# Patient Record
Sex: MALE | Race: WHITE | NOT HISPANIC OR LATINO | Employment: OTHER | URBAN - NONMETROPOLITAN AREA
[De-identification: names, ages, dates, MRNs, and addresses within clinical notes are randomized per-mention and may not be internally consistent; named-entity substitution may affect disease eponyms.]

---

## 2018-01-29 RX ORDER — ALPRAZOLAM 0.5 MG/1
1 TABLET ORAL EVERY 12 HOURS PRN
Refills: 0 | COMMUNITY
Start: 2018-01-23 | End: 2018-01-31 | Stop reason: SDUPTHER

## 2018-01-29 RX ORDER — DOXEPIN HYDROCHLORIDE 50 MG/1
1 CAPSULE ORAL
COMMUNITY
End: 2018-01-31 | Stop reason: SDUPTHER

## 2018-01-29 RX ORDER — PANTOPRAZOLE SODIUM 40 MG/1
1 TABLET, DELAYED RELEASE ORAL DAILY
COMMUNITY
End: 2018-01-31 | Stop reason: SDUPTHER

## 2018-01-29 RX ORDER — RANITIDINE 150 MG/1
1 TABLET ORAL
COMMUNITY
End: 2018-01-31 | Stop reason: SDUPTHER

## 2018-01-31 ENCOUNTER — OFFICE VISIT (OUTPATIENT)
Dept: FAMILY MEDICINE CLINIC | Facility: CLINIC | Age: 63
End: 2018-01-31
Payer: COMMERCIAL

## 2018-01-31 VITALS
BODY MASS INDEX: 33.68 KG/M2 | OXYGEN SATURATION: 97 % | HEART RATE: 68 BPM | SYSTOLIC BLOOD PRESSURE: 124 MMHG | TEMPERATURE: 98.6 F | RESPIRATION RATE: 18 BRPM | WEIGHT: 227.4 LBS | DIASTOLIC BLOOD PRESSURE: 70 MMHG | HEIGHT: 69 IN
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
## 2018-01-31 DIAGNOSIS — Z72.0 TOBACCO ABUSE: ICD-10-CM

## 2018-01-31 DIAGNOSIS — I10 HYPERTENSION, UNSPECIFIED TYPE: Primary | ICD-10-CM

## 2018-01-31 DIAGNOSIS — K21.9 GASTROESOPHAGEAL REFLUX DISEASE WITHOUT ESOPHAGITIS: ICD-10-CM

## 2018-01-31 DIAGNOSIS — K63.5 POLYP OF COLON, UNSPECIFIED PART OF COLON, UNSPECIFIED TYPE: ICD-10-CM

## 2018-01-31 DIAGNOSIS — M25.50 ARTHRALGIA, UNSPECIFIED JOINT: ICD-10-CM

## 2018-01-31 DIAGNOSIS — K25.9 GASTRIC ULCER, UNSPECIFIED CHRONICITY, UNSPECIFIED WHETHER GASTRIC ULCER HEMORRHAGE OR PERFORATION PRESENT: ICD-10-CM

## 2018-01-31 DIAGNOSIS — E53.8 VITAMIN B12 DEFICIENCY: ICD-10-CM

## 2018-01-31 DIAGNOSIS — G47.00 INSOMNIA, UNSPECIFIED TYPE: ICD-10-CM

## 2018-01-31 DIAGNOSIS — Z13.220 SCREENING FOR HYPERLIPIDEMIA: ICD-10-CM

## 2018-01-31 DIAGNOSIS — E55.9 VITAMIN D DEFICIENCY: ICD-10-CM

## 2018-01-31 DIAGNOSIS — F41.9 ANXIETY: ICD-10-CM

## 2018-01-31 DIAGNOSIS — Z13.1 SCREENING FOR DIABETES MELLITUS: ICD-10-CM

## 2018-01-31 PROCEDURE — 3078F DIAST BP <80 MM HG: CPT | Performed by: NURSE PRACTITIONER

## 2018-01-31 PROCEDURE — 99204 OFFICE O/P NEW MOD 45 MIN: CPT | Performed by: NURSE PRACTITIONER

## 2018-01-31 PROCEDURE — 3074F SYST BP LT 130 MM HG: CPT | Performed by: NURSE PRACTITIONER

## 2018-01-31 RX ORDER — IBUPROFEN 800 MG/1
800 TABLET ORAL EVERY 8 HOURS PRN
Qty: 270 TABLET | Refills: 1 | Status: SHIPPED | OUTPATIENT
Start: 2018-01-31 | End: 2019-10-24 | Stop reason: SDUPTHER

## 2018-01-31 RX ORDER — PANTOPRAZOLE SODIUM 40 MG/1
40 TABLET, DELAYED RELEASE ORAL DAILY
Qty: 90 TABLET | Refills: 1 | Status: SHIPPED | OUTPATIENT
Start: 2018-01-31 | End: 2019-05-23 | Stop reason: SDUPTHER

## 2018-01-31 RX ORDER — ERGOCALCIFEROL 1.25 MG/1
1 CAPSULE ORAL
COMMUNITY
Start: 2018-01-06 | End: 2018-01-31 | Stop reason: SDUPTHER

## 2018-01-31 RX ORDER — LISINOPRIL 20 MG/1
1 TABLET ORAL DAILY
COMMUNITY
Start: 2018-01-24 | End: 2018-01-31 | Stop reason: SDUPTHER

## 2018-01-31 RX ORDER — CITALOPRAM 20 MG/1
1 TABLET ORAL DAILY
COMMUNITY
Start: 2017-12-13 | End: 2018-01-31 | Stop reason: SDUPTHER

## 2018-01-31 RX ORDER — RANITIDINE 150 MG/1
150 TABLET ORAL
Qty: 180 TABLET | Refills: 1 | Status: SHIPPED | OUTPATIENT
Start: 2018-01-31 | End: 2019-06-24 | Stop reason: SDUPTHER

## 2018-01-31 RX ORDER — IBUPROFEN 800 MG/1
1 TABLET ORAL EVERY 8 HOURS PRN
COMMUNITY
Start: 2017-12-13 | End: 2018-01-31 | Stop reason: SDUPTHER

## 2018-01-31 RX ORDER — ALPRAZOLAM 0.5 MG/1
0.5 TABLET ORAL EVERY 12 HOURS PRN
Qty: 60 TABLET | Refills: 2 | Status: SHIPPED | OUTPATIENT
Start: 2018-01-31 | End: 2018-08-22 | Stop reason: SDUPTHER

## 2018-01-31 RX ORDER — LISINOPRIL 20 MG/1
20 TABLET ORAL DAILY
Qty: 90 TABLET | Refills: 1 | Status: SHIPPED | OUTPATIENT
Start: 2018-01-31 | End: 2018-08-22 | Stop reason: SDUPTHER

## 2018-01-31 RX ORDER — DOXEPIN HYDROCHLORIDE 50 MG/1
50 CAPSULE ORAL
Qty: 90 CAPSULE | Refills: 1 | Status: SHIPPED | OUTPATIENT
Start: 2018-01-31 | End: 2019-11-27 | Stop reason: ALTCHOICE

## 2018-01-31 RX ORDER — ERGOCALCIFEROL 1.25 MG/1
50000 CAPSULE ORAL
Qty: 3 CAPSULE | Refills: 1 | Status: SHIPPED | OUTPATIENT
Start: 2018-01-31 | End: 2018-11-27 | Stop reason: SDUPTHER

## 2018-01-31 RX ORDER — CITALOPRAM 20 MG/1
20 TABLET ORAL DAILY
Qty: 90 TABLET | Refills: 1 | Status: SHIPPED | OUTPATIENT
Start: 2018-01-31 | End: 2018-11-27 | Stop reason: SDUPTHER

## 2018-01-31 NOTE — PROGRESS NOTES
Assessment/Plan:      Diagnoses and all orders for this visit:    Hypertension, unspecified type  -     CBC and differential; Future  -     Comprehensive metabolic panel; Future  -     TSH, 3rd generation with T4 reflex; Future  -     lisinopril (ZESTRIL) 20 mg tablet; Take 1 tablet (20 mg total) by mouth daily    Anxiety  -     citalopram (CeleXA) 20 mg tablet; Take 1 tablet (20 mg total) by mouth daily  -     ALPRAZolam (XANAX) 0 5 mg tablet; Take 1 tablet (0 5 mg total) by mouth every 12 (twelve) hours as needed for anxiety For anxiety    Insomnia, unspecified type  -     doxepin (SINEquan) 50 mg capsule; Take 1 capsule (50 mg total) by mouth daily at bedtime    Gastroesophageal reflux disease without esophagitis  -     CBC and differential; Future  -     Comprehensive metabolic panel; Future  -     TSH, 3rd generation with T4 reflex; Future  -     pantoprazole (PROTONIX) 40 mg tablet; Take 1 tablet (40 mg total) by mouth daily  -     ranitidine (ZANTAC) 150 mg tablet; Take 1 tablet (150 mg total) by mouth 2 (two) times a day before breakfast and lunch    Tobacco abuse  -     TSH, 3rd generation with T4 reflex; Future    Gastric ulcer, unspecified chronicity, unspecified whether gastric ulcer hemorrhage or perforation present  -     CBC and differential; Future  -     Comprehensive metabolic panel; Future  -     TSH, 3rd generation with T4 reflex; Future  -     pantoprazole (PROTONIX) 40 mg tablet; Take 1 tablet (40 mg total) by mouth daily  -     ranitidine (ZANTAC) 150 mg tablet; Take 1 tablet (150 mg total) by mouth 2 (two) times a day before breakfast and lunch    Polyp of colon, unspecified part of colon, unspecified type  -     Ambulatory referral to Gastroenterology; Future    Screening for hyperlipidemia  -     Lipid panel; Future    Screening for diabetes mellitus  -     Hemoglobin A1c; Future  -     Insulin, fasting; Future    Vitamin D deficiency  -     Vitamin D Panel;  Future  -     ergocalciferol (VITAMIN D2) 50,000 units; Take 1 capsule (50,000 Units total) by mouth every 30 (thirty) days    Vitamin B12 deficiency  -     Vitamin B12; Future    Arthralgia, unspecified joint  -     ibuprofen (MOTRIN) 800 mg tablet; Take 1 tablet (800 mg total) by mouth every 8 (eight) hours as needed for moderate pain    Other orders  -     Discontinue: ALPRAZolam (XANAX) 0 5 mg tablet; Take 1 tablet by mouth every 12 (twelve) hours as needed For anxiety  -     Discontinue: pantoprazole (PROTONIX) 40 mg tablet; Take 1 tablet by mouth daily  -     Discontinue: ranitidine (ZANTAC) 150 mg tablet; Take 1 tablet by mouth 2 (two) times a day before breakfast and lunch  -     Discontinue: doxepin (SINEquan) 50 mg capsule; Take 1 capsule by mouth daily at bedtime  -     Discontinue: citalopram (CeleXA) 20 mg tablet; Take 1 tablet by mouth daily  -     Discontinue: ergocalciferol (VITAMIN D2) 50,000 units; Take 1 capsule by mouth every 30 (thirty) days  -     Discontinue: ibuprofen (MOTRIN) 800 mg tablet; Take 1 tablet by mouth every 8 (eight) hours as needed  -     Discontinue: lisinopril (ZESTRIL) 20 mg tablet; Take 1 tablet by mouth daily          Subjective:     Patient ID: Kena Nassar is a 57 y o  male  Patient presents to office for Initial Physical Exam and to establish care at [address] [place] [address]  Patient denies any problems or concerns at present time  All medications reviewed with patient and states he is tolerating all medications well without any problems  Patient completed Doxycycline regimen treatment for recent tick bite and denies any fever, chills, sweats, generalized muscle aches or joint pain at present time  Review of Systems    GENERAL:  Feels well, denies any significant changes in weight without trying  SKIN:  Denies rashes, lesions, opened areas, wounds, change in moles or any other skin changes  HEENT:  Denies any head injury or headaches      Negative blurred vision, floaters, spots before eyes, infections, or other vision problems  Negative significant changes in vision or hearing  Negative tinnitus, vertigo, or infections  Negative hay fever, sinus trouble, nasal discharge, bloody noses, or problems with smell  Negative sore throat, bleeding gums, ulcers, or sores  Glasses/Contacts: Glasses  Hearing Aids: NO  Dentures/Partials/Implants: NO  NECK:  Denies lumps, goiter, pain, swollen glands, or lymphadenopathy  BREASTS:  Denies lumps, pain, nipple discharge, swelling, redness, or any other changes  RESPIRATORY:  Denies cough, wheezing, shortness of breath, dyspnea, or orthopnea  CARDIOVASCULAR:  Denies chest pain or palpitations  GASTROINTESTINAL:  Appetite good, denies nausea, vomiting, Hx of indigestion and gastric ulcer controlled with medications  Bowel movements normal occurring about once daily or every other day  Denies black tarry or bloody Bms  URINARY:  Denies frequency, incontinence, dysuria, hematuria, nocturia, or recent flank pain  GENITAL:  Denies penile discharge, ulcerations, lesions, or other problems  PERIPHERAL VASCULAR:  Denies varicosities, swelling, skin changes, or pain  MUSCULOSKELETAL:  Denies back, joint, or muscle pain  Negative problems with mobility or movement  PSYCHIATRIC:  Denies problems with depression, Feels anxiety is controlled with medications, No anger or other psychiatric symptoms  NEUROLOGIC:  Denies fainting, dizziness, memory problems, seizures, tingling, motor or sensory loss  HEMATOLOGIC:  Denies easy bruising, bleeding, or anemia  ENDOCRINE:  Denies thyroid problems, temperature intolerance, excessive sweating, or other endocrine symptoms  Objective:     Physical Exam    GENERAL:  Appears well nourished, well groomed, in no acute distress  SKIN:  Palms warm, dry, color good  Nails without clubbing or cyanosis  No lesions, ulcerations, or wounds  HEAD:  Hair is average texture    Scalp without lesions, normocephalic, and atraumatic  EYES:  Visual fields full by confrontation  Conjunctiva pink, sclera white, PERRLA  Extraocular movements intact  Disc margins sharp, without hemorrhages or exudate  No arteriolar narrowing or A-V nicking  EARS:  B/L ear canals clear  B/L TMs clear with + light reflex  Acuity good to whispered voice  [place] midline  AC>BC  NOSE: Mucosa pink, moist, septum midline  Negative sinus tenderness  B/L turbinates pink, moist, non-edematous without exudate  MOUTH:  Oral mucosa pink  Pharynx pink, moist, without swelling, redness, or exudate  Dentition ok  Tonsils without enlargement or exudate  Tongue midline  NECK:  Supple, trachea midline, Negative thyromegaly, lymphadenopathy, or swollen glands  LYMPH NODES:  Negative enlargement of neck, axillary, epitrochlear, or inguinal nodes  THORAX/LUNGS  Thorax symmetric with good excursion  Lungs resonant  Breath sounds vesicular with no added sounds  Diaphragm descends within normal limits  CARDIOVASCULAR:  Carotid upstrokes brisk and without bruits  Apical impulse discrete and tapping, barely palpable in the 5th ICS/MCL  Normal S1 and Normal S2, Negative S3 or S4  Negative murmurs, thrills, lifts, or heaves  ABDOMEN:  Protuberant, bowel sounds normal active x 4 quadrants  Negative tenderness  Negative masses  Negative hepatomegaly  Negative splenomegaly  Negative costovertebral tenderness  EXTREMITIES:  Warm, calves supple, non-tender, negative for edema  Negative stasis pigmentation or ulcers  +2 pulses throughout  MUSCULOSKELETAL:  Negative joint deformities  Good range of motion in hands, wrists, elbows, shoulders, spine, hips, knees, and ankles  Negative spinal curvature  NEUROLOGICAL:  Mental status:  Awake, alert, and oriented to person, place, time, and event  Normal thought processes  Cranial Nerves:  II-XII intact     Motor:  Good muscle bulk and tone   Strength: 5/5 throughout  Cerebellar:  Rapid alternating movements, point-to-point movements intact  Gait stable and fluid  Sensory:  Pinprick, light touch, position sense, vibration, and stereogenesis intact  Romberg: Negative  Reflexes: +2 throughout

## 2018-01-31 NOTE — PATIENT INSTRUCTIONS
Low-Sodium Diet   AMBULATORY CARE:   A low-sodium diet  limits foods that are high in sodium (salt)  You will need to follow a low-sodium diet if you have high blood pressure, kidney disease, or heart failure  You may also need to follow this diet if you have a condition that is causing your body to retain (hold) extra fluid  You may need to limit the amount of sodium you eat to 1,500 mg  Ask your healthcare provider how much sodium you can have each day  How to use food labels to choose foods that are low in sodium:  Read food labels to find the amount of sodium they contain  The amount of sodium is listed in milligrams (mg)  The % Daily Value (DV) column tells you how much of your daily needs are met by 1 serving of the food for each nutrient listed  Choose foods that have less than 5% of the DV of sodium  These foods are considered low in sodium  Foods that have 20% or more of the DV of sodium are considered high in sodium  Some food labels may also list any of the following terms that tell you about the sodium content in the food:  · Sodium-free:  Less than 5 mg in each serving    · Very low sodium:  35 mg of sodium or less in each serving    · Low sodium:  140 mg of sodium or less in each serving    · Reduced sodium: At least 25% less sodium in each serving than the regular type    · Light in sodium:  50% less sodium in each serving    · Unsalted or no added salt:  No extra salt is added during processing (the food may still contain sodium)  Foods to avoid:  Salty foods are high in sodium   You should avoid the following:  · Processed foods:      ¨ Mixes for cornbread, biscuits, cake, and pudding     ¨ Instant foods, such as potatoes, cereals, noodles, and rice     ¨ Packaged foods, such as bread stuffing, rice and pasta mixes, snack dip mixes, and macaroni and cheese     ¨ Canned foods, such as canned vegetables, soups, broths, sauces, and vegetable or tomato juice    ¨ Snack foods, such as salted chips, popcorn, pretzels, pork rinds, salted crackers, and salted nuts    ¨ Frozen foods, such as dinners, entrees, vegetables with sauces, and breaded meats    ¨ Sauerkraut, pickled vegetables, and other foods prepared in brine    · Meats and cheeses:      ¨ Smoked or cured meat, such as corned beef, michaels, ham, hot dogs, and sausage    ¨ Canned meats or spreads, such as potted meats, sardines, anchovies, and imitation seafood    ¨ Deli or lunch meats, such as bologna, ham, turkey, and roast beef    ¨ Processed cheese, such as American cheese and cheese spreads    · Condiments, sauces, and seasonings:      ¨ Salt (¼ teaspoon of salt contains 575 mg of sodium)    ¨ Seasonings made with salt, such as garlic salt, celery salt, onion salt, and seasoned salt    ¨ Regular soy sauce, barbecue sauce, teriyaki sauce, steak sauce, Worcestershire sauce, and most flavored vinegars    ¨ Canned gravy and mixes     ¨ Regular condiments, such as mustard, ketchup, and salad dressings    ¨ Pickles and olives    ¨ Meat tenderizers and monosodium glutamate (MSG)  Foods to include:  Read the food label to find the exact amount of sodium in each serving  · Bread and cereal:  Try to choose breads with less than 80 mg of sodium per serving  ¨ Bread, roll, maranda, tortilla, or unsalted crackers  ¨ Ready-to-eat cereals with less than 5% DV of sodium (examples include shredded wheat and puffed rice)    ¨ Pasta    · Vegetables and fruits:      ¨ Unsalted fresh, frozen, or canned vegetables    ¨ Fresh, frozen, or canned fruits    ¨ Fruit juice    · Dairy:  One serving has about 150 mg of sodium  ¨ Milk, all types    ¨ Yogurt    ¨ Hard cheese, such as cheddar, Swiss, [employer], or mozzarella    · Meat and other protein foods:  Some raw meats may have added sodium       ¨ Plain meats, fish, and poultry     ¨ Eggs    · Other foods:      ¨ Homemade pudding    ¨ Unsalted nuts, popcorn, or pretzels    ¨ Unsalted butter or margarine  Ways to decrease sodium:   · Add spices and herbs to foods instead of salt during cooking  Use salt-free seasonings to add flavor to foods  Examples include onion powder, garlic powder, basil, grubbs powder, paprika, and parsley  Try lemon or lime juice or vinegar to give foods a tart flavor  Use hot peppers, pepper, or cayenne pepper to add a spicy flavor to foods  · Do not keep a salt shaker at your kitchen table  This may help keep you from adding salt to food at the table  It may take time to get used to enjoying the natural flavor of food instead of adding salt  Talk to your healthcare provider before you use salt substitutes  Some salt substitutes have a high amount of potassium and need to be avoided if you have kidney disease  · Choose low-sodium foods at restaurants  Meals from restaurants are often high in sodium  Some restaurants have nutrition information on the menu that tells you the amount of sodium in their foods  If possible, ask for your food to be prepared with less, or no salt  · Shop for unsalted or low-sodium foods and snacks at the grocery store  Examples include unsalted or low-sodium broths, soups, and canned vegetables  Choose fresh or frozen vegetables instead  Choose unsalted nuts or seeds or fresh fruits or vegetables as snacks  Read food labels and choose salt-free, very low-sodium, or low-sodium foods  © 2017 [address] [employer] Information is for End User's use only and may not be sold, redistributed or otherwise used for commercial purposes  All illustrations and images included in CareNotes® are the copyrighted property of [employer]  or Lalo Murray  The above information is an  only  It is not intended as medical advice for individual conditions or treatments  Talk to your doctor, nurse or pharmacist before following any medical regimen to see if it is safe and effective for you    Wellness Visit for Adults   AMBULATORY CARE:   A wellness visit  is when you see your healthcare provider to get screened for health problems  You can also get advice on how to stay healthy  Write down your questions so you remember to ask them  Ask your healthcare provider how often you should have a wellness visit  What happens at a wellness visit:  Your healthcare provider will ask about your health, and your family history of health problems  This includes high blood pressure, heart disease, and cancer  He or she will ask if you have symptoms that concern you, if you smoke, and about your mood  You may also be asked about your intake of medicines, supplements, food, and alcohol  Any of the following may be done:  · Your weight  will be checked  Your height may also be checked so your body mass index (BMI) can be calculated  Your BMI shows if you are at a healthy weight  · Your blood pressure  and heart rate will be checked  Your temperature may also be checked  · Blood and urine tests  may be done  Blood tests may be done to check your cholesterol levels  Abnormal cholesterol levels increase your risk for heart disease and stroke  You may also need a blood or urine test to check for diabetes if you are at increased risk  Urine tests may be done to look for signs of an infection or kidney disease  · A physical exam  includes checking your heartbeat and lungs with a stethoscope  Your healthcare provider may also check your skin to look for sun damage  · Screening tests  may be recommended  A screening test is done to check for diseases that may not cause symptoms  The screening tests you may need depend on your age, gender, family history, and lifestyle habits  For example, colorectal screening may be recommended if you are 48years old or older  Screening tests you need if you are a woman:   · A Pap smear  is used to screen for cervical cancer  Pap smears are usually done every 3 to 5 years depending on your age   You may need them more often if you have had abnormal Pap smear test results in the past  Ask your healthcare provider how often you should have a Pap smear  · A mammogram  is an x-ray of your breasts to screen for breast cancer  Experts recommend mammograms every 2 years starting at age 48 years  You may need a mammogram at age 52 years or younger if you have an increased risk for breast cancer  Talk to your healthcare provider about when you should start having mammograms and how often you need them  Vaccines you may need:   · Get an influenza vaccine  every year  The influenza vaccine protects you from the flu  Several types of viruses cause the flu  The viruses change over time, so new vaccines are made each year  · Get a tetanus-diphtheria (Td) booster vaccine  every 10 years  This vaccine protects you against tetanus and diphtheria  Tetanus is a severe infection that may cause painful muscle spasms and lockjaw  Diphtheria is a severe bacterial infection that causes a thick covering in the back of your mouth and throat  · Get a human papillomavirus (HPV) vaccine  if you are female and aged 23 to 32 or male 23 to 24 and never received it  This vaccine protects you from HPV infection  HPV is the most common infection spread by sexual contact  HPV may also cause vaginal, penile, and anal cancers  · Get a pneumococcal vaccine  if you are aged 72 years or older  The pneumococcal vaccine is an injection given to protect you from pneumococcal disease  Pneumococcal disease is an infection caused by pneumococcal bacteria  The infection may cause pneumonia, meningitis, or an ear infection  · Get a shingles vaccine  if you are aged 61 or older, even if you have had shingles before  The shingles vaccine is an injection to protect you from the varicella-zoster virus  This is the same virus that causes chickenpox  Shingles is a painful rash that develops in people who had chickenpox or have been exposed to the virus    How to eat healthy:  My Plate is a model for planning healthy meals  It shows the types and amounts of foods that should go on your plate  Fruits and vegetables make up about half of your plate, and grains and protein make up the other half  A serving of dairy is included on the side of your plate  The amount of calories and serving sizes you need depends on your age, gender, weight, and height  Examples of healthy foods are listed below:  · Eat a variety of vegetables  such as dark green, red, and orange vegetables  You can also include canned vegetables low in sodium (salt) and frozen vegetables without added butter or sauces  · Eat a variety of fresh fruits , canned fruit in 100% juice, frozen fruit, and dried fruit  · Include whole grains  At least half of the grains you eat should be whole grains  Examples include whole-wheat bread, wheat pasta, brown rice, and whole-grain cereals such as oatmeal     · Eat a variety of protein foods such as seafood (fish and shellfish), lean meat, and poultry without skin (turkey and chicken)  Examples of lean meats include pork leg, shoulder, or tenderloin, and beef round, sirloin, tenderloin, and extra lean ground beef  Other protein foods include eggs and egg substitutes, beans, peas, soy products, nuts, and seeds  · Choose low-fat dairy products such as skim or 1% milk or low-fat yogurt, cheese, and cottage cheese  · Limit unhealthy fats  such as butter, hard margarine, and shortening  Exercise:  Exercise at least 30 minutes per day on most days of the week  Some examples of exercise include walking, biking, dancing, and swimming  You can also fit in more physical activity by taking the stairs instead of the elevator or parking farther away from stores  Include muscle strengthening activities 2 days each week  Regular exercise provides many health benefits   It helps you manage your weight, and decreases your risk for type 2 diabetes, heart disease, stroke, and high blood pressure  Exercise can also help improve your mood  Ask your healthcare provider about the best exercise plan for you  General health and safety guidelines:   · Do not smoke  Nicotine and other chemicals in cigarettes and cigars can cause lung damage  Ask your healthcare provider for information if you currently smoke and need help to quit  E-cigarettes or smokeless tobacco still contain nicotine  Talk to your healthcare provider before you use these products  · Limit alcohol  A drink of alcohol is 12 ounces of beer, 5 ounces of wine, or 1½ ounces of liquor  · Lose weight, if needed  Being overweight increases your risk of certain health conditions  These include heart disease, high blood pressure, type 2 diabetes, and certain types of cancer  · Protect your skin  Do not sunbathe or use tanning beds  Use sunscreen with a SPF 15 or higher  Apply sunscreen at least 15 minutes before you go outside  Reapply sunscreen every 2 hours  Wear protective clothing, hats, and sunglasses when you are outside  · Drive safely  Always wear your seatbelt  Make sure everyone in your car wears a seatbelt  A seatbelt can save your life if you are in an accident  Do not use your cell phone when you are driving  This could distract you and cause an accident  Pull over if you need to make a call or send a text message  · Practice safe sex  Use latex condoms if are sexually active and have more than one partner  Your healthcare provider may recommend screening tests for sexually transmitted infections (STIs)  · Wear helmets, lifejackets, and protective gear  Always wear a helmet when you ride a bike or motorcycle, go skiing, or play sports that could cause a head injury  Wear protective equipment when you play sports  Wear a lifejacket when you are on a boat or doing water sports    © 2017 [employer] Information is for End User's use only and may not be sold, redistributed or otherwise used for commercial purposes  All illustrations and images included in CareNotes® are the copyrighted property of [employer]  or Lalo Murray  The above information is an  only  It is not intended as medical advice for individual conditions or treatments  Talk to your doctor, nurse or pharmacist before following any medical regimen to see if it is safe and effective for you  Cigarette Smoking and Your Health   AMBULATORY CARE:   Risks to your health if you smoke:  Nicotine and other chemicals found in tobacco damage every cell in your body  Even if you are a light smoker, you have an increased risk for cancer, heart disease, and lung disease  If you are pregnant or have diabetes, smoking increases your risk for complications  Benefits to your health if you stop smoking:   · You decrease respiratory symptoms such as coughing, wheezing, and shortness of breath  · You reduce your risk for cancers of the lung, mouth, throat, kidney, bladder, pancreas, stomach, and cervix  If you already have cancer, you increase the benefits of chemotherapy  You also reduce your risk for cancer returning or a second cancer from developing  · You reduce your risk for heart disease, blood clots, heart attack, and stroke  · You reduce your risk for lung infections, and diseases such as pneumonia, asthma, chronic bronchitis, and emphysema  · Your circulation improves  More oxygen can be delivered to your body  If you have diabetes, you lower your risk for complications, such as kidney, artery, and eye diseases  You also lower your risk for nerve damage  Nerve damage can lead to amputations, poor vision, and blindness  · You improve your body's ability to heal and to fight infections  Benefits to the health of others if you stop smoking:  Tobacco is harmful to nonsmokers who breathe in your secondhand smoke   The following are ways the health of others around you may improve when you stop smoking:  · You lower the risks for lung cancer and heart disease in nonsmoking adults  · If you are pregnant, you lower the risk for miscarriage, early delivery, low birth weight, and stillbirth  You also lower your baby's risk for SIDS, obesity, developmental delay, and neurobehavioral problems, such as ADHD  · If you have children, you lower their risk for ear infections, colds, pneumonia, bronchitis, and asthma  For more information and support to stop smoking:   · [employer]  Phone: 5- 565 - [phone]  Web Address: www [employer]  Follow up with your healthcare provider as directed:  Write down your questions so you remember to ask them during your visits  © 2017 [facility] Information is for End User's use only and may not be sold, redistributed or otherwise used for commercial purposes  All illustrations and images included in CareNotes® are the copyrighted property of [employer]  or Lalo Murray  The above information is an  only  It is not intended as medical advice for individual conditions or treatments  Talk to your doctor, nurse or pharmacist before following any medical regimen to see if it is safe and effective for you

## 2018-02-23 LAB — HBA1C MFR BLD HPLC: 5.1 %

## 2018-07-18 ENCOUNTER — OFFICE VISIT (OUTPATIENT)
Dept: FAMILY MEDICINE CLINIC | Facility: CLINIC | Age: 63
End: 2018-07-18
Payer: COMMERCIAL

## 2018-07-18 VITALS
HEART RATE: 75 BPM | DIASTOLIC BLOOD PRESSURE: 68 MMHG | WEIGHT: 225.8 LBS | BODY MASS INDEX: 33.44 KG/M2 | HEIGHT: 69 IN | RESPIRATION RATE: 18 BRPM | OXYGEN SATURATION: 97 % | SYSTOLIC BLOOD PRESSURE: 122 MMHG | TEMPERATURE: 98.8 F

## 2018-07-18 DIAGNOSIS — K21.00 GASTROESOPHAGEAL REFLUX DISEASE WITH ESOPHAGITIS: Primary | ICD-10-CM

## 2018-07-18 DIAGNOSIS — F32.89 OTHER DEPRESSION: ICD-10-CM

## 2018-07-18 DIAGNOSIS — Z11.59 NEED FOR HEPATITIS B SCREENING TEST: ICD-10-CM

## 2018-07-18 DIAGNOSIS — G47.09 OTHER INSOMNIA: ICD-10-CM

## 2018-07-18 DIAGNOSIS — Z13.29 SCREENING FOR HYPOTHYROIDISM: ICD-10-CM

## 2018-07-18 DIAGNOSIS — E53.8 VITAMIN B12 DEFICIENCY: ICD-10-CM

## 2018-07-18 DIAGNOSIS — Z72.0 TOBACCO USE: ICD-10-CM

## 2018-07-18 DIAGNOSIS — Z23 NEED FOR TDAP VACCINATION: ICD-10-CM

## 2018-07-18 DIAGNOSIS — Z12.2 ENCOUNTER FOR SCREENING FOR LUNG CANCER: ICD-10-CM

## 2018-07-18 DIAGNOSIS — E55.9 VITAMIN D DEFICIENCY: ICD-10-CM

## 2018-07-18 DIAGNOSIS — Z11.4 SCREENING FOR HIV (HUMAN IMMUNODEFICIENCY VIRUS): ICD-10-CM

## 2018-07-18 DIAGNOSIS — F41.9 ANXIETY: ICD-10-CM

## 2018-07-18 DIAGNOSIS — Z13.220 SCREENING FOR HYPERLIPIDEMIA: ICD-10-CM

## 2018-07-18 DIAGNOSIS — R73.9 HYPERGLYCEMIA: ICD-10-CM

## 2018-07-18 DIAGNOSIS — Z13.0 SCREENING FOR DEFICIENCY ANEMIA: ICD-10-CM

## 2018-07-18 DIAGNOSIS — K29.50 CHRONIC GASTRITIS WITHOUT BLEEDING, UNSPECIFIED GASTRITIS TYPE: ICD-10-CM

## 2018-07-18 PROBLEM — F32.A DEPRESSION: Status: ACTIVE | Noted: 2018-07-18

## 2018-07-18 PROCEDURE — 90715 TDAP VACCINE 7 YRS/> IM: CPT | Performed by: NURSE PRACTITIONER

## 2018-07-18 PROCEDURE — 3008F BODY MASS INDEX DOCD: CPT | Performed by: NURSE PRACTITIONER

## 2018-07-18 PROCEDURE — 99214 OFFICE O/P EST MOD 30 MIN: CPT | Performed by: NURSE PRACTITIONER

## 2018-07-18 PROCEDURE — 90471 IMMUNIZATION ADMIN: CPT | Performed by: NURSE PRACTITIONER

## 2018-07-18 NOTE — PATIENT INSTRUCTIONS
Wellness Visit for Adults   WHAT YOU NEED TO KNOW:   What is a wellness visit? A wellness visit is when you see your healthcare provider to get screened for health problems  You can also get advice on how to stay healthy  Write down your questions so you remember to ask them  Ask your healthcare provider how often you should have a wellness visit  What happens at a wellness visit? Your healthcare provider will ask about your health, and your family history of health problems  This includes high blood pressure, heart disease, and cancer  He or she will ask if you have symptoms that concern you, if you smoke, and about your mood  You may also be asked about your intake of medicines, supplements, food, and alcohol  Any of the following may be done:  · Your weight  will be checked  Your height may also be checked so your body mass index (BMI) can be calculated  Your BMI shows if you are at a healthy weight  · Your blood pressure  and heart rate will be checked  Your temperature may also be checked  · Blood and urine tests  may be done  Blood tests may be done to check your cholesterol levels  Abnormal cholesterol levels increase your risk for heart disease and stroke  You may also need a blood or urine test to check for diabetes if you are at increased risk  Urine tests may be done to look for signs of an infection or kidney disease  · A physical exam  includes checking your heartbeat and lungs with a stethoscope  Your healthcare provider may also check your skin to look for sun damage  · Screening tests  may be recommended  A screening test is done to check for diseases that may not cause symptoms  The screening tests you may need depend on your age, gender, family history, and lifestyle habits  For example, colorectal screening may be recommended if you are 48years old or older  What screening tests do I need if I am a woman? · A Pap smear  is used to screen for cervical cancer   Pap smears are usually done every 3 to 5 years depending on your age  You may need them more often if you have had abnormal Pap smear test results in the past  Ask your healthcare provider how often you should have a Pap smear  · A mammogram  is an x-ray of your breasts to screen for breast cancer  Experts recommend mammograms every 2 years starting at age 48 years  You may need a mammogram at age 52 years or younger if you have an increased risk for breast cancer  Talk to your healthcare provider about when you should start having mammograms and how often you need them  What vaccines might I need? · Get an influenza vaccine  every year  The influenza vaccine protects you from the flu  Several types of viruses cause the flu  The viruses change over time, so new vaccines are made each year  · Get a tetanus-diphtheria (Td) booster vaccine  every 10 years  This vaccine protects you against tetanus and diphtheria  Tetanus is a severe infection that may cause painful muscle spasms and lockjaw  Diphtheria is a severe bacterial infection that causes a thick covering in the back of your mouth and throat  · Get a human papillomavirus (HPV) vaccine  if you are female and aged 23 to 32 or male 23 to 24 and never received it  This vaccine protects you from HPV infection  HPV is the most common infection spread by sexual contact  HPV may also cause vaginal, penile, and anal cancers  · Get a pneumococcal vaccine  if you are aged 72 years or older  The pneumococcal vaccine is an injection given to protect you from pneumococcal disease  Pneumococcal disease is an infection caused by pneumococcal bacteria  The infection may cause pneumonia, meningitis, or an ear infection  · Get a shingles vaccine  if you are aged 61 or older, even if you have had shingles before  The shingles vaccine is an injection to protect you from the varicella-zoster virus  This is the same virus that causes chickenpox   Shingles is a painful rash that develops in people who had chickenpox or have been exposed to the virus  How can I eat healthy? My Plate is a model for planning healthy meals  It shows the types and amounts of foods that should go on your plate  Fruits and vegetables make up about half of your plate, and grains and protein make up the other half  A serving of dairy is included on the side of your plate  The amount of calories and serving sizes you need depends on your age, gender, weight, and height  Examples of healthy foods are listed below:  · Eat a variety of vegetables  such as dark green, red, and orange vegetables  You can also include canned vegetables low in sodium (salt) and frozen vegetables without added butter or sauces  · Eat a variety of fresh fruits , canned fruit in 100% juice, frozen fruit, and dried fruit  · Include whole grains  At least half of the grains you eat should be whole grains  Examples include whole-wheat bread, wheat pasta, brown rice, and whole-grain cereals such as oatmeal     · Eat a variety of protein foods such as seafood (fish and shellfish), lean meat, and poultry without skin (turkey and chicken)  Examples of lean meats include pork leg, shoulder, or tenderloin, and beef round, sirloin, tenderloin, and extra lean ground beef  Other protein foods include eggs and egg substitutes, beans, peas, soy products, nuts, and seeds  · Choose low-fat dairy products such as skim or 1% milk or low-fat yogurt, cheese, and cottage cheese  · Limit unhealthy fats  such as butter, hard margarine, and shortening  How much exercise do I need? Exercise at least 30 minutes per day on most days of the week  Some examples of exercise include walking, biking, dancing, and swimming  You can also fit in more physical activity by taking the stairs instead of the elevator or parking farther away from stores  Include muscle strengthening activities 2 days each week  Regular exercise provides many health benefits  It helps you manage your weight, and decreases your risk for type 2 diabetes, heart disease, stroke, and high blood pressure  Exercise can also help improve your mood  Ask your healthcare provider about the best exercise plan for you  What are some general health and safety guidelines I should follow? · Do not smoke  Nicotine and other chemicals in cigarettes and cigars can cause lung damage  Ask your healthcare provider for information if you currently smoke and need help to quit  E-cigarettes or smokeless tobacco still contain nicotine  Talk to your healthcare provider before you use these products  · Limit alcohol  A drink of alcohol is 12 ounces of beer, 5 ounces of wine, or 1½ ounces of liquor  · Lose weight, if needed  Being overweight increases your risk of certain health conditions  These include heart disease, high blood pressure, type 2 diabetes, and certain types of cancer  · Protect your skin  Do not sunbathe or use tanning beds  Use sunscreen with a SPF 15 or higher  Apply sunscreen at least 15 minutes before you go outside  Reapply sunscreen every 2 hours  Wear protective clothing, hats, and sunglasses when you are outside  · Drive safely  Always wear your seatbelt  Make sure everyone in your car wears a seatbelt  A seatbelt can save your life if you are in an accident  Do not use your cell phone when you are driving  This could distract you and cause an accident  Pull over if you need to make a call or send a text message  · Practice safe sex  Use latex condoms if are sexually active and have more than one partner  Your healthcare provider may recommend screening tests for sexually transmitted infections (STIs)  · Wear helmets, lifejackets, and protective gear  Always wear a helmet when you ride a bike or motorcycle, go skiing, or play sports that could cause a head injury  Wear protective equipment when you play sports   Wear a lifejacket when you are on a boat or doing water sports  CARE AGREEMENT:   You have the right to help plan your care  Learn about your health condition and how it may be treated  Discuss treatment options with your caregivers to decide what care you want to receive  You always have the right to refuse treatment  The above information is an  only  It is not intended as medical advice for individual conditions or treatments  Talk to your doctor, nurse or pharmacist before following any medical regimen to see if it is safe and effective for you  © 2017 [employer]  Information is for End User's use only and may not be sold, redistributed or otherwise used for commercial purposes  All illustrations and images included in CareNotes® are the copyrighted property of A D A M , Inc  or [employer]  PDMP Checked and WNL  Anxiety   WHAT YOU NEED TO KNOW:   What do I need to know about anxiety? Anxiety is a condition that causes you to feel extremely worried or nervous  The feelings are so strong that they can cause problems with your daily activities or sleep  Anxiety may be triggered by something you fear, or it may happen without a cause  Family or work stress, smoking, caffeine, and alcohol can increase your risk for anxiety  Certain medicines or health conditions can also increase your risk  Anxiety can become a long-term condition if it is not managed or treated  What other common signs and symptoms may occur with anxiety? · Fatigue or muscle tightness     · Shaking, restlessness, or irritability     · Problems focusing     · Trouble sleeping     · Feeling jumpy, easily startled, or dizzy     · Rapid heartbeat or shortness of breath  What do I need to tell my healthcare provider about my anxiety? Tell your healthcare provider when your symptoms began and what triggers them  Tell your provider if anxiety affects your daily activities   Your provider will also ask about your medical history and if you have family members with a similar condition  Tell your provider about your past and present alcohol, nicotine, or drug use  What can I do to manage anxiety? You may get medicines to help you feel calm and relaxed, and to decrease your symptoms  Medicines are usually given together with therapy or other treatments  The following can help you manage anxiety:  · Talk to someone about your anxiety  Your healthcare provider may suggest counseling  Cognitive behavioral therapy can help you understand and change how you react to events that trigger your symptoms  You might feel more comfortable talking with a friend or family member about your anxiety  Choose someone you know will be supportive and encouraging  · Find ways to relax  Activities such as exercise, meditation, or listening to music can help you relax  Spend time with friends, or do things you enjoy  · Practice deep breathing  Deep breathing can help you relax when you feel anxious  Focus on taking slow, deep breaths several times a day, or during an anxiety attack  Breathe in through your nose and out through your mouth  · Create a regular sleep routine  Regular sleep can help you feel calmer during the day  Go to sleep and wake up at the same times every day  Do not watch television or use the computer right before bed  Your room should be comfortable, dark, and quiet  · Eat a variety of healthy foods  Healthy foods include fruits, vegetables, low-fat dairy products, lean meats, fish, whole-grain breads, and cooked beans  Healthy foods can help you feel less anxious and have more energy  · Exercise regularly  Exercise can increase your energy level  Exercise may also lift your mood and help you sleep better  Your healthcare provider can help you create an exercise plan  · Do not smoke  Nicotine and other chemicals in cigarettes and cigars can increase anxiety   Ask your healthcare provider for information if you currently smoke and need help to quit  E-cigarettes or smokeless tobacco still contain nicotine  Talk to your healthcare provider before you use these products  · Do not have caffeine  Caffeine can make your symptoms worse  Do not have foods or drinks that are meant to increase your energy level  · Limit or do not drink alcohol  Ask your healthcare provider if alcohol is safe for you  You may not be able to drink alcohol if you take certain anxiety or depression medicines  Limit alcohol to 1 drink per day if you are a woman  Limit alcohol to 2 drinks per day if you are a man  A drink of alcohol is 12 ounces of beer, 5 ounces of wine, or 1½ ounces of liquor  · Do not use drugs  Drugs can make your anxiety worse  It can also make anxiety hard to manage  Talk to your healthcare provider if you use drugs and want help to quit  Call 911 if:   · You have chest pain, tightness, or heaviness that may spread to your shoulders, arms, jaw, neck, or back  · You feel like hurting yourself or someone else  When should I contact my healthcare provider? · Your symptoms get worse or do not get better with treatment  · Your anxiety keeps you from doing your regular daily activities  · You have new symptoms since your last visit  · You have questions or concerns about your condition or care  CARE AGREEMENT:   You have the right to help plan your care  Learn about your health condition and how it may be treated  Discuss treatment options with your caregivers to decide what care you want to receive  You always have the right to refuse treatment  The above information is an  only  It is not intended as medical advice for individual conditions or treatments  Talk to your doctor, nurse or pharmacist before following any medical regimen to see if it is safe and effective for you    © 2017 [employer] Information is for End User's use only and may not be sold, redistributed or otherwise used for commercial purposes  All illustrations and images included in CareNotes® are the copyrighted property of A D A M , Inc  or Lalo Murray  Diphtheria/Acellular Pertussis/Tetanus Booster Vaccine (Tdap) (By injection)   Pertussis Vaccine, Acellular (per-TUS-iss VAX-een, b-JASI-lwl-lar), Reduced Diphtheria Toxoid (ree-DOOST dif-THEER-ee-a TOX-oyd), Tetanus Toxoid (TET-a-nus TOX-oyd)  Protects against infections caused by tetanus (lockjaw), diphtheria, or pertussis (whooping cough)  This is a booster vaccine  Brand Name(s): Adacel, Boostrix   There may be other brand names for this medicine  When This Medicine Should Not Be Used: You should not receive this vaccine if you have had an allergic reaction to the separate or combined tetanus, diphtheria, or pertussis vaccine  You should not receive this vaccine if you have had seizures, mental changes, or any other serious reaction within 7 days after you received a pertussis vaccine  How to Use This Medicine:   Injectable  · A nurse or other health provider will give you this medicine  · Your doctor will prescribe your exact dose and tell you how often it should be given  This medicine is given as a shot into one of your muscles  · You may receive other vaccines at the same time as this one, but in a different body area  You should receive patient instructions for all of the vaccines  Talk to your doctor or nurse if you have questions  · Read and follow the patient instructions that come with this medicine  Talk to your doctor or pharmacist if you have any questions  Drugs and Foods to Avoid:   Ask your doctor or pharmacist before using any other medicine, including over-the-counter medicines, vitamins, and herbal products  · Make sure the doctor knows if you are receiving a treatment or medicine that weakens your immune system   This includes radiation treatment, steroid medicines (such as dexamethasone, hydrocortisone, methylprednisolone, prednisolone, prednisone, Medrol®), or cancer medicines  Warnings While Using This Medicine:   · Make sure your doctor knows if you are pregnant or breastfeeding or have epilepsy, a weak immune system, or a history of a stroke  Tell your doctor if you are sick or have a fever  · Tell your doctor about any reaction you had after you received a vaccine  This includes fainting, seizures, a fever over 105 degrees F, or severe redness or swelling where the shot was given  Tell your doctor if you have a history of Guillain-Barré syndrome after you received a vaccine with tetanus  · Call your doctor right away if you faint or have vision changes, numbness or tingling in your arms, hands, or feet, or a seizure after you receive this vaccine  · Tell your doctor if you have an allergy to latex  The syringes may contain dry natural latex rubber  · This vaccine will not treat an active infection  If you have a diphtheria, tetanus, or pertussis infection, you will need medicine to treat the infection  Possible Side Effects While Using This Medicine:   Call your doctor right away if you notice any of these side effects:  · Allergic reaction: Itching or hives, swelling in your face or hands, swelling or tingling in your mouth or throat, chest tightness, trouble breathing  · Changes in vision  · Fever over 105 degrees F  · Lightheadedness or fainting  · Numbness, tingling, or burning pain in your hands, arms, legs, or feet  · Seizures  · Sudden numbness or weakness in your arms or legs  · Severe pain, redness, or swelling where the shot was given  If you notice these less serious side effects, talk with your doctor:   · Headache  · Mild pain, redness, or swelling where the shot was given  · Nausea, vomiting, diarrhea, or stomach pain  · Tiredness  If you notice other side effects that you think are caused by this medicine, tell your doctor  Call your doctor for medical advice about side effects   You may report side effects to FDA at 1-800-FDA-1088  © 2017 [address] [employer] Information is for End User's use only and may not be sold, redistributed or otherwise used for commercial purposes  The above information is an  only  It is not intended as medical advice for individual conditions or treatments  Talk to your doctor, nurse or pharmacist before following any medical regimen to see if it is safe and effective for you

## 2018-07-18 NOTE — PROGRESS NOTES
Assessment/Plan:      Diagnoses and all orders for this visit:    Gastroesophageal reflux disease with esophagitis    Screening for deficiency anemia  -     CBC and differential; Future    Screening for hyperlipidemia  -     Lipid panel; Future    Screening for hypothyroidism  -     TSH baseline; Future    Hyperglycemia  -     Comprehensive metabolic panel; Future  -     Hemoglobin A1C; Future  -     Insulin, fasting; Future    Vitamin B12 deficiency  -     Vitamin B12; Future    Vitamin D deficiency  -     Vitamin D 25 hydroxy; Future    Anxiety    Other depression    Other insomnia    Chronic gastritis without bleeding, unspecified gastritis type    Need for Tdap vaccination  -     TDAP VACCINE GREATER THAN OR EQUAL TO 6YO IM    Encounter for screening for lung cancer  -     XR chest pa & lateral; Future    Tobacco use  -     XR chest pa & lateral; Future    Screening for HIV (human immunodeficiency virus)  -     HIV 1/2 AG-AB combo; Future    Need for hepatitis B screening test  -     Chronic Hepatitis Panel; Future          Subjective:     Patient ID: Kayley Davis is a 57 y o  male  Patient presents to office for follow up and recheck  Complete medical history and medications reviewed with patient and tolerating all medications well without any problems or concerns at the present time  Patient still continues with B/L Shoulder Pain due to Hx DJD and B/L hand/wrist pain with numbness due to Hx B/L Carpal Tunnel Syndrome but has improved with taking the Tumeric OTC Supplement  Patient refuses to have Colonoscopy done at present time and continues to be contacted by GI Specialist Dr Darshana Willett to scheduled his follow up Colonoscopy  Review of Systems    GENERAL:  Feels well, denies any significant changes in weight without trying  SKIN:  Denies rashes, lesions, opened areas, wounds, change in moles or any other skin changes  HEENT:  Denies any head injury or headaches      Negative blurred vision, floaters, spots before eyes, infections, or other vision problems  Negative significant changes in vision or hearing  Negative tinnitus, vertigo, or infections  Negative hay fever, sinus trouble, nasal discharge, bloody noses, or problems with smell  Negative sore throat, bleeding gums, ulcers, or sores  Glasses/Contacts: Glasses  Hearing Aids: NO  Dentures/Partials/Implants: NO  NECK:  Denies lumps, goiter, pain, swollen glands, or lymphadenopathy  BREASTS:  Denies lumps, pain, nipple discharge, swelling, redness, or any other changes  RESPIRATORY:  Denies cough, wheezing, shortness of breath, dyspnea, or orthopnea  CARDIOVASCULAR:  Denies chest pain or palpitations  GASTROINTESTINAL:  Appetite good, denies nausea, vomiting, Hx of indigestion and gastric ulcer controlled with medications  Bowel movements normal occurring about once daily or every other day  Denies black tarry or bloody Bms  URINARY:  Denies frequency, incontinence, dysuria, hematuria, nocturia, or recent flank pain  GENITAL:  Denies penile discharge, ulcerations, lesions, or other problems  PERIPHERAL VASCULAR:  Denies varicosities, swelling, skin changes, or pain  MUSCULOSKELETAL:  Denies back, joint, or muscle pain  Negative problems with mobility or movement  PSYCHIATRIC:  Denies problems with depression, Feels anxiety is controlled with medications, No anger or other psychiatric symptoms  NEUROLOGIC:  Denies fainting, dizziness, memory problems, seizures, tingling, motor or sensory loss  HEMATOLOGIC:  Denies easy bruising, bleeding, or anemia  ENDOCRINE:  Denies thyroid problems, temperature intolerance, excessive sweating, or other endocrine symptoms       Objective:     Physical Exam   Nursing note and vitals reviewed  GENERAL:  Appears well nourished, well groomed, in no acute distress  SKIN:  Palms warm, dry, color good  Nails without clubbing or cyanosis      No lesions, ulcerations, or wounds  HEAD:  Hair is average texture  Scalp without lesions, normocephalic, and atraumatic  EYES:  Visual fields full by confrontation  Conjunctiva pink, sclera white, PERRLA  Extraocular movements intact  Disc margins sharp, without hemorrhages or exudate  No arteriolar narrowing or A-V nicking  EARS:  B/L ear canals clear  B/L TMs clear with + light reflex  Acuity good to whispered voice  [place] midline  AC>BC  NOSE: Mucosa pink, moist, septum midline  Negative sinus tenderness  B/L turbinates pink, moist, non-edematous without exudate  MOUTH:  Oral mucosa pink  Pharynx pink, moist, without swelling, redness, or exudate  Dentition ok  Tonsils without enlargement or exudate  Tongue midline  NECK:  Supple, trachea midline, Negative thyromegaly, lymphadenopathy, or swollen glands  LYMPH NODES:  Negative enlargement of neck, axillary, epitrochlear, or inguinal nodes  THORAX/LUNGS  Thorax symmetric with good excursion  Lungs resonant  Breath sounds vesicular with no added sounds  Diaphragm descends within normal limits  CARDIOVASCULAR:  Carotid upstrokes brisk and without bruits  Apical impulse discrete and tapping, barely palpable in the 5th ICS/MCL  Normal S1 and Normal S2, Negative S3 or S4  Negative murmurs, thrills, lifts, or heaves  ABDOMEN:  Protuberant, bowel sounds normal active x 4 quadrants  Negative tenderness  Negative masses  Negative hepatomegaly  Negative splenomegaly  Negative costovertebral tenderness  EXTREMITIES:  Warm, calves supple, non-tender, negative for edema  Negative stasis pigmentation or ulcers  +2 pulses throughout  MUSCULOSKELETAL:  Negative joint deformities  Good range of motion in hands, wrists, elbows, shoulders, spine, hips, knees, and ankles  Negative spinal curvature  NEUROLOGICAL:  Mental status:  Awake, alert, and oriented to person, place, time, and event  Normal thought processes  Cranial Nerves:  II-XII intact  Motor:  Good muscle bulk and tone  Strength: 5/5 throughout  Cerebellar:  Rapid alternating movements, point-to-point movements intact  Gait stable and fluid  Sensory:  Pinprick, light touch, position sense, vibration, and stereogenesis intact      Romberg: Negative  Reflexes: +2 throughout

## 2018-08-22 ENCOUNTER — TELEPHONE (OUTPATIENT)
Dept: FAMILY MEDICINE CLINIC | Facility: CLINIC | Age: 63
End: 2018-08-22

## 2018-08-22 DIAGNOSIS — I10 HYPERTENSION, UNSPECIFIED TYPE: ICD-10-CM

## 2018-08-22 DIAGNOSIS — F41.9 ANXIETY: ICD-10-CM

## 2018-08-22 RX ORDER — ALPRAZOLAM 0.5 MG/1
0.5 TABLET ORAL EVERY 12 HOURS PRN
Qty: 60 TABLET | Refills: 2 | Status: SHIPPED | OUTPATIENT
Start: 2018-08-22 | End: 2019-04-01 | Stop reason: SDUPTHER

## 2018-08-22 RX ORDER — LISINOPRIL 20 MG/1
20 TABLET ORAL DAILY
Qty: 90 TABLET | Refills: 1 | Status: SHIPPED | OUTPATIENT
Start: 2018-08-22 | End: 2019-02-07 | Stop reason: SDUPTHER

## 2018-10-15 ENCOUNTER — TELEPHONE (OUTPATIENT)
Dept: FAMILY MEDICINE CLINIC | Facility: CLINIC | Age: 63
End: 2018-10-15

## 2018-11-27 DIAGNOSIS — E55.9 VITAMIN D DEFICIENCY: ICD-10-CM

## 2018-11-27 DIAGNOSIS — F41.9 ANXIETY: ICD-10-CM

## 2018-11-27 RX ORDER — ERGOCALCIFEROL 1.25 MG/1
50000 CAPSULE ORAL
Qty: 3 CAPSULE | Refills: 1 | Status: SHIPPED | OUTPATIENT
Start: 2018-11-27 | End: 2019-05-19 | Stop reason: SDUPTHER

## 2018-11-27 RX ORDER — CITALOPRAM 20 MG/1
20 TABLET ORAL DAILY
Qty: 90 TABLET | Refills: 1 | Status: SHIPPED | OUTPATIENT
Start: 2018-11-27 | End: 2019-05-19 | Stop reason: SDUPTHER

## 2018-12-13 DIAGNOSIS — E03.9 HYPOTHYROIDISM, UNSPECIFIED TYPE: Primary | ICD-10-CM

## 2018-12-13 DIAGNOSIS — Z11.59 NEED FOR HEPATITIS C SCREENING TEST: Primary | ICD-10-CM

## 2018-12-13 DIAGNOSIS — Z11.59 NEED FOR HEPATITIS B SCREENING TEST: ICD-10-CM

## 2018-12-13 DIAGNOSIS — E78.5 HYPERLIPIDEMIA, UNSPECIFIED HYPERLIPIDEMIA TYPE: ICD-10-CM

## 2018-12-13 LAB
HBA1C MFR BLD HPLC: 5.5 %
HEPATITIS C ANTIBODY (HISTORICAL): <0.1

## 2018-12-20 ENCOUNTER — OFFICE VISIT (OUTPATIENT)
Dept: FAMILY MEDICINE CLINIC | Facility: CLINIC | Age: 63
End: 2018-12-20
Payer: MEDICARE

## 2018-12-20 VITALS
HEART RATE: 87 BPM | SYSTOLIC BLOOD PRESSURE: 108 MMHG | TEMPERATURE: 99.5 F | OXYGEN SATURATION: 97 % | WEIGHT: 214.2 LBS | DIASTOLIC BLOOD PRESSURE: 70 MMHG | BODY MASS INDEX: 31.73 KG/M2 | RESPIRATION RATE: 18 BRPM | HEIGHT: 69 IN

## 2018-12-20 DIAGNOSIS — Z13.29 SCREENING FOR HYPOTHYROIDISM: ICD-10-CM

## 2018-12-20 DIAGNOSIS — Z72.0 TOBACCO ABUSE: ICD-10-CM

## 2018-12-20 DIAGNOSIS — I10 HYPERTENSION, UNSPECIFIED TYPE: Primary | ICD-10-CM

## 2018-12-20 DIAGNOSIS — F41.9 ANXIETY: ICD-10-CM

## 2018-12-20 DIAGNOSIS — G47.09 OTHER INSOMNIA: ICD-10-CM

## 2018-12-20 DIAGNOSIS — E53.8 VITAMIN B12 DEFICIENCY: ICD-10-CM

## 2018-12-20 DIAGNOSIS — Z12.5 PROSTATE CANCER SCREENING: ICD-10-CM

## 2018-12-20 DIAGNOSIS — R73.9 HYPERGLYCEMIA: ICD-10-CM

## 2018-12-20 DIAGNOSIS — Z13.0 SCREENING FOR DEFICIENCY ANEMIA: ICD-10-CM

## 2018-12-20 DIAGNOSIS — K21.9 GASTROESOPHAGEAL REFLUX DISEASE WITHOUT ESOPHAGITIS: ICD-10-CM

## 2018-12-20 DIAGNOSIS — F32.89 OTHER DEPRESSION: ICD-10-CM

## 2018-12-20 DIAGNOSIS — Z12.11 COLON CANCER SCREENING: ICD-10-CM

## 2018-12-20 DIAGNOSIS — Z13.220 SCREENING FOR HYPERLIPIDEMIA: ICD-10-CM

## 2018-12-20 DIAGNOSIS — E55.9 VITAMIN D DEFICIENCY: ICD-10-CM

## 2018-12-20 DIAGNOSIS — Z86.010 HISTORY OF COLON POLYPS: ICD-10-CM

## 2018-12-20 PROCEDURE — 99214 OFFICE O/P EST MOD 30 MIN: CPT | Performed by: NURSE PRACTITIONER

## 2018-12-20 NOTE — PATIENT INSTRUCTIONS
Insomnia   WHAT YOU NEED TO KNOW:   What is insomnia? Insomnia is a condition that makes it hard to fall or stay asleep  Lack of sleep can lead to attention or memory problems during the day  You may also be garcia, depressed, clumsy, or have headaches  What increases my risk for insomnia? · Older age    · Stress or worry    · A medical condition, such as sleep apnea, GERD, COPD, or asthma    · A mental health condition, such as depression or anxiety    · Blood pressure medicines or antidepressants    · Odd work schedules or frequent travel  How is insomnia diagnosed? Your healthcare provider will ask when your symptoms began and how often you cannot sleep  He will ask if you take any medicines that can cause insomnia, such as blood pressure medicine  He will ask if you have a medical condition, such as GERD, or a mental health condition, such as depression  He may also have you take a survey about your sleep  How is insomnia treated? · Cognitive behavioral therapy (CBT)  helps you find ways to relax, decrease stress, and improve sleep  · Medicines  may help you sleep more regularly or help you feel less anxious  Take them as directed  What can I do to improve my sleep? · Create a sleep schedule  This will help you form a sleep routine  Keep a record of your sleep patterns, and any sleeping problems you have  Bring the record to follow-up visits with healthcare providers  · Do not take naps  Naps could make it hard for you to fall asleep at bedtime  · Keep your bedroom cool, quiet, and dark  Turn on white noise, such as a fan, to help you relax  Do not use your bed for any activity that will keep you awake  Do not read, exercise, eat, or watch TV in your bedroom  · Get up if you do not fall asleep within 20 minutes  Move to another room and do something relaxing until you become sleepy  · Limit caffeine, alcohol, and food to earlier in the day  Only drink caffeine in the morning   Do not drink alcohol within 6 hours of bedtime  Do not eat a heavy meal right before you go to bed  · Exercise regularly  Daily exercise may help you sleep better  Do not exercise within 4 hours of bedtime  When should I contact my healthcare provider? · Your symptoms do not get better, or they get worse  · You begin to use drugs or alcohol to fall asleep  · You have questions or concerns about your condition or care  CARE AGREEMENT:   You have the right to help plan your care  Learn about your health condition and how it may be treated  Discuss treatment options with your caregivers to decide what care you want to receive  You always have the right to refuse treatment  The above information is an  only  It is not intended as medical advice for individual conditions or treatments  Talk to your doctor, nurse or pharmacist before following any medical regimen to see if it is safe and effective for you  © 2017 [address] Information is for End User's use only and may not be sold, redistributed or otherwise used for commercial purposes  All illustrations and images included in CareNotes® are the copyrighted property of [employer]  or Lalo Trevor  Anxiety   WHAT YOU NEED TO KNOW:   What do I need to know about anxiety? Anxiety is a condition that causes you to feel extremely worried or nervous  The feelings are so strong that they can cause problems with your daily activities or sleep  Anxiety may be triggered by something you fear, or it may happen without a cause  Family or work stress, smoking, caffeine, and alcohol can increase your risk for anxiety  Certain medicines or health conditions can also increase your risk  Anxiety can become a long-term condition if it is not managed or treated  What other common signs and symptoms may occur with anxiety?    · Fatigue or muscle tightness     · Shaking, restlessness, or irritability     · Problems focusing · Trouble sleeping     · Feeling jumpy, easily startled, or dizzy     · Rapid heartbeat or shortness of breath  What do I need to tell my healthcare provider about my anxiety? Tell your healthcare provider when your symptoms began and what triggers them  Tell your provider if anxiety affects your daily activities  Your provider will also ask about your medical history and if you have family members with a similar condition  Tell your provider about your past and present alcohol, nicotine, or drug use  What can I do to manage anxiety? You may get medicines to help you feel calm and relaxed, and to decrease your symptoms  Medicines are usually given together with therapy or other treatments  The following can help you manage anxiety:  · Talk to someone about your anxiety  Your healthcare provider may suggest counseling  Cognitive behavioral therapy can help you understand and change how you react to events that trigger your symptoms  You might feel more comfortable talking with a friend or family member about your anxiety  Choose someone you know will be supportive and encouraging  · Find ways to relax  Activities such as exercise, meditation, or listening to music can help you relax  Spend time with friends, or do things you enjoy  · Practice deep breathing  Deep breathing can help you relax when you feel anxious  Focus on taking slow, deep breaths several times a day, or during an anxiety attack  Breathe in through your nose and out through your mouth  · Create a regular sleep routine  Regular sleep can help you feel calmer during the day  Go to sleep and wake up at the same times every day  Do not watch television or use the computer right before bed  Your room should be comfortable, dark, and quiet  · Eat a variety of healthy foods  Healthy foods include fruits, vegetables, low-fat dairy products, lean meats, fish, whole-grain breads, and cooked beans   Healthy foods can help you feel less anxious and have more energy  · Exercise regularly  Exercise can increase your energy level  Exercise may also lift your mood and help you sleep better  Your healthcare provider can help you create an exercise plan  · Do not smoke  Nicotine and other chemicals in cigarettes and cigars can increase anxiety  Ask your healthcare provider for information if you currently smoke and need help to quit  E-cigarettes or smokeless tobacco still contain nicotine  Talk to your healthcare provider before you use these products  · Do not have caffeine  Caffeine can make your symptoms worse  Do not have foods or drinks that are meant to increase your energy level  · Limit or do not drink alcohol  Ask your healthcare provider if alcohol is safe for you  You may not be able to drink alcohol if you take certain anxiety or depression medicines  Limit alcohol to 1 drink per day if you are a woman  Limit alcohol to 2 drinks per day if you are a man  A drink of alcohol is 12 ounces of beer, 5 ounces of wine, or 1½ ounces of liquor  · Do not use drugs  Drugs can make your anxiety worse  It can also make anxiety hard to manage  Talk to your healthcare provider if you use drugs and want help to quit  Call 911 if:   · You have chest pain, tightness, or heaviness that may spread to your shoulders, arms, jaw, neck, or back  · You feel like hurting yourself or someone else  When should I contact my healthcare provider? · Your symptoms get worse or do not get better with treatment  · Your anxiety keeps you from doing your regular daily activities  · You have new symptoms since your last visit  · You have questions or concerns about your condition or care  CARE AGREEMENT:   You have the right to help plan your care  Learn about your health condition and how it may be treated  Discuss treatment options with your caregivers to decide what care you want to receive   You always have the right to refuse treatment  The above information is an  only  It is not intended as medical advice for individual conditions or treatments  Talk to your doctor, nurse or pharmacist before following any medical regimen to see if it is safe and effective for you  © 2017 [address] Information is for End User's use only and may not be sold, redistributed or otherwise used for commercial purposes  All illustrations and images included in CareNotes® are the copyrighted property of [employer]  or [employer]  Low-Sodium Diet   WHAT YOU NEED TO KNOW:   What is a low-sodium diet? A low-sodium diet limits foods that are high in sodium (salt)  You will need to follow a low-sodium diet if you have high blood pressure, kidney disease, or heart failure  You may also need to follow this diet if you have a condition that is causing your body to retain (hold) extra fluid  You may need to limit the amount of sodium you eat to 1,500 mg  Ask your healthcare provider how much sodium you can have each day  How can I use food labels to choose foods that are low in sodium? Read food labels to find the amount of sodium they contain  The amount of sodium is listed in milligrams (mg)  The % Daily Value (DV) column tells you how much of your daily needs are met by 1 serving of the food for each nutrient listed  Choose foods that have less than 5% of the DV of sodium  These foods are considered low in sodium  Foods that have 20% or more of the DV of sodium are considered high in sodium  Some food labels may also list any of the following terms that tell you about the sodium content in the food:  · Sodium-free:  Less than 5 mg in each serving    · Very low sodium:  35 mg of sodium or less in each serving    · Low sodium:  140 mg of sodium or less in each serving    · Reduced sodium:   At least 25% less sodium in each serving than the regular type    · Light in sodium:  50% less sodium in each serving    · Unsalted or no added salt:  No extra salt is added during processing (the food may still contain sodium)  Which foods should I avoid? Salty foods are high in sodium  You should avoid the following:  · Processed foods:      ¨ Mixes for cornbread, biscuits, cake, and pudding     ¨ Instant foods, such as potatoes, cereals, noodles, and rice     ¨ Packaged foods, such as bread stuffing, rice and pasta mixes, snack dip mixes, and macaroni and cheese     ¨ Canned foods, such as canned vegetables, soups, broths, sauces, and vegetable or tomato juice    ¨ Snack foods, such as salted chips, popcorn, pretzels, pork rinds, salted crackers, and salted nuts    ¨ Frozen foods, such as dinners, entrees, vegetables with sauces, and breaded meats    ¨ Sauerkraut, pickled vegetables, and other foods prepared in brine    · Meats and cheeses:      ¨ Smoked or cured meat, such as corned beef, michaels, ham, hot dogs, and sausage    ¨ Canned meats or spreads, such as potted meats, sardines, anchovies, and imitation seafood    ¨ Deli or lunch meats, such as bologna, ham, turkey, and roast beef    ¨ Processed cheese, such as American cheese and cheese spreads    · Condiments, sauces, and seasonings:      ¨ Salt (¼ teaspoon of salt contains 575 mg of sodium)    ¨ Seasonings made with salt, such as garlic salt, celery salt, onion salt, and seasoned salt    ¨ Regular soy sauce, barbecue sauce, teriyaki sauce, steak sauce, Worcestershire sauce, and most flavored vinegars    ¨ Canned gravy and mixes     ¨ Regular condiments, such as mustard, ketchup, and salad dressings    ¨ Pickles and olives    ¨ Meat tenderizers and monosodium glutamate (MSG)  Which foods can I include? Read the food label to find the amount of sodium in each serving  · Bread and cereal:  Try to choose breads with less than 80 mg of sodium per serving  ¨ Bread, roll, maranda, tortilla, or unsalted crackers      ¨ Ready-to-eat cereals with less than 5% DV of sodium (examples include shredded wheat and puffed rice)    ¨ Pasta    · Vegetables and fruits:      ¨ Unsalted fresh, frozen, or canned vegetables    ¨ Fresh, frozen, or canned fruits    ¨ Fruit juice    · Dairy:  One serving has about 150 mg of sodium  ¨ Milk, all types    ¨ Yogurt    ¨ Hard cheese, such as cheddar, Swiss, [employer], or mozzarella    · Meat and other protein foods:  Some raw meats may have added sodium  ¨ Plain meats, fish, and poultry     ¨ Egg    · Other foods:      ¨ Homemade pudding    ¨ Unsalted nuts, popcorn, or pretzels    ¨ Unsalted butter or margarine  What are some ways that I can decrease sodium? · Add spices and herbs to foods instead of salt during cooking  Use salt-free seasonings to add flavor to foods  Examples include onion powder, garlic powder, basil, grubbs powder, paprika, and parsley  Try lemon or lime juice or vinegar to give foods a tart flavor  Use hot peppers, pepper, or cayenne pepper to add a spicy flavor to foods  · Do not keep a salt shaker at your kitchen table  This may help keep you from adding salt to food at the table  It may take time to get used to enjoying the natural flavor of food instead of adding salt  Talk to your healthcare provider before you use salt substitutes  Some salt substitutes have a high amount of potassium and need to be avoided if you have kidney disease  · Choose low-sodium foods at restaurants  Meals from restaurants are often high in sodium  Some restaurants have nutrition information on the menu that tells you the amount of sodium in their foods  If possible, ask for your food to be prepared with less, or no salt  · Shop for unsalted or low-sodium foods and snacks at the grocery store  Examples include unsalted or low-sodium broths, soups, and canned vegetables  Choose fresh or frozen vegetables instead  Choose unsalted nuts or seeds or fresh fruits or vegetables as snacks   Read food labels and choose salt-free, very low-sodium, or low-sodium foods  CARE AGREEMENT:   You have the right to help plan your care  Discuss treatment options with your caregivers to decide what care you want to receive  You always have the right to refuse treatment  The above information is an  only  It is not intended as medical advice for individual conditions or treatments  Talk to your doctor, nurse or pharmacist before following any medical regimen to see if it is safe and effective for you  © 2017 [address]  Information is for End User's use only and may not be sold, redistributed or otherwise used for commercial purposes  All illustrations and images included in CareNotes® are the copyrighted property of [employer]  or [employer]  Wellness Visit for Adults   WHAT YOU NEED TO KNOW:   What is a wellness visit? A wellness visit is when you see your healthcare provider to get screened for health problems  You can also get advice on how to stay healthy  Write down your questions so you remember to ask them  Ask your healthcare provider how often you should have a wellness visit  What happens at a wellness visit? Your healthcare provider will ask about your health, and your family history of health problems  This includes high blood pressure, heart disease, and cancer  He or she will ask if you have symptoms that concern you, if you smoke, and about your mood  You may also be asked about your intake of medicines, supplements, food, and alcohol  Any of the following may be done:  · Your weight  will be checked  Your height may also be checked so your body mass index (BMI) can be calculated  Your BMI shows if you are at a healthy weight  · Your blood pressure  and heart rate will be checked  Your temperature may also be checked  · Blood and urine tests  may be done  Blood tests may be done to check your cholesterol levels   Abnormal cholesterol levels increase your risk for heart disease and stroke  You may also need a blood or urine test to check for diabetes if you are at increased risk  Urine tests may be done to look for signs of an infection or kidney disease  · A physical exam  includes checking your heartbeat and lungs with a stethoscope  Your healthcare provider may also check your skin to look for sun damage  · Screening tests  may be recommended  A screening test is done to check for diseases that may not cause symptoms  The screening tests you may need depend on your age, gender, family history, and lifestyle habits  For example, colorectal screening may be recommended if you are 48years old or older  What screening tests do I need if I am a woman? · A Pap smear  is used to screen for cervical cancer  Pap smears are usually done every 3 to 5 years depending on your age  You may need them more often if you have had abnormal Pap smear test results in the past  Ask your healthcare provider how often you should have a Pap smear  · A mammogram  is an x-ray of your breasts to screen for breast cancer  Experts recommend mammograms every 2 years starting at age 48 years  You may need a mammogram at age 52 years or younger if you have an increased risk for breast cancer  Talk to your healthcare provider about when you should start having mammograms and how often you need them  What vaccines might I need? · Get an influenza vaccine  every year  The influenza vaccine protects you from the flu  Several types of viruses cause the flu  The viruses change over time, so new vaccines are made each year  · Get a tetanus-diphtheria (Td) booster vaccine  every 10 years  This vaccine protects you against tetanus and diphtheria  Tetanus is a severe infection that may cause painful muscle spasms and lockjaw  Diphtheria is a severe bacterial infection that causes a thick covering in the back of your mouth and throat       · Get a human papillomavirus (HPV) vaccine  if you are female and aged 23 to 32 or male 23 to 24 and never received it  This vaccine protects you from HPV infection  HPV is the most common infection spread by sexual contact  HPV may also cause vaginal, penile, and anal cancers  · Get a pneumococcal vaccine  if you are aged 72 years or older  The pneumococcal vaccine is an injection given to protect you from pneumococcal disease  Pneumococcal disease is an infection caused by pneumococcal bacteria  The infection may cause pneumonia, meningitis, or an ear infection  · Get a shingles vaccine  if you are aged 61 or older, even if you have had shingles before  The shingles vaccine is an injection to protect you from the varicella-zoster virus  This is the same virus that causes chickenpox  Shingles is a painful rash that develops in people who had chickenpox or have been exposed to the virus  How can I eat healthy? My Plate is a model for planning healthy meals  It shows the types and amounts of foods that should go on your plate  Fruits and vegetables make up about half of your plate, and grains and protein make up the other half  A serving of dairy is included on the side of your plate  The amount of calories and serving sizes you need depends on your age, gender, weight, and height  Examples of healthy foods are listed below:  · Eat a variety of vegetables  such as dark green, red, and orange vegetables  You can also include canned vegetables low in sodium (salt) and frozen vegetables without added butter or sauces  · Eat a variety of fresh fruits , canned fruit in 100% juice, frozen fruit, and dried fruit  · Include whole grains  At least half of the grains you eat should be whole grains  Examples include whole-wheat bread, wheat pasta, brown rice, and whole-grain cereals such as oatmeal     · Eat a variety of protein foods such as seafood (fish and shellfish), lean meat, and poultry without skin (turkey and chicken)   Examples of lean meats include pork leg, shoulder, or tenderloin, and beef round, sirloin, tenderloin, and extra lean ground beef  Other protein foods include eggs and egg substitutes, beans, peas, soy products, nuts, and seeds  · Choose low-fat dairy products such as skim or 1% milk or low-fat yogurt, cheese, and cottage cheese  · Limit unhealthy fats  such as butter, hard margarine, and shortening  How much exercise do I need? Exercise at least 30 minutes per day on most days of the week  Some examples of exercise include walking, biking, dancing, and swimming  You can also fit in more physical activity by taking the stairs instead of the elevator or parking farther away from stores  Include muscle strengthening activities 2 days each week  Regular exercise provides many health benefits  It helps you manage your weight, and decreases your risk for type 2 diabetes, heart disease, stroke, and high blood pressure  Exercise can also help improve your mood  Ask your healthcare provider about the best exercise plan for you  What are some general health and safety guidelines I should follow? · Do not smoke  Nicotine and other chemicals in cigarettes and cigars can cause lung damage  Ask your healthcare provider for information if you currently smoke and need help to quit  E-cigarettes or smokeless tobacco still contain nicotine  Talk to your healthcare provider before you use these products  · Limit alcohol  A drink of alcohol is 12 ounces of beer, 5 ounces of wine, or 1½ ounces of liquor  · Lose weight, if needed  Being overweight increases your risk of certain health conditions  These include heart disease, high blood pressure, type 2 diabetes, and certain types of cancer  · Protect your skin  Do not sunbathe or use tanning beds  Use sunscreen with a SPF 15 or higher  Apply sunscreen at least 15 minutes before you go outside  Reapply sunscreen every 2 hours   Wear protective clothing, hats, and sunglasses when you are outside  · Drive safely  Always wear your seatbelt  Make sure everyone in your car wears a seatbelt  A seatbelt can save your life if you are in an accident  Do not use your cell phone when you are driving  This could distract you and cause an accident  Pull over if you need to make a call or send a text message  · Practice safe sex  Use latex condoms if are sexually active and have more than one partner  Your healthcare provider may recommend screening tests for sexually transmitted infections (STIs)  · Wear helmets, lifejackets, and protective gear  Always wear a helmet when you ride a bike or motorcycle, go skiing, or play sports that could cause a head injury  Wear protective equipment when you play sports  Wear a lifejacket when you are on a boat or doing water sports  CARE AGREEMENT:   You have the right to help plan your care  Learn about your health condition and how it may be treated  Discuss treatment options with your caregivers to decide what care you want to receive  You always have the right to refuse treatment  The above information is an  only  It is not intended as medical advice for individual conditions or treatments  Talk to your doctor, nurse or pharmacist before following any medical regimen to see if it is safe and effective for you  © 2017 [address] Information is for End User's use only and may not be sold, redistributed or otherwise used for commercial purposes  All illustrations and images included in CareNotes® are the copyrighted property of [employer]  or Lalo SALCEDO [employer]

## 2018-12-20 NOTE — PROGRESS NOTES
Assessment/Plan:      Diagnoses and all orders for this visit:    Hypertension, unspecified type  -     PSA, Total Screen; Future  -     CBC and differential; Future  -     Comprehensive metabolic panel; Future  -     Hemoglobin A1C; Future  -     Insulin, fasting; Future  -     Lipid panel; Future  -     Vitamin B12; Future  -     Vitamin D 25 hydroxy; Future  -     TSH, 3rd generation; Future    Colon cancer screening  -     Ambulatory referral to Gastroenterology; Future    History of colon polyps  -     Ambulatory referral to Gastroenterology; Future    Gastroesophageal reflux disease without esophagitis    Anxiety    Other insomnia    Tobacco abuse    Vitamin D deficiency  -     Vitamin D 25 hydroxy; Future    Vitamin B12 deficiency  -     Vitamin B12; Future    Other depression    Prostate cancer screening  -     PSA, Total Screen; Future  -     Ambulatory referral to Urology; Future    Hyperglycemia  -     Comprehensive metabolic panel; Future  -     Hemoglobin A1C; Future  -     Insulin, fasting; Future    Screening for deficiency anemia  -     CBC and differential; Future    Screening for hyperlipidemia  -     Lipid panel; Future    Screening for hypothyroidism  -     TSH, 3rd generation; Future          Subjective:     Patient ID: Kera Steven is a 70 y o  male  Patient presents to office for follow up and recheck  Complete medical history and medications reviewed with patient and tolerating all medications well without any problems  Denies any problems or concerns at the present time  Review of Systems    GENERAL:  Feels well, denies any significant changes in weight without trying  SKIN:  Denies rashes, lesions, opened areas, wounds, change in moles or any other skin changes    HEENT:  Denies any head injury or headaches     Negative blurred vision, floaters, spots before eyes, infections, or other vision problems   Negative significant changes in vision or hearing     Negative tinnitus, vertigo, or infections     Negative hay fever, sinus trouble, nasal discharge, bloody noses, or problems with smell     Negative sore throat, bleeding gums, ulcers, or sores  Glasses/Contacts: Glasses  Hearing Aids: NO  Dentures/Partials/Implants: NO  NECK:  Denies lumps, goiter, pain, swollen glands, or lymphadenopathy  BREASTS:  Denies lumps, pain, nipple discharge, swelling, redness, or any other changes  RESPIRATORY:  Denies cough, wheezing, shortness of breath, dyspnea, or orthopnea  CARDIOVASCULAR:  Denies chest pain or palpitations  GASTROINTESTINAL:  Appetite good, denies nausea, vomiting, Hx of indigestion and gastric ulcer controlled with medications     Bowel movements normal occurring about once daily or every other day   Denies black tarry or bloody Bms  URINARY:  Denies frequency, incontinence, dysuria, hematuria, nocturia, or recent flank pain  GENITAL:  Denies penile discharge, ulcerations, lesions, or other problems  PERIPHERAL VASCULAR:  Denies varicosities, swelling, skin changes, or pain  MUSCULOSKELETAL:  Denies back, joint, or muscle pain   Negative problems with mobility or movement  PSYCHIATRIC:  Denies problems with depression, Feels anxiety is controlled with medications, No anger or other psychiatric symptoms  NEUROLOGIC:  Denies fainting, dizziness, memory problems, seizures, tingling, motor or sensory loss  HEMATOLOGIC:  Denies easy bruising, bleeding, or anemia  ENDOCRINE:  Denies thyroid problems, temperature intolerance, excessive sweating, or other endocrine symptoms  Objective:     Physical Exam   Nursing note and vitals reviewed  GENERAL:  Appears well nourished, well groomed, in no acute distress  SKIN:  Palms warm, dry, color good   Nails without clubbing or cyanosis     No lesions, ulcerations, or wounds  HEAD: Virgilio Lincoln is average texture   Scalp without lesions, normocephalic, and atraumatic    EYES:  Visual fields full by confrontation  Juan Hutchins pink, sclera white, PERRLA   Extraocular movements intact     EARS:  B/L ear canals clear   B/L TMs clear with + light reflex   Acuity good to whispered voice     NOSE: Mucosa pink, moist, septum midline   Negative sinus tenderness  B/L turbinates pink, moist, non-edematous without exudate  MOUTH:  Oral mucosa pink   Pharynx pink, moist, without swelling, redness, or exudate  Dentition ok   Tonsils without enlargement or exudate   Tongue midline  NECK:  Supple, trachea midline, Negative thyromegaly, lymphadenopathy, or swollen glands  LYMPH NODES:  Negative enlargement of neck, axillary, epitrochlear, or inguinal nodes  THORAX/LUNGS  Thorax symmetric with good excursion  Lungs resonant   Breath sounds vesicular with no added sounds     Diaphragm descends within normal limits  CARDIOVASCULAR:  Carotid upstrokes brisk and without bruits  Apical impulse discrete and tapping, barely palpable in the 5th ICS/MCL   Normal S1 and Normal S2, Negative S3 or S4     Negative murmurs, thrills, lifts, or heaves  ABDOMEN:  Protuberant, bowel sounds normal active x 4 quadrants     Negative tenderness   Negative masses  Negative hepatomegaly   Negative splenomegaly  Negative costovertebral tenderness  EXTREMITIES:  Warm, calves supple, non-tender, negative for edema     Negative stasis pigmentation or ulcers  +2 pulses throughout  MUSCULOSKELETAL:  Negative joint deformities     Good range of motion in hands, wrists, elbows, shoulders, spine, hips, knees, and ankles  Negative spinal curvature  NEUROLOGICAL:  Mental status:  Awake, alert, and oriented to person, place, time, and event  Normal thought processes     Cranial Nerves:  II-XII intact  Motor:  Good muscle bulk and tone  Strength: 5/5 throughout  Cerebellar:  Rapid alternating movements, point-to-point movements intact  Gait stable and fluid    Sensory:  Pinprick, light touch, position sense, vibration, and stereogenesis intact     Romberg: Negative  Reflexes: +2 throughout

## 2019-02-07 DIAGNOSIS — I10 HYPERTENSION, UNSPECIFIED TYPE: ICD-10-CM

## 2019-02-07 RX ORDER — LISINOPRIL 20 MG/1
TABLET ORAL
Qty: 90 TABLET | Refills: 1 | Status: SHIPPED | OUTPATIENT
Start: 2019-02-07 | End: 2019-02-11 | Stop reason: SDUPTHER

## 2019-02-11 DIAGNOSIS — I10 HYPERTENSION, UNSPECIFIED TYPE: ICD-10-CM

## 2019-02-11 RX ORDER — LISINOPRIL 20 MG/1
20 TABLET ORAL DAILY
Qty: 90 TABLET | Refills: 1 | Status: SHIPPED | OUTPATIENT
Start: 2019-02-11 | End: 2019-02-14 | Stop reason: SDUPTHER

## 2019-02-14 ENCOUNTER — OFFICE VISIT (OUTPATIENT)
Dept: FAMILY MEDICINE CLINIC | Facility: CLINIC | Age: 64
End: 2019-02-14
Payer: MEDICARE

## 2019-02-14 VITALS
HEART RATE: 69 BPM | RESPIRATION RATE: 18 BRPM | DIASTOLIC BLOOD PRESSURE: 78 MMHG | WEIGHT: 256.2 LBS | SYSTOLIC BLOOD PRESSURE: 110 MMHG | BODY MASS INDEX: 37.95 KG/M2 | OXYGEN SATURATION: 97 % | TEMPERATURE: 99.3 F | HEIGHT: 69 IN

## 2019-02-14 DIAGNOSIS — G47.09 OTHER INSOMNIA: ICD-10-CM

## 2019-02-14 DIAGNOSIS — E53.8 VITAMIN B12 DEFICIENCY: ICD-10-CM

## 2019-02-14 DIAGNOSIS — Z72.0 TOBACCO USE: ICD-10-CM

## 2019-02-14 DIAGNOSIS — E66.01 CLASS 2 SEVERE OBESITY WITH SERIOUS COMORBIDITY AND BODY MASS INDEX (BMI) OF 38.0 TO 38.9 IN ADULT, UNSPECIFIED OBESITY TYPE (HCC): Primary | ICD-10-CM

## 2019-02-14 DIAGNOSIS — E55.9 VITAMIN D DEFICIENCY: ICD-10-CM

## 2019-02-14 DIAGNOSIS — Z72.0 TOBACCO ABUSE: ICD-10-CM

## 2019-02-14 DIAGNOSIS — K04.7 TOOTH ABSCESS: ICD-10-CM

## 2019-02-14 DIAGNOSIS — J01.00 ACUTE NON-RECURRENT MAXILLARY SINUSITIS: ICD-10-CM

## 2019-02-14 DIAGNOSIS — I10 HYPERTENSION, UNSPECIFIED TYPE: ICD-10-CM

## 2019-02-14 DIAGNOSIS — R42 DIZZINESS: ICD-10-CM

## 2019-02-14 DIAGNOSIS — I95.1 ORTHOSTATIC HYPOTENSION: ICD-10-CM

## 2019-02-14 DIAGNOSIS — F32.89 OTHER DEPRESSION: ICD-10-CM

## 2019-02-14 DIAGNOSIS — K21.00 GASTROESOPHAGEAL REFLUX DISEASE WITH ESOPHAGITIS: ICD-10-CM

## 2019-02-14 DIAGNOSIS — H65.03 BILATERAL ACUTE SEROUS OTITIS MEDIA, RECURRENCE NOT SPECIFIED: ICD-10-CM

## 2019-02-14 DIAGNOSIS — Z12.2 ENCOUNTER FOR SCREENING FOR LUNG CANCER: ICD-10-CM

## 2019-02-14 DIAGNOSIS — F41.9 ANXIETY: ICD-10-CM

## 2019-02-14 PROCEDURE — 99214 OFFICE O/P EST MOD 30 MIN: CPT | Performed by: NURSE PRACTITIONER

## 2019-02-14 RX ORDER — LISINOPRIL 10 MG/1
10 TABLET ORAL DAILY
Qty: 30 TABLET | Refills: 5 | Status: SHIPPED | OUTPATIENT
Start: 2019-02-14 | End: 2019-06-24 | Stop reason: SDUPTHER

## 2019-02-14 RX ORDER — FLUTICASONE PROPIONATE 50 MCG
SPRAY, SUSPENSION (ML) NASAL
Qty: 1 BOTTLE | Refills: 5 | Status: SHIPPED | OUTPATIENT
Start: 2019-02-14 | End: 2019-11-27 | Stop reason: ALTCHOICE

## 2019-02-14 RX ORDER — CLINDAMYCIN HYDROCHLORIDE 300 MG/1
300 CAPSULE ORAL 4 TIMES DAILY
Qty: 40 CAPSULE | Refills: 1 | Status: SHIPPED | OUTPATIENT
Start: 2019-02-14 | End: 2019-02-24

## 2019-02-14 RX ORDER — MECLIZINE HYDROCHLORIDE 25 MG/1
25 TABLET ORAL EVERY 8 HOURS PRN
Qty: 60 TABLET | Refills: 3 | Status: SHIPPED | OUTPATIENT
Start: 2019-02-14 | End: 2019-11-27 | Stop reason: ALTCHOICE

## 2019-02-14 NOTE — PATIENT INSTRUCTIONS
Obesity   AMBULATORY CARE:   Obesity  is when your body mass index (BMI) is greater than 30  Your healthcare provider will use your height and weight to measure your BMI  The risks of obesity include  many health problems, such as injuries or physical disability  You may need tests to check for the following:  · Diabetes     · High blood pressure or high cholesterol     · Heart disease     · Gallbladder or liver disease     · Cancer of the colon, breast, prostate, liver, or kidney     · Sleep apnea     · Arthritis or gout  Seek care immediately if:   · You have a severe headache, confusion, or difficulty speaking  · You have weakness on one side of your body  · You have chest pain, sweating, or shortness of breath  Contact your healthcare provider if:   · You have symptoms of gallbladder or liver disease, such as pain in your upper abdomen  · You have knee or hip pain and discomfort while walking  · You have symptoms of diabetes, such as intense hunger and thirst, and frequent urination  · You have symptoms of sleep apnea, such as snoring or daytime sleepiness  · You have questions or concerns about your condition or care  Treatment for obesity  focuses on helping you lose weight to improve your health  Even a small decrease in BMI can reduce the risk for many health problems  Your healthcare provider will help you set a weight-loss goal   · Lifestyle changes  are the first step in treating obesity  These include making healthy food choices and getting regular physical activity  Your healthcare provider may suggest a weight-loss program that involves coaching, education, and therapy  · Medicine  may help you lose weight when it is used with a healthy diet and physical activity  · Surgery  can help you lose weight if you are very obese and have other health problems  There are several types of weight-loss surgery  Ask your healthcare provider for more information    Be successful losing weight:   · Set small, realistic goals  An example of a small goal is to walk for 20 minutes 5 days a week  Anther goal is to lose 5% of your body weight  · Tell friends, family members, and coworkers about your goals  and ask for their support  Ask a friend to lose weight with you, or join a weight-loss support group  · Identify foods or triggers that may cause you to overeat , and find ways to avoid them  Remove tempting high-calorie foods from your home and workplace  Place a bowl of fresh fruit on your kitchen counter  If stress causes you to eat, then find other ways to cope with stress  · Keep a diary to track what you eat and drink  Also write down how many minutes of physical activity you do each day  Weigh yourself once a week and record it in your diary  Eating changes: You will need to eat 500 to 1,000 fewer calories each day than you currently eat to lose 1 to 2 pounds a week  The following changes will help you cut calories:  · Eat smaller portions  Use small plates, no larger than 9 inches in diameter  Fill your plate half full of fruits and vegetables  Measure your food using measuring cups until you know what a serving size looks like  · Eat 3 meals and 1 or 2 snacks each day  Plan your meals in advance  Lucas Longoria and eat at home most of the time  Eat slowly  · Eat fruits and vegetables at every meal   They are low in calories and high in fiber, which makes you feel full  Do not add butter, margarine, or cream sauce to vegetables  Use herbs to season steamed vegetables  · Eat less fat and fewer fried foods  Eat more baked or grilled chicken and fish  These protein sources are lower in calories and fat than red meat  Limit fast food  Dress your salads with olive oil and vinegar instead of bottled dressing  · Limit the amount of sugar you eat  Do not drink sugary beverages  Limit alcohol  Activity changes:  Physical activity is good for your body in many ways   It helps you burn calories and build strong muscles  It decreases stress and depression, and improves your mood  It can also help you sleep better  Talk to your healthcare provider before you begin an exercise program   · Exercise for at least 30 minutes 5 days a week  Start slowly  Set aside time each day for physical activity that you enjoy and that is convenient for you  It is best to do both weight training and an activity that increases your heart rate, such as walking, bicycling, or swimming  · Find ways to be more active  Do yard work and housecleaning  Walk up the stairs instead of using elevators  Spend your leisure time going to events that require walking, such as outdoor festivals or fairs  This extra physical activity can help you lose weight and keep it off  Follow up with your healthcare provider as directed: You may need to meet with a dietitian  Write down your questions so you remember to ask them during your visits  © 2017 [address] Information is for End User's use only and may not be sold, redistributed or otherwise used for commercial purposes  All illustrations and images included in CareNotes® are the copyrighted property of [employer] [place]  or Lalo Murray  The above information is an  only  It is not intended as medical advice for individual conditions or treatments  Talk to your doctor, nurse or pharmacist before following any medical regimen to see if it is safe and effective for you  Weight Management   AMBULATORY CARE:   Why it is important to manage your weight:  Being overweight increases your risk of health conditions such as heart disease, high blood pressure, type 2 diabetes, and certain types of cancer  It can also increase your risk for osteoarthritis, sleep apnea, and other respiratory problems  Aim for a slow, steady weight loss  Even a small amount of weight loss can lower your risk of health problems    How to lose weight safely:  A safe and healthy way to lose weight is to eat fewer calories and get regular exercise  You can lose up about 1 pound a week by decreasing the number of calories you eat by 500 calories each day  You can decrease calories by eating smaller portion sizes or by cutting out high-calorie foods  Read labels to find out how many calories are in the foods you eat  You can also burn calories with exercise such as walking, swimming, or biking  You will be more likely to keep weight off if you make these changes part of your lifestyle  Healthy meal plan for weight management:  A healthy meal plan includes a variety of foods, contains fewer calories, and helps you stay healthy  A healthy meal plan includes the following:  · Eat whole-grain foods more often  A healthy meal plan should contain fiber  Fiber is the part of grains, fruits, and vegetables that is not broken down by your body  Whole-grain foods are healthy and provide extra fiber in your diet  Some examples of whole-grain foods are whole-wheat breads and pastas, oatmeal, brown rice, and bulgur  · Eat a variety of vegetables every day  Include dark, leafy greens such as spinach, kale, laurie greens, and mustard greens  Eat yellow and orange vegetables such as carrots, sweet potatoes, and winter squash  · Eat a variety of fruits every day  Choose fresh or canned fruit (canned in its own juice or light syrup) instead of juice  Fruit juice has very little or no fiber  · Eat low-fat dairy foods  Drink fat-free (skim) milk or 1% milk  Eat fat-free yogurt and low-fat cottage cheese  Try low-fat cheeses such as mozzarella and other reduced-fat cheeses  · Choose meat and other protein foods that are low in fat  Choose beans or other legumes such as split peas or lentils  Choose fish, skinless poultry (chicken or turkey), or lean cuts of red meat (beef or pork)  Before you cook meat or poultry, cut off any visible fat  · Use less fat and oil  Try baking foods instead of frying them  Add less fat, such as margarine, sour cream, regular salad dressing and mayonnaise to foods  Eat fewer high-fat foods  Some examples of high-fat foods include french fries, doughnuts, ice cream, and cakes  · Eat fewer sweets  Limit foods and drinks that are high in sugar  This includes candy, cookies, regular soda, and sweetened drinks  Ways to decrease calories:   · Eat smaller portions  ¨ Use a small plate with smaller servings  ¨ Do not eat second helpings  ¨ When you eat at a restaurant, ask for a box and place half of your meal in the box before you eat  ¨ Share an entrée with someone else  · Replace high-calorie snacks with healthy, low-calorie snacks  ¨ Choose fresh fruit, vegetables, fat-free rice cakes, or air-popped popcorn instead of potato chips, nuts, or chocolate  ¨ Choose water or calorie-free drinks instead of soda or sweetened drinks  · Eat regular meals  Skipping meals can lead to overeating later in the day  Eat a healthy snack in place of a meal if you do not have time to eat a regular meal      · Do not shop for groceries when you are hungry  You may be more likely to make unhealthy food choices  Take a grocery list of healthy foods and shop after you have eaten  Exercise:  Exercise at least 30 minutes per day on most days of the week  Some examples of exercise include walking, biking, dancing, and swimming  You can also fit in more physical activity by taking the stairs instead of the elevator or parking farther away from stores  Ask your healthcare provider about the best exercise plan for you  Other things to consider as you try to lose weight:   · Be aware of situations that may give you the urge to overeat, such as eating while watching television  Find ways to avoid these situations  For example, read a book, go for a walk, or do crafts      · Meet with a weight loss support group or friends who are also trying to lose weight  This may help you stay motivated to continue working on your weight loss goals  © 2017 [address] Information is for End User's use only and may not be sold, redistributed or otherwise used for commercial purposes  All illustrations and images included in CareNotes® are the copyrighted property of [employer]  or Lalo Murray  The above information is an  only  It is not intended as medical advice for individual conditions or treatments  Talk to your doctor, nurse or pharmacist before following any medical regimen to see if it is safe and effective for you  Low Fat Diet   AMBULATORY CARE:   A low-fat diet  is an eating plan that is low in total fat, unhealthy fat, and cholesterol  You may need to follow a low-fat diet if you have trouble digesting or absorbing fat  You may also need to follow this diet if you have high cholesterol  You can also lower your cholesterol by increasing the amount of fiber in your diet  Soluble fiber is a type of fiber that helps to decrease cholesterol levels  Different types of fat in food:   · Limit unhealthy fats  A diet that is high in cholesterol, saturated fat, and trans fat may cause unhealthy cholesterol levels  Unhealthy cholesterol levels increase your risk of heart disease  ¨ Cholesterol:  Limit intake of cholesterol to less than 200 mg per day  Cholesterol is found in meat, eggs, and dairy  ¨ Saturated fat:  Limit saturated fat to less than 7% of your total daily calories  Ask your dietitian how many calories you need each day  Saturated fat is found in butter, cheese, ice cream, whole milk, and palm oil  Saturated fat is also found in meat, such as beef, pork, chicken skin, and processed meats  Processed meats include sausage, hot dogs, and bologna  ¨ Trans fat:  Avoid trans fat as much as possible  Trans fat is used in fried and baked foods   Foods that say trans fat free on the label may still have up to 0 5 grams of trans fat per serving  · Include healthy fats  Replace foods that are high in saturated and trans fat with foods high in healthy fats  This may help to decrease high cholesterol levels  ¨ Monounsaturated fats: These are found in avocados, nuts, and vegetable oils, such as olive, canola, and sunflower oil  ¨ Polyunsaturated fats: These can be found in vegetable oils, such as soybean or corn oil  Omega-3 fats can help to decrease the risk of heart disease  Omega-3 fats are found in fish, such as salmon, herring, trout, and tuna  Omega-3 fats can also be found in plant foods, such as walnuts, flaxseed, soybeans, and canola oil    Foods to limit or avoid:   · Grains:      ¨ Snacks that are made with partially hydrogenated oils, such as chips, regular crackers, and butter-flavored popcorn    ¨ High-fat baked goods, such as biscuits, croissants, doughnuts, pies, cookies, and pastries    · Dairy:      ¨ Whole milk, 2% milk, and yogurt and ice cream made with whole milk    ¨ Half and half creamer, heavy cream, and whipping cream    ¨ Cheese, cream cheese, and sour cream    · Meats and proteins:      ¨ High-fat cuts of meat (T-bone steak, regular hamburger, and ribs)    ¨ Fried meat, poultry (turkey and chicken), and fish    ¨ Poultry (chicken and turkey) with skin    ¨ Cold cuts (salami or bologna), hot dogs, michaels, and sausage    ¨ Whole eggs and egg yolks    · Vegetables and fruits with added fat:      ¨ Fried vegetables or vegetables in butter or high-fat sauces, such as cream or cheese sauces    ¨ Fried fruit or fruit served with butter or cream    · Fats:      ¨ Butter, stick margarine, and shortening    ¨ Coconut, palm oil, and palm kernel oil  Foods to include:   · Grains:      ¨ Whole-grain breads, cereals, pasta, and brown rice    ¨ Low-fat crackers and pretzels    · Vegetables and fruits:      ¨ Fresh, frozen, or canned vegetables (no salt or low-sodium)    ¨ Fresh, frozen, dried, or canned fruit (canned in light syrup or fruit juice)    ¨ Avocado    · Low-fat dairy products:      ¨ Nonfat (skim) or 1% milk    ¨ Nonfat or low-fat cheese, yogurt, and cottage cheese    · Meats and proteins:      ¨ Chicken or turkey with no skin    ¨ Baked or broiled fish    ¨ Lean beef and pork (loin, round, extra lean hamburger)    ¨ Beans and peas, unsalted nuts, soy products    ¨ Egg whites and substitutes    ¨ Seeds and nuts    · Fats:      ¨ Unsaturated oil, such as canola, olive, peanut, soybean, or sunflower oil    ¨ Soft or liquid margarine and vegetable oil spread    ¨ Low-fat salad dressing  Other ways to decrease fat:   · Read food labels before you buy foods  Choose foods that have less than 30% of calories from fat  Choose low-fat or fat-free dairy products  Remember that fat free does not mean calorie free  These foods still contain calories, and too many calories can lead to weight gain  · Trim fat from meat and avoid fried food  Trim all visible fat from meat before you cook it  Remove the skin from poultry  Do not lara meat, fish, or poultry  Bake, roast, boil, or broil these foods instead  Avoid fried foods  Eat a baked potato instead of [place] fries  Steam vegetables instead of sautéing them in butter  · Add less fat to foods  Use imitation michaels bits on salads and baked potatoes instead of regular michaels bits  Use fat-free or low-fat salad dressings instead of regular dressings  Use low-fat or nonfat butter-flavored topping instead of regular butter or margarine on popcorn and other foods  Ways to decrease fat in recipes:  Replace high-fat ingredients with low-fat or nonfat ones  This may cause baked goods to be drier than usual  You may need to use nonfat cooking spray on pans to prevent food from sticking  You also may need to change the amount of other ingredients, such as water, in the recipe   Try the following:  · Use low-fat or light margarine instead of regular margarine or shortening  · Use lean ground turkey breast or chicken, or lean ground beef (less than 5% fat) instead of hamburger  · Add 1 teaspoon of canola oil to 8 ounces of skim milk instead of using cream or half and half  · Use grated zucchini, carrots, or apples in breads instead of coconut  · Use blenderized, low-fat cottage cheese, plain tofu, or low-fat ricotta cheese instead of cream cheese  · Use 1 egg white and 1 teaspoon of canola oil, or use ¼ cup (2 ounces) of fat-free egg substitute instead of a whole egg  · Replace half of the oil that is called for in a recipe with applesauce when you bake  Use 3 tablespoons of cocoa powder and 1 tablespoon of canola oil instead of a square of baking chocolate  How to increase fiber:  Eat enough high-fiber foods to get 20 to 30 grams of fiber every day  Slowly increase your fiber intake to avoid stomach cramps, gas, and other problems  · Eat 3 ounces of whole-grain foods each day  An ounce is about 1 slice of bread  Eat whole-grain breads, such as whole-wheat bread  Whole wheat, whole-wheat flour, or other whole grains should be listed as the first ingredient on the food label  Replace white flour with whole-grain flour or use half of each in recipes  Whole-grain flour is heavier than white flour, so you may have to add more yeast or baking powder  · Eat a high-fiber cereal for breakfast   Oatmeal is a good source of soluble fiber  Look for cereals that have bran or fiber in the name  Choose whole-grain products, such as brown rice, barley, and whole-wheat pasta  · Eat more beans, peas, and lentils  For example, add beans to soups or salads  Eat at least 5 cups of fruits and vegetables each day  Eat fruits and vegetables with the peel because the peel is high in fiber  © 2017 [address] [employer] [employer] Information is for End User's use only and may not be sold, redistributed or otherwise used for commercial purposes   All illustrations and images included in CareNotes® are the copyrighted property of [email or web address]  or Lalo Murray  The above information is an  only  It is not intended as medical advice for individual conditions or treatments  Talk to your doctor, nurse or pharmacist before following any medical regimen to see if it is safe and effective for you  Heart Healthy Diet   AMBULATORY CARE:   A heart healthy diet  is an eating plan low in total fat, unhealthy fats, and sodium (salt)  A heart healthy diet helps decrease your risk for heart disease and stroke  Limit the amount of fat you eat to 25% to 35% of your total daily calories  Limit sodium to less than 2,300 mg each day  Healthy fats:  Healthy fats can help improve cholesterol levels  The risk for heart disease is decreased when cholesterol levels are normal  Choose healthy fats, such as the following:  · Unsaturated fat  is found in foods such as soybean, canola, olive, corn, and safflower oils  It is also found in soft tub margarine that is made with liquid vegetable oil  · Omega-3 fat  is found in certain fish, such as salmon, tuna, and trout, and in walnuts and flaxseed  Unhealthy fats:  Unhealthy fats can cause unhealthy cholesterol levels in your blood and increase your risk of heart disease  Limit unhealthy fats, such as the following:  · Cholesterol  is found in animal foods, such as eggs and lobster, and in dairy products made from whole milk  Limit cholesterol to less than 300 milligrams (mg) each day  You may need to limit cholesterol to 200 mg each day if you have heart disease  · Saturated fat  is found in meats, such as michaels and hamburger  It is also found in chicken or turkey skin, whole milk, and butter  Limit saturated fat to less than 7% of your total daily calories  Limit saturated fat to less than 6% if you have heart disease or are at increased risk for it       · Trans fat  is found in packaged foods, such as potato chips and cookies  It is also in hard margarine, some fried foods, and shortening  Avoid trans fats as much as possible    Heart healthy foods and drinks to include:  Ask your dietitian or healthcare provider how many servings to have from each of the following food groups:  · Grains:      ¨ Whole-wheat breads, cereals, and pastas, and brown rice    ¨ Low-fat, low-sodium crackers and chips    · Vegetables:      ¨ Broccoli, green beans, green peas, and spinach    ¨ Collards, kale, and lima beans    ¨ Carrots, sweet potatoes, tomatoes, and peppers    ¨ Canned vegetables with no salt added    · Fruits:      ¨ Bananas, peaches, pears, and pineapple    ¨ Grapes, raisins, and dates    ¨ Oranges, tangerines, grapefruit, orange juice, and grapefruit juice    ¨ Apricots, mangoes, melons, and papaya    ¨ Raspberries and strawberries    ¨ Canned fruit with no added sugar    · Low-fat dairy products:      ¨ Nonfat (skim) milk, 1% milk, and low-fat almond, cashew, or soy milks fortified with calcium    ¨ Low-fat cheese, regular or frozen yogurt, and cottage cheese    · Meats and proteins , such as lean cuts of beef and pork (loin, leg, round), skinless chicken and turkey, legumes, soy products, egg whites, and nuts  Foods and drinks to limit or avoid:  Ask your dietitian or healthcare provider about these and other foods that are high in unhealthy fat, sodium, and sugar:  · Snack or packaged foods , such as frozen dinners, cookies, macaroni and cheese, and cereals with more than 300 mg of sodium per serving    · Canned or dry mixes  for cakes, soups, sauces, or gravies    · Vegetables with added sodium , such as instant potatoes, vegetables with added sauces, or regular canned vegetables    · Other foods high in sodium , such as ketchup, barbecue sauce, salad dressing, pickles, olives, soy sauce, and miso    · High-fat dairy foods  such as whole or 2% milk, cream cheese, or sour cream, and cheeses     · High-fat protein foods  such as high-fat cuts of beef (T-bone steaks, ribs), chicken or turkey with skin, and organ meats, such as liver    · Cured or smoked meats , such as hot dogs, michaels, and sausage    · Unhealthy fats and oils , such as butter, stick margarine, shortening, and cooking oils such as coconut or palm oil    · Food and drinks high in sugar , such as soft drinks (soda), sports drinks, sweetened tea, candy, cake, cookies, pies, and doughnuts  Other diet guidelines to follow:   · Eat more foods containing omega-3 fats  Eat fish high in omega-3 fats at least 2 times a week  · Limit alcohol  Too much alcohol can damage your heart and raise your blood pressure  Women should limit alcohol to 1 drink a day  Men should limit alcohol to 2 drinks a day  A drink of alcohol is 12 ounces of beer, 5 ounces of wine, or 1½ ounces of liquor  · Choose low-sodium foods  High-sodium foods can lead to high blood pressure  Add little or no salt to food you prepare  Use herbs and spices in place of salt  · Eat more fiber  to help lower cholesterol levels  Eat at least 5 servings of fruits and vegetables each day  Eat 3 ounces of whole-grain foods each day  Legumes (beans) are also a good source of fiber  Lifestyle guidelines:   · Do not smoke  Nicotine and other chemicals in cigarettes and cigars can cause lung and heart damage  Ask your healthcare provider for information if you currently smoke and need help to quit  E-cigarettes or smokeless tobacco still contain nicotine  Talk to your healthcare provider before you use these products  · Exercise regularly  to help you maintain a healthy weight and improve your blood pressure and cholesterol levels  Ask your healthcare provider about the best exercise plan for you  Do not start an exercise program without asking your healthcare provider  Follow up with your healthcare provider as directed:  Write down your questions so you remember to ask them during your visits     © 2017 [facility] [address] is for End User's use only and may not be sold, redistributed or otherwise used for commercial purposes  All illustrations and images included in CareNotes® are the copyrighted property of [employer] M [employer]  or Lalo Murray  The above information is an  only  It is not intended as medical advice for individual conditions or treatments  Talk to your doctor, nurse or pharmacist before following any medical regimen to see if it is safe and effective for you  Calorie Counting Diet   WHAT YOU NEED TO KNOW:   What is a calorie counting diet? It is a meal plan based on counting calories each day to reach a healthy body weight  You will need to eat fewer calories if you are trying to lose weight  Weight loss may decrease your risk for certain health problems or improve your health if you have health problems  Some of these health problems include heart disease, high blood pressure, and diabetes  What foods should I avoid? Your dietitian will tell you if you need to avoid certain foods based on your body weight and health condition  You may need to avoid high-fat foods if you are at risk for or have heart disease  You may need to eat fewer foods from the breads and starches food group if you have diabetes  How many calories are in foods? The following is a list of foods and drinks with the approximate number of calories in each  Check the food label to find the exact number of calories  A dietitian can tell you how many calories you should have from each food group each day    · Carbohydrate:      ¨ ½ of a 3-inch bagel, 1 slice of bread, or ½ of a hamburger bun or hot dog bun (80)    ¨ 1 (8-inch) flour tortilla or ½ cup of cooked rice (100)    ¨ 1 (6-inch) corn tortilla (80)    ¨ 1 (6-inch) pancake or 1 cup of bran flakes cereal (110)    ¨ ½ cup of cooked cereal (80)    ¨ ½ cup of cooked pasta (85)    ¨ 1 ounce of pretzels (100)    ¨ 3 cups of air-popped popcorn without butter or oil (80)    · Dairy:      ¨ 1 cup of skim or 1% milk (90)    ¨ 1 cup of 2% milk (120)    ¨ 1 cup of whole milk (160)    ¨ 1 cup of 2% chocolate milk (220)    ¨ 1 ounce of low-fat cheese with 3 grams of fat per ounce (70)    ¨ 1 ounce of cheddar cheese (114)    ¨ ½ cup of 1% fat cottage cheese (80)    ¨ 1 cup of plain or sugar-free, fat-free yogurt (90)    · Protein foods:      ¨ 3 ounces of fish (not breaded or fried) (95)    ¨ 3 ounces of breaded, fried fish (195)    ¨ ¾ cup of tuna canned in water (105)    ¨ 3 ounces of chicken breast without skin (105)    ¨ 1 fried chicken breast with skin (350)    ¨ ¼ cup of fat free egg substitute (40)    ¨ 1 large egg (75)    ¨ 3 ounces of lean beef or pork (165)    ¨ 3 ounces of fried pork chop or ham (185)    ¨ ½ cup of cooked dried beans, such as kidney, farooq, lentils, or navy (115)    ¨ 3 ounces of bologna or lunch meat (225)    ¨ 2 links of breakfast sausage (140)    · Vegetables:      ¨ ½ cup of sliced mushrooms (10)    ¨ 1 cup of salad greens, such as lettuce, spinach, or ray (15)    ¨ ½ cup of steamed asparagus (20)    ¨ ½ cup of cooked summer squash, zucchini squash, or green or wax beans (25)    ¨ 1 cup of broccoli or cauliflower florets, or 1 medium tomato (25)    ¨ 1 large raw carrot or ½ cup of cooked carrots (40)    ¨ ? of a medium cucumber or 1 stalk of celery (5)    ¨ 1 small baked potato (160)    ¨ 1 cup of breaded, fried vegetables (230)    · Fruit:      ¨ 1 (6-inch) banana (55)     ¨ ½ of a 4-inch grapefruit (55)    ¨ 15 grapes (60)    ¨ 1 medium orange or apple (70)    ¨ 1 large peach (65)    ¨ 1 cup of fresh pineapple chunks (75)    ¨ 1 cup of melon cubes (50)    ¨ 1¼ cups of whole strawberries (45)    ¨ ½ cup of fruit canned in juice (55)    ¨ ½ cup of fruit canned in heavy syrup (110)    ¨ ?  cup of raisins (130)    ¨ ½ cup of unsweetened fruit juice (60)    ¨ ½ cup of grape, cranberry, or prune juice (90)    · Fat: ¨ 10 peanuts or 2 teaspoons of peanut butter (55)    ¨ 2 tablespoons of avocado or 1 tablespoon of regular salad dressing (45)    ¨ 2 slices of michaels (90)    ¨ 1 teaspoon of oil, such as safflower, canola, corn, or olive oil (45)    ¨ 2 teaspoons of low-fat margarine, or 1 tablespoon of low-fat mayonnaise (50)    ¨ 1 teaspoon of regular margarine (40)    ¨ 1 tablespoon of regular mayonnaise (135)    ¨ 1 tablespoon of cream cheese or 2 tablespoons of low-fat cream cheese (45)    ¨ 2 tablespoons of vegetable shortening (215)    · Dessert and sweets:      ¨ 8 animal crackers or 5 vanilla wafers (80)    ¨ 1 frozen fruit juice bar (80)    ¨ ½ cup of ice milk or low-fat frozen yogurt (90)    ¨ ½ cup of sherbet or sorbet (125)    ¨ ½ cup of sugar-free pudding or custard (60)    ¨ ½ cup of ice cream (140)    ¨ ½ cup of pudding or custard (175)    ¨ 1 (2-inch) square chocolate brownie (185)    · Combination foods:      ¨ Bean burrito made with an 8-inch tortilla, without cheese (275)    ¨ Chicken breast sandwich with lettuce and tomato (325)    ¨ 1 cup of chicken noodle soup (60)    ¨ 1 beef taco (175)    ¨ Regular hamburger with lettuce and tomato (310)    ¨ Regular cheeseburger with lettuce and tomato (410)     ¨ ¼ of a 12-inch cheese pizza (280)    ¨ Fried fish sandwich with lettuce and tomato (425)    ¨ Hot dog and bun (275)    ¨ 1½ cups of macaroni and cheese (310)    ¨ Taco salad with a fried tortilla shell (870)    · Low-calorie foods:      ¨ 1 tablespoon of ketchup or 1 tablespoon of fat free sour cream (15)    ¨ 1 teaspoon of mustard (5)    ¨ ¼ cup of salsa (20)    ¨ 1 large dill pickle (15)    ¨ 1 tablespoon of fat free salad dressing (10)    ¨ 2 teaspoons of low-sugar, light jam or jelly, or 1 tablespoon of sugar-free syrup (15)    ¨ 1 sugar-free popsicle (15)    ¨ 1 cup of club soda, seltzer water, or diet soda (0)  CARE AGREEMENT:   You have the right to help plan your care   Discuss treatment options with your caregivers to decide what care you want to receive  You always have the right to refuse treatment  The above information is an  only  It is not intended as medical advice for individual conditions or treatments  Talk to your doctor, nurse or pharmacist before following any medical regimen to see if it is safe and effective for you  © 2017 [address] [employer] Information is for End User's use only and may not be sold, redistributed or otherwise used for commercial purposes  All illustrations and images included in CareNotes® are the copyrighted property of [employer]  or [employer]  Wellness Visit for Adults   WHAT YOU NEED TO KNOW:   What is a wellness visit? A wellness visit is when you see your healthcare provider to get screened for health problems  You can also get advice on how to stay healthy  Write down your questions so you remember to ask them  Ask your healthcare provider how often you should have a wellness visit  What happens at a wellness visit? Your healthcare provider will ask about your health, and your family history of health problems  This includes high blood pressure, heart disease, and cancer  He or she will ask if you have symptoms that concern you, if you smoke, and about your mood  You may also be asked about your intake of medicines, supplements, food, and alcohol  Any of the following may be done:  · Your weight  will be checked  Your height may also be checked so your body mass index (BMI) can be calculated  Your BMI shows if you are at a healthy weight  · Your blood pressure  and heart rate will be checked  Your temperature may also be checked  · Blood and urine tests  may be done  Blood tests may be done to check your cholesterol levels  Abnormal cholesterol levels increase your risk for heart disease and stroke  You may also need a blood or urine test to check for diabetes if you are at increased risk   Urine tests may be done to look for signs of an infection or kidney disease  · A physical exam  includes checking your heartbeat and lungs with a stethoscope  Your healthcare provider may also check your skin to look for sun damage  · Screening tests  may be recommended  A screening test is done to check for diseases that may not cause symptoms  The screening tests you may need depend on your age, gender, family history, and lifestyle habits  For example, colorectal screening may be recommended if you are 48years old or older  What screening tests do I need if I am a woman? · A Pap smear  is used to screen for cervical cancer  Pap smears are usually done every 3 to 5 years depending on your age  You may need them more often if you have had abnormal Pap smear test results in the past  Ask your healthcare provider how often you should have a Pap smear  · A mammogram  is an x-ray of your breasts to screen for breast cancer  Experts recommend mammograms every 2 years starting at age 48 years  You may need a mammogram at age 52 years or younger if you have an increased risk for breast cancer  Talk to your healthcare provider about when you should start having mammograms and how often you need them  What vaccines might I need? · Get an influenza vaccine  every year  The influenza vaccine protects you from the flu  Several types of viruses cause the flu  The viruses change over time, so new vaccines are made each year  · Get a tetanus-diphtheria (Td) booster vaccine  every 10 years  This vaccine protects you against tetanus and diphtheria  Tetanus is a severe infection that may cause painful muscle spasms and lockjaw  Diphtheria is a severe bacterial infection that causes a thick covering in the back of your mouth and throat  · Get a human papillomavirus (HPV) vaccine  if you are female and aged 23 to 32 or male 23 to 24 and never received it  This vaccine protects you from HPV infection   HPV is the most common infection spread by sexual contact  HPV may also cause vaginal, penile, and anal cancers  · Get a pneumococcal vaccine  if you are aged 72 years or older  The pneumococcal vaccine is an injection given to protect you from pneumococcal disease  Pneumococcal disease is an infection caused by pneumococcal bacteria  The infection may cause pneumonia, meningitis, or an ear infection  · Get a shingles vaccine  if you are aged 61 or older, even if you have had shingles before  The shingles vaccine is an injection to protect you from the varicella-zoster virus  This is the same virus that causes chickenpox  Shingles is a painful rash that develops in people who had chickenpox or have been exposed to the virus  How can I eat healthy? My Plate is a model for planning healthy meals  It shows the types and amounts of foods that should go on your plate  Fruits and vegetables make up about half of your plate, and grains and protein make up the other half  A serving of dairy is included on the side of your plate  The amount of calories and serving sizes you need depends on your age, gender, weight, and height  Examples of healthy foods are listed below:  · Eat a variety of vegetables  such as dark green, red, and orange vegetables  You can also include canned vegetables low in sodium (salt) and frozen vegetables without added butter or sauces  · Eat a variety of fresh fruits , canned fruit in 100% juice, frozen fruit, and dried fruit  · Include whole grains  At least half of the grains you eat should be whole grains  Examples include whole-wheat bread, wheat pasta, brown rice, and whole-grain cereals such as oatmeal     · Eat a variety of protein foods such as seafood (fish and shellfish), lean meat, and poultry without skin (turkey and chicken)  Examples of lean meats include pork leg, shoulder, or tenderloin, and beef round, sirloin, tenderloin, and extra lean ground beef   Other protein foods include eggs and egg substitutes, beans, peas, soy products, nuts, and seeds  · Choose low-fat dairy products such as skim or 1% milk or low-fat yogurt, cheese, and cottage cheese  · Limit unhealthy fats  such as butter, hard margarine, and shortening  How much exercise do I need? Exercise at least 30 minutes per day on most days of the week  Some examples of exercise include walking, biking, dancing, and swimming  You can also fit in more physical activity by taking the stairs instead of the elevator or parking farther away from stores  Include muscle strengthening activities 2 days each week  Regular exercise provides many health benefits  It helps you manage your weight, and decreases your risk for type 2 diabetes, heart disease, stroke, and high blood pressure  Exercise can also help improve your mood  Ask your healthcare provider about the best exercise plan for you  What are some general health and safety guidelines I should follow? · Do not smoke  Nicotine and other chemicals in cigarettes and cigars can cause lung damage  Ask your healthcare provider for information if you currently smoke and need help to quit  E-cigarettes or smokeless tobacco still contain nicotine  Talk to your healthcare provider before you use these products  · Limit alcohol  A drink of alcohol is 12 ounces of beer, 5 ounces of wine, or 1½ ounces of liquor  · Lose weight, if needed  Being overweight increases your risk of certain health conditions  These include heart disease, high blood pressure, type 2 diabetes, and certain types of cancer  · Protect your skin  Do not sunbathe or use tanning beds  Use sunscreen with a SPF 15 or higher  Apply sunscreen at least 15 minutes before you go outside  Reapply sunscreen every 2 hours  Wear protective clothing, hats, and sunglasses when you are outside  · Drive safely  Always wear your seatbelt  Make sure everyone in your car wears a seatbelt   A seatbelt can save your life if you are in an accident  Do not use your cell phone when you are driving  This could distract you and cause an accident  Pull over if you need to make a call or send a text message  · Practice safe sex  Use latex condoms if are sexually active and have more than one partner  Your healthcare provider may recommend screening tests for sexually transmitted infections (STIs)  · Wear helmets, lifejackets, and protective gear  Always wear a helmet when you ride a bike or motorcycle, go skiing, or play sports that could cause a head injury  Wear protective equipment when you play sports  Wear a lifejacket when you are on a boat or doing water sports  CARE AGREEMENT:   You have the right to help plan your care  Learn about your health condition and how it may be treated  Discuss treatment options with your caregivers to decide what care you want to receive  You always have the right to refuse treatment  The above information is an  only  It is not intended as medical advice for individual conditions or treatments  Talk to your doctor, nurse or pharmacist before following any medical regimen to see if it is safe and effective for you  © 2017 [address] [facility] Information is for End User's use only and may not be sold, redistributed or otherwise used for commercial purposes  All illustrations and images included in CareNotes® are the copyrighted property of [employer]  or [employer]  Low-Sodium Diet   WHAT YOU NEED TO KNOW:   What is a low-sodium diet? A low-sodium diet limits foods that are high in sodium (salt)  You will need to follow a low-sodium diet if you have high blood pressure, kidney disease, or heart failure  You may also need to follow this diet if you have a condition that is causing your body to retain (hold) extra fluid  You may need to limit the amount of sodium you eat to 1,500 mg  Ask your healthcare provider how much sodium you can have each day    How can I use food labels to choose foods that are low in sodium? Read food labels to find the amount of sodium they contain  The amount of sodium is listed in milligrams (mg)  The % Daily Value (DV) column tells you how much of your daily needs are met by 1 serving of the food for each nutrient listed  Choose foods that have less than 5% of the DV of sodium  These foods are considered low in sodium  Foods that have 20% or more of the DV of sodium are considered high in sodium  Some food labels may also list any of the following terms that tell you about the sodium content in the food:  · Sodium-free:  Less than 5 mg in each serving    · Very low sodium:  35 mg of sodium or less in each serving    · Low sodium:  140 mg of sodium or less in each serving    · Reduced sodium: At least 25% less sodium in each serving than the regular type    · Light in sodium:  50% less sodium in each serving    · Unsalted or no added salt:  No extra salt is added during processing (the food may still contain sodium)  Which foods should I avoid? Salty foods are high in sodium   You should avoid the following:  · Processed foods:      ¨ Mixes for cornbread, biscuits, cake, and pudding     ¨ Instant foods, such as potatoes, cereals, noodles, and rice     ¨ Packaged foods, such as bread stuffing, rice and pasta mixes, snack dip mixes, and macaroni and cheese     ¨ Canned foods, such as canned vegetables, soups, broths, sauces, and vegetable or tomato juice    ¨ Snack foods, such as salted chips, popcorn, pretzels, pork rinds, salted crackers, and salted nuts    ¨ Frozen foods, such as dinners, entrees, vegetables with sauces, and breaded meats    ¨ Sauerkraut, pickled vegetables, and other foods prepared in brine    · Meats and cheeses:      ¨ Smoked or cured meat, such as corned beef, michaels, ham, hot dogs, and sausage    ¨ Canned meats or spreads, such as potted meats, sardines, anchovies, and imitation seafood    ¨ Deli or lunch meats, such as bologna, ham, turkey, and roast beef    ¨ Processed cheese, such as American cheese and cheese spreads    · Condiments, sauces, and seasonings:      ¨ Salt (¼ teaspoon of salt contains 575 mg of sodium)    ¨ Seasonings made with salt, such as garlic salt, celery salt, onion salt, and seasoned salt    ¨ Regular soy sauce, barbecue sauce, teriyaki sauce, steak sauce, Worcestershire sauce, and most flavored vinegars    ¨ Canned gravy and mixes     ¨ Regular condiments, such as mustard, ketchup, and salad dressings    ¨ Pickles and olives    ¨ Meat tenderizers and monosodium glutamate (MSG)  Which foods can I include? Read the food label to find the amount of sodium in each serving  · Bread and cereal:  Try to choose breads with less than 80 mg of sodium per serving  ¨ Bread, roll, maranda, tortilla, or unsalted crackers  ¨ Ready-to-eat cereals with less than 5% DV of sodium (examples include shredded wheat and puffed rice)    ¨ Pasta    · Vegetables and fruits:      ¨ Unsalted fresh, frozen, or canned vegetables    ¨ Fresh, frozen, or canned fruits    ¨ Fruit juice    · Dairy:  One serving has about 150 mg of sodium  ¨ Milk, all types    ¨ Yogurt    ¨ Hard cheese, such as cheddar, Swiss, [employer], or mozzarella    · Meat and other protein foods:  Some raw meats may have added sodium  ¨ Plain meats, fish, and poultry     ¨ Egg    · Other foods:      ¨ Homemade pudding    ¨ Unsalted nuts, popcorn, or pretzels    ¨ Unsalted butter or margarine  What are some ways that I can decrease sodium? · Add spices and herbs to foods instead of salt during cooking  Use salt-free seasonings to add flavor to foods  Examples include onion powder, garlic powder, basil, grubbs powder, paprika, and parsley  Try lemon or lime juice or vinegar to give foods a tart flavor  Use hot peppers, pepper, or cayenne pepper to add a spicy flavor to foods  · Do not keep a salt shaker at your kitchen table    This may help keep you from adding salt to food at the table  It may take time to get used to enjoying the natural flavor of food instead of adding salt  Talk to your healthcare provider before you use salt substitutes  Some salt substitutes have a high amount of potassium and need to be avoided if you have kidney disease  · Choose low-sodium foods at restaurants  Meals from restaurants are often high in sodium  Some restaurants have nutrition information on the menu that tells you the amount of sodium in their foods  If possible, ask for your food to be prepared with less, or no salt  · Shop for unsalted or low-sodium foods and snacks at the grocery store  Examples include unsalted or low-sodium broths, soups, and canned vegetables  Choose fresh or frozen vegetables instead  Choose unsalted nuts or seeds or fresh fruits or vegetables as snacks  Read food labels and choose salt-free, very low-sodium, or low-sodium foods  CARE AGREEMENT:   You have the right to help plan your care  Discuss treatment options with your caregivers to decide what care you want to receive  You always have the right to refuse treatment  The above information is an  only  It is not intended as medical advice for individual conditions or treatments  Talk to your doctor, nurse or pharmacist before following any medical regimen to see if it is safe and effective for you  © 2017 [address] [employer]  Information is for End User's use only and may not be sold, redistributed or otherwise used for commercial purposes  All illustrations and images included in CareNotes® are the copyrighted property of [employer]  or L[employer]  Dizziness   WHAT YOU NEED TO KNOW:   What is dizziness? Dizziness is a feeling of being off balance or unsteady  Common causes of dizziness are an inner ear fluid imbalance or a lack of oxygen in your blood   Dizziness may be acute (lasts 3 days or less) or chronic (lasts longer than 3 days)  You may have dizzy spells that last from seconds to a few hours  What increases my risk for dizziness? Dizziness may get worse during certain activities or when you move a certain way  The following may also increase your risk for dizziness:  · Older age    · An infection, ear surgery, or an inner ear condition, such as Ménière disease    · Stroke, a brain tumor, or a recent head trauma     · An injury that causes a large amount of blood loss    · Heart or blood pressure problems     · Exposure to chemicals, or long-term alcohol use     · Medicines used to treat high blood pressure, seizure disorders, or anxiety and depression     · A nerve disorder, such as multiple sclerosis  What signs and symptoms may happen with dizziness? · A feeling that your surroundings are moving even though you are standing still    · Ringing in your ears or hearing loss     · Feeling faint or lightheaded     · Weakness or unsteadiness     · Double vision or eye movements you cannot control    · Nausea or vomiting     · Confusion  How is the cause of dizziness diagnosed? Your healthcare provider may ask when the dizziness started  Tell the provider if you have dizzy spells, and how long they last  Tell him or her what happens before you become dizzy  The provider will ask if you have other health conditions and if you take any medicines  He or she will check your blood pressure and pulse to see if your dizziness may be related to your heart  Your balance, strength, reflexes, and the way you walk may also be checked  You may need any of the following tests to help find the cause of your dizziness:  · An EKG  records the electrical activity of your heart  An EKG can be used to check for an abnormal heart beat or heart damage  · Blood tests  will check your blood sugar level, infection, and your blood cell levels  · CT or MRI  pictures check for a stroke, head injury, or brain tumor   They also check for brain bleeding or swelling  You may be given contrast liquid to help your brain show up better in the pictures  Tell the healthcare provider if you have ever had an allergic reaction to contrast liquid  Do not enter the MRI room with anything metal  Metal can cause serious injury  Tell the healthcare provider if you have any metal in or on your body  How is dizziness treated? Treatment will depend on the cause of your dizziness  Your healthcare provider may give you oxygen or medicines to decrease your dizziness and nausea  He may also refer you to a specialist  Rigoberto Belle may need to be admitted to the hospital for treatment  How can I manage my symptoms? · Do not drive  or operate heavy machinery when you are dizzy  · Get up slowly  from sitting or lying down  · Drink plenty of liquids  Liquids help prevent dehydration  Ask how much liquid to drink each day and which liquids are best for you  When should I seek immediate care? · You have a headache and a stiff neck  · You have shaking chills and a fever  · You vomit over and over with no relief  · Your vomit or bowel movements are red or black  · You have pain in your chest, back, or abdomen  · You have numbness, especially in your face, arms, or legs  · You have trouble moving your arms or legs  · You are confused  When should I contact my healthcare provider? · You have a fever  · Your symptoms do not get better with treatment  · You have questions or concerns about your condition or care  CARE AGREEMENT:   You have the right to help plan your care  Learn about your health condition and how it may be treated  Discuss treatment options with your caregivers to decide what care you want to receive  You always have the right to refuse treatment  The above information is an  only  It is not intended as medical advice for individual conditions or treatments   Talk to your doctor, nurse or pharmacist before following any medical regimen to see if it is safe and effective for you  © 2017 [address] Information is for End User's use only and may not be sold, redistributed or otherwise used for commercial purposes  All illustrations and images included in CareNotes® are the copyrighted property of A D [employer] M [employer]  or Lalo Pardo   WHAT YOU NEED TO KNOW:   Hypotension is a condition that causes your blood pressure (BP) to drop lower than it should be  Hypotension may be mild, serious, or life-threatening  DISCHARGE INSTRUCTIONS:   Medicines:   · Alpha-adrenoreceptor agonists: These medicines may increase your BP and decrease your symptoms  Take these medicines as directed  · Steroids: This medicine helps prevent salt loss from your body  Steroids may also help increase the amount of fluid in your body and raise your BP  · Vasopressors: These medicines help constrict (make smaller) your blood vessels and increase your BP  Vasopressor medicines may increase the blood flow to your brain and help decrease your symptoms  · Antidiuretic hormone: This medicine helps control your BP and helps decrease your need to urinate during the night  · Antiparkinson medicine: This medicine may help increase your standing BP and decrease your symptoms  · Take your medicine as directed  Contact your healthcare provider if you think your medicine is not helping or if you have side effects  Tell him of her if you are allergic to any medicine  Keep a list of the medicines, vitamins, and herbs you take  Include the amounts, and when and why you take them  Bring the list or the pill bottles to follow-up visits  Carry your medicine list with you in case of an emergency  Follow up with your healthcare provider or specialist as directed:  Write down your questions so you remember to ask them during your visits  Check your blood pressure: You may need to check your BP at home   Record the results and bring them with you to follow-up visits  Ask when and how often to check your BP  You may need to wear a BP monitor for up to 24 hours  This will record your blood pressure during your normal daily activities  The monitor will take your BP every 15 to 30 minutes  Try to keep still while your BP is taken  Avoid heavy activity, such as exercise, while you are wearing the monitor  Manage your symptoms:   · Change positions slowly:  When you get out of bed, sit up first, then slowly move your legs to the side of the bed  If you are not having any symptoms, slowly stand up  If you have symptoms, sit down right away  · Exercise and do physical counter maneuvers:  Ask your healthcare provider or specialist about the best exercise plan for you  Physical counter maneuvers may help to increase your BP and increase blood flow to your heart  They include crossing your legs, squatting, and bending at the waist  You can also rise up on your toes while you are standing, and tighten your thigh muscles  · Drink liquids as directed:  Ask your healthcare provider or specialist how much liquid to drink each day and which liquids are best for you  Drink 500 milliliters (½ liter) of liquid quickly in the morning or before meals to help increase your BP  Your healthcare provider may tell you to drink 2 cups of coffee with, or after, breakfast and lunch  The caffeine in the coffee can help prevent a drop in your BP  Your healthcare provider may also give you caffeine pills  · Change how you eat meals: If your BP drops after eating large meals, try to eat smaller meals more often  Eat foods low in carbohydrates and cholesterol to help prevent BP drops after you eat  Ask if you need to increase the amount of sodium (salt) you eat each day  · Raise the head of your bed:  Raise the head of your bed 4 to 8 inches  This may help prevent morning BP drops and decrease the need to urinate during the night    Avoid things that make your hypotension worse:   · Do not drink alcohol:  Alcohol can make your symptoms worse  Ask for information if you need help quitting  · Avoid straining:  Activities and movements that cause you to strain can cause a drop in your BP  Activities to avoid include lifting, coughing, and other movements that increase the feeling of pressure in your chest     · Avoid the heat: This can cause a decrease in your BP  Stay inside during very hot days, or limit the amount of time you are outside  Do not take hot baths  Contact your healthcare provider or specialist if:   · You vomit several times or have diarrhea, and you cannot drink liquid  · You have a fever  · You have new or increased symptoms, such as dizziness, weakness, or fainting  · Your legs, ankles, and feet are swollen, or you gain weight for no known reason  · You have questions or concerns about your condition or care  Return to the emergency department if:   · You become confused or cannot speak  · You urinate very little or not at all  · You have a seizure  · You have chest pain or trouble breathing  · You have changes in vision or cannot see  © 2017 [facility] Information is for End User's use only and may not be sold, redistributed or otherwise used for commercial purposes  All illustrations and images included in CareNotes® are the copyrighted property of A D A M , Inc  or Lalo Murray  The above information is an  only  It is not intended as medical advice for individual conditions or treatments  Talk to your doctor, nurse or pharmacist before following any medical regimen to see if it is safe and effective for you

## 2019-02-14 NOTE — PROGRESS NOTES
Assessment/Plan:      Diagnoses and all orders for this visit:    Class 2 severe obesity with serious comorbidity and body mass index (BMI) of 38 0 to 38 9 in adult, unspecified obesity type (HCC)    Anxiety    Other insomnia    Hypertension, unspecified type  -     lisinopril (ZESTRIL) 10 mg tablet; Take 1 tablet (10 mg total) by mouth daily    Gastroesophageal reflux disease with esophagitis    Other depression    Tobacco abuse    Vitamin B12 deficiency    Vitamin D deficiency    Dizziness  -     meclizine (ANTIVERT) 25 mg tablet; Take 1 tablet (25 mg total) by mouth every 8 (eight) hours as needed for dizziness    Encounter for screening for lung cancer  -     XR chest pa & lateral; Future    Tobacco use  -     XR chest pa & lateral; Future    Tooth abscess  -     clindamycin (CLEOCIN) 300 MG capsule; Take 1 capsule (300 mg total) by mouth 4 (four) times a day for 10 days    Acute non-recurrent maxillary sinusitis  -     clindamycin (CLEOCIN) 300 MG capsule; Take 1 capsule (300 mg total) by mouth 4 (four) times a day for 10 days  -     fluticasone (FLONASE) 50 mcg/act nasal spray; 1 spray each nostril 2 x daily x 1 month then prn congestion or allergies    Bilateral acute serous otitis media, recurrence not specified  -     clindamycin (CLEOCIN) 300 MG capsule; Take 1 capsule (300 mg total) by mouth 4 (four) times a day for 10 days  -     fluticasone (FLONASE) 50 mcg/act nasal spray; 1 spray each nostril 2 x daily x 1 month then prn congestion or allergies    Orthostatic hypotension          Subjective:     Patient ID: Santino Byers is a 70 y o  male  Patient presents to office for C/O dizziness that started on Monday when he was lying down in bed and when he sat up he had to sit on the side of his bed before standing to prevent him from falling    Patient states the dizziness has progressively worsened over the past couple days and this AM he was holding sheet rock up when standing on a ladder during working and became very dizzy with his head turning red which resolved over a couple minutes  Denies any falls/syncopal episodes  Patient states he noticed when he bends over he becomes dizzy and then it resolves after a couple minutes  Patient also noticed when he goes from a sitting to standing position he becomes dizzy and lightheaded  Patient does C/O sinus congestion and pain with fullness sensation in B/L ears occurring over the past couple weeks which he had been taking allergy medication for with no relief of symptoms  C/O nausea occurring at times of dizziness  Complete medical history and medications reviewed with patient and tolerating all medications well without any problems  C/O left lower tooth pain occurring for the past 2-3 weeks due to tooth fractured  Denies any problems or concerns at the present time  Patient had Colonoscopy completed 2/2019 by Dr Emaline Dubin and had #9 Colon Polyps Removed which were Benign  Patient is scheduled for Prostate Digital Exam with [facility] on 2/26/19  Patient instructed on importance of having CXR completed and verbalized understanding  Review of Systems    GENERAL:  Feels well, denies any significant changes in weight without trying  SKIN:  Denies rashes, lesions, opened areas, wounds, change in moles or any other skin changes  HEENT:  Denies any head injury or headaches   C/O dizziness occurring when lying down or bending over that he feels is worsening over the past couple days  C/O sinus congestion and B/L ear fullness occurring  Negative blurred vision, floaters, spots before eyes, infections, or other vision problems   Negative significant changes in vision or hearing   C/O left lower tooth pain occurring for the past 2-3 weeks due to tooth fractured  Negative tinnitus or infections     Negative hay fever, sinus trouble, nasal discharge, bloody noses, or problems with smell     Negative sore throat, bleeding gums, ulcers, or sores  Glasses/Contacts: Glasses  Hearing Aids: NO  Dentures/Partials/Implants: NO  NECK:  Denies lumps, goiter, pain, swollen glands, or lymphadenopathy  BREASTS:  Denies lumps, pain, nipple discharge, swelling, redness, or any other changes  RESPIRATORY:  Denies cough, wheezing, shortness of breath, dyspnea, or orthopnea  CARDIOVASCULAR:  Denies chest pain or palpitations  GASTROINTESTINAL:  Appetite good, denies nausea, vomiting, Hx of indigestion and gastric ulcer controlled with medications     Bowel movements normal occurring about once daily or every other day   Denies black tarry or bloody Bms  URINARY:  Denies frequency, incontinence, dysuria, hematuria, nocturia, or recent flank pain  GENITAL:  Denies penile discharge, ulcerations, lesions, or other problems  PERIPHERAL VASCULAR:  Denies varicosities, swelling, skin changes, or pain  MUSCULOSKELETAL:  Denies back, joint, or muscle pain   Negative problems with mobility or movement  PSYCHIATRIC:  Denies problems with depression, Feels anxiety is controlled with medications, No anger or other psychiatric symptoms  NEUROLOGIC:  Denies fainting, memory problems, seizures, tingling, motor or sensory loss  HEMATOLOGIC:  Denies easy bruising, bleeding, or anemia  ENDOCRINE:  Denies thyroid problems, temperature intolerance, excessive sweating, or other endocrine symptoms  Objective:     Physical Exam   Nursing note and vitals reviewed  GENERAL:  Appears well nourished, well groomed, in no acute distress  SKIN:  Palms warm, dry, color good   Nails without clubbing or cyanosis     No lesions, ulcerations, or wounds  HEAD: [place] is average texture   Scalp without lesions, normocephalic, and atraumatic  EYES:  Visual fields full by confrontation   Conjunctiva pink, sclera white, PERRLA   Extraocular movements intact     EARS:  B/L ear canals clear   B/L TMs red with serous effusion and decreased light reflex   Acuity good to whispered voice     NOSE: Mucosa pink, moist, septum midline  + sinus tenderness  B/L turbinates red and edematous with yellow exudate  MOUTH:  Oral mucosa pink   Pharynx pink, moist, without swelling, redness, or exudate  Tongue midline  Tooth abscess of left lower posterior molar  NECK:  Supple, trachea midline, Negative thyromegaly, lymphadenopathy, or swollen glands  LYMPH NODES:  Negative enlargement of neck, axillary, epitrochlear, or inguinal nodes  THORAX/LUNGS  Thorax symmetric with good excursion  Lungs resonant   Breath sounds vesicular with no added sounds     Diaphragm descends within normal limits  CARDIOVASCULAR:  Carotid upstrokes brisk and without bruits  Apical impulse discrete and tapping, barely palpable in the 5th ICS/MCL   Normal S1 and Normal S2, Negative S3 or S4     Negative murmurs, thrills, lifts, or heaves  ABDOMEN:  Protuberant, bowel sounds normal active x 4 quadrants     Negative tenderness   Negative masses  Negative hepatomegaly   Negative splenomegaly  Negative costovertebral tenderness  EXTREMITIES:  Warm, calves supple, non-tender, negative for edema     Negative stasis pigmentation or ulcers  +2 pulses throughout  MUSCULOSKELETAL:  Negative joint deformities     Good range of motion in hands, wrists, elbows, shoulders, spine, hips, knees, and ankles  Negative spinal curvature  NEUROLOGICAL:  Mental status:  Awake, alert, and oriented to person, place, time, and event  Normal thought processes     Cranial Nerves:  II-XII intact  Motor:  Good muscle bulk and tone  Strength: 5/5 throughout  Cerebellar:  Rapid alternating movements, point-to-point movements intact  Gait stable and fluid  Sensory:  Pinprick, light touch, position sense, vibration, and stereogenesis intact     Romberg: Negative  Reflexes: +2 throughout         BMI Counseling: Body mass index is 38 39 kg/m²  Discussed the patient's BMI with him  The BMI is above average   BMI counseling and education was provided to the patient  Nutrition recommendations include decreasing overall calorie intake

## 2019-02-18 ENCOUNTER — TELEPHONE (OUTPATIENT)
Dept: FAMILY MEDICINE CLINIC | Facility: CLINIC | Age: 64
End: 2019-02-18

## 2019-02-18 DIAGNOSIS — Z12.5 PROSTATE CANCER SCREENING: Primary | ICD-10-CM

## 2019-02-18 NOTE — TELEPHONE ENCOUNTER
Please notify patient I printed a PSA lab script for him to have drawn before his Urology appointment but I ordered a PSA lab for June 2019 with all his next routine labwork so he can void that script out so it is not drawn because insurance will not cover to have the PSA drawn now before Urology appointment and in June 2019 with his next routine labs

## 2019-02-26 ENCOUNTER — OFFICE VISIT (OUTPATIENT)
Dept: UROLOGY | Facility: CLINIC | Age: 64
End: 2019-02-26
Payer: MEDICARE

## 2019-02-26 VITALS
SYSTOLIC BLOOD PRESSURE: 120 MMHG | HEART RATE: 60 BPM | HEIGHT: 68 IN | BODY MASS INDEX: 34.1 KG/M2 | DIASTOLIC BLOOD PRESSURE: 80 MMHG | WEIGHT: 225 LBS

## 2019-02-26 DIAGNOSIS — Z72.0 TOBACCO ABUSE: Primary | ICD-10-CM

## 2019-02-26 DIAGNOSIS — Z12.5 PROSTATE CANCER SCREENING: ICD-10-CM

## 2019-02-26 PROCEDURE — 99204 OFFICE O/P NEW MOD 45 MIN: CPT | Performed by: UROLOGY

## 2019-02-26 NOTE — PROGRESS NOTES
UROLOGY NEW CONSULT NOTE     CHIEF COMPLAINT   Lisa Quiñones III is a 70 y o  male with a complaint of   Chief Complaint   Patient presents with    prostate exam     PSA 0 2       History of Present Illness:     70 y o  Male with a family history of what he describes as prostate cancer in his father who  at age 64 after the cancer spread to the kidney  He reports the kidney and bladder then had to be removed  When inquired about whether not this was urothelial cancer affecting the lining, the patient is unsure  He has had PSA testing in the past and a remote history of a rectal exam that were normal   Patient is a significant cigarette smoker at 1 pack a day  He denies gross hematuria  Patient has erectile dysfunction but is not sexually active with his spouse and is not bothered by this  AUA SYMPTOM SCORE      Most Recent Value   AUA SYMPTOM SCORE   How often have you had a sensation of not emptying your bladder completely after you finished urinating? 1   How often have you had to urinate again less than two hours after you finished urinating? 1   How often have you found you stopped and started again several times when you urinate? 1   How often have you found it difficult to postpone urination? 1   How often have you had a weak urinary stream?  0   How often have you had to push or strain to begin urination?   0   How many times did you most typically get up to urinate from the time you went to bed at night until the time you got up in the morning?  0   Quality of Life: If you were to spend the rest of your life with your urinary condition just the way it is now, how would you feel about that?  4   AUA SYMPTOM SCORE  4        Past Medical History:     Past Medical History:   Diagnosis Date    Anxiety     Carpal tunnel syndrome     Colon polyps     DJD (degenerative joint disease)     Duodenal ulcer     Gastric reflux     Gastric ulcer     Heel fracture     bilateral    Hypertension     Vitamin B12 deficiency     Vitamin D deficiency        PAST SURGICAL HISTORY:     Past Surgical History:   Procedure Laterality Date    ANKLE SURGERY      repair    COLONOSCOPY      polyp removed; benign    COLONOSCOPY  02/07/2019    COLONOSCOPY W/ POLYPECTOMY      Followed by Dr Tiffanie Loera Bilateral    Dora Blandonney Left        CURRENT MEDICATIONS:     Current Outpatient Medications   Medication Sig Dispense Refill    ALPRAZolam (XANAX) 0 5 mg tablet Take 1 tablet (0 5 mg total) by mouth every 12 (twelve) hours as needed for anxiety For anxiety 60 tablet 2    citalopram (CeleXA) 20 mg tablet Take 1 tablet (20 mg total) by mouth daily 90 tablet 1    doxepin (SINEquan) 50 mg capsule Take 1 capsule (50 mg total) by mouth daily at bedtime 90 capsule 1    ergocalciferol (VITAMIN D2) 50,000 units Take 1 capsule (50,000 Units total) by mouth every 30 (thirty) days 3 capsule 1    fluticasone (FLONASE) 50 mcg/act nasal spray 1 spray each nostril 2 x daily x 1 month then prn congestion or allergies 1 Bottle 5    ibuprofen (MOTRIN) 800 mg tablet Take 1 tablet (800 mg total) by mouth every 8 (eight) hours as needed for moderate pain 270 tablet 1    lisinopril (ZESTRIL) 10 mg tablet Take 1 tablet (10 mg total) by mouth daily 30 tablet 5    meclizine (ANTIVERT) 25 mg tablet Take 1 tablet (25 mg total) by mouth every 8 (eight) hours as needed for dizziness 60 tablet 3    pantoprazole (PROTONIX) 40 mg tablet Take 1 tablet (40 mg total) by mouth daily 90 tablet 1    ranitidine (ZANTAC) 150 mg tablet Take 1 tablet (150 mg total) by mouth 2 (two) times a day before breakfast and lunch 180 tablet 1     No current facility-administered medications for this visit          ALLERGIES:   No Known Allergies    SOCIAL HISTORY:     Social History     Socioeconomic History    Marital status: /Civil Union     Spouse name: None    Number of children: None    Years of education: None    Highest education level: None   Occupational History    None   Social Needs    Financial resource strain: None    Food insecurity:     Worry: None     Inability: None    Transportation needs:     Medical: No     Non-medical: No   Tobacco Use    Smoking status: Current Every Day Smoker     Packs/day: 1 00     Years: 40 00     Pack years: 40 00     Types: Cigarettes     Start date: 1/1/1976    Smokeless tobacco: Former User     Types: Chew     Quit date: 2014   Substance and Sexual Activity    Alcohol use: No    Drug use: No    Sexual activity: None   Lifestyle    Physical activity:     Days per week: None     Minutes per session: None    Stress: None   Relationships    Social connections:     Talks on phone: None     Gets together: None     Attends Oriental orthodox service: None     Active member of club or organization: None     Attends meetings of clubs or organizations: None     Relationship status: None    Intimate partner violence:     Fear of current or ex partner: None     Emotionally abused: None     Physically abused: None     Forced sexual activity: None   Other Topics Concern    None   Social History Narrative    None       SOCIAL HISTORY:     Family History   Problem Relation Age of Onset    Cancer Mother         lung    Heart disease Mother     Cancer Father         prostate    Heart murmur Brother     Diabetes Daughter     Diabetes Son     Diabetes Other        REVIEW OF SYSTEMS:     Review of Systems   Constitutional: Negative  Respiratory: Negative  Cardiovascular: Negative  Gastrointestinal: Negative  Genitourinary: Negative  Musculoskeletal: Negative  Skin: Negative  Neurological: Negative  Psychiatric/Behavioral: Negative            PHYSICAL EXAM:     /80   Pulse 60   Ht 5' 8" (1 727 m)   Wt 102 kg (225 lb)   BMI 34 21 kg/m²     General:  Healthy appearing male in no acute distress  They have a normal affect  There is not appear to be any gross neurologic defects or abnormalities  HEENT:  Normocephalic, atraumatic  Neck is supple without any palpable lymphadenopathy  Cardiovascular:  Patient has normal palpable distal radial pulses  There is no significant peripheral edema  No JVD is noted  Respiratory:  Patient has unlabored respirations  There is no audible wheeze or rhonchi  Abdomen:  Abdomen is   Without surgical scars  Abdomen is soft and nontender  There is no tympany  Inguinal and umbilical hernia are not appreciated  Genitourinary:  Large left hydrocele, circumcised phallus with orthotopic meatus, prostate is small smooth and without nodules, patient has external hemorrhoids    Musculoskeletal:  Patient does not have significant CVA tenderness in the flank with palpation or percussion  They full range of motion in all 4 extremities  Strength in all 4 extremities appears congruent  Patient is able to ambulate without assistance or difficulty  Dermatologic:  Patient has no skin abnormalities or rashes  LABS:     CBC: No results found for: WBC, HGB, HCT, MCV, PLT    BMP: No results found for: GLUCOSE, CALCIUM, NA, K, CO2, CL, BUN, CREATININE    2/25/19 PSA 0 2    PROCEDURE:     Patient is attempting to give a urine specimen after the visit    ASSESSMENT:     70 y o  male with  Tobacco abuse history, family history of either prostate or bladder cancer, and erectile dysfunction    PLAN:      I have recommended that the patient deliver us a urine sample for analysis to ensure he does not have microscopic hematuria given his tobacco abuse and a questionable history of urothelial carcinoma in his father  Patient's rectal exam is normal and his PSA is low, would recommend yearly evaluation  Patient has erectile dysfunction but is not necessarily interested in treatment  He will let us know if this changes in the future

## 2019-04-01 DIAGNOSIS — F41.9 ANXIETY: ICD-10-CM

## 2019-04-01 RX ORDER — ALPRAZOLAM 0.5 MG/1
0.5 TABLET ORAL EVERY 12 HOURS PRN
Qty: 60 TABLET | Refills: 2 | Status: SHIPPED | OUTPATIENT
Start: 2019-04-01 | End: 2019-09-18 | Stop reason: SDUPTHER

## 2019-05-19 DIAGNOSIS — F41.9 ANXIETY: ICD-10-CM

## 2019-05-19 DIAGNOSIS — E55.9 VITAMIN D DEFICIENCY: ICD-10-CM

## 2019-05-20 RX ORDER — ERGOCALCIFEROL 1.25 MG/1
CAPSULE ORAL
Qty: 3 CAPSULE | Refills: 1 | Status: SHIPPED | OUTPATIENT
Start: 2019-05-20 | End: 2019-11-14 | Stop reason: SDUPTHER

## 2019-05-20 RX ORDER — CITALOPRAM 20 MG/1
TABLET ORAL
Qty: 90 TABLET | Refills: 1 | Status: SHIPPED | OUTPATIENT
Start: 2019-05-20 | End: 2019-11-14 | Stop reason: SDUPTHER

## 2019-05-23 DIAGNOSIS — K25.9 GASTRIC ULCER, UNSPECIFIED CHRONICITY, UNSPECIFIED WHETHER GASTRIC ULCER HEMORRHAGE OR PERFORATION PRESENT: ICD-10-CM

## 2019-05-23 DIAGNOSIS — K21.9 GASTROESOPHAGEAL REFLUX DISEASE WITHOUT ESOPHAGITIS: ICD-10-CM

## 2019-05-23 RX ORDER — PANTOPRAZOLE SODIUM 40 MG/1
40 TABLET, DELAYED RELEASE ORAL DAILY
Qty: 90 TABLET | Refills: 1 | Status: SHIPPED | OUTPATIENT
Start: 2019-05-23 | End: 2019-11-14 | Stop reason: SDUPTHER

## 2019-06-24 ENCOUNTER — OFFICE VISIT (OUTPATIENT)
Dept: FAMILY MEDICINE CLINIC | Facility: CLINIC | Age: 64
End: 2019-06-24
Payer: MEDICARE

## 2019-06-24 VITALS
DIASTOLIC BLOOD PRESSURE: 68 MMHG | HEIGHT: 69 IN | BODY MASS INDEX: 32.64 KG/M2 | SYSTOLIC BLOOD PRESSURE: 114 MMHG | RESPIRATION RATE: 18 BRPM | HEART RATE: 57 BPM | OXYGEN SATURATION: 96 % | WEIGHT: 220.4 LBS | TEMPERATURE: 97.7 F

## 2019-06-24 DIAGNOSIS — F41.9 ANXIETY: ICD-10-CM

## 2019-06-24 DIAGNOSIS — E55.9 VITAMIN D DEFICIENCY: ICD-10-CM

## 2019-06-24 DIAGNOSIS — E53.8 VITAMIN B12 DEFICIENCY: ICD-10-CM

## 2019-06-24 DIAGNOSIS — R42 DIZZINESS: ICD-10-CM

## 2019-06-24 DIAGNOSIS — I10 HYPERTENSION, UNSPECIFIED TYPE: ICD-10-CM

## 2019-06-24 DIAGNOSIS — K25.9 GASTRIC ULCER, UNSPECIFIED CHRONICITY, UNSPECIFIED WHETHER GASTRIC ULCER HEMORRHAGE OR PERFORATION PRESENT: ICD-10-CM

## 2019-06-24 DIAGNOSIS — Z72.0 TOBACCO ABUSE: ICD-10-CM

## 2019-06-24 DIAGNOSIS — D50.9 IRON DEFICIENCY ANEMIA, UNSPECIFIED IRON DEFICIENCY ANEMIA TYPE: ICD-10-CM

## 2019-06-24 DIAGNOSIS — E78.49 OTHER HYPERLIPIDEMIA: ICD-10-CM

## 2019-06-24 DIAGNOSIS — E07.9 THYROID DISORDER: ICD-10-CM

## 2019-06-24 DIAGNOSIS — K29.50 CHRONIC GASTRITIS WITHOUT BLEEDING, UNSPECIFIED GASTRITIS TYPE: ICD-10-CM

## 2019-06-24 DIAGNOSIS — K21.9 GASTROESOPHAGEAL REFLUX DISEASE WITHOUT ESOPHAGITIS: Primary | ICD-10-CM

## 2019-06-24 DIAGNOSIS — R73.9 HYPERGLYCEMIA: ICD-10-CM

## 2019-06-24 DIAGNOSIS — G47.09 OTHER INSOMNIA: ICD-10-CM

## 2019-06-24 PROCEDURE — 99214 OFFICE O/P EST MOD 30 MIN: CPT | Performed by: NURSE PRACTITIONER

## 2019-06-24 RX ORDER — RANITIDINE 150 MG/1
150 TABLET ORAL
Qty: 180 TABLET | Refills: 1 | Status: SHIPPED | OUTPATIENT
Start: 2019-06-24 | End: 2019-11-27 | Stop reason: CLARIF

## 2019-06-24 RX ORDER — LISINOPRIL 10 MG/1
10 TABLET ORAL DAILY
Qty: 90 TABLET | Refills: 1 | Status: SHIPPED | OUTPATIENT
Start: 2019-06-24 | End: 2019-11-27 | Stop reason: SDUPTHER

## 2019-09-18 ENCOUNTER — TELEPHONE (OUTPATIENT)
Dept: FAMILY MEDICINE CLINIC | Facility: CLINIC | Age: 64
End: 2019-09-18

## 2019-09-18 DIAGNOSIS — F41.9 ANXIETY: ICD-10-CM

## 2019-09-18 RX ORDER — ALPRAZOLAM 0.5 MG/1
0.5 TABLET ORAL EVERY 12 HOURS PRN
Qty: 60 TABLET | Refills: 2 | Status: SHIPPED | OUTPATIENT
Start: 2019-09-18 | End: 2020-04-01

## 2019-10-24 ENCOUNTER — OFFICE VISIT (OUTPATIENT)
Dept: FAMILY MEDICINE CLINIC | Facility: CLINIC | Age: 64
End: 2019-10-24
Payer: MEDICARE

## 2019-10-24 VITALS
DIASTOLIC BLOOD PRESSURE: 64 MMHG | HEART RATE: 63 BPM | BODY MASS INDEX: 33.5 KG/M2 | TEMPERATURE: 98 F | WEIGHT: 226.2 LBS | RESPIRATION RATE: 18 BRPM | OXYGEN SATURATION: 97 % | HEIGHT: 69 IN | SYSTOLIC BLOOD PRESSURE: 126 MMHG

## 2019-10-24 DIAGNOSIS — M54.50 CHRONIC BILATERAL LOW BACK PAIN WITHOUT SCIATICA: ICD-10-CM

## 2019-10-24 DIAGNOSIS — M54.2 NECK PAIN: ICD-10-CM

## 2019-10-24 DIAGNOSIS — G89.29 CHRONIC BILATERAL THORACIC BACK PAIN: ICD-10-CM

## 2019-10-24 DIAGNOSIS — M25.512 CHRONIC PAIN OF BOTH SHOULDERS: ICD-10-CM

## 2019-10-24 DIAGNOSIS — M25.511 CHRONIC PAIN OF BOTH SHOULDERS: ICD-10-CM

## 2019-10-24 DIAGNOSIS — M25.50 ARTHRALGIA, UNSPECIFIED JOINT: Primary | ICD-10-CM

## 2019-10-24 DIAGNOSIS — M54.6 CHRONIC BILATERAL THORACIC BACK PAIN: ICD-10-CM

## 2019-10-24 DIAGNOSIS — G89.29 CHRONIC BILATERAL LOW BACK PAIN WITHOUT SCIATICA: ICD-10-CM

## 2019-10-24 DIAGNOSIS — G89.29 CHRONIC PAIN OF BOTH SHOULDERS: ICD-10-CM

## 2019-10-24 PROCEDURE — 99213 OFFICE O/P EST LOW 20 MIN: CPT | Performed by: NURSE PRACTITIONER

## 2019-10-24 RX ORDER — IBUPROFEN 800 MG/1
800 TABLET ORAL EVERY 8 HOURS PRN
Qty: 270 TABLET | Refills: 1 | Status: SHIPPED | OUTPATIENT
Start: 2019-10-24 | End: 2021-03-15 | Stop reason: ALTCHOICE

## 2019-10-24 NOTE — PROGRESS NOTES
Assessment/Plan:     Diagnoses and all orders for this visit:    Arthralgia, unspecified joint  Comments:  Check xrays and continue Ibuprofen with Baclofen  Notify ofice of new or worsening symptoms  Consider PT vs  Referral vs  Additional imaging  Orders:  -     ibuprofen (MOTRIN) 800 mg tablet; Take 1 tablet (800 mg total) by mouth every 8 (eight) hours as needed for moderate pain    Chronic bilateral low back pain without sciatica  -     XR spine lumbar minimum 4 views non injury; Future    Chronic bilateral thoracic back pain  -     XR spine thoracic 3 vw; Future    Chronic pain of both shoulders  -     XR spine cervical complete 4 or 5 vw non injury; Future    Neck pain  -     XR spine cervical complete 4 or 5 vw non injury; Future        Subjective:      Patient ID: Jono Leyva is a 75 y o  male  Patient presents to [address] [place] [address] with complaints of back pain  Allergies, medical history and current medications reviewed with patient; patient reports taking all medications as prescribed without issues  Patient C/O low back pain with radiation into the mid back, lower ribcage and bilateral groin  Patient states rib pain is worst to the left  Patient also reports bilateral shoulder pain that is worst to the left  Patient describes pain as "stinging " Patient states he has used Ibuprofen 800 mg and Baclofen 10 mg, which is effective  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, and lift heavy objects  Patient has not yet completed labs as ordered in June 2019; reprinted scripts and instructed patient to have labs completed as ordered  Patient Care Team:  Cary Ac as PCP - General (Family Medicine)  Roxana Callejas MD    Review of Systems   Musculoskeletal: Positive for arthralgias and back pain  All other systems reviewed and are negative      Objective:    /64 (BP Location: Right arm, Patient Position: Sitting, Cuff Size: Large)   Pulse 63   Temp 98 °F (36 7 °C) (Temporal)   Resp 18   Ht 5' 8 5" (1 74 m)   Wt 103 kg (226 lb 3 2 oz)   SpO2 97%   BMI 33 89 kg/m²      Physical Exam   Constitutional: He is oriented to person, place, and time  He appears well-developed and well-nourished  No distress  HENT:   Head: Normocephalic and atraumatic  Eyes: Conjunctivae and lids are normal    Neck: Normal range of motion  No tracheal deviation present  Cardiovascular: Normal rate, regular rhythm, S1 normal, S2 normal and normal heart sounds  No murmur heard  Pulmonary/Chest: Effort normal and breath sounds normal  No respiratory distress  Abdominal: Normal appearance  He exhibits no distension  There is no guarding  Musculoskeletal: Normal range of motion  Cervical back: He exhibits spasm  Thoracic back: He exhibits spasm  Lumbar back: He exhibits spasm  Neurological: He is alert and oriented to person, place, and time  Skin: Skin is warm, dry and intact  Psychiatric: He has a normal mood and affect  His speech is normal    Nursing note and vitals reviewed

## 2019-10-24 NOTE — PATIENT INSTRUCTIONS
Back Pain   WHAT YOU NEED TO KNOW:   What should I know about back pain? Back pain is common  You may feel sore or stiff on one or both sides of your back  The pain may spread to your buttocks or thighs  What causes or increases my risk for back pain? Conditions that affect the spine, joints, or muscles can cause back pain  These may include arthritis, spinal stenosis (narrowing of the spinal column), muscle tension, or breakdown of the spinal discs  The following increase your risk of back pain:  · Repeated bending, lifting, or twisting, or lifting heavy items    · Injury from a fall or accident    · Lack of regular physical activity     · Obesity, pregnancy     · Smoking    · Aging    · Driving, sitting, or standing for long periods    · Bad posture while sitting or standing  How is back pain diagnosed? Your healthcare provider will ask if you have any medical conditions  He may ask if you have a history of back pain and how it started  He may watch you stand and walk, and check your range of motion  Show him where you feel pain and what makes it better or worse  Describe the pain, how bad it is, and how long it lasts  Tell him if your pain worsens at night or when you lie on your back  How is back pain treated? · NSAIDs  help decrease swelling and pain  This medicine is available with or without a doctor's order  NSAIDs can cause stomach bleeding or kidney problems in certain people  If you take blood thinner medicine, always ask your healthcare provider if NSAIDs are safe for you  Always read the medicine label and follow directions  · Acetaminophen  decreases pain  It is available without a doctor's order  Ask how much to take and how often to take it  Follow directions  Acetaminophen can cause liver damage if not taken correctly  · Prescription pain medicine  may be given  Ask your healthcare provider how to take this medicine safely  How do I manage my back pain?    · Apply ice  on your back or affected area for 15 to 20 minutes every hour or as directed  Use an ice pack, or put crushed ice in a plastic bag  Cover it with a towel  Ice helps prevent tissue damage and decreases pain  · Apply heat  on your back or affected area for 20 to 30 minutes every 2 hours for as many days as directed  Heat helps decrease pain and muscle spasms  · Stay active  as much as you can without causing more pain  Bed rest could make your back pain worse  Avoid heavy lifting until your pain is gone  When should I contact my healthcare provider? · You have back pain that does not get better with rest and pain medicine  · You have a fever  · You have pain that worsens when you are on your back or when you rest     · You have pain that worsens when you cough or sneeze  · You lose weight without trying  · You have questions or concerns about your condition or care  When should I seek immediate care or call 911? · You have pain, numbness, or weakness in one or both legs  · Your pain becomes so severe that you cannot walk  · You cannot control your urine or bowel movements  · You have severe back pain with chest pain  · You have severe back pain, nausea, and vomiting  · You have severe back pain that spreads to your side or genital area  CARE AGREEMENT:   You have the right to help plan your care  Learn about your health condition and how it may be treated  Discuss treatment options with your caregivers to decide what care you want to receive  You always have the right to refuse treatment  The above information is an  only  It is not intended as medical advice for individual conditions or treatments  Talk to your doctor, nurse or pharmacist before following any medical regimen to see if it is safe and effective for you  © 2017 [facility] Information is for End User's use only and may not be sold, redistributed or otherwise used for commercial purposes   All illustrations and images included in CareNotes® are the copyrighted property of [employer] M [employer]  or [employer]

## 2019-11-11 ENCOUNTER — APPOINTMENT (OUTPATIENT)
Dept: LAB | Facility: HOSPITAL | Age: 64
End: 2019-11-11
Payer: MEDICARE

## 2019-11-11 ENCOUNTER — HOSPITAL ENCOUNTER (OUTPATIENT)
Dept: RADIOLOGY | Facility: HOSPITAL | Age: 64
Discharge: HOME | End: 2019-11-11
Payer: MEDICARE

## 2019-11-11 DIAGNOSIS — M54.50 CHRONIC BILATERAL LOW BACK PAIN WITHOUT SCIATICA: ICD-10-CM

## 2019-11-11 DIAGNOSIS — G89.29 CHRONIC BILATERAL LOW BACK PAIN WITHOUT SCIATICA: ICD-10-CM

## 2019-11-11 DIAGNOSIS — M25.511 CHRONIC PAIN OF BOTH SHOULDERS: ICD-10-CM

## 2019-11-11 DIAGNOSIS — G89.29 CHRONIC PAIN OF BOTH SHOULDERS: ICD-10-CM

## 2019-11-11 DIAGNOSIS — E55.9 VITAMIN D DEFICIENCY: ICD-10-CM

## 2019-11-11 DIAGNOSIS — G89.29 CHRONIC BILATERAL THORACIC BACK PAIN: ICD-10-CM

## 2019-11-11 DIAGNOSIS — M54.2 NECK PAIN: ICD-10-CM

## 2019-11-11 DIAGNOSIS — E53.8 VITAMIN B12 DEFICIENCY: ICD-10-CM

## 2019-11-11 DIAGNOSIS — E07.9 THYROID DISORDER: ICD-10-CM

## 2019-11-11 DIAGNOSIS — R73.9 HYPERGLYCEMIA: ICD-10-CM

## 2019-11-11 DIAGNOSIS — D50.9 IRON DEFICIENCY ANEMIA, UNSPECIFIED IRON DEFICIENCY ANEMIA TYPE: ICD-10-CM

## 2019-11-11 DIAGNOSIS — E78.49 OTHER HYPERLIPIDEMIA: ICD-10-CM

## 2019-11-11 DIAGNOSIS — M25.512 CHRONIC PAIN OF BOTH SHOULDERS: ICD-10-CM

## 2019-11-11 DIAGNOSIS — M54.6 CHRONIC BILATERAL THORACIC BACK PAIN: ICD-10-CM

## 2019-11-11 LAB
25(OH)D3 SERPL-MCNC: 40.4 NG/ML (ref 30–100)
ALBUMIN SERPL BCP-MCNC: 3.9 G/DL (ref 3.5–5)
ALP SERPL-CCNC: 45 U/L (ref 46–116)
ALT SERPL W P-5'-P-CCNC: 35 U/L (ref 12–78)
ANION GAP SERPL CALCULATED.3IONS-SCNC: 5 MMOL/L (ref 4–13)
AST SERPL W P-5'-P-CCNC: 17 U/L (ref 5–45)
BASOPHILS # BLD AUTO: 0.02 THOUSANDS/ΜL (ref 0–0.1)
BASOPHILS NFR BLD AUTO: 0 % (ref 0–1)
BILIRUB SERPL-MCNC: 0.4 MG/DL (ref 0.2–1)
BUN SERPL-MCNC: 18 MG/DL (ref 5–25)
CALCIUM SERPL-MCNC: 9.1 MG/DL (ref 8.3–10.1)
CHLORIDE SERPL-SCNC: 104 MMOL/L (ref 100–108)
CHOLEST SERPL-MCNC: 118 MG/DL (ref 50–200)
CO2 SERPL-SCNC: 32 MMOL/L (ref 21–32)
CREAT SERPL-MCNC: 0.97 MG/DL (ref 0.6–1.3)
EOSINOPHIL # BLD AUTO: 0.06 THOUSAND/ΜL (ref 0–0.61)
EOSINOPHIL NFR BLD AUTO: 1 % (ref 0–6)
ERYTHROCYTE [DISTWIDTH] IN BLOOD BY AUTOMATED COUNT: 12.2 % (ref 11.6–15.1)
EST. AVERAGE GLUCOSE BLD GHB EST-MCNC: 108 MG/DL
GFR SERPL CREATININE-BSD FRML MDRD: 81 ML/MIN/1.73SQ M
GLUCOSE P FAST SERPL-MCNC: 99 MG/DL (ref 65–99)
HBA1C MFR BLD: 5.4 % (ref 4.2–6.3)
HCT VFR BLD AUTO: 45.7 % (ref 36.5–49.3)
HDLC SERPL-MCNC: 58 MG/DL
HGB BLD-MCNC: 15.1 G/DL (ref 12–17)
IMM GRANULOCYTES # BLD AUTO: 0.03 THOUSAND/UL (ref 0–0.2)
IMM GRANULOCYTES NFR BLD AUTO: 1 % (ref 0–2)
INSULIN SERPL-ACNC: 12.2 MU/L (ref 3–25)
LDLC SERPL CALC-MCNC: 52 MG/DL (ref 0–100)
LYMPHOCYTES # BLD AUTO: 1.41 THOUSANDS/ΜL (ref 0.6–4.47)
LYMPHOCYTES NFR BLD AUTO: 26 % (ref 14–44)
MCH RBC QN AUTO: 31.3 PG (ref 26.8–34.3)
MCHC RBC AUTO-ENTMCNC: 33 G/DL (ref 31.4–37.4)
MCV RBC AUTO: 95 FL (ref 82–98)
MONOCYTES # BLD AUTO: 0.51 THOUSAND/ΜL (ref 0.17–1.22)
MONOCYTES NFR BLD AUTO: 9 % (ref 4–12)
NEUTROPHILS # BLD AUTO: 3.48 THOUSANDS/ΜL (ref 1.85–7.62)
NEUTS SEG NFR BLD AUTO: 63 % (ref 43–75)
NONHDLC SERPL-MCNC: 60 MG/DL
NRBC BLD AUTO-RTO: 0 /100 WBCS
PLATELET # BLD AUTO: 183 THOUSANDS/UL (ref 149–390)
PMV BLD AUTO: 8.5 FL (ref 8.9–12.7)
POTASSIUM SERPL-SCNC: 4.9 MMOL/L (ref 3.5–5.3)
PROT SERPL-MCNC: 7.2 G/DL (ref 6.4–8.2)
RBC # BLD AUTO: 4.83 MILLION/UL (ref 3.88–5.62)
SODIUM SERPL-SCNC: 141 MMOL/L (ref 136–145)
TRIGL SERPL-MCNC: 40 MG/DL
TSH SERPL DL<=0.05 MIU/L-ACNC: 1.84 UIU/ML (ref 0.36–3.74)
VIT B12 SERPL-MCNC: 414 PG/ML (ref 100–900)
WBC # BLD AUTO: 5.51 THOUSAND/UL (ref 4.31–10.16)

## 2019-11-11 PROCEDURE — 36415 COLL VENOUS BLD VENIPUNCTURE: CPT

## 2019-11-11 PROCEDURE — 80053 COMPREHEN METABOLIC PANEL: CPT

## 2019-11-11 PROCEDURE — 72050 X-RAY EXAM NECK SPINE 4/5VWS: CPT

## 2019-11-11 PROCEDURE — 85025 COMPLETE CBC W/AUTO DIFF WBC: CPT

## 2019-11-11 PROCEDURE — 82607 VITAMIN B-12: CPT

## 2019-11-11 PROCEDURE — 84443 ASSAY THYROID STIM HORMONE: CPT

## 2019-11-11 PROCEDURE — 80061 LIPID PANEL: CPT

## 2019-11-11 PROCEDURE — 83525 ASSAY OF INSULIN: CPT

## 2019-11-11 PROCEDURE — 83036 HEMOGLOBIN GLYCOSYLATED A1C: CPT

## 2019-11-11 PROCEDURE — 82306 VITAMIN D 25 HYDROXY: CPT

## 2019-11-11 PROCEDURE — 72072 X-RAY EXAM THORAC SPINE 3VWS: CPT

## 2019-11-11 PROCEDURE — 72110 X-RAY EXAM L-2 SPINE 4/>VWS: CPT

## 2019-11-14 DIAGNOSIS — F41.9 ANXIETY: ICD-10-CM

## 2019-11-14 DIAGNOSIS — E55.9 VITAMIN D DEFICIENCY: ICD-10-CM

## 2019-11-14 DIAGNOSIS — K25.9 GASTRIC ULCER, UNSPECIFIED CHRONICITY, UNSPECIFIED WHETHER GASTRIC ULCER HEMORRHAGE OR PERFORATION PRESENT: ICD-10-CM

## 2019-11-14 DIAGNOSIS — K21.9 GASTROESOPHAGEAL REFLUX DISEASE WITHOUT ESOPHAGITIS: ICD-10-CM

## 2019-11-14 RX ORDER — ERGOCALCIFEROL 1.25 MG/1
CAPSULE ORAL
Qty: 3 CAPSULE | Refills: 1 | Status: SHIPPED | OUTPATIENT
Start: 2019-11-14 | End: 2020-02-19

## 2019-11-14 RX ORDER — PANTOPRAZOLE SODIUM 40 MG/1
TABLET, DELAYED RELEASE ORAL
Qty: 90 TABLET | Refills: 1 | Status: SHIPPED | OUTPATIENT
Start: 2019-11-14 | End: 2020-02-19

## 2019-11-14 RX ORDER — CITALOPRAM 20 MG/1
TABLET ORAL
Qty: 90 TABLET | Refills: 1 | Status: SHIPPED | OUTPATIENT
Start: 2019-11-14 | End: 2020-02-19

## 2019-11-19 ENCOUNTER — TELEPHONE (OUTPATIENT)
Dept: FAMILY MEDICINE CLINIC | Facility: CLINIC | Age: 64
End: 2019-11-19

## 2019-11-19 DIAGNOSIS — M47.23 OSTEOARTHRITIS OF SPINE WITH RADICULOPATHY, CERVICOTHORACIC REGION: Primary | ICD-10-CM

## 2019-11-19 DIAGNOSIS — M47.25 OSTEOARTHRITIS OF SPINE WITH RADICULOPATHY, THORACOLUMBAR REGION: ICD-10-CM

## 2019-11-19 PROBLEM — M47.815 OSTEOARTHRITIS OF THORACOLUMBAR SPINE: Status: ACTIVE | Noted: 2019-11-19

## 2019-11-19 PROBLEM — M47.813 OSTEOARTHRITIS OF CERVICOTHORACIC SPINE: Status: ACTIVE | Noted: 2019-11-19

## 2019-11-19 RX ORDER — LANOLIN ALCOHOL/MO/W.PET/CERES
1 CREAM (GRAM) TOPICAL 3 TIMES DAILY
Qty: 90 TABLET | Refills: 5 | Status: SHIPPED | OUTPATIENT
Start: 2019-11-19

## 2019-11-19 RX ORDER — LANOLIN ALCOHOL/MO/W.PET/CERES
1 CREAM (GRAM) TOPICAL 2 TIMES DAILY
Qty: 60 TABLET | Refills: 5 | Status: SHIPPED | OUTPATIENT
Start: 2019-11-19 | End: 2019-11-19

## 2019-11-20 NOTE — TELEPHONE ENCOUNTER
Xrays of cervical and thoracic spine revealed multilevel moderate degenerative spondylosis  Xray of lumbar spine revealed multilevel moderate degenerative facet arthrosis with levoscoliosis  Plan: Within the context of the patient's symptoms, I think Physical Therapy would be beneficial for the patient moving forward  We can consider referral if patient's symptoms are not improved with conservative treatment  We will also start treatment with Glucosamine-Condroitin

## 2019-11-27 ENCOUNTER — OFFICE VISIT (OUTPATIENT)
Dept: FAMILY MEDICINE CLINIC | Facility: CLINIC | Age: 64
End: 2019-11-27
Payer: MEDICARE

## 2019-11-27 VITALS
OXYGEN SATURATION: 98 % | TEMPERATURE: 98.8 F | DIASTOLIC BLOOD PRESSURE: 76 MMHG | SYSTOLIC BLOOD PRESSURE: 112 MMHG | RESPIRATION RATE: 18 BRPM | HEIGHT: 69 IN | WEIGHT: 225.4 LBS | HEART RATE: 71 BPM | BODY MASS INDEX: 33.38 KG/M2

## 2019-11-27 DIAGNOSIS — M41.35 THORACOGENIC SCOLIOSIS OF THORACOLUMBAR REGION: ICD-10-CM

## 2019-11-27 DIAGNOSIS — M51.37 DDD (DEGENERATIVE DISC DISEASE), LUMBOSACRAL: ICD-10-CM

## 2019-11-27 DIAGNOSIS — F32.89 OTHER DEPRESSION: ICD-10-CM

## 2019-11-27 DIAGNOSIS — M47.25 OSTEOARTHRITIS OF SPINE WITH RADICULOPATHY, THORACOLUMBAR REGION: ICD-10-CM

## 2019-11-27 DIAGNOSIS — M47.9 SPONDYLOSIS: ICD-10-CM

## 2019-11-27 DIAGNOSIS — E07.9 THYROID DISORDER: ICD-10-CM

## 2019-11-27 DIAGNOSIS — M47.23 OSTEOARTHRITIS OF SPINE WITH RADICULOPATHY, CERVICOTHORACIC REGION: ICD-10-CM

## 2019-11-27 DIAGNOSIS — I10 HYPERTENSION, UNSPECIFIED TYPE: Primary | ICD-10-CM

## 2019-11-27 DIAGNOSIS — E55.9 VITAMIN D DEFICIENCY: ICD-10-CM

## 2019-11-27 DIAGNOSIS — G47.09 OTHER INSOMNIA: ICD-10-CM

## 2019-11-27 DIAGNOSIS — F41.9 ANXIETY: ICD-10-CM

## 2019-11-27 DIAGNOSIS — Z72.0 TOBACCO ABUSE: ICD-10-CM

## 2019-11-27 DIAGNOSIS — E78.2 MIXED HYPERLIPIDEMIA: ICD-10-CM

## 2019-11-27 DIAGNOSIS — M54.42 CHRONIC LEFT-SIDED LOW BACK PAIN WITH LEFT-SIDED SCIATICA: ICD-10-CM

## 2019-11-27 DIAGNOSIS — D64.9 ANEMIA, UNSPECIFIED TYPE: ICD-10-CM

## 2019-11-27 DIAGNOSIS — G89.29 CHRONIC LEFT-SIDED LOW BACK PAIN WITH LEFT-SIDED SCIATICA: ICD-10-CM

## 2019-11-27 DIAGNOSIS — M50.30 DDD (DEGENERATIVE DISC DISEASE), CERVICAL: ICD-10-CM

## 2019-11-27 DIAGNOSIS — E53.8 VITAMIN B12 DEFICIENCY: ICD-10-CM

## 2019-11-27 DIAGNOSIS — R73.9 HYPERGLYCEMIA: ICD-10-CM

## 2019-11-27 DIAGNOSIS — K21.9 GASTROESOPHAGEAL REFLUX DISEASE WITHOUT ESOPHAGITIS: ICD-10-CM

## 2019-11-27 DIAGNOSIS — M51.35 DDD (DEGENERATIVE DISC DISEASE), THORACOLUMBAR: ICD-10-CM

## 2019-11-27 PROCEDURE — 99214 OFFICE O/P EST MOD 30 MIN: CPT | Performed by: NURSE PRACTITIONER

## 2019-11-27 RX ORDER — MULTIVITAMIN
1 TABLET ORAL DAILY
Qty: 90 TABLET | Refills: 1
Start: 2019-11-27

## 2019-11-27 RX ORDER — LISINOPRIL 10 MG/1
10 TABLET ORAL DAILY
Qty: 90 TABLET | Refills: 1 | Status: SHIPPED | OUTPATIENT
Start: 2019-11-27 | End: 2020-06-03 | Stop reason: SDUPTHER

## 2019-11-27 NOTE — PROGRESS NOTES
Assessment/Plan:      Diagnoses and all orders for this visit:    Hypertension, unspecified type  -     CBC and differential; Future  -     TSH, 3rd generation; Future  -     lisinopril (ZESTRIL) 10 mg tablet; Take 1 tablet (10 mg total) by mouth daily    Gastroesophageal reflux disease without esophagitis    Osteoarthritis of spine with radiculopathy, cervicothoracic region    Osteoarthritis of spine with radiculopathy, thoracolumbar region    Anxiety    Other insomnia    Tobacco abuse    Other depression    Vitamin B12 deficiency  -     Vitamin B12; Future  -     Multiple Vitamin (MULTIVITAMIN) tablet; Take 1 tablet by mouth daily    Vitamin D deficiency  -     Vitamin D 25 hydroxy; Future  -     Multiple Vitamin (MULTIVITAMIN) tablet; Take 1 tablet by mouth daily    Chronic left-sided low back pain with left-sided sciatica  -     Ambulatory referral to Pain Management; Future    DDD (degenerative disc disease), lumbosacral  -     Ambulatory referral to Pain Management; Future    DDD (degenerative disc disease), thoracolumbar  -     Ambulatory referral to Pain Management; Future    DDD (degenerative disc disease), cervical  -     Ambulatory referral to Pain Management; Future    Thoracogenic scoliosis of thoracolumbar region  -     Ambulatory referral to Pain Management; Future    Spondylosis  -     Ambulatory referral to Pain Management; Future    Anemia, unspecified type  -     CBC and differential; Future    Hyperglycemia  -     Comprehensive metabolic panel; Future  -     Hemoglobin A1C; Future  -     Insulin, fasting; Future    Mixed hyperlipidemia  -     Lipid panel; Future    Thyroid disorder  -     TSH, 3rd generation; Future        Subjective:     Patient ID: Silva Henson is a 75 y o  male  Patient presents to office for follow up and recheck  Complete medical history and medications reviewed with patient and tolerating all medications well without any problems   C/O low back pain occurring since he fell 8-9 weeks ago and hit his lower back off a cinder block  C/O bilateral abdominal pain occurring since the fall and feels like a pinching pain that comes and goes on its own and sometimes feels numbness in bilateral abdominal area which he feels is coming from his spine  C/O low back pain radiating down left leg and left buttocks  Continues with chronic neck and mid back pain occurring with pain from neck radiating into bilateral shoulders  Denies any fever, chills, N/V/D, constipation, black tarry or bloody BMs, or blood in urine  Denies any problems with urination or with BMs  Denies any other problems or concerns at the present time  Patient had X-Rays of Chest and Spine completed  Last Colonoscopy was done in 2019 with Dr Quang Rice which he had benign polyps removed and has to schedule follow up Colonoscopy in 5 years  Patient is taking Baclofen 10 mg 2 x daily as previously prescribed for his low back pain and feels this is helping his low back pain somewhat  Review of Systems    GENERAL:  Feels well, denies any significant changes in weight without trying  SKIN:  Denies rashes, lesions, opened areas, wounds, change in moles or any other skin changes  HEENT:  Denies any head injury or headaches  Negative blurred vision, floaters, spots before eyes, infections, or other vision problems  Negative significant changes in vision or hearing  Negative tinnitus, vertigo, or infections  Negative hay fever, sinus trouble, nasal discharge, bloody noses, or problems with smell  Negative sore throat, bleeding gums, ulcers, or sores  NECK:  Denies lumps, goiter, pain, swollen glands, or lymphadenopathy  BREASTS:  Denies lumps, pain, nipple discharge, swelling, redness, or any other changes  RESPIRATORY:  Denies cough, wheezing, shortness of breath, dyspnea, or orthopnea  CARDIOVASCULAR:  Denies chest pain or palpitations     GASTROINTESTINAL:  Appetite good, denies nausea, vomiting, or indigestion  C/O bilateral abdominal pain that he feels is radiating around from spine and feels numbness occurring of bilateral abdomen area at times that comes and goes  Bowel movements normal occurring about once daily or every other day  URINARY:  Denies frequency, incontinence, dysuria, hematuria, nocturia, or recent flank pain  GENITAL:  Denies penile discharge, ulcerations, lesions, or other problems  PERIPHERAL VASCULAR:  Denies varicosities, swelling, skin changes, or pain  MUSCULOSKELETAL: C/O low back pain occurring since he fell 8-9 weeks ago and hit his lower back off of a cinder block  PSYCHIATRIC:  Denies problems with depression, anxiety, anger, or other psychiatric symptoms  NEUROLOGIC:  Denies fainting, dizziness, memory problems, seizures, tingling, motor or sensory loss  HEMATOLOGIC:  Denies easy bruising, bleeding, or anemia  ENDOCRINE:  Denies thyroid problems, temperature intolerance, excessive sweating, or other endocrine symptoms  Objective:     Physical Exam   Nursing note and vitals reviewed  GENERAL:  Appears well nourished, well groomed, in no acute distress  SKIN:  Palms warm, dry, color good  Nails without clubbing or cyanosis  No lesions, ulcerations, or wounds  HEAD:  Hair is average texture  Scalp without lesions, normocephalic, and atraumatic  EARS:  B/L ear canals clear  B/L TMs clear with + light reflex  NOSE: Mucosa pink, moist, septum midline  Negative sinus tenderness  B/L turbinates pink, moist, non-edematous without exudate  MOUTH:  Oral mucosa pink  Pharynx pink, moist, without swelling, redness, or exudate  Dentition ok  Tongue midline  NECK:  Supple, trachea midline, Negative thyromegaly, lymphadenopathy, or swollen glands  LYMPH NODES:  Negative enlargement of neck, axillary, epitrochlear, or inguinal nodes  THORAX/LUNGS  Thorax symmetric with good excursion  Lungs resonant  Breath sounds vesicular with no added sounds  Diaphragm descends within normal limits  CARDIOVASCULAR:  Carotid upstrokes brisk and without bruits  Apical impulse discrete and tapping, barely palpable in the 5th ICS/MCL  Normal S1 and Normal S2, Negative S3 or S4  Negative murmurs, thrills, lifts, or heaves  ABDOMEN:  Protuberant, bowel sounds normal active x 4 quadrants  Non-tender  Negative masses  Negative hepatomegaly  Negative splenomegaly  Negative costovertebral tenderness  EXTREMITIES:  Warm, calves supple, non-tender, negative for edema  Negative stasis pigmentation or ulcers  +2 pulses throughout  MUSCULOSKELETAL:  Negative joint deformities  Good range of motion in hands, wrists, elbows, shoulders, spine, hips, knees, and ankles  + tenderness of lumbar spine with para-lumbar spasms  Negative spinal curvature  + tenderness of cervical and thoracic spine upon palpation  NEUROLOGICAL:  Mental status:  Awake, alert, and oriented to person, place, time, and event  Normal thought processes

## 2019-11-27 NOTE — PATIENT INSTRUCTIONS
Wellness Visit for Adults   WHAT YOU NEED TO KNOW:   What is a wellness visit? A wellness visit is when you see your healthcare provider to get screened for health problems  You can also get advice on how to stay healthy  Write down your questions so you remember to ask them  Ask your healthcare provider how often you should have a wellness visit  What happens at a wellness visit? Your healthcare provider will ask about your health, and your family history of health problems  This includes high blood pressure, heart disease, and cancer  He or she will ask if you have symptoms that concern you, if you smoke, and about your mood  You may also be asked about your intake of medicines, supplements, food, and alcohol  Any of the following may be done:  · Your weight  will be checked  Your height may also be checked so your body mass index (BMI) can be calculated  Your BMI shows if you are at a healthy weight  · Your blood pressure  and heart rate will be checked  Your temperature may also be checked  · Blood and urine tests  may be done  Blood tests may be done to check your cholesterol levels  Abnormal cholesterol levels increase your risk for heart disease and stroke  You may also need a blood or urine test to check for diabetes if you are at increased risk  Urine tests may be done to look for signs of an infection or kidney disease  · A physical exam  includes checking your heartbeat and lungs with a stethoscope  Your healthcare provider may also check your skin to look for sun damage  · Screening tests  may be recommended  A screening test is done to check for diseases that may not cause symptoms  The screening tests you may need depend on your age, gender, family history, and lifestyle habits  For example, colorectal screening may be recommended if you are 48years old or older  What screening tests do I need if I am a woman? · A Pap smear  is used to screen for cervical cancer   Pap smears are usually done every 3 to 5 years depending on your age  You may need them more often if you have had abnormal Pap smear test results in the past  Ask your healthcare provider how often you should have a Pap smear  · A mammogram  is an x-ray of your breasts to screen for breast cancer  Experts recommend mammograms every 2 years starting at age 48 years  You may need a mammogram at age 52 years or younger if you have an increased risk for breast cancer  Talk to your healthcare provider about when you should start having mammograms and how often you need them  What vaccines might I need? · Get an influenza vaccine  every year  The influenza vaccine protects you from the flu  Several types of viruses cause the flu  The viruses change over time, so new vaccines are made each year  · Get a tetanus-diphtheria (Td) booster vaccine  every 10 years  This vaccine protects you against tetanus and diphtheria  Tetanus is a severe infection that may cause painful muscle spasms and lockjaw  Diphtheria is a severe bacterial infection that causes a thick covering in the back of your mouth and throat  · Get a human papillomavirus (HPV) vaccine  if you are female and aged 23 to 32 or male 23 to 24 and never received it  This vaccine protects you from HPV infection  HPV is the most common infection spread by sexual contact  HPV may also cause vaginal, penile, and anal cancers  · Get a pneumococcal vaccine  if you are aged 72 years or older  The pneumococcal vaccine is an injection given to protect you from pneumococcal disease  Pneumococcal disease is an infection caused by pneumococcal bacteria  The infection may cause pneumonia, meningitis, or an ear infection  · Get a shingles vaccine  if you are aged 61 or older, even if you have had shingles before  The shingles vaccine is an injection to protect you from the varicella-zoster virus  This is the same virus that causes chickenpox   Shingles is a painful rash that develops in people who had chickenpox or have been exposed to the virus  How can I eat healthy? My Plate is a model for planning healthy meals  It shows the types and amounts of foods that should go on your plate  Fruits and vegetables make up about half of your plate, and grains and protein make up the other half  A serving of dairy is included on the side of your plate  The amount of calories and serving sizes you need depends on your age, gender, weight, and height  Examples of healthy foods are listed below:  · Eat a variety of vegetables  such as dark green, red, and orange vegetables  You can also include canned vegetables low in sodium (salt) and frozen vegetables without added butter or sauces  · Eat a variety of fresh fruits , canned fruit in 100% juice, frozen fruit, and dried fruit  · Include whole grains  At least half of the grains you eat should be whole grains  Examples include whole-wheat bread, wheat pasta, brown rice, and whole-grain cereals such as oatmeal     · Eat a variety of protein foods such as seafood (fish and shellfish), lean meat, and poultry without skin (turkey and chicken)  Examples of lean meats include pork leg, shoulder, or tenderloin, and beef round, sirloin, tenderloin, and extra lean ground beef  Other protein foods include eggs and egg substitutes, beans, peas, soy products, nuts, and seeds  · Choose low-fat dairy products such as skim or 1% milk or low-fat yogurt, cheese, and cottage cheese  · Limit unhealthy fats  such as butter, hard margarine, and shortening  How much exercise do I need? Exercise at least 30 minutes per day on most days of the week  Some examples of exercise include walking, biking, dancing, and swimming  You can also fit in more physical activity by taking the stairs instead of the elevator or parking farther away from stores  Include muscle strengthening activities 2 days each week  Regular exercise provides many health benefits  It helps you manage your weight, and decreases your risk for type 2 diabetes, heart disease, stroke, and high blood pressure  Exercise can also help improve your mood  Ask your healthcare provider about the best exercise plan for you  What are some general health and safety guidelines I should follow? · Do not smoke  Nicotine and other chemicals in cigarettes and cigars can cause lung damage  Ask your healthcare provider for information if you currently smoke and need help to quit  E-cigarettes or smokeless tobacco still contain nicotine  Talk to your healthcare provider before you use these products  · Limit alcohol  A drink of alcohol is 12 ounces of beer, 5 ounces of wine, or 1½ ounces of liquor  · Lose weight, if needed  Being overweight increases your risk of certain health conditions  These include heart disease, high blood pressure, type 2 diabetes, and certain types of cancer  · Protect your skin  Do not sunbathe or use tanning beds  Use sunscreen with a SPF 15 or higher  Apply sunscreen at least 15 minutes before you go outside  Reapply sunscreen every 2 hours  Wear protective clothing, hats, and sunglasses when you are outside  · Drive safely  Always wear your seatbelt  Make sure everyone in your car wears a seatbelt  A seatbelt can save your life if you are in an accident  Do not use your cell phone when you are driving  This could distract you and cause an accident  Pull over if you need to make a call or send a text message  · Practice safe sex  Use latex condoms if are sexually active and have more than one partner  Your healthcare provider may recommend screening tests for sexually transmitted infections (STIs)  · Wear helmets, lifejackets, and protective gear  Always wear a helmet when you ride a bike or motorcycle, go skiing, or play sports that could cause a head injury  Wear protective equipment when you play sports   Wear a lifejacket when you are on a boat or doing water sports  CARE AGREEMENT:   You have the right to help plan your care  Learn about your health condition and how it may be treated  Discuss treatment options with your caregivers to decide what care you want to receive  You always have the right to refuse treatment  The above information is an  only  It is not intended as medical advice for individual conditions or treatments  Talk to your doctor, nurse or pharmacist before following any medical regimen to see if it is safe and effective for you  © 2017 [address] Information is for End User's use only and may not be sold, redistributed or otherwise used for commercial purposes  All illustrations and images included in CareNotes® are the copyrighted property of [employer]  or Lalo Murray

## 2019-12-20 DIAGNOSIS — K21.9 GASTROESOPHAGEAL REFLUX DISEASE WITHOUT ESOPHAGITIS: ICD-10-CM

## 2019-12-20 DIAGNOSIS — K25.9 GASTRIC ULCER, UNSPECIFIED CHRONICITY, UNSPECIFIED WHETHER GASTRIC ULCER HEMORRHAGE OR PERFORATION PRESENT: ICD-10-CM

## 2019-12-23 RX ORDER — RANITIDINE 150 MG/1
TABLET ORAL
Qty: 180 TABLET | Refills: 0 | OUTPATIENT
Start: 2019-12-23

## 2020-01-28 ENCOUNTER — CONSULT (OUTPATIENT)
Dept: PAIN MEDICINE | Facility: CLINIC | Age: 65
End: 2020-01-28
Payer: MEDICARE

## 2020-01-28 VITALS
HEIGHT: 69 IN | BODY MASS INDEX: 34.57 KG/M2 | DIASTOLIC BLOOD PRESSURE: 73 MMHG | WEIGHT: 233.4 LBS | SYSTOLIC BLOOD PRESSURE: 131 MMHG

## 2020-01-28 DIAGNOSIS — M54.12 CERVICAL RADICULOPATHY: Primary | ICD-10-CM

## 2020-01-28 DIAGNOSIS — M54.16 LUMBAR RADICULOPATHY: ICD-10-CM

## 2020-01-28 DIAGNOSIS — M51.35 DDD (DEGENERATIVE DISC DISEASE), THORACOLUMBAR: ICD-10-CM

## 2020-01-28 DIAGNOSIS — M54.42 CHRONIC LEFT-SIDED LOW BACK PAIN WITH LEFT-SIDED SCIATICA: ICD-10-CM

## 2020-01-28 DIAGNOSIS — M51.37 DDD (DEGENERATIVE DISC DISEASE), LUMBOSACRAL: ICD-10-CM

## 2020-01-28 DIAGNOSIS — M50.120 CERVICAL DISC DISORDER WITH RADICULOPATHY OF MID-CERVICAL REGION: ICD-10-CM

## 2020-01-28 DIAGNOSIS — M47.816 LUMBAR SPONDYLOSIS: ICD-10-CM

## 2020-01-28 DIAGNOSIS — M41.35 THORACOGENIC SCOLIOSIS OF THORACOLUMBAR REGION: ICD-10-CM

## 2020-01-28 DIAGNOSIS — G56.03 BILATERAL CARPAL TUNNEL SYNDROME: ICD-10-CM

## 2020-01-28 DIAGNOSIS — M50.30 DDD (DEGENERATIVE DISC DISEASE), CERVICAL: ICD-10-CM

## 2020-01-28 DIAGNOSIS — G89.4 CHRONIC PAIN SYNDROME: ICD-10-CM

## 2020-01-28 DIAGNOSIS — G89.29 CHRONIC LEFT-SIDED LOW BACK PAIN WITH LEFT-SIDED SCIATICA: ICD-10-CM

## 2020-01-28 DIAGNOSIS — M47.812 CERVICAL SPONDYLOSIS: ICD-10-CM

## 2020-01-28 DIAGNOSIS — M47.9 SPONDYLOSIS: ICD-10-CM

## 2020-01-28 PROCEDURE — 99204 OFFICE O/P NEW MOD 45 MIN: CPT | Performed by: ANESTHESIOLOGY

## 2020-01-28 RX ORDER — NORTRIPTYLINE HYDROCHLORIDE 10 MG/1
10 CAPSULE ORAL
Qty: 30 CAPSULE | Refills: 1 | Status: SHIPPED | OUTPATIENT
Start: 2020-01-28 | End: 2021-03-15 | Stop reason: ALTCHOICE

## 2020-01-28 RX ORDER — BACLOFEN 10 MG/1
10 TABLET ORAL 3 TIMES DAILY
COMMUNITY
End: 2021-03-15 | Stop reason: ALTCHOICE

## 2020-01-28 NOTE — PROGRESS NOTES
Assessment:  1  Cervical radiculopathy    2  Chronic left-sided low back pain with left-sided sciatica    3  DDD (degenerative disc disease), lumbosacral    4  DDD (degenerative disc disease), thoracolumbar    5  DDD (degenerative disc disease), cervical    6  Thoracogenic scoliosis of thoracolumbar region    7  Spondylosis    8  Cervical spondylosis    9  Cervical disc disorder with radiculopathy of mid-cervical region    10  Chronic pain syndrome    11  Lumbar radiculopathy    12  Lumbar spondylosis    13  Bilateral carpal tunnel syndrome        Plan:  Patient is a 57-year-old male with complaints of neck pain, bilateral shoulder pain, bilateral arm pain with chronic pain secondary to cervical degenerative disc disease, cervical radiculopathy, cervical spondylosis presents office for initial consultation  Patient also clinically presents with neck pain and radicular symptoms in the bilateral upper extremities resulting dropping objects uncontrolled only  Patient notes low back pain and radicular symptoms down into the left lower extremity which is elicited by standing for prolonged periods of time or bending in addition the pain with rotation to the ipsilateral side in addition flexion and extensive extension  1   MRI Cervical Spine to assess discogenic pathology for patients neck and bilateral arm pain and we will also order an MRI of the lumbar spine to assess the pathology behind patient's left-sided radicular symptoms  2  Physical therapy for cervical pine strengthen  3  We will trial nortriptyline 10mg po QHS for cervical radiculopathy   4  Patient also has a history of bilateral carpal tunnel syndrome  We will send patient to Ortho for possible surgical repair to decrease episodes of dropping objects  5  Follow-up in 1 month        [facility] report was reviewed and was appropriate     History of Present Illness:     The patient is a 75 y o  male who presents for consultation in regards to Shoulder Pain and Back Pain  Symptoms have been present for 3 yrs  Symptoms began without any precipitating injury or trauma  Pain is reported to be 5 on the numeric rating scale  Symptoms are felt nearly constantly and worst in the morning  Symptoms are characterized as dull/aching, numbing and tingling  Symptoms are associated with bilateral arm weakness  Aggravating factors include standing, bending and walking  Relieving factors include kneeling, sitting, exercise, coughing/sneezing and bowel movements  No change in symptoms with lying down and relaxation  Patient has not tried any pain relieving modaliaites  Medications to relieve symptoms include ibuprofen, baclofen which provides relief  Review of Systems:    Review of Systems   Respiratory: Positive for wheezing  Musculoskeletal: Positive for arthralgias  All other systems reviewed and are negative          Past Medical History:   Diagnosis Date    Anxiety     Carpal tunnel syndrome     Colon polyps     DJD (degenerative joint disease)     Duodenal ulcer     Gastric reflux     Gastric ulcer     Heel fracture     bilateral    Hypertension     Vitamin B12 deficiency     Vitamin D deficiency        Past Surgical History:   Procedure Laterality Date    ANKLE SURGERY      repair    COLONOSCOPY      polyp removed; benign    COLONOSCOPY  02/07/2019    COLONOSCOPY W/ POLYPECTOMY      Followed by Dr Romel Shannon Bilateral     MULTIPLE TOOTH EXTRACTIONS      POLYPECTOMY      TIBIA FRACTURE SURGERY Left        Family History   Problem Relation Age of Onset    Cancer Mother         lung    Heart disease Mother     Cancer Father         prostate    Heart murmur Brother     Diabetes Daughter     Diabetes Son     Diabetes Other        Social History     Occupational History    Not on file   Tobacco Use    Smoking status: Current Every Day Smoker     Packs/day: 1 00     Years: 40 00     Pack years: 40 00     Types: Cigarettes     Start date: 1/1/1976    Smokeless tobacco: Former User     Types: Chew     Quit date: 2014   Substance and Sexual Activity    Alcohol use: No    Drug use: No    Sexual activity: Not on file         Current Outpatient Medications:     ALPRAZolam (XANAX) 0 5 mg tablet, Take 1 tablet (0 5 mg total) by mouth every 12 (twelve) hours as needed for anxiety For anxiety, Disp: 60 tablet, Rfl: 2    citalopram (CeleXA) 20 mg tablet, take 1 tablet by mouth once daily, Disp: 90 tablet, Rfl: 1    ergocalciferol (VITAMIN D2) 50,000 units, TAKE ONE CAPSULE BY MOUTH EVERY 30 DAYS, Disp: 3 capsule, Rfl: 1    glucosamine-chondroitin 500-400 MG tablet, Take 1 tablet by mouth 3 (three) times a day, Disp: 90 tablet, Rfl: 5    ibuprofen (MOTRIN) 800 mg tablet, Take 1 tablet (800 mg total) by mouth every 8 (eight) hours as needed for moderate pain, Disp: 270 tablet, Rfl: 1    lisinopril (ZESTRIL) 10 mg tablet, Take 1 tablet (10 mg total) by mouth daily, Disp: 90 tablet, Rfl: 1    Multiple Vitamin (MULTIVITAMIN) tablet, Take 1 tablet by mouth daily, Disp: 90 tablet, Rfl: 1    pantoprazole (PROTONIX) 40 mg tablet, take 1 tablet by mouth once daily, Disp: 90 tablet, Rfl: 1    No Known Allergies    Physical Exam:    /73 (BP Location: Left arm, Patient Position: Sitting, Cuff Size: Large)   Ht 5' 8 5" (1 74 m)   Wt 106 kg (233 lb 6 4 oz)   BMI 34 97 kg/m²     Constitutional: normal, well developed, well nourished, alert, in no distress and non-toxic and no overt pain behavior   and obese  Eyes: anicteric  HEENT: grossly intact  Neck: supple, symmetric, trachea midline and no masses   Pulmonary:even and unlabored  Cardiovascular:No edema or pitting edema present  Skin:Normal without rashes or lesions and well hydrated  Psychiatric:Mood and affect appropriate  Neurologic:Cranial Nerves II-XII grossly intact  Musculoskeletal:antalgic     Cervical Spine examination demonstrates  Decreased ROM secondary to pain with lateral rotation to the left/right and bending to the left/right, in addition to neck flexion  4/5 upper extremity strength in all muscle groups bilaterally  Negative Spurling's maneuver to the b/l Ue, sensitivity to light touch intact b/l Ue  Imaging  CERVICAL SPINE     INDICATION:   M25 511: Pain in right shoulder  G89 29: Other chronic pain  M25 512: Pain in left shoulder  M54 2: Cervicalgia      COMPARISON:  None     VIEWS:  XR SPINE CERVICAL COMPLETE 4 OR 5 VW NON INJURY   Images: 6     FINDINGS:     No evidence of fracture       Normal alignment without subluxation      Moderate degenerative spondylosis,  bilateral foraminal stenosis C3-4, C5-6, C6-7     The prevertebral soft tissues are within normal limits        The lung apices are clear      IMPRESSION:     Multilevel degenerative spondylosis          THORACIC SPINE     INDICATION:   M54 6: Pain in thoracic spine  G89 29: Other chronic pain      COMPARISON:  None     VIEWS:  XR SPINE THORACIC 3 VW  Images: 2     FINDINGS:     There is no fracture or pathologic bone lesion      Thoracic vertebral alignment is within normal limits      Moderate multilevel degenerative spondylosis throughout mid and lower thoracic segments     There is no displacement of the paraspinal line       The pedicles appear intact      IMPRESSION:  Moderate multilevel degenerative spondylosis     No acute osseous abnormality         LUMBAR SPINE     INDICATION:   M54 5: Low back pain  G89 29:  Other chronic pain      COMPARISON:  None     VIEWS:  XR SPINE LUMBAR MINIMUM 4 VIEWS NON INJURY        FINDINGS:  Mild chronic appearing endplate concavities throughout lumbar segments     There are 5 non rib bearing lumbar vertebral bodies       There is no evidence of acute fracture or destructive osseous lesion      Mild levoscoliosis, apex L2-3     Moderate degenerative facet arthrosis L4-5, L5-S1     The pedicles appear intact      Soft tissues are unremarkable      IMPRESSION:  Multilevel degenerative facet arthrosis, levoscoliosis     Mild multilevel chronic endplate concavities

## 2020-02-05 ENCOUNTER — HOSPITAL ENCOUNTER (OUTPATIENT)
Dept: MRI IMAGING | Facility: HOSPITAL | Age: 65
Discharge: HOME | End: 2020-02-05
Attending: ANESTHESIOLOGY

## 2020-02-05 DIAGNOSIS — M54.16 LUMBAR RADICULOPATHY: ICD-10-CM

## 2020-02-05 DIAGNOSIS — M54.42 CHRONIC LEFT-SIDED LOW BACK PAIN WITH LEFT-SIDED SCIATICA: ICD-10-CM

## 2020-02-05 DIAGNOSIS — M54.12 CERVICAL RADICULOPATHY: ICD-10-CM

## 2020-02-05 DIAGNOSIS — M47.816 LUMBAR SPONDYLOSIS: ICD-10-CM

## 2020-02-05 DIAGNOSIS — G89.4 CHRONIC PAIN SYNDROME: ICD-10-CM

## 2020-02-05 DIAGNOSIS — G89.29 CHRONIC LEFT-SIDED LOW BACK PAIN WITH LEFT-SIDED SCIATICA: ICD-10-CM

## 2020-02-05 DIAGNOSIS — M50.120 CERVICAL DISC DISORDER WITH RADICULOPATHY OF MID-CERVICAL REGION: ICD-10-CM

## 2020-02-05 DIAGNOSIS — M47.812 CERVICAL SPONDYLOSIS: ICD-10-CM

## 2020-02-10 ENCOUNTER — TELEPHONE (OUTPATIENT)
Dept: PAIN MEDICINE | Facility: CLINIC | Age: 65
End: 2020-02-10

## 2020-02-10 ENCOUNTER — HOSPITAL ENCOUNTER (OUTPATIENT)
Dept: MRI IMAGING | Facility: HOSPITAL | Age: 65
Discharge: HOME | End: 2020-02-10
Attending: ANESTHESIOLOGY
Payer: MEDICARE

## 2020-02-10 PROCEDURE — 72141 MRI NECK SPINE W/O DYE: CPT

## 2020-02-10 PROCEDURE — 72148 MRI LUMBAR SPINE W/O DYE: CPT

## 2020-02-10 NOTE — TELEPHONE ENCOUNTER
Call from [facility] radiology  Ph# [phone]  Caller reports significant findings of the MRI lumbar spine  Please review in epic

## 2020-02-12 NOTE — TELEPHONE ENCOUNTER
MRI cervical spine shows moderate central canal stenosis at C3-C4, multilevel degenerative and arthritic changes, significant narrowing affecting the right C5 and C6 nerve roots which are responsible for the shoulder, biceps, triceps, forearm into the thumb    MRI lumbar spine recruitment of the right L5 nerve which will provoke neuropathy in the right lateral thigh down to the top of right foot, left L3 nerve which provoked neuropathy to the anterior/front left thigh, multilevel degenerative changes in arthritic joint changes which are responsible for dull pressure in the back images sharp stabbing pain when flexion/extension/ipsilateral rotation of the lumbar spine    Not quite sure with the significant findings were of this MRI of lumbar spine

## 2020-02-18 DIAGNOSIS — K25.9 GASTRIC ULCER, UNSPECIFIED CHRONICITY, UNSPECIFIED WHETHER GASTRIC ULCER HEMORRHAGE OR PERFORATION PRESENT: ICD-10-CM

## 2020-02-18 DIAGNOSIS — E55.9 VITAMIN D DEFICIENCY: ICD-10-CM

## 2020-02-18 DIAGNOSIS — F41.9 ANXIETY: ICD-10-CM

## 2020-02-18 DIAGNOSIS — K21.9 GASTROESOPHAGEAL REFLUX DISEASE WITHOUT ESOPHAGITIS: ICD-10-CM

## 2020-02-19 RX ORDER — ERGOCALCIFEROL 1.25 MG/1
CAPSULE ORAL
Qty: 3 CAPSULE | Refills: 1 | Status: SHIPPED | OUTPATIENT
Start: 2020-02-19 | End: 2020-05-11

## 2020-02-19 RX ORDER — PANTOPRAZOLE SODIUM 40 MG/1
TABLET, DELAYED RELEASE ORAL
Qty: 90 TABLET | Refills: 1 | Status: SHIPPED | OUTPATIENT
Start: 2020-02-19 | End: 2020-05-11

## 2020-02-19 RX ORDER — CITALOPRAM 20 MG/1
TABLET ORAL
Qty: 90 TABLET | Refills: 1 | Status: SHIPPED | OUTPATIENT
Start: 2020-02-19 | End: 2020-05-11

## 2020-02-20 ENCOUNTER — OFFICE VISIT (OUTPATIENT)
Dept: PAIN MEDICINE | Facility: CLINIC | Age: 65
End: 2020-02-20
Payer: MEDICARE

## 2020-02-20 VITALS
HEIGHT: 69 IN | BODY MASS INDEX: 35.07 KG/M2 | DIASTOLIC BLOOD PRESSURE: 72 MMHG | WEIGHT: 236.8 LBS | SYSTOLIC BLOOD PRESSURE: 136 MMHG

## 2020-02-20 DIAGNOSIS — M54.16 LUMBAR RADICULOPATHY: ICD-10-CM

## 2020-02-20 DIAGNOSIS — M47.812 CERVICAL SPONDYLOSIS: ICD-10-CM

## 2020-02-20 DIAGNOSIS — G89.29 CHRONIC BILATERAL LOW BACK PAIN WITHOUT SCIATICA: ICD-10-CM

## 2020-02-20 DIAGNOSIS — G89.4 CHRONIC PAIN SYNDROME: ICD-10-CM

## 2020-02-20 DIAGNOSIS — M54.2 NECK PAIN: ICD-10-CM

## 2020-02-20 DIAGNOSIS — M54.50 CHRONIC BILATERAL LOW BACK PAIN WITHOUT SCIATICA: ICD-10-CM

## 2020-02-20 DIAGNOSIS — G56.03 BILATERAL CARPAL TUNNEL SYNDROME: ICD-10-CM

## 2020-02-20 DIAGNOSIS — M47.816 LUMBAR SPONDYLOSIS: ICD-10-CM

## 2020-02-20 PROCEDURE — 1160F RVW MEDS BY RX/DR IN RCRD: CPT | Performed by: NURSE PRACTITIONER

## 2020-02-20 PROCEDURE — 3078F DIAST BP <80 MM HG: CPT | Performed by: NURSE PRACTITIONER

## 2020-02-20 PROCEDURE — 3075F SYST BP GE 130 - 139MM HG: CPT | Performed by: NURSE PRACTITIONER

## 2020-02-20 PROCEDURE — 3008F BODY MASS INDEX DOCD: CPT | Performed by: NURSE PRACTITIONER

## 2020-02-20 PROCEDURE — 99213 OFFICE O/P EST LOW 20 MIN: CPT | Performed by: NURSE PRACTITIONER

## 2020-02-20 NOTE — PROGRESS NOTES
Assessment:  1  Chronic pain syndrome    2  Chronic bilateral low back pain without sciatica    3  Lumbar radiculopathy    4  Lumbar spondylosis    5  Neck pain    6  Cervical spondylosis    7  Bilateral carpal tunnel syndrome        Plan:   While the patient was in the office today, I did have a thorough conversation regarding their chronic pain syndrome, medication management, and treatment plan options  Yang Moe  Is a 40-year-old male with chronic low back pain and neck pain  He was initially seen here on 01/28/2020 at which time an MRI of his cervical spine and lumbar spine were ordered  MRI of the cervical spine revealed multilevel spondylosis with moderate central canal narrowing at C3-4, mild central canal narrowing at C4-5 and C5-6  There is moderate to severe right foraminal narrowing at C5-6 and C4-5  MRI of the lumbar spine also revealed multilevel spondylosis and a disc protrusion at L4-5 with indentation of the thecal sac causing right lateral recess stenosis  MRI results were reviewed with patient and his wife  And several different options were discussed  First, I recommended that patient begin physical therapy that was recommended during his initial evaluation  I explained to him the importance of a lifelong commitment to a home exercise program geared toward core strengthening  The patient was agreeable and verbalized an understanding  Next, to address the neuropathic component of his pain, patient was advised to begin the nortriptyline that was prescribed during his initial evaluation  He will begin nortriptyline 10 mg at bedtime  Explained to him that the nortriptyline may need to be titrated to relief or therapeutic dose in the future  The patient was agreeable and verbalized an understanding  finally, we discussed the role of injection therapy    Since most of his pain is nonradiating low back pain which is consistent with facetogenic pain, I discussed diagnostic medial branch blocks and radiofrequency ablation with them  I gave him information to take home and read regarding both procedures  The patient will follow-up in 6 weeksfor medication prescription refill and reevaluation  The patient was advised to contact the office should their symptoms worsen in the interim  Also, patient was advised to call to schedule medial branch block if he decides that he would like to proceed in that manner  The patient was agreeable and verbalized an understanding  History of Present Illness: The patient is a 75 y o  male who presents for a follow up office visit in regards to  Neck and low back pain  The patients current symptoms include low back pain  Patient reports that his biggest complaint is low back pain  He denies leg pain, numbness, tingling weakness, but reports an occasional sense of weakness in his legs especially these climbing stairs  His secondary complaint of pain is neck and shoulder pain  Current pain level is a 3/10  Pain is described as dull and aching  He was initially seen here on 01/28/2020 at which time MRIs of his cervical spine lumbar spine were ordered  He is here to review results  He was given a prescription of nortriptyline 10 mg during his initial visit  He never started it  He was given a prescription to start physical therapy  He never started it  Current pain medications includes:  Baclofen 10 mg as needed ( prescribed by PCP) and ibuprofen as needed    The patient reports that this regimen is providing 40% pain relief  The patient is reporting no side effects from this pain medication regimen  I have personally reviewed and/or updated the patient's past medical history, past surgical history, family history, social history, current medications, allergies, and vital signs today  Review of Systems  Review of Systems   Musculoskeletal:        Joint stiffness   All other systems reviewed and are negative        Past Medical History:   Diagnosis Date    Anxiety     Carpal tunnel syndrome     Colon polyps     DJD (degenerative joint disease)     Duodenal ulcer     Gastric reflux     Gastric ulcer     Heel fracture     bilateral    Hypertension     Vitamin B12 deficiency     Vitamin D deficiency        Past Surgical History:   Procedure Laterality Date    ANKLE SURGERY      repair    COLONOSCOPY      polyp removed; benign    COLONOSCOPY  02/07/2019    COLONOSCOPY W/ POLYPECTOMY      Followed by Dr Jon Shoemaker Bilateral     MULTIPLE TOOTH EXTRACTIONS      POLYPECTOMY      TIBIA FRACTURE SURGERY Left        Family History   Problem Relation Age of Onset    Cancer Mother         lung    Heart disease Mother     Cancer Father         prostate    Heart murmur Brother     Diabetes Daughter     Diabetes Son     Diabetes Other        Social History     Occupational History    Not on file   Tobacco Use    Smoking status: Current Every Day Smoker     Packs/day: 1 00     Years: 40 00     Pack years: 40 00     Types: Cigarettes     Start date: 1/1/1976    Smokeless tobacco: Former User     Types: Chew     Quit date: 2014   Substance and Sexual Activity    Alcohol use: No    Drug use: No    Sexual activity: Not on file         Current Outpatient Medications:     ALPRAZolam (XANAX) 0 5 mg tablet, Take 1 tablet (0 5 mg total) by mouth every 12 (twelve) hours as needed for anxiety For anxiety, Disp: 60 tablet, Rfl: 2    baclofen 10 mg tablet, Take 10 mg by mouth 3 (three) times a day, Disp: , Rfl:     citalopram (CeleXA) 20 mg tablet, take 1 tablet by mouth once daily, Disp: 90 tablet, Rfl: 1    ergocalciferol (VITAMIN D2) 50,000 units, take 1 capsule by mouth EVERY 30 DAYS, Disp: 3 capsule, Rfl: 1    glucosamine-chondroitin 500-400 MG tablet, Take 1 tablet by mouth 3 (three) times a day, Disp: 90 tablet, Rfl: 5    ibuprofen (MOTRIN) 800 mg tablet, Take 1 tablet (800 mg total) by mouth every 8 (eight) hours as needed for moderate pain, Disp: 270 tablet, Rfl: 1    lisinopril (ZESTRIL) 10 mg tablet, Take 1 tablet (10 mg total) by mouth daily, Disp: 90 tablet, Rfl: 1    Multiple Vitamin (MULTIVITAMIN) tablet, Take 1 tablet by mouth daily, Disp: 90 tablet, Rfl: 1    nortriptyline (PAMELOR) 10 mg capsule, Take 1 capsule (10 mg total) by mouth daily at bedtime, Disp: 30 capsule, Rfl: 1    pantoprazole (PROTONIX) 40 mg tablet, take 1 tablet by mouth once daily, Disp: 90 tablet, Rfl: 1    No Known Allergies    Physical Exam:    /72 (BP Location: Left arm, Patient Position: Sitting, Cuff Size: Large)   Ht 5' 8 5" (1 74 m)   Wt 107 kg (236 lb 12 8 oz)   BMI 35 48 kg/m²     Constitutional:normal, well developed, well nourished, alert, in no distress and non-toxic and no overt pain behavior  Eyes:anicteric  HEENT:grossly intact  Neck:supple, symmetric, trachea midline and no masses   Pulmonary:even and unlabored  Cardiovascular:No edema or pitting edema present  Skin:Normal without rashes or lesions and well hydrated  Psychiatric:Mood and affect appropriate  Neurologic:Cranial Nerves II-XII grossly intact  Musculoskeletal:normal    Imaging  No orders to display       No orders of the defined types were placed in this encounter

## 2020-02-25 ENCOUNTER — EVALUATION (OUTPATIENT)
Dept: PHYSICAL THERAPY | Facility: CLINIC | Age: 65
End: 2020-02-25
Payer: MEDICARE

## 2020-02-25 DIAGNOSIS — M54.16 LUMBAR RADICULOPATHY: ICD-10-CM

## 2020-02-25 DIAGNOSIS — M54.12 CERVICAL RADICULOPATHY: Primary | ICD-10-CM

## 2020-02-25 PROCEDURE — 97110 THERAPEUTIC EXERCISES: CPT | Performed by: PHYSICAL THERAPIST

## 2020-02-25 PROCEDURE — 97163 PT EVAL HIGH COMPLEX 45 MIN: CPT | Performed by: PHYSICAL THERAPIST

## 2020-02-25 NOTE — PROGRESS NOTES
PT Evaluation     Today's date: 2020  Patient name: Rosemarie Abernathy  : 1955  MRN: [id number]  Referring provider: Mehran Brumfield MD  Dx:   Encounter Diagnosis     ICD-10-CM    1  Cervical radiculopathy M54 12    2  Lumbar radiculopathy M54 16                   Assessment  Assessment details: Patient is a 75year old male who presents to PT with c/o pain in low back and neck  PT examination shows weakness and instability, decreased flexibility and rom, limited posture, decreased activity tolerance and endurance stemming from significant arthritis causing pain and limited function  Patient would benefit from PT intervention including core, lumbar and cervical stabilization, strengthening exercises, manual therapy, HEP, functional training, aerobic conditioning and pain relieving modalities in order to maximize functional independence  Impairments: abnormal muscle tone, abnormal or restricted ROM, activity intolerance, impaired balance, impaired physical strength, lacks appropriate home exercise program, pain with function, safety issue and poor posture     Goals  ST  Initiate HEP  2  Patient to report decreased pain at worst by 25% in 4 weeks    LT  Patient to report decreased pain at worst by 50% in 8 weeks  2  Patient to increase B/L LE and UE strength to 5/5 in 8 weeks  3   Patient to increase stabilization t/o low back and neck to [facility] in 8 weeks    Plan  Patient would benefit from: PT eval and skilled physical therapy  Planned modality interventions: cryotherapy, thermotherapy: hydrocollator packs, ultrasound and unattended electrical stimulation  Other planned modality interventions: Modalities PRN  Planned therapy interventions: abdominal trunk stabilization, IADL retraining, ADL retraining, joint mobilization, manual therapy, massage, balance, neuromuscular re-education, patient education, postural training, strengthening, stretching, therapeutic activities, therapeutic exercise, therapeutic training, functional ROM exercises, flexibility, graded activity, graded exercise and home exercise program  Frequency: 3x week  Duration in visits: 12  Duration in weeks: 4  Treatment plan discussed with: patient        Subjective Evaluation    History of Present Illness  Date of onset: 2019  Mechanism of injury: Patient presents to PT with c/o pain in his cervical and lumbar spine  Patient reports he has had some pain for awhile but it worsened about 6 months ago when he tripped and his back hit a wall  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 due to active working style  Patient reports he gets stiffness in his neck and shoulder in the morning which stretching and movement helps loosen up  Patient reports he has most pain in left lower back area  Patient had MRI's done which showed significant degeneration in cervical and lumbar spine levels  Patient is to trial PT prior to returning to spine specialist in 6 weeks  Patient now referred to oppt  Pain  At best pain ratin  At worst pain ratin  Quality: dull ache and tight (Stiffness)    Patient Goals  Patient goals for therapy: decreased pain          Objective     Static Posture     Head  Forward  Shoulders  Rounded  Palpation   Left   Tenderness of the levator scapulae and upper trapezius       Neurological Testing     Sensation   Cervical/Thoracic   Left   Intact: light touch    Right   Intact: light touch    Lumbar   Left   Intact: light touch    Right   Intact: light touch    Active Range of Motion   Cervical/Thoracic Spine       Cervical    Flexion:  Restriction level: moderate  Extension:  Restriction level: moderate  Left lateral flexion:  Restriction level: moderate  Right lateral flexion:  Restriction level moderate  Left rotation:  Restriction level: moderate  Right rotation:  Restriction level: moderate    Lumbar   Flexion:  Restriction level: moderate  Extension: Restriction level: moderate  Left lateral flexion:  Restriction level: moderate  Right lateral flexion:  Restriction level: moderate    Strength/Myotome Testing     Left Shoulder     Planes of Motion   Flexion: 4   Abduction: 4   External rotation at 0°: 4   Internal rotation at 0°: 4     Right Shoulder     Planes of Motion   Flexion: 4   Abduction: 4   External rotation at 0°: 4   Internal rotation at 0°: 4     Left Elbow   Flexion: 4  Extension: 4    Right Elbow   Flexion: 4  Extension: 4    Left Hip   Planes of Motion   Flexion: 4  Abduction: 4  Adduction: 4    Right Hip   Planes of Motion   Flexion: 4  Abduction: 4  Adduction: 4    Left Knee   Flexion: 4-  Extension: 4-    Right Knee   Flexion: 4-  Extension: 4-    Left Ankle/Foot   Dorsiflexion: 4    Right Ankle/Foot   Dorsiflexion: 4    Tests   Cervical     Left   Negative Spurling's Test A  Right   Negative Spurling's Test A       General Comments:    Upper quarter screen   Shoulder: unremarkable  Elbow: unremarkable  Hand/wrist: unremarkable  Lower quarter screen   Hips: unremarkable  Knees: unremarkable  Foot/ankle: unremarkable             Precautions: None                Manual  2/25       B/L LE Stretch        Cervical Stretch and ROM                                    Exercise Diary  2/25       UBE 10 min       [facility]        TR/HR        MTP/LTP        IR/ER with TB        Tricep Ext with TB        Bicep Curls        Shrugs        Shoulder Adduction with TB        1/4 Squats        Abdominal Brace        Brace with march        Supine SLR        Bridges                                                            Modalities

## 2020-02-26 ENCOUNTER — OFFICE VISIT (OUTPATIENT)
Dept: PHYSICAL THERAPY | Facility: CLINIC | Age: 65
End: 2020-02-26
Payer: MEDICARE

## 2020-02-26 DIAGNOSIS — M54.16 LUMBAR RADICULOPATHY: ICD-10-CM

## 2020-02-26 DIAGNOSIS — M54.12 CERVICAL RADICULOPATHY: Primary | ICD-10-CM

## 2020-02-26 PROCEDURE — 97112 NEUROMUSCULAR REEDUCATION: CPT | Performed by: PHYSICAL THERAPIST

## 2020-02-26 PROCEDURE — 97530 THERAPEUTIC ACTIVITIES: CPT | Performed by: PHYSICAL THERAPIST

## 2020-02-26 PROCEDURE — 97110 THERAPEUTIC EXERCISES: CPT | Performed by: PHYSICAL THERAPIST

## 2020-02-26 PROCEDURE — 97140 MANUAL THERAPY 1/> REGIONS: CPT | Performed by: PHYSICAL THERAPIST

## 2020-02-26 NOTE — PROGRESS NOTES
Daily Note     Today's date: 2020  Patient name: Brenda Carroll  : 1955  MRN: [id number]  Referring provider: Neha Lind MD  Dx:   Encounter Diagnosis     ICD-10-CM    1  Cervical radiculopathy M54 12    2  Lumbar radiculopathy M54 16                   Subjective: "I had to loosen up my muscles this morning"  Objective: See treatment diary below  Manual           B/L LE Stretch    15 min         Cervical Stretch and ROM                                                             Exercise Diary           UBE 10 min  10 min         [facility]    10 min         TR/HR    2x10         MTP/LTP    BLK 2x10         IR/ER with TB    GTB 2x10         Tricep Ext with TB    BLK 2x10         Bicep Curls    8# 3x10         Shrugs    8# 3x10         Shoulder Adduction with TB             1/4 Squats             Abdominal Brace    2x10         Brace with march             Supine SLR    3x10         Bridges    3x10                                                                                                   Modalities                                                             Assessment: Patient with good tolerance to first treatment today  Patient required verbal and manual cues for correct performance of TE  LE tightness noted with stretching  Plan: Continue per plan of care        Precautions: None

## 2020-03-02 ENCOUNTER — OFFICE VISIT (OUTPATIENT)
Dept: PHYSICAL THERAPY | Facility: CLINIC | Age: 65
End: 2020-03-02
Payer: MEDICARE

## 2020-03-02 DIAGNOSIS — M54.12 CERVICAL RADICULOPATHY: Primary | ICD-10-CM

## 2020-03-02 DIAGNOSIS — M54.16 LUMBAR RADICULOPATHY: ICD-10-CM

## 2020-03-02 PROCEDURE — 97110 THERAPEUTIC EXERCISES: CPT | Performed by: PHYSICAL THERAPIST

## 2020-03-02 PROCEDURE — 97530 THERAPEUTIC ACTIVITIES: CPT | Performed by: PHYSICAL THERAPIST

## 2020-03-02 PROCEDURE — 97140 MANUAL THERAPY 1/> REGIONS: CPT | Performed by: PHYSICAL THERAPIST

## 2020-03-02 PROCEDURE — 97112 NEUROMUSCULAR REEDUCATION: CPT | Performed by: PHYSICAL THERAPIST

## 2020-03-02 NOTE — PROGRESS NOTES
Daily Note     Today's date: 3/2/2020  Patient name: Jeanmarie Maria  : 1955  MRN: [id number]  Referring provider: Grecia Reyes MD  Dx:   Encounter Diagnosis     ICD-10-CM    1  Cervical radiculopathy M54 12    2  Lumbar radiculopathy M54 16                   Subjective: "I can notice a difference  I'm not as stiff in the morning"  Objective: See treatment diary below  [facility] 2/25  2/26  3/2       B/L LE Stretch    15 min  15 min       Cervical Stretch and ROM                                                             Exercise Diary  2/25  2/26  3/2       UBE 10 min  10 min  10 min       SRC    10 min  10 min       TR/HR    2x10  3x10       MTP/LTP    BLK 2x10  BLK 3x10       IR/ER with TB    GTB 2x10  BLK 3x10       Tricep Ext with TB    BLK 2x10  BLK 3x10       Bicep Curls    8# 3x10  9# 3x10       Shrugs    8# 3x10  9# 3x10       Shoulder Adduction with TB             1/4 Squats      2x10       Abdominal Brace    2x10  2x10       Brace with march             Supine SLR    3x10  3x10       Bridges    3x10  3x10        Retractions      9# 3x10                                                                                   Modalities                                                             Assessment: Patient with good tolerance to treatment today  Patient is showing improved overall strength and stability with exercises  Plan to add strength machines at [place]  Plan: Continue per plan of care        Precautions: None

## 2020-03-04 ENCOUNTER — OFFICE VISIT (OUTPATIENT)
Dept: PHYSICAL THERAPY | Facility: CLINIC | Age: 65
End: 2020-03-04
Payer: MEDICARE

## 2020-03-04 DIAGNOSIS — M54.12 CERVICAL RADICULOPATHY: Primary | ICD-10-CM

## 2020-03-04 DIAGNOSIS — M54.16 LUMBAR RADICULOPATHY: ICD-10-CM

## 2020-03-04 PROCEDURE — 97530 THERAPEUTIC ACTIVITIES: CPT | Performed by: PHYSICAL THERAPIST

## 2020-03-04 PROCEDURE — 97140 MANUAL THERAPY 1/> REGIONS: CPT | Performed by: PHYSICAL THERAPIST

## 2020-03-04 PROCEDURE — 97110 THERAPEUTIC EXERCISES: CPT | Performed by: PHYSICAL THERAPIST

## 2020-03-04 PROCEDURE — 97112 NEUROMUSCULAR REEDUCATION: CPT | Performed by: PHYSICAL THERAPIST

## 2020-03-04 NOTE — PROGRESS NOTES
Daily Note     Today's date: 3/4/2020  Patient name: Abundio Washington  : 1955  MRN: [id number]  Referring provider: Eveline Juarez MD  Dx:   Encounter Diagnosis     ICD-10-CM    1  Cervical radiculopathy M54 12    2  Lumbar radiculopathy M54 16                   Subjective: "I'm pretty stiff this morning  My back is feeling better"  Objective: See treatment diary below  [facility] 2/25  2/26  3/2  3/4     B/L LE Stretch    15 min  15 min  15 min     Cervical Stretch and ROM                                                             Exercise Diary  2/25  2/26  3/2  3/4     UBE 10 min  10 min  10 min  10 min     SRC    10 min  10 min  10 min     TR/HR    2x10  3x10  3x10     MTP/LTP    BLK 2x10  BLK 3x10       IR/ER with TB    GTB 2x10  BLK 3x10       Tricep Ext with TB    BLK 2x10  BLK 3x10  Machine 27 5# 2x15     Bicep Curls    8# 3x10  9# 3x10  10# 3x10     Shrugs    8# 3x10  9# 3x10  10# 3x10     LAICIA Rows        10# 3x10     Sit to stands      2x10  Sit to stands 2x10     Abdominal Brace    2x10  2x10  2x10     Brace with march             Supine SLR    3x10  3x10  3x10     Bridges    3x10  3x10  3x10      Retractions      9# 3x10  10# 3x10      Seated Row        50# 2x15      Lat Pulldown        50# 2x15      Horizontal Abd with TB        GTB 3x10      Leg and Calf Press        75# 2x15                                 Modalities                                                             Assessment: Patient with good tolerance to treatment today  PT added strength machines today with good results  Patient is gaining good strength overall  Plan: Continue per plan of care        Precautions: None

## 2020-03-06 ENCOUNTER — APPOINTMENT (OUTPATIENT)
Dept: PHYSICAL THERAPY | Facility: CLINIC | Age: 65
End: 2020-03-06
Payer: MEDICARE

## 2020-03-09 ENCOUNTER — OFFICE VISIT (OUTPATIENT)
Dept: PHYSICAL THERAPY | Facility: CLINIC | Age: 65
End: 2020-03-09
Payer: MEDICARE

## 2020-03-09 DIAGNOSIS — M54.16 LUMBAR RADICULOPATHY: ICD-10-CM

## 2020-03-09 DIAGNOSIS — M54.12 CERVICAL RADICULOPATHY: Primary | ICD-10-CM

## 2020-03-09 PROCEDURE — 97140 MANUAL THERAPY 1/> REGIONS: CPT | Performed by: PHYSICAL THERAPIST

## 2020-03-09 PROCEDURE — 97112 NEUROMUSCULAR REEDUCATION: CPT | Performed by: PHYSICAL THERAPIST

## 2020-03-09 PROCEDURE — 97110 THERAPEUTIC EXERCISES: CPT | Performed by: PHYSICAL THERAPIST

## 2020-03-09 PROCEDURE — 97530 THERAPEUTIC ACTIVITIES: CPT | Performed by: PHYSICAL THERAPIST

## 2020-03-09 NOTE — PROGRESS NOTES
Daily Note     Today's date: 3/9/2020  Patient name: Gomez Sullivan  : 1955  MRN: [id number]  Referring provider: Jazmin Swift MD  Dx:   Encounter Diagnosis     ICD-10-CM    1  Cervical radiculopathy M54 12    2  Lumbar radiculopathy M54 16                   Subjective: ""I'm just a little stiff  My back is still doing good"  Objective: See treatment diary below  [facility] 2/25  2/26  3/2  3/4  3/9   B/L LE Stretch    15 min  15 min  15 min  15 min   Cervical Stretch and ROM                                                             Exercise Diary  2/25  2/26  3/2  3/4  3/9   UBE 10 min  10 min  10 min  10 min  10 min   SRC    10 min  10 min  10 min  10 min   TR/HR    2x10  3x10  3x10  3x10   MTP/LTP    BLK 2x10  BLK 3x10       IR/ER with TB    GTB 2x10  BLK 3x10       Tricep Ext with TB    BLK 2x10  BLK 3x10  Machine 27 5# 2x15  Machine 27 5# 2x15   Bicep Curls    8# 3x10  9# 3x10  10# 3x10  10# 3x10   Shrugs    8# 3x10  9# 3x10  10# 3x10  10# 3x10   ALICIA Rows        10# 3x10  10# 3x10   Sit to stands      2x10  Sit to stands 2x10  Sit to stands 2x10   Abdominal Brace    2x10  2x10  2x10  2x10   Brace with march             Supine SLR    3x10  3x10  3x10  3x10   Bridges    3x10  3x10  3x10  3x10    Retractions      9# 3x10  10# 3x10  10# 3x10    Seated Row        50# 2x15  60# 2x15    Lat Pulldown        50# 2x15  60# 2x15    Horizontal Abd with TB        GTB 3x10  GTB 3x10    Leg and Calf Press        75# 2x15  75# 2x15                                     Modalities                                                             Assessment: Patient is progressing well with PT POC  Patient is showing improved strength and stability with exercises today  Continue to progress as tolerated  Plan: Continue per plan of care        Precautions: None

## 2020-03-10 ENCOUNTER — OFFICE VISIT (OUTPATIENT)
Dept: UROLOGY | Facility: CLINIC | Age: 65
End: 2020-03-10
Payer: MEDICARE

## 2020-03-10 VITALS
WEIGHT: 236.99 LBS | BODY MASS INDEX: 35.51 KG/M2 | SYSTOLIC BLOOD PRESSURE: 118 MMHG | DIASTOLIC BLOOD PRESSURE: 78 MMHG | HEART RATE: 66 BPM

## 2020-03-10 DIAGNOSIS — Z12.5 PROSTATE CANCER SCREENING: Primary | ICD-10-CM

## 2020-03-10 PROCEDURE — 99213 OFFICE O/P EST LOW 20 MIN: CPT | Performed by: PHYSICIAN ASSISTANT

## 2020-03-10 PROCEDURE — 3078F DIAST BP <80 MM HG: CPT | Performed by: PHYSICIAN ASSISTANT

## 2020-03-10 PROCEDURE — 3074F SYST BP LT 130 MM HG: CPT | Performed by: PHYSICIAN ASSISTANT

## 2020-03-10 PROCEDURE — 1160F RVW MEDS BY RX/DR IN RCRD: CPT | Performed by: PHYSICIAN ASSISTANT

## 2020-03-10 NOTE — PROGRESS NOTES
3/10/2020      Chief Complaint   Patient presents with    Benign Prostatic Hypertrophy    Erectile Dysfunction         Assessment and Plan    75 y o  male managed by Dr Rafy Vann    1  Prostate cancer screening  - PSA 0 34 (6/11/2019), 0 2 (2018)  - HALINA small smooth prostate without appreciable nodule    2  BPH without symptoms  - stable no symptoms, continue to monitor  - reviewed avoidance of bladder irritants, primarily patient's coffee intake    3  ED  - defers treatment, not active with spouse    4  Hydrocele  - left greater than right hydrocele  - not bothersome, defers treatment    5  Tobacco use  - screening UA negative for microscopic hematuria today      History of Present Illness  Neli Cooney III is a 75 y o  male here for evaluation of annual visit for prostate cancer screening  There is a questionable history of his father having either prostate cancer urothelial cancer, this is unclear but what patient's describing sounds like the father actually underwent nephroureterectomy not a prostatectomy  Patient not bothered by any lower urinary tract symptoms  He does have erectile dysfunction but is not interested in treatment as he is not sexually active with his spouse given her medical comorbidities  He is not bothered by his left hydrocele, which has been present for many years, he defers treatment on this as well  He continues to smoke, screening urinalysis performed today is negative for any microscopic hematuria  He is feeling well without complaints today  Review of Systems   Constitutional: Negative  Respiratory: Negative  Cardiovascular: Negative  Genitourinary: Negative for decreased urine volume, difficulty urinating, dysuria, flank pain, frequency, hematuria and urgency  Musculoskeletal: Negative                   Past Medical History  Past Medical History:   Diagnosis Date    Anxiety     Carpal tunnel syndrome     Colon polyps     DJD (degenerative joint disease)     Duodenal ulcer     Gastric reflux     Gastric ulcer     Heel fracture     bilateral    Hypertension     Vitamin B12 deficiency     Vitamin D deficiency      Past Social History  Past Surgical History:   Procedure Laterality Date    ANKLE SURGERY      repair    COLONOSCOPY      polyp removed; benign    COLONOSCOPY  02/07/2019    COLONOSCOPY W/ POLYPECTOMY      Followed by Dr Ortiz Canal Bilateral     MULTIPLE TOOTH EXTRACTIONS      POLYPECTOMY      TIBIA FRACTURE SURGERY Left      Social History     Tobacco Use   Smoking Status Current Every Day Smoker    Packs/day: 1 00    Years: 40 00    Pack years: 40 00    Types: Cigarettes    Start date: 1/1/1976   Smokeless Tobacco Former User    Types: Chew    Quit date: 2014     Past Family History  Family History   Problem Relation Age of Onset    Cancer Mother         lung    Heart disease Mother     Cancer Father         prostate    Heart murmur Brother     Diabetes Daughter     Diabetes Son     Diabetes Other      Past Social history  Social History     Socioeconomic History    Marital status: /Civil Union     Spouse name: Not on file    Number of children: Not on file    Years of education: Not on file    Highest education level: Not on file   Occupational History    Not on file   Social Needs    Financial resource strain: Not on file    Food insecurity:     Worry: Not on file     Inability: Not on file    Transportation needs:     Medical: No     Non-medical: No   Tobacco Use    Smoking status: Current Every Day Smoker     Packs/day: 1 00     Years: 40 00     Pack years: 40 00     Types: Cigarettes     Start date: 1/1/1976    Smokeless tobacco: Former User     Types: Chew     Quit date: 2014   Substance and Sexual Activity    Alcohol use: No    Drug use: No    Sexual activity: Not on file   Lifestyle    Physical activity:     Days per week: Not on file     Minutes per session: Not on file    Stress: Not on file   Relationships    Social connections:     Talks on phone: Not on file     Gets together: Not on file     Attends Voodoo service: Not on file     Active member of club or organization: Not on file     Attends meetings of clubs or organizations: Not on file     Relationship status: Not on file    Intimate partner violence:     Fear of current or ex partner: Not on file     Emotionally abused: Not on file     Physically abused: Not on file     Forced sexual activity: Not on file   Other Topics Concern    Not on file   Social History Narrative    Not on file     Current Medications  Current Outpatient Medications   Medication Sig Dispense Refill    ALPRAZolam (XANAX) 0 5 mg tablet Take 1 tablet (0 5 mg total) by mouth every 12 (twelve) hours as needed for anxiety For anxiety 60 tablet 2    baclofen 10 mg tablet Take 10 mg by mouth 3 (three) times a day      citalopram (CeleXA) 20 mg tablet take 1 tablet by mouth once daily 90 tablet 1    ergocalciferol (VITAMIN D2) 50,000 units take 1 capsule by mouth EVERY 30 DAYS 3 capsule 1    glucosamine-chondroitin 500-400 MG tablet Take 1 tablet by mouth 3 (three) times a day 90 tablet 5    ibuprofen (MOTRIN) 800 mg tablet Take 1 tablet (800 mg total) by mouth every 8 (eight) hours as needed for moderate pain 270 tablet 1    lisinopril (ZESTRIL) 10 mg tablet Take 1 tablet (10 mg total) by mouth daily 90 tablet 1    Multiple Vitamin (MULTIVITAMIN) tablet Take 1 tablet by mouth daily 90 tablet 1    nortriptyline (PAMELOR) 10 mg capsule Take 1 capsule (10 mg total) by mouth daily at bedtime 30 capsule 1    pantoprazole (PROTONIX) 40 mg tablet take 1 tablet by mouth once daily 90 tablet 1     No current facility-administered medications for this visit        Allergies  No Known Allergies      The following portions of the patient's history were reviewed and updated as appropriate: allergies, current medications, past medical history, past social history, past surgical history and problem list       Vitals  Vitals:    03/10/20 1353   BP: 118/78   Pulse: 66   Weight: 107 kg (236 lb 15 9 oz)       Physical Exam   Constitutional: He is oriented to person, place, and time  He appears well-developed and well-nourished  No distress  HENT:   Head: Normocephalic and atraumatic  Pulmonary/Chest: Effort normal    Genitourinary:   Genitourinary Comments: Circumcised penis, normal phallus, orthotopic and patent meatus  Testes smooth descended bilaterally into the scrotum  Left greater than right hydrocele, overlying scrotal skin is normal   Digital rectal exam reveals a smooth prostate without appreciable nodule nor significant enlargement   Musculoskeletal: He exhibits no edema  Neurological: He is alert and oriented to person, place, and time  Gait normal    Skin: Skin is warm and dry  He is not diaphoretic  Psychiatric: He has a normal mood and affect  His speech is normal and behavior is normal    Nursing note and vitals reviewed  Results  No results found for this or any previous visit (from the past 1 hour(s))  ]  No results found for: PSA  Lab Results   Component Value Date    CALCIUM 9 1 11/11/2019    K 4 9 11/11/2019    CO2 32 11/11/2019     11/11/2019    BUN 18 11/11/2019    CREATININE 0 97 11/11/2019     Lab Results   Component Value Date    WBC 5 51 11/11/2019    HGB 15 1 11/11/2019    HCT 45 7 11/11/2019    MCV 95 11/11/2019     11/11/2019         Orders  Orders Placed This Encounter   Procedures    PSA, Total Screen     This is a patient instruction: This test is non-fasting  Please drink two glasses of water morning of bloodwork          Standing Status:   Future     Standing Expiration Date:   3/10/2021

## 2020-03-11 ENCOUNTER — APPOINTMENT (OUTPATIENT)
Dept: PHYSICAL THERAPY | Facility: CLINIC | Age: 65
End: 2020-03-11
Payer: MEDICARE

## 2020-03-13 ENCOUNTER — APPOINTMENT (OUTPATIENT)
Dept: PHYSICAL THERAPY | Facility: CLINIC | Age: 65
End: 2020-03-13
Payer: MEDICARE

## 2020-03-16 ENCOUNTER — APPOINTMENT (OUTPATIENT)
Dept: PHYSICAL THERAPY | Facility: CLINIC | Age: 65
End: 2020-03-16
Payer: MEDICARE

## 2020-03-18 ENCOUNTER — APPOINTMENT (OUTPATIENT)
Dept: PHYSICAL THERAPY | Facility: CLINIC | Age: 65
End: 2020-03-18
Payer: MEDICARE

## 2020-03-20 ENCOUNTER — APPOINTMENT (OUTPATIENT)
Dept: PHYSICAL THERAPY | Facility: CLINIC | Age: 65
End: 2020-03-20
Payer: MEDICARE

## 2020-03-23 ENCOUNTER — APPOINTMENT (OUTPATIENT)
Dept: PHYSICAL THERAPY | Facility: CLINIC | Age: 65
End: 2020-03-23
Payer: MEDICARE

## 2020-03-25 ENCOUNTER — APPOINTMENT (OUTPATIENT)
Dept: PHYSICAL THERAPY | Facility: CLINIC | Age: 65
End: 2020-03-25
Payer: MEDICARE

## 2020-03-27 ENCOUNTER — APPOINTMENT (OUTPATIENT)
Dept: PHYSICAL THERAPY | Facility: CLINIC | Age: 65
End: 2020-03-27
Payer: MEDICARE

## 2020-03-29 DIAGNOSIS — F41.9 ANXIETY: ICD-10-CM

## 2020-03-30 ENCOUNTER — APPOINTMENT (OUTPATIENT)
Dept: PHYSICAL THERAPY | Facility: CLINIC | Age: 65
End: 2020-03-30
Payer: MEDICARE

## 2020-04-01 ENCOUNTER — TRANSCRIBE ORDERS (OUTPATIENT)
Dept: PHYSICAL THERAPY | Facility: CLINIC | Age: 65
End: 2020-04-01

## 2020-04-01 RX ORDER — ALPRAZOLAM 0.5 MG/1
TABLET ORAL
Qty: 60 TABLET | Refills: 2 | Status: SHIPPED | OUTPATIENT
Start: 2020-04-01 | End: 2020-05-26 | Stop reason: SDUPTHER

## 2020-04-21 NOTE — PROGRESS NOTES
Patient seen for 5 total visits of PT  Last visit 3/9/20  Patient cancelled next scheduled appointment due to work conflicts  No further contact from patient to reschedule  Patient to be discharged at this time due to script expiration

## 2020-05-11 DIAGNOSIS — K21.9 GASTROESOPHAGEAL REFLUX DISEASE WITHOUT ESOPHAGITIS: ICD-10-CM

## 2020-05-11 DIAGNOSIS — K25.9 GASTRIC ULCER, UNSPECIFIED CHRONICITY, UNSPECIFIED WHETHER GASTRIC ULCER HEMORRHAGE OR PERFORATION PRESENT: ICD-10-CM

## 2020-05-11 DIAGNOSIS — F41.9 ANXIETY: ICD-10-CM

## 2020-05-11 DIAGNOSIS — E55.9 VITAMIN D DEFICIENCY: ICD-10-CM

## 2020-05-11 RX ORDER — CITALOPRAM 20 MG/1
TABLET ORAL
Qty: 90 TABLET | Refills: 1 | Status: SHIPPED | OUTPATIENT
Start: 2020-05-11 | End: 2021-03-15 | Stop reason: SDUPTHER

## 2020-05-11 RX ORDER — PANTOPRAZOLE SODIUM 40 MG/1
TABLET, DELAYED RELEASE ORAL
Qty: 90 TABLET | Refills: 1 | Status: SHIPPED | OUTPATIENT
Start: 2020-05-11 | End: 2021-03-15 | Stop reason: SDUPTHER

## 2020-05-11 RX ORDER — ERGOCALCIFEROL 1.25 MG/1
CAPSULE ORAL
Qty: 3 CAPSULE | Refills: 1 | Status: SHIPPED | OUTPATIENT
Start: 2020-05-11 | End: 2021-03-15 | Stop reason: SDUPTHER

## 2020-05-26 DIAGNOSIS — F41.9 ANXIETY: ICD-10-CM

## 2020-05-27 LAB — HBA1C MFR BLD HPLC: 5.7 %

## 2020-05-27 RX ORDER — ALPRAZOLAM 0.5 MG/1
0.5 TABLET ORAL EVERY 12 HOURS PRN
Qty: 60 TABLET | Refills: 2 | Status: SHIPPED | OUTPATIENT
Start: 2020-05-27 | End: 2020-11-30

## 2020-06-03 ENCOUNTER — OFFICE VISIT (OUTPATIENT)
Dept: FAMILY MEDICINE CLINIC | Facility: CLINIC | Age: 65
End: 2020-06-03
Payer: MEDICARE

## 2020-06-03 VITALS
RESPIRATION RATE: 18 BRPM | BODY MASS INDEX: 34.36 KG/M2 | SYSTOLIC BLOOD PRESSURE: 122 MMHG | OXYGEN SATURATION: 97 % | TEMPERATURE: 97.9 F | DIASTOLIC BLOOD PRESSURE: 70 MMHG | HEART RATE: 66 BPM | HEIGHT: 69 IN | WEIGHT: 232 LBS

## 2020-06-03 DIAGNOSIS — R73.9 HYPERGLYCEMIA: ICD-10-CM

## 2020-06-03 DIAGNOSIS — M54.16 LUMBAR RADICULOPATHY: ICD-10-CM

## 2020-06-03 DIAGNOSIS — K29.50 CHRONIC GASTRITIS WITHOUT BLEEDING, UNSPECIFIED GASTRITIS TYPE: ICD-10-CM

## 2020-06-03 DIAGNOSIS — E55.9 VITAMIN D DEFICIENCY: ICD-10-CM

## 2020-06-03 DIAGNOSIS — M47.812 CERVICAL SPONDYLOSIS: ICD-10-CM

## 2020-06-03 DIAGNOSIS — M54.12 CERVICAL RADICULOPATHY: ICD-10-CM

## 2020-06-03 DIAGNOSIS — I10 HYPERTENSION, UNSPECIFIED TYPE: ICD-10-CM

## 2020-06-03 DIAGNOSIS — M47.816 LUMBAR SPONDYLOSIS: ICD-10-CM

## 2020-06-03 DIAGNOSIS — D64.9 ANEMIA, UNSPECIFIED TYPE: ICD-10-CM

## 2020-06-03 DIAGNOSIS — E07.9 THYROID DISORDER: ICD-10-CM

## 2020-06-03 DIAGNOSIS — M50.120 CERVICAL DISC DISORDER WITH RADICULOPATHY OF MID-CERVICAL REGION: ICD-10-CM

## 2020-06-03 DIAGNOSIS — K21.00 GASTROESOPHAGEAL REFLUX DISEASE WITH ESOPHAGITIS: ICD-10-CM

## 2020-06-03 DIAGNOSIS — E78.49 OTHER HYPERLIPIDEMIA: ICD-10-CM

## 2020-06-03 DIAGNOSIS — F41.9 ANXIETY: ICD-10-CM

## 2020-06-03 DIAGNOSIS — G89.29 CHRONIC BILATERAL LOW BACK PAIN WITHOUT SCIATICA: ICD-10-CM

## 2020-06-03 DIAGNOSIS — E53.8 VITAMIN B12 DEFICIENCY: ICD-10-CM

## 2020-06-03 DIAGNOSIS — Z00.00 ENCOUNTER FOR MEDICARE ANNUAL WELLNESS EXAM: Primary | ICD-10-CM

## 2020-06-03 DIAGNOSIS — M54.50 CHRONIC BILATERAL LOW BACK PAIN WITHOUT SCIATICA: ICD-10-CM

## 2020-06-03 DIAGNOSIS — M47.25 OSTEOARTHRITIS OF SPINE WITH RADICULOPATHY, THORACOLUMBAR REGION: ICD-10-CM

## 2020-06-03 PROCEDURE — 1170F FXNL STATUS ASSESSED: CPT | Performed by: NURSE PRACTITIONER

## 2020-06-03 PROCEDURE — 4004F PT TOBACCO SCREEN RCVD TLK: CPT | Performed by: NURSE PRACTITIONER

## 2020-06-03 PROCEDURE — 1125F AMNT PAIN NOTED PAIN PRSNT: CPT | Performed by: NURSE PRACTITIONER

## 2020-06-03 PROCEDURE — 99214 OFFICE O/P EST MOD 30 MIN: CPT | Performed by: NURSE PRACTITIONER

## 2020-06-03 PROCEDURE — 1123F ACP DISCUSS/DSCN MKR DOCD: CPT | Performed by: NURSE PRACTITIONER

## 2020-06-03 PROCEDURE — 1160F RVW MEDS BY RX/DR IN RCRD: CPT | Performed by: NURSE PRACTITIONER

## 2020-06-03 PROCEDURE — G0439 PPPS, SUBSEQ VISIT: HCPCS | Performed by: NURSE PRACTITIONER

## 2020-06-03 PROCEDURE — 3008F BODY MASS INDEX DOCD: CPT | Performed by: NURSE PRACTITIONER

## 2020-06-03 PROCEDURE — 3074F SYST BP LT 130 MM HG: CPT | Performed by: NURSE PRACTITIONER

## 2020-06-03 PROCEDURE — 3078F DIAST BP <80 MM HG: CPT | Performed by: NURSE PRACTITIONER

## 2020-06-03 RX ORDER — LISINOPRIL 20 MG/1
20 TABLET ORAL DAILY
Qty: 90 TABLET | Refills: 1 | Status: SHIPPED | OUTPATIENT
Start: 2020-06-03 | End: 2021-01-11 | Stop reason: DRUGHIGH

## 2020-06-03 RX ORDER — FAMOTIDINE 20 MG/1
20 TABLET, FILM COATED ORAL 2 TIMES DAILY PRN
Qty: 60 TABLET | Refills: 5 | Status: SHIPPED | OUTPATIENT
Start: 2020-06-03 | End: 2021-03-15 | Stop reason: SDUPTHER

## 2020-06-21 DIAGNOSIS — I10 HYPERTENSION, UNSPECIFIED TYPE: ICD-10-CM

## 2020-06-22 RX ORDER — LISINOPRIL 10 MG/1
TABLET ORAL
Qty: 90 TABLET | Refills: 1 | Status: SHIPPED | OUTPATIENT
Start: 2020-06-22 | End: 2021-03-03 | Stop reason: SDUPTHER

## 2020-07-01 ENCOUNTER — CLINICAL SUPPORT (OUTPATIENT)
Dept: FAMILY MEDICINE CLINIC | Facility: CLINIC | Age: 65
End: 2020-07-01

## 2020-07-01 DIAGNOSIS — Z20.828 SARS-ASSOCIATED CORONAVIRUS EXPOSURE: Primary | ICD-10-CM

## 2020-07-01 DIAGNOSIS — Z20.822 EXPOSURE TO COVID-19 VIRUS: Primary | ICD-10-CM

## 2020-07-01 PROCEDURE — U0003 INFECTIOUS AGENT DETECTION BY NUCLEIC ACID (DNA OR RNA); SEVERE ACUTE RESPIRATORY SYNDROME CORONAVIRUS 2 (SARS-COV-2) (CORONAVIRUS DISEASE [COVID-19]), AMPLIFIED PROBE TECHNIQUE, MAKING USE OF HIGH THROUGHPUT TECHNOLOGIES AS DESCRIBED BY CMS-2020-01-R: HCPCS | Performed by: NURSE PRACTITIONER

## 2020-07-06 ENCOUNTER — TELEPHONE (OUTPATIENT)
Dept: FAMILY MEDICINE CLINIC | Facility: CLINIC | Age: 65
End: 2020-07-06

## 2020-07-06 LAB — SARS-COV-2 RNA SPEC QL NAA+PROBE: NOT DETECTED

## 2020-11-29 DIAGNOSIS — F41.9 ANXIETY: ICD-10-CM

## 2020-11-30 RX ORDER — ALPRAZOLAM 0.5 MG/1
TABLET ORAL
Qty: 60 TABLET | Refills: 2 | Status: SHIPPED | OUTPATIENT
Start: 2020-11-30 | End: 2021-03-15 | Stop reason: SDUPTHER

## 2020-12-14 LAB — HBA1C MFR BLD HPLC: 5.7 %

## 2021-01-11 ENCOUNTER — OFFICE VISIT (OUTPATIENT)
Dept: FAMILY MEDICINE CLINIC | Facility: CLINIC | Age: 66
End: 2021-01-11
Payer: MEDICARE

## 2021-01-11 ENCOUNTER — TELEPHONE (OUTPATIENT)
Dept: FAMILY MEDICINE CLINIC | Facility: CLINIC | Age: 66
End: 2021-01-11

## 2021-01-11 DIAGNOSIS — R09.82 PND (POST-NASAL DRIP): ICD-10-CM

## 2021-01-11 DIAGNOSIS — R05.9 COUGH: ICD-10-CM

## 2021-01-11 DIAGNOSIS — J01.00 ACUTE NON-RECURRENT MAXILLARY SINUSITIS: ICD-10-CM

## 2021-01-11 DIAGNOSIS — D50.9 IRON DEFICIENCY ANEMIA, UNSPECIFIED IRON DEFICIENCY ANEMIA TYPE: ICD-10-CM

## 2021-01-11 DIAGNOSIS — R43.2 LOSS OF TASTE: ICD-10-CM

## 2021-01-11 DIAGNOSIS — E53.8 VITAMIN B12 DEFICIENCY: ICD-10-CM

## 2021-01-11 DIAGNOSIS — R09.81 SINUS CONGESTION: Primary | ICD-10-CM

## 2021-01-11 DIAGNOSIS — E07.9 THYROID DISORDER: ICD-10-CM

## 2021-01-11 DIAGNOSIS — E66.01 CLASS 2 SEVERE OBESITY WITH SERIOUS COMORBIDITY AND BODY MASS INDEX (BMI) OF 38.0 TO 38.9 IN ADULT, UNSPECIFIED OBESITY TYPE (HCC): ICD-10-CM

## 2021-01-11 DIAGNOSIS — E78.49 OTHER HYPERLIPIDEMIA: ICD-10-CM

## 2021-01-11 DIAGNOSIS — R73.9 HYPERGLYCEMIA: ICD-10-CM

## 2021-01-11 DIAGNOSIS — E55.9 VITAMIN D DEFICIENCY: ICD-10-CM

## 2021-01-11 DIAGNOSIS — R09.81 SINUS CONGESTION: ICD-10-CM

## 2021-01-11 PROBLEM — E66.812 CLASS 2 SEVERE OBESITY WITH SERIOUS COMORBIDITY AND BODY MASS INDEX (BMI) OF 38.0 TO 38.9 IN ADULT (HCC): Status: ACTIVE | Noted: 2021-01-11

## 2021-01-11 PROCEDURE — U0003 INFECTIOUS AGENT DETECTION BY NUCLEIC ACID (DNA OR RNA); SEVERE ACUTE RESPIRATORY SYNDROME CORONAVIRUS 2 (SARS-COV-2) (CORONAVIRUS DISEASE [COVID-19]), AMPLIFIED PROBE TECHNIQUE, MAKING USE OF HIGH THROUGHPUT TECHNOLOGIES AS DESCRIBED BY CMS-2020-01-R: HCPCS | Performed by: NURSE PRACTITIONER

## 2021-01-11 PROCEDURE — U0005 INFEC AGEN DETEC AMPLI PROBE: HCPCS | Performed by: NURSE PRACTITIONER

## 2021-01-11 PROCEDURE — 99442 PR PHYS/QHP TELEPHONE EVALUATION 11-20 MIN: CPT | Performed by: NURSE PRACTITIONER

## 2021-01-11 RX ORDER — AZITHROMYCIN 250 MG/1
TABLET, FILM COATED ORAL
Qty: 6 TABLET | Refills: 1 | Status: SHIPPED | OUTPATIENT
Start: 2021-01-11 | End: 2021-01-16

## 2021-01-11 NOTE — PATIENT INSTRUCTIONS
Wellness Visit for Adults   AMBULATORY CARE:   A wellness visit  is when you see your healthcare provider to get screened for health problems  Your healthcare provider will also give you advice on how to stay healthy  Write down your questions so you remember to ask them  Ask your healthcare provider how often you should have a wellness visit  What happens at a wellness visit:  Your healthcare provider will ask about your health, and your family history of health problems  This includes high blood pressure, heart disease, and cancer  He or she will ask if you have symptoms that concern you, if you smoke, and about your mood  You may also be asked about your intake of medicines, supplements, food, and alcohol  Any of the following may be done:  · Your weight  will be checked  Your height may also be checked so your body mass index (BMI) can be calculated  Your BMI shows if you are at a healthy weight  · Your blood pressure  and heart rate will be checked  Your temperature may also be checked  · Blood and urine tests  may be done  Blood tests may be done to check your cholesterol levels  Abnormal cholesterol levels increase your risk for heart disease and stroke  You may also need a blood or urine test to check for diabetes if you are at increased risk  Urine tests may be done to look for signs of an infection or kidney disease  · A physical exam  includes checking your heartbeat and lungs with a stethoscope  Your healthcare provider may also check your skin to look for sun damage  · Screening tests  may be recommended  A screening test is done to check for diseases that may not cause symptoms  The screening tests you may need depend on your age, gender, family history, and lifestyle habits  For example, colorectal screening may be recommended if you are 48years old or older  Screening tests you need if you are a woman:   · A Pap smear  is used to screen for cervical cancer   Pap smears are usually done every 3 to 5 years depending on your age  You may need them more often if you have had abnormal Pap smear test results in the past  Ask your healthcare provider how often you should have a Pap smear  · A mammogram  is an x-ray of your breasts to screen for breast cancer  Experts recommend mammograms every 2 years starting at age 48 years  You may need a mammogram at age 52 years or younger if you have an increased risk for breast cancer  Talk to your healthcare provider about when you should start having mammograms and how often you need them  Vaccines you may need:   · Get an influenza vaccine  every year  The influenza vaccine protects you from the flu  Several types of viruses cause the flu  The viruses change over time, so new vaccines are made each year  · Get a tetanus-diphtheria (Td) booster vaccine  every 10 years  This vaccine protects you against tetanus and diphtheria  Tetanus is a severe infection that may cause painful muscle spasms and lockjaw  Diphtheria is a severe bacterial infection that causes a thick covering in the back of your mouth and throat  · Get a human papillomavirus (HPV) vaccine  if you are female and aged 23 to 32 or male 23 to 24 and never received it  This vaccine protects you from HPV infection  HPV is the most common infection spread by sexual contact  HPV may also cause vaginal, penile, and anal cancers  · Get a pneumococcal vaccine  if you are aged 72 years or older  The pneumococcal vaccine is an injection given to protect you from pneumococcal disease  Pneumococcal disease is an infection caused by pneumococcal bacteria  The infection may cause pneumonia, meningitis, or an ear infection  · Get a shingles vaccine  if you are 60 or older, even if you have had shingles before  The shingles vaccine is an injection to protect you from the varicella-zoster virus  This is the same virus that causes chickenpox   Shingles is a painful rash that develops in people who had chickenpox or have been exposed to the virus  How to eat healthy:  My Plate is a model for planning healthy meals  It shows the types and amounts of foods that should go on your plate  Fruits and vegetables make up about half of your plate, and grains and protein make up the other half  A serving of dairy is included on the side of your plate  The amount of calories and serving sizes you need depends on your age, gender, weight, and height  Examples of healthy foods are listed below:  · Eat a variety of vegetables  such as dark green, red, and orange vegetables  You can also include canned vegetables low in sodium (salt) and frozen vegetables without added butter or sauces  · Eat a variety of fresh fruits , canned fruit in 100% juice, frozen fruit, and dried fruit  · Include whole grains  At least half of the grains you eat should be whole grains  Examples include whole-wheat bread, wheat pasta, brown rice, and whole-grain cereals such as oatmeal     · Eat a variety of protein foods such as seafood (fish and shellfish), lean meat, and poultry without skin (turkey and chicken)  Examples of lean meats include pork leg, shoulder, or tenderloin, and beef round, sirloin, tenderloin, and extra lean ground beef  Other protein foods include eggs and egg substitutes, beans, peas, soy products, nuts, and seeds  · Choose low-fat dairy products such as skim or 1% milk or low-fat yogurt, cheese, and cottage cheese  · Limit unhealthy fats  such as butter, hard margarine, and shortening  Exercise:  Exercise at least 30 minutes per day on most days of the week  Some examples of exercise include walking, biking, dancing, and swimming  You can also fit in more physical activity by taking the stairs instead of the elevator or parking farther away from stores  Include muscle strengthening activities 2 days each week  Regular exercise provides many health benefits   It helps you manage your weight, and decreases your risk for type 2 diabetes, heart disease, stroke, and high blood pressure  Exercise can also help improve your mood  Ask your healthcare provider about the best exercise plan for you  General health and safety guidelines:   · Do not smoke  Nicotine and other chemicals in cigarettes and cigars can cause lung damage  Ask your healthcare provider for information if you currently smoke and need help to quit  E-cigarettes or smokeless tobacco still contain nicotine  Talk to your healthcare provider before you use these products  · Limit alcohol  A drink of alcohol is 12 ounces of beer, 5 ounces of wine, or 1½ ounces of liquor  · Lose weight, if needed  Being overweight increases your risk of certain health conditions  These include heart disease, high blood pressure, type 2 diabetes, and certain types of cancer  · Protect your skin  Do not sunbathe or use tanning beds  Use sunscreen with a SPF 15 or higher  Apply sunscreen at least 15 minutes before you go outside  Reapply sunscreen every 2 hours  Wear protective clothing, hats, and sunglasses when you are outside  · Drive safely  Always wear your seatbelt  Make sure everyone in your car wears a seatbelt  A seatbelt can save your life if you are in an accident  Do not use your cell phone when you are driving  This could distract you and cause an accident  Pull over if you need to make a call or send a text message  · Practice safe sex  Use latex condoms if are sexually active and have more than one partner  Your healthcare provider may recommend screening tests for sexually transmitted infections (STIs)  · Wear helmets, lifejackets, and protective gear  Always wear a helmet when you ride a bike or motorcycle, go skiing, or play sports that could cause a head injury  Wear protective equipment when you play sports  Wear a lifejacket when you are on a boat or doing water sports      © Copyright [employer] 2020 Information is for End User's use only and may not be sold, redistributed or otherwise used for commercial purposes  All illustrations and images included in CareNotes® are the copyrighted property of [employer]  or [employer]  The above information is an  only  It is not intended as medical advice for individual conditions or treatments  Talk to your doctor, nurse or pharmacist before following any medical regimen to see if it is safe and effective for you  COVID-19 and Chronic Health Conditions   AMBULATORY CARE:   What you need to know about coronavirus disease 2019 (COVID-19) and chronic health conditions: Your chronic condition may increase your risk for COVID-19 or serious problems it can cause, such as pneumonia  Healthcare providers might need to make changes that affect how you usually manage your chronic health condition  Providers may change hours of operation or not have patients come in to be seen  You may not be able to make appointments to get blood drawn or to have tests or procedures  This may continue until the virus that causes COVID-19 is controlled  Until then, you can take steps to manage your condition  The steps will also lower your risk for COVID-19 or the serious problems it causes    The following increase your risk for serious effects of COVID-19:   · Age 72 years or older    · Obesity, diabetes, cancer, or a liver disease    · Kidney failure, chronic kidney disease, or sickle cell disease    · Lung disease, COPD, moderate-to-severe asthma, cystic fibrosis, or pulmonary fibrosis (scarring in your lungs)    · A severe heart disease, such as coronary artery disease or heart failure    · A brain blood vessel disorder or disease, or a condition such as dementia    · Living in a nursing home or long-term care facility    · Smoking cigarettes    · Hypertension (high blood pressure) or thalassemia    · An immune system problem, HIV or AIDS, or a blood or bone marrow transplant    · Long-term use of certain medicines, such as corticosteroids    · Pregnancy    If you think you may be infected with the new coronavirus,  do the following to protect others:  · If emergency care is needed,  tell the  about the possible infection, or call ahead and tell the emergency department  · Call a healthcare provider  for instructions if symptoms are mild  Do not  arrive without calling first  Your provider will need to protect staff members and other patients  · Cover your mouth and nose  while you are getting medical care  This will help lower the risk of infecting others  Call your local emergency number [phone] in the [address] [place] [address],[address]) or emergency department if:   · You have trouble breathing or shortness of breath  · You have chest pain or pressure that lasts longer than 5 minutes  · You become confused or hard to wake  · Your lips or face are blue  · You have a fever of 104°F (40°C) or higher  Call your doctor or healthcare provider if:   · You do not  have symptoms of COVID-19 but had close physical contact within 14 days with someone who tested positive  · You have questions or concerns about your COVID-19 or your chronic condition      The following may increase your risk for serious effects of COVID-19:   · Age 72 years or older    · Obesity, diabetes, cancer, or a liver disease    · Kidney failure, chronic kidney disease, or sickle cell disease    · Lung disease, COPD, moderate-to-severe asthma, cystic fibrosis, or pulmonary fibrosis (scarring in your lungs)    · A severe heart disease, such as coronary artery disease or heart failure    · A brain blood vessel disorder or disease, or a condition such as dementia    · Living in a nursing home or long-term care facility    · Smoking cigarettes    · Hypertension (high blood pressure) or thalassemia    · An immune system problem, HIV or AIDS, a blood or bone marrow transplant    · Long-term use of certain medicines, such as corticosteroids    · Pregnancy    Manage your chronic health condition during this time:  If you do not have a regular healthcare provider, experts recommend you contact a local [facility] health center or health department  The following can help you manage your condition and prevent COVID-19:  · Get emergency care for your condition if needed  Talk to your healthcare providers about symptoms of your chronic condition that need immediate care  Your providers can help you create a plan or add exacerbation management to your plan  The plan will include when to go to an emergency department and when to call your local emergency number  This will depend on where you live and the services that are available during this time  · Go to dialysis appointments as scheduled  It is important to stay on schedule  You will need to have enough food to be able to follow the emergency diet plan if you must miss a session  The emergency diet needs to be part of the management plan for your condition  · Reschedule any upcoming appointments as needed  Medical facilities may be closed until the new coronavirus is better controlled  This means you may need to reschedule a surgery, procedure, or check-up appointment  If you cannot have a phone or video appointment, you will need to make a new appointment  Some providers may be scheduling appointments several months in advance  Some surgeries and procedures will happen as scheduled  This depends on the medical condition and the reason for the surgery or procedure  You may need to have extra testing for COVID-19 several days before  · Follow any regular management plan you use  Your healthcare provider will tell you if you need to make any changes to your regular management plan  For example, if you have asthma, continue to follow your asthma action plan  If you have diabetes, you may need to check your blood sugar level more often   Stress and illness can make blood sugar levels go up  You may need to adjust medicine such as insulin  If you have a heart condition or high blood pressure, you may need to check your blood pressure more often  Stress and illness can also raise your blood pressure  · Talk to your healthcare providers about your medicines  You may be able to get more than 1 month of medicine at a time  This will lower the number of times you need to go to a pharmacy to get your medicines  Make sure you have enough medicine if you have a condition that can lead to an emergency  Examples include asthma medicines, insulin, or an epinephrine pen  Check the expiration dates on the medicines you currently have  Ask for refills as soon as possible, if needed  If it is not time to refill prescriptions, you may be able to get an emergency supply of some medicines  Medicine plans vary, so ask your healthcare provider or pharmacist for options  · Have supplies available in your home  If possible, get extra supplies you use regularly  Examples include absorbent pads, syringes, and wound cleaning solutions  This will limit the number of trips out of your home to get supplies  · Know the signs and symptoms of COVID-19  Signs and symptoms usually start about 5 days after infection but can take 2 to 14 days  Signs and symptoms range from mild to severe  You may feel like you have the flu or a bad cold  Your chronic health condition may cause some of the same symptoms COVID-19 causes  This can make it hard to know if a symptom is from COVID-19 or your chronic condition  Keep a record of any new or worsening symptom you have  This is especially important if you have a condition that often causes shortness of breath  Your provider can tell you if you should be tested for COVID-19  Information is still being learned  Tell your healthcare provider if you think you were infected but develop signs or symptoms not listed below:    ? A cough    ?  Shortness of breath or trouble breathing that may become severe    ? A fever of at least 100 4°F, or 38°C (may be lower in adults 65 or older)    ? Chills that might include shaking    ? Muscle pain, body aches, or a headache    ? A sore throat    ? Suddenly not being able to taste or smell anything    ? Feeling very tired (fatigue)    ? Congestion (stuffy head and nose), or a runny nose    ? Diarrhea, nausea, or vomiting    What you can do to prevent having to go out of your home during this time:   · Ask your healthcare provider for other ways to have appointments  You may be able to have appointments without having to go into the provider's office  Some providers offer phone, video, or other types of appointments  · Have food, medicines, and other supplies delivered  Some pharmacies can send certain medicines to you through the mail  Grocery stores and restaurants may be able to deliver food and other items  If possible, have delivered items placed somewhere  Try not to have someone hand you an item  You will be so close to the person that the virus can spread between you  Wash your hands after you put the delivered items away  · Ask someone to get items you need  The person can get groceries, medicines, or other needed items for you  Choose a person who does not have signs or symptoms of COVID-19 or has tested negative for it  The person should not be waiting for test results  He or she should not have a condition that increases the risk for COVID-19 or serious problems it causes  Lower your risk for COVID-19:  The virus that causes COVID-19 spreads quickly and easily  It mainly spreads through droplets that form when a person talks, coughs, or sneezes  An infected person may also be able to leave the virus on objects and surfaces  Another person can get the virus on his or her hands by touching the object or surface  Infection happens if the person then touches his or her eyes or mouth with unwashed hands   The best way to prevent infection is to avoid anyone who is infected, but this can be hard to do  An infected person can spread the virus before signs or symptoms begin, or even if signs or symptoms never develop  The following are ways to prevent the spread of the virus and lower your risk for COVID-19:      · Wash your hands often throughout the day  Use soap and water  Rub your soapy hands together, lacing your fingers  Wash the front and back of each hand, and in between your fingers  Use the fingers of one hand to scrub under the fingernails of the other hand  Wash for at least 20 seconds  Rinse with warm, running water for several seconds  Then dry your hands with a clean towel or paper towel  Use hand  that contains alcohol if soap and water are not available  Do not touch your eyes, nose, or mouth without washing your hands first  Teach children how to wash their hands  · Cover sneezes and coughs  Turn your face away and cover your mouth and nose with a tissue  Throw the tissue away  Use the bend of your arm if a tissue is not available  Then wash your hands well with soap and water or use hand   Turn your head and cover your face if someone near you is coughing or sneezing  Teach children how to cover a cough or sneeze  Remind them to wash their hands  · Make a habit of not touching your face  If you get the virus on your hands, you can transfer it to your eyes, nose, or mouth and become infected  · Clean and disinfect high-touch surfaces and objects in your home often  Do this regularly, even if you think no one living in or coming to your home is infected with the virus  Surfaces include countertops, cupboard doors, desks, handles, handrails, doorknobs, toilet handles, faucets, chairs, and light switches  Objects include keys, phones, computer keyboards and mice, video games, remote controls, and children's toys   Some surfaces and objects may need to be cleaned several times each day, depending on how often they are used  ? Use disinfecting wipes or a disinfecting solution  You can make a solution by mixing 4 teaspoons of bleach with 1 quart (4 cups) of water  Clean with a sponge or cloth that can be thrown away or washed in hot, soapy water and reused  Clean used dishes and utensils in hot, soapy water or in the   ? Be careful with   Do not use any cleaning or disinfecting products that can trigger an asthma attack or other breathing problems  Open windows or have circulating air as you clean  Do not  mix ammonia with bleach  This will create toxic fumes  Read the labels of all products you use  · Ask about vaccines you may need  No vaccine is available yet for the new coronavirus  It is still a good idea to get other vaccines to help protect your immune system  Get the influenza (flu) vaccine as soon as recommended each year, usually starting in September or October  A flu infection can make COVID-19 worse if you have them at the same time  The flu can also cause signs and symptoms that are similar to COVID-19  Get the pneumonia vaccine if recommended  Your healthcare provider can tell you if you also need other vaccines, and when to get them  Follow social distancing guidelines if you must go out of your home during this time:  Social distancing means people stay far enough apart physically that the virus cannot spread from one person to another  National and local social distancing rules vary  Rules may change over time as restrictions are lifted, but they may return if an outbreak happens where you live  It is important to know and follow all current social distancing rules in your area  The following are general guidelines:  · Limit trips out of your home  Most local guidelines allow leaving the home to buy food or supplies, or to seek medical care if necessary  · Stay at least 6 feet (2 meters) away from anyone who does not live in your home    Do not shake hands with, hug, or kiss a person as a greeting  Stand or walk as far from others as possible, especially around anyone who is sneezing or coughing  If you must use public transportation (such as a bus or subway), try to sit or stand away from others  You can stay safely connected with others through phone calls, e-mail messages, social media websites, and video chats  Check in on anyone who may be having a hard time socially distancing, or who lives alone  Ask administrators at nursing homes or long-term care facilities how you can safely communicate with someone living there  · Wear a cloth face covering (mask) when you must go out  Only do this if your chronic condition does not cause breathing problems  You can make a face covering out of a thick cloth  This will save medical masks for healthcare workers and first responders  Choose fabric that holds its shape after it is washed and dried, such as cotton  Put the top half of a paper coffee filter in the middle of the fabric to create an extra filter for you  Check that you can breathe through the fabric when it is folded into several layers  Fold the fabric into a long band  Put each end through a rubber band or hair tie to create ear loops  Fold the ends of the fabric toward the middle  Hold the covering to your face  Adjust it so it covers your nose and mouth completely  Hook the loops onto your ears to keep the covering in place  The following are important points to remember:     ? Do not use a plastic face shield instead of a cloth covering  A face shield will not protect others from droplets  If a face shield must be used, choose one that wraps around both sides of the face and goes below the chin  Do not use disposable shields more than 1 time  [employer] that can be used again need to be cleaned and disinfected between uses  Do not put a face shield or a cloth covering on a  or infant   These increase the risk for sudden infant death syndrome (SIDS)  ? Continue social distancing while you are out  A face covering does not mean you can have close physical contact with others  You still need to stay at least 6 feet (2 meters) away from others  ? Do not touch your face covering or eyes while you are wearing the covering  Your eyes will not be covered by the cloth  You can be infected if the virus is on your hand and you touch your eyes  Wash your hands with soap and water for 20 seconds or use hand  before you remove your face covering  ? Do not remove your face covering to talk, sneeze, or cough  Your face covering will help protect you and others around you  ? Wash face coverings often  Wash them in a washing machine in the warmest water the fabric allows  Make sure the fabric is completely dry before you use the face covering again  Only wear clean coverings when you go out  Use a new coffee filter each time  ? Certain individuals should not use face coverings  Do not put a face covering on anyone who is younger than 2 years  [place] children may not be able to breathe through the covering  Do not put a face covering on anyone who cannot remove it by himself or herself  ? You can use a clear face covering if others need to read your lips  A clear covering may be helpful if you communicate with someone who is deaf or hard of hearing  A clear covering is made of cloth but has plastic over the mouth area  This will help the person see your lips more easily  Do not use a plastic face shield instead  ? Do not use face coverings that have breathing valves or vents  Valves or vents on the sides are designed to allow breathed air to go out  This makes breathing easier, but the virus can travel out of the valve or vent and be spread to others  · Do not have anyone over to your home unless it is necessary  It is okay to have medical workers in to provide care   Wear your face covering, and remind medical workers to wear a mask or face covering  Remind them to wash their hands when they arrive and before they leave  Do not  have family members, friends, or neighbors over, even if they are not sick  A person can pass the new virus to others before symptoms of COVID-19 begin  Some people never even develop symptoms  Children commonly have mild symptoms or no symptoms  It is important that you continue social distancing with everyone, including children  It may be hard to tell a child not to hug or kiss you  Explain that this is how he or she can help you stay healthy  · Do not go to someone else's home unless it is necessary  Do not go over to visit, even if the person is lonely  Only go if you need to help him or her  · Avoid large gatherings and crowds  Gatherings or crowds of 10 or more individuals can cause the virus to spread  Examples of gatherings include parties, sporting events, Protestant services, and conferences  Crowds may form at beaches, [place], and tourist attractions  You can continue to protect yourself by staying away from large gatherings and crowds  · Stay safe if you must go out to work  You may have a job only done outside your home that is considered essential  Keep physical distance between you and other workers as much as possible  Follow your employer's rules so everyone stays safe  Help strengthen your immune system:   · Do not smoke  Nicotine and other chemicals in cigarettes and cigars can cause lung damage and increase your risk for infections such as bronchitis or pneumonia  Ask your healthcare provider for information if you currently smoke and need help to quit  E-cigarettes or smokeless tobacco still contain nicotine  Talk to your healthcare provider before you use these products  · Eat a variety of healthy foods  Examples include vegetables, fruits, whole-grain breads and cereals, lean meats and poultry, fish, low-fat dairy products, and cooked beans   Healthy foods contain nutrients that help keep your immune system strong  · Find ways to manage stress  You may be feeling more stressed than usual because of the COVID-19 outbreak  The situation is very stressful to many people  Talk to your healthcare providers about ways to manage stress during this time  Stress can lead to breathing problems or make the problems worse  Stress can trigger an attack or exacerbation of many health conditions  It is important to do things that help you feel more relaxed, such as the following:     ? Pick 1 or 2 times a day to watch the news  Constant news watching can increase your stress levels  ? Talk to a friend on the phone or through a video chat  ? Take a warm, soothing bath  ? Listen to music  ? Exercise can also help relieve stress  This may be hard if your regular gym or outdoor exercise area is closed  If you do not have exercise equipment at home, try walking inside your home  You can walk quickly or turn on music and dance  Follow up with your doctor or healthcare provider as directed: Your providers will tell you when you can come in for tests, procedures, or check-ups  Bring your symptom record with you to all appointments  Write down your questions so you remember to ask them during your visits  For more information:   · Centers for Disease Control and Prevention  [address] , [address]  Phone: 8- 208 - 2235124  Phone: 0- 744 - 8554487  Web Address: [employer] com br    © Copyright [address] [place] [address] Drive Information is for End User's use only and may not be sold, redistributed or otherwise used for commercial purposes  All illustrations and images included in CareNotes® are the copyrighted property of A D A M , Inc  or [facility] Corinna Page   The above information is an  only  It is not intended as medical advice for individual conditions or treatments   Talk to your doctor, nurse or pharmacist before following any medical regimen to see if it is safe and effective for you

## 2021-01-11 NOTE — PROGRESS NOTES
Virtual Brief Visit    Assessment/Plan:    Problem List Items Addressed This Visit        Other    Vitamin B12 deficiency    Relevant Orders    Vitamin B12    Vitamin D deficiency    Relevant Orders    Vitamin D 25 hydroxy    Class 2 severe obesity with serious comorbidity and body mass index (BMI) of 38 0 to 38 9 in [facility])      Other Visit Diagnoses     Sinus congestion    -  Primary    Relevant Orders    Novel Coronavirus (COVID-19), PCR [facility] Collected at [facility]    Loss of taste        Relevant Orders    Novel Coronavirus (COVID-19), PCR [facility] Collected at [facility]    Cough        Relevant Orders    Novel Coronavirus (COVID-19), PCR [facility] Collected at [facility] or [facility]    PND (post-nasal drip)        Relevant Orders    Novel Coronavirus (COVID-19), PCR [facility] Collected at [facility] or [facility]    Iron deficiency anemia, unspecified iron deficiency anemia type        Relevant Orders    CBC and differential    CBC and differential    Hyperglycemia        Relevant Orders    Comprehensive metabolic panel    Hemoglobin A1C    Insulin, fasting    Other hyperlipidemia        Relevant Orders    Lipid panel    Thyroid disorder        Relevant Orders    TSH, 3rd generation    Acute non-recurrent maxillary sinusitis        Relevant Medications    azithromycin (Zithromax) 250 mg tablet            Reason for visit is Follow Up, Recheck, and Review Lab Results  Chief Complaint   Patient presents with    Virtual Brief Visit    [id number]        Encounter provider Justino Arroyo    Provider located at [address] [place] [id number]    Recent Visits  No visits were found meeting these conditions     Showing recent visits within past 7 days and meeting all other requirements     Today's Visits  Date Type Provider Dept   01/11/21 Office Visit [facility] Arroyo [facility]   Showing today's visits and meeting all other requirements     Future Appointments  No visits were found meeting these conditions  Showing future appointments within next 150 days and meeting all other requirements        After connecting through telephone, the patient was identified by name and date of birth  Kimberly Forman III was informed that this is a telemedicine visit and that the visit is being conducted through telephone  My office door was closed  No one else was in the room  He acknowledged consent and understanding of privacy and security of the platform  The patient has agreed to participate and understands he can discontinue the visit at any time  Patient is aware this is a billable service  Subjective    Kimberly Forman III is a 77 y o  male  Virtual telephone visit conducted for follow up, recheck, and to review lab results  Complete medical history and medications reviewed with patient and tolerating all medications well without any problems  C/O sinus congestion with PND for clear mucous and change in taste ongoing for the past 2 weeks  Does have a non-productive cough in AM upon awakening which he feels is due to allergies  Patient received Influ[facility] Vacc[facility] by [facility] in Dec  2020  Patient is currently attending Chiropractic Treatment for treatment of his chronic low back pain  Continues to be followed by [facility] for routine Prostate Exams and continues to be followed by Dr Lalo Sinha for chronic neck and shoulder pain  Denies any other problems or concerns at the present time          Past Medical History:   Diagnosis Date    Anxiety     Carpal tunnel syndrome     Colon polyps     DJD (degenerative joint disease)     Duodenal ulcer     Gastric reflux     Gastric ulcer     Heel fracture     bilateral    Hypertension     Vitamin B12 deficiency     Vitamin D deficiency        Past Surgical History:   Procedure Laterality Date    ANKLE SURGERY      repair    COLONOSCOPY      polyp removed; benign    COLONOSCOPY  02/07/2019    COLONOSCOPY W/ POLYPECTOMY      Followed by Dr Amina Maciel Left        Current Outpatient Medications   Medication Sig Dispense Refill    ALPRAZolam (XANAX) 0 5 mg tablet take 1 tablet by mouth every 12 hours if needed for anxiety 60 tablet 2    azithromycin (Zithromax) 250 mg tablet Take 2 tablets (500 mg total) by mouth daily for 1 day, THEN 1 tablet (250 mg total) daily for 4 days  6 tablet 1    baclofen 10 mg tablet Take 10 mg by mouth 3 (three) times a day      citalopram (CeleXA) 20 mg tablet take 1 tablet by mouth once daily 90 tablet 1    ergocalciferol (VITAMIN D2) 50,000 units take 1 capsule by mouth EVERY 30 DAYS 3 capsule 1    famotidine (PEPCID) 20 mg tablet Take 1 tablet (20 mg total) by mouth 2 (two) times a day as needed for indigestion or heartburn 60 tablet 5    glucosamine-chondroitin 500-400 MG tablet Take 1 tablet by mouth 3 (three) times a day 90 tablet 5    ibuprofen (MOTRIN) 800 mg tablet Take 1 tablet (800 mg total) by mouth every 8 (eight) hours as needed for moderate pain 270 tablet 1    lisinopril (ZESTRIL) 10 mg tablet take 1 tablet by mouth once daily 90 tablet 1    Multiple Vitamin (MULTIVITAMIN) tablet Take 1 tablet by mouth daily 90 tablet 1    nortriptyline (PAMELOR) 10 mg capsule Take 1 capsule (10 mg total) by mouth daily at bedtime 30 capsule 1    pantoprazole (PROTONIX) 40 mg tablet take 1 tablet by mouth once daily 90 tablet 1     No current facility-administered medications for this visit  No Known Allergies    Review of Systems  GENERAL:  Feels well, denies any significant changes in weight without trying  SKIN:  Denies rashes, lesions, opened areas, wounds, change in moles or any other skin changes  HEENT:  Denies any head injury or headaches      Negative blurred vision, floaters, spots before eyes, infections, or other vision problems  Negative significant changes in vision or hearing  Negative tinnitus, vertigo, or infections  Negative hay fever, C/O sinus congestion with PND  C/O change in taste  Negative sore throat, bleeding gums, ulcers, or sores  NECK:  Denies lumps, goiter, pain, swollen glands, or lymphadenopathy  BREASTS:  Denies lumps, pain, nipple discharge, swelling, redness, or any other changes  RESPIRATORY:  C/O non-productive cough in AM upon awakening  Denies wheezing, shortness of breath, dyspnea, or orthopnea  CARDIOVASCULAR:  Denies chest pain or palpitations  GASTROINTESTINAL:  Appetite good, denies nausea, vomiting, or indigestion  Bowel movements normal occurring about once daily or every other day  URINARY:  Denies frequency, incontinence, dysuria, hematuria, nocturia, or recent flank pain  GENITAL:  Denies penile discharge, ulcerations, lesions, or other problems  PERIPHERAL VASCULAR:  Denies varicosities, swelling, skin changes, or pain  MUSCULOSKELETAL:  Denies back, joint, or muscle pain  Negative problems with mobility or movement  PSYCHIATRIC:  Denies problems with depression, anxiety, anger, or other psychiatric symptoms  NEUROLOGIC:  Denies fainting, dizziness, memory problems, seizures, tingling, motor or sensory loss  HEMATOLOGIC:  Denies easy bruising, bleeding, or anemia  ENDOCRINE:  Denies thyroid problems, temperature intolerance, excessive sweating, or other endocrine symptoms  There were no vitals filed for this visit  I spent 20 minutes directly with the patient during this visit    VIRTUAL VISIT DISCLAIMER    Laruth Sever III acknowledges that he has consented to an online visit or consultation   He understands that the online visit is based solely on information provided by him, and that, in the absence of a face-to-face physical evaluation by the physician, the diagnosis he receives is both limited and provisional in terms of accuracy and completeness  This is not intended to replace a full medical face-to-face evaluation by the physician  Gomez Sullivan understands and accepts these terms

## 2021-01-12 LAB — SARS-COV-2 RNA SPEC QL NAA+PROBE: NOT DETECTED

## 2021-03-03 DIAGNOSIS — I10 HYPERTENSION, UNSPECIFIED TYPE: ICD-10-CM

## 2021-03-07 RX ORDER — LISINOPRIL 10 MG/1
10 TABLET ORAL DAILY
Qty: 90 TABLET | Refills: 1 | Status: SHIPPED | OUTPATIENT
Start: 2021-03-07 | End: 2021-03-15 | Stop reason: SDUPTHER

## 2021-03-10 DIAGNOSIS — Z23 ENCOUNTER FOR IMMUNIZATION: ICD-10-CM

## 2021-03-15 ENCOUNTER — OFFICE VISIT (OUTPATIENT)
Dept: FAMILY MEDICINE CLINIC | Facility: CLINIC | Age: 66
End: 2021-03-15
Payer: MEDICARE

## 2021-03-15 VITALS
WEIGHT: 249.8 LBS | HEIGHT: 69 IN | OXYGEN SATURATION: 97 % | DIASTOLIC BLOOD PRESSURE: 86 MMHG | SYSTOLIC BLOOD PRESSURE: 126 MMHG | TEMPERATURE: 97.6 F | HEART RATE: 63 BPM | BODY MASS INDEX: 37 KG/M2 | RESPIRATION RATE: 18 BRPM

## 2021-03-15 DIAGNOSIS — M47.25 OSTEOARTHRITIS OF SPINE WITH RADICULOPATHY, THORACOLUMBAR REGION: ICD-10-CM

## 2021-03-15 DIAGNOSIS — F32.89 OTHER DEPRESSION: ICD-10-CM

## 2021-03-15 DIAGNOSIS — E55.9 VITAMIN D DEFICIENCY: ICD-10-CM

## 2021-03-15 DIAGNOSIS — K21.9 GASTROESOPHAGEAL REFLUX DISEASE WITHOUT ESOPHAGITIS: Primary | ICD-10-CM

## 2021-03-15 DIAGNOSIS — M54.16 LUMBAR RADICULOPATHY: ICD-10-CM

## 2021-03-15 DIAGNOSIS — M47.812 CERVICAL SPONDYLOSIS: ICD-10-CM

## 2021-03-15 DIAGNOSIS — M54.12 CERVICAL RADICULOPATHY: ICD-10-CM

## 2021-03-15 DIAGNOSIS — F41.9 ANXIETY: ICD-10-CM

## 2021-03-15 DIAGNOSIS — M54.50 CHRONIC BILATERAL LOW BACK PAIN WITHOUT SCIATICA: ICD-10-CM

## 2021-03-15 DIAGNOSIS — M47.816 LUMBAR SPONDYLOSIS: ICD-10-CM

## 2021-03-15 DIAGNOSIS — K21.00 GASTROESOPHAGEAL REFLUX DISEASE WITH ESOPHAGITIS: ICD-10-CM

## 2021-03-15 DIAGNOSIS — R10.9 LEFT LATERAL ABDOMINAL PAIN: ICD-10-CM

## 2021-03-15 DIAGNOSIS — K52.9 FREQUENT STOOLS: ICD-10-CM

## 2021-03-15 DIAGNOSIS — K25.9 GASTRIC ULCER, UNSPECIFIED CHRONICITY, UNSPECIFIED WHETHER GASTRIC ULCER HEMORRHAGE OR PERFORATION PRESENT: ICD-10-CM

## 2021-03-15 DIAGNOSIS — E53.8 VITAMIN B12 DEFICIENCY: ICD-10-CM

## 2021-03-15 DIAGNOSIS — G89.29 CHRONIC BILATERAL LOW BACK PAIN WITHOUT SCIATICA: ICD-10-CM

## 2021-03-15 DIAGNOSIS — I10 HYPERTENSION, UNSPECIFIED TYPE: ICD-10-CM

## 2021-03-15 PROCEDURE — 99214 OFFICE O/P EST MOD 30 MIN: CPT | Performed by: NURSE PRACTITIONER

## 2021-03-15 RX ORDER — PANTOPRAZOLE SODIUM 40 MG/1
40 TABLET, DELAYED RELEASE ORAL DAILY
Qty: 90 TABLET | Refills: 1 | Status: SHIPPED | OUTPATIENT
Start: 2021-03-15 | End: 2021-10-13

## 2021-03-15 RX ORDER — FAMOTIDINE 20 MG/1
20 TABLET, FILM COATED ORAL 2 TIMES DAILY PRN
Qty: 60 TABLET | Refills: 5 | Status: SHIPPED | OUTPATIENT
Start: 2021-03-15 | End: 2021-09-10

## 2021-03-15 RX ORDER — CITALOPRAM 20 MG/1
20 TABLET ORAL DAILY
Qty: 90 TABLET | Refills: 1 | Status: SHIPPED | OUTPATIENT
Start: 2021-03-15 | End: 2021-10-13

## 2021-03-15 RX ORDER — ALPRAZOLAM 0.5 MG/1
0.5 TABLET ORAL EVERY 12 HOURS PRN
Qty: 60 TABLET | Refills: 2 | Status: SHIPPED | OUTPATIENT
Start: 2021-03-15 | End: 2021-09-09 | Stop reason: SDUPTHER

## 2021-03-15 RX ORDER — LISINOPRIL 10 MG/1
10 TABLET ORAL DAILY
Qty: 90 TABLET | Refills: 1 | Status: SHIPPED | OUTPATIENT
Start: 2021-03-15 | End: 2021-11-15

## 2021-03-15 RX ORDER — ERGOCALCIFEROL 1.25 MG/1
50000 CAPSULE ORAL
Qty: 3 CAPSULE | Refills: 1 | Status: SHIPPED | OUTPATIENT
Start: 2021-03-15 | End: 2021-09-02

## 2021-03-15 NOTE — PROGRESS NOTES
Assessment/Plan:      Diagnoses and all orders for this visit:    Gastroesophageal reflux disease without esophagitis  -     US abdomen complete; Future  -     Ambulatory referral to Gastroenterology; Future  -     pantoprazole (PROTONIX) 40 mg tablet; Take 1 tablet (40 mg total) by mouth daily    Hypertension, unspecified type  -     lisinopril (ZESTRIL) 10 mg tablet; Take 1 tablet (10 mg total) by mouth daily    Cervical radiculopathy    Lumbar radiculopathy    Cervical spondylosis    Lumbar spondylosis    Osteoarthritis of spine with radiculopathy, thoracolumbar region    Anxiety  -     ALPRAZolam (XANAX) 0 5 mg tablet; Take 1 tablet (0 5 mg total) by mouth every 12 (twelve) hours as needed for anxiety  -     citalopram (CeleXA) 20 mg tablet; Take 1 tablet (20 mg total) by mouth daily    Chronic bilateral low back pain without sciatica    Other depression    Vitamin B12 deficiency    Vitamin D deficiency  -     ergocalciferol (VITAMIN D2) 50,000 units; Take 1 capsule (50,000 Units total) by mouth every 28 days    Left lateral abdominal pain  -     Ambulatory referral to Gastroenterology; Future    Frequent stools  -     US abdomen complete; Future  -     Ambulatory referral to Gastroenterology; Future    Gastroesophageal reflux disease with esophagitis  -     famotidine (PEPCID) 20 mg tablet; Take 1 tablet (20 mg total) by mouth 2 (two) times a day as needed for indigestion or heartburn    Gastric ulcer, unspecified chronicity, unspecified whether gastric ulcer hemorrhage or perforation present  -     pantoprazole (PROTONIX) 40 mg tablet; Take 1 tablet (40 mg total) by mouth daily          Subjective:     Patient ID: Tamar Botello is a 77 y o  male  Patient presents to office for follow up, recheck, and to review lab results  Complete medical history and medications reviewed with patient and tolerating all medications well without any problems   C/O left abdominal cramping pain ongoing since November that is not severe but just feels like something is there  C/O frequent stools occurring 3 x daily since November 2020  Patient started taking OTC BHARGAVI once daily which he recently started and feels it is helping his frequent stools  Last Colonoscopy done with Dr Tom Cook within the past 2 years and had polyps removed  Patient does not have scheduled follow up appointment with Dr Tom Cook  Patient has quit smoking back in June 2020 and has gained 17 lbs since quitting smoking  Patient is scheduled for appointment with Urology [facility] for yearly prostate cancer exam   Patient is scheduled for 1st COVID Vaccine on 3/19/21 at [facility]  Denies any other problems or concerns at the present time  Patient has routine labwork scheduled for July 2021  Review of Systems    GENERAL:  Feels well, denies any significant changes in weight without trying  SKIN:  Denies rashes, lesions, opened areas, wounds, change in moles or any other skin changes  HEENT:  Denies any head injury or headaches  Negative blurred vision, floaters, spots before eyes, infections, or other vision problems  Negative significant changes in vision or hearing  Negative tinnitus, vertigo, or infections  Negative hay fever, sinus trouble, nasal discharge, bloody noses, or problems with smell  Negative sore throat, bleeding gums, ulcers, or sores  NECK:  Denies lumps, goiter, pain, swollen glands, or lymphadenopathy  BREASTS:  Denies lumps, pain, nipple discharge, swelling, redness, or any other changes  RESPIRATORY:  Denies cough, wheezing, shortness of breath, dyspnea, or orthopnea  CARDIOVASCULAR:  Denies chest pain or palpitations  GASTROINTESTINAL:  Appetite good, denies nausea, vomiting, or indigestion  C/O left abdominal cramping occurring since Nov 2020 in addition to frequent stools 3 x daily occurring since Nov 2020    URINARY:  Denies frequency, incontinence, dysuria, hematuria, nocturia, or recent flank pain  GENITAL:  Denies penile discharge, ulcerations, lesions, or other problems  PERIPHERAL VASCULAR:  Denies varicosities, swelling, skin changes, or pain  MUSCULOSKELETAL:  Denies back, joint, or muscle pain  Negative problems with mobility or movement  PSYCHIATRIC:  Denies problems with depression, anxiety, anger, or other psychiatric symptoms  NEUROLOGIC:  Denies fainting, dizziness, memory problems, seizures, tingling, motor or sensory loss  HEMATOLOGIC:  Denies easy bruising, bleeding, or anemia  ENDOCRINE:  Denies thyroid problems, temperature intolerance, excessive sweating, or other endocrine symptoms  Objective:     Physical Exam  Vitals signs and nursing note reviewed  GENERAL:  Appears well nourished, well groomed, in no acute distress  SKIN:  Palms warm, dry, color good  Nails without clubbing or cyanosis  No lesions, ulcerations, or wounds  HEAD:  Hair is average texture  Scalp without lesions, normocephalic, and atraumatic  EYES/EARS/NOSE/MOUTH: Deferred due to masking policy  NECK:  Supple, trachea midline, Negative thyromegaly, lymphadenopathy, or swollen glands  LYMPH NODES:  Negative enlargement of neck, axillary, epitrochlear, or inguinal nodes  THORAX/LUNGS  Thorax symmetric with good excursion  Lungs resonant  Breath sounds vesicular with no added sounds  Diaphragm descends within normal limits  CARDIOVASCULAR:  Carotid upstrokes brisk and without bruits  Apical impulse discrete and tapping, barely palpable in the 5th ICS/MCL  Normal S1 and Normal S2, Negative S3 or S4  Negative murmurs, thrills, lifts, or heaves  ABDOMEN:  Protuberant, bowel sounds normal active x 4 quadrants  Negative tenderness  Negative masses  Negative hepatomegaly  Negative splenomegaly  Abdomen distended  Negative costovertebral tenderness  EXTREMITIES:  Warm, calves supple, non-tender, negative for edema      Negative stasis pigmentation or ulcers  +2 pulses throughout  MUSCULOSKELETAL:  Negative joint deformities  Good range of motion in hands, wrists, elbows, shoulders, spine, hips, knees, and ankles  NEUROLOGICAL:  Mental status:  Awake, alert, and oriented to person, place, time, and event  Normal thought processes  BMI Counseling: Body mass index is 37 43 kg/m²  The BMI is above normal  Nutrition recommendations include decreasing portion sizes, encouraging healthy choices of fruits and vegetables, decreasing fast food intake, consuming healthier snacks, limiting drinks that contain sugar, moderation in carbohydrate intake, increasing intake of lean protein, reducing intake of saturated and trans fat and reducing intake of cholesterol  Exercise recommendations include exercising 3-5 times per week  No pharmacotherapy was ordered  Patient referred to PCP due to patient being overweight  BMI Counseling: Body mass index is 37 43 kg/m²  Follow-up plan was not completed due to patient refusing BMI follow-up plan  BMI Counseling: Body mass index is 37 43 kg/m²  The BMI is above normal  Nutrition recommendations include reducing portion sizes, decreasing overall calorie intake, 3-5 servings of fruits/vegetables daily, reducing fast food intake, consuming healthier snacks, decreasing soda and/or juice intake, moderation in carbohydrate intake, increasing intake of lean protein, reducing intake of saturated fat and trans fat and reducing intake of cholesterol  Exercise recommendations include exercising 3-5 times per week

## 2021-03-15 NOTE — PATIENT INSTRUCTIONS

## 2021-04-15 ENCOUNTER — OFFICE VISIT (OUTPATIENT)
Dept: UROLOGY | Facility: CLINIC | Age: 66
End: 2021-04-15
Payer: MEDICARE

## 2021-04-15 VITALS
WEIGHT: 249.12 LBS | DIASTOLIC BLOOD PRESSURE: 80 MMHG | SYSTOLIC BLOOD PRESSURE: 132 MMHG | HEIGHT: 69 IN | BODY MASS INDEX: 36.9 KG/M2

## 2021-04-15 DIAGNOSIS — N40.1 BENIGN PROSTATIC HYPERPLASIA WITH URINARY FREQUENCY: ICD-10-CM

## 2021-04-15 DIAGNOSIS — N43.2 OTHER HYDROCELE: ICD-10-CM

## 2021-04-15 DIAGNOSIS — N52.9 ERECTILE DYSFUNCTION, UNSPECIFIED ERECTILE DYSFUNCTION TYPE: ICD-10-CM

## 2021-04-15 DIAGNOSIS — Z12.5 PROSTATE CANCER SCREENING: Primary | ICD-10-CM

## 2021-04-15 DIAGNOSIS — R35.0 BENIGN PROSTATIC HYPERPLASIA WITH URINARY FREQUENCY: ICD-10-CM

## 2021-04-15 PROCEDURE — 99214 OFFICE O/P EST MOD 30 MIN: CPT | Performed by: PHYSICIAN ASSISTANT

## 2021-04-15 NOTE — PROGRESS NOTES
4/15/2021      No chief complaint on file  Assessment and Plan    77 y o  male    1  Prostate cancer screening  - LUCIAN smooth no nodule today  - PSA 0 2 (6/2020), 0 34 (2019), 0 2 (2018)    2  BPH  - very mildly symptomatic, monitor off medications    3  ED  - not active, defers treatment    4  Left hydrocele  - chronic/stable, not bothersome 40+ years    5  Tobacco use  - quit 9 months ago and doing well woohoo! 6  Colon cancer screening  - UTD, has upcoming colonoscopy on Tuesday next week, established with several priors    F/U 1 year psa/lucian      History of Present Illness  Lowery Jeans III is a 77 y o  male here for evaluation of   Annual visit for prostate cancer screening, BPH, ED, hydrocele  There is a questionable history of father either having prostate verses urothelial cancer   But what patient describing sounds more like treatment with nephroureterectomy  Patient of self has no malignancy history  He has a chronic and stable left hydrocele, he has minimal to no bothersome lower urinary tract symptoms, with frequency correlating to coffee intake alone usually  Not sexually active and defers ED treatment at this time  He quit smoking cold turkey 9 months ago! He has not had hematuria  He feels well today with no complaints  PSA for today's visit  Is 0 2, labs drawn June 2020  Having some left sided abdominal "picking sensation" bloating and diarrhea since quitting smoking a weight gain 20 lbs  Has upcoming abdominal ultrasound and colonoscopy next week  Review of Systems   Constitutional: Negative for activity change, appetite change, chills, fever and unexpected weight change  HENT: Negative  Respiratory: Negative  Negative for shortness of breath  Cardiovascular: Negative  Negative for chest pain  Gastrointestinal: Positive for abdominal distention (bloating/diarrhea)  Negative for abdominal pain, nausea and vomiting  Endocrine: Negative      Genitourinary: Negative for decreased urine volume, difficulty urinating, dysuria, flank pain, frequency, hematuria and urgency  Musculoskeletal: Negative for back pain and gait problem  Skin: Negative  Allergic/Immunologic: Negative  Neurological: Negative  Hematological: Negative for adenopathy  Does not bruise/bleed easily  Vitals  Vitals:    04/15/21 1530   BP: 132/80   Weight: 113 kg (249 lb 1 9 oz)   Height: 5' 8 5" (1 74 m)       Physical Exam  Vitals signs and nursing note reviewed  Constitutional:       General: He is not in acute distress  Appearance: Normal appearance  He is well-developed  He is obese  He is not diaphoretic  HENT:      Head: Normocephalic and atraumatic  Pulmonary:      Effort: Pulmonary effort is normal       Comments: No cough or audible wheeze  Abdominal:      General: There is no distension  Tenderness: There is no abdominal tenderness  There is no right CVA tenderness or left CVA tenderness  Genitourinary:     Comments: Circumcised penis, normal phallus, orthotopic patent meatus  Testes smooth descended bilaterally into the scrotum nontender with no palpable mass  Moderate left hydrocele  Digital rectal exam smooth prostate, without appreciable nodule, induration or asymmetry    Musculoskeletal:      Right lower leg: No edema  Left lower leg: No edema  Skin:     General: Skin is warm and dry  Neurological:      Mental Status: He is alert and oriented to person, place, and time        Gait: Gait normal    Psychiatric:         Speech: Speech normal          Behavior: Behavior normal            Past History  Past Medical History:   Diagnosis Date    Anxiety     Carpal tunnel syndrome     Colon polyps     DJD (degenerative joint disease)     Duodenal ulcer     Gastric reflux     Gastric ulcer     Heel fracture     bilateral    Hypertension     Vitamin B12 deficiency     Vitamin D deficiency      Social History     Socioeconomic History  Marital status: /Civil Union     Spouse name: None    Number of children: None    Years of education: None    Highest education level: None   Occupational History    None   Social Needs    Financial resource strain: None    Food insecurity     Worry: None     Inability: None    Transportation needs     Medical: No     Non-medical: No   Tobacco Use    Smoking status: Former Smoker     Packs/day: 1 00     Years: 40 00     Pack years: 40 00     Types: Cigarettes     Start date: 1976     Quit date: 2020     Years since quittin 8    Smokeless tobacco: Former User     Types: Chew     Quit date:    Substance and Sexual Activity    Alcohol use: No    Drug use: No    Sexual activity: None   Lifestyle    Physical activity     Days per week: None     Minutes per session: None    Stress: None   Relationships    Social connections     Talks on phone: None     Gets together: None     Attends Anabaptism service: None     Active member of club or organization: None     Attends meetings of clubs or organizations: None     Relationship status: None    Intimate partner violence     Fear of current or ex partner: None     Emotionally abused: None     Physically abused: None     Forced sexual activity: None   Other Topics Concern    None   Social History Narrative    None     Social History     Tobacco Use   Smoking Status Former Smoker    Packs/day: 1 00    Years: 40 00    Pack years: 40 00    Types: Cigarettes    Start date: 1976   [facility] Quit date: 2020    Years since quittin 8   Smokeless Tobacco Former User    Types: Chew    Quit date:      Family History   Problem Relation Age of Onset    Cancer Mother         lung    Heart disease Mother     Cancer Father         prostate    Heart murmur Brother     Diabetes Daughter     Diabetes Son     Diabetes Other        The following portions of the patient's history were reviewed and updated as appropriate: allergies, current medications, past medical history, past social history, past surgical history and problem list     Results  No results found for this or any previous visit (from the past 1 hour(s))  ]  No results found for: PSA  Lab Results   Component Value Date    CALCIUM 9 1 11/11/2019    K 4 9 11/11/2019    CO2 32 11/11/2019     11/11/2019    BUN 18 11/11/2019    CREATININE 0 97 11/11/2019     Lab Results   Component Value Date    WBC 5 51 11/11/2019    HGB 15 1 11/11/2019    HCT 45 7 11/11/2019    MCV 95 11/11/2019     11/11/2019

## 2021-07-06 ENCOUNTER — OFFICE VISIT (OUTPATIENT)
Dept: FAMILY MEDICINE CLINIC | Facility: CLINIC | Age: 66
End: 2021-07-06
Payer: MEDICARE

## 2021-07-06 VITALS
BODY MASS INDEX: 38.19 KG/M2 | SYSTOLIC BLOOD PRESSURE: 130 MMHG | HEIGHT: 68 IN | WEIGHT: 252 LBS | TEMPERATURE: 98.4 F | HEART RATE: 68 BPM | OXYGEN SATURATION: 97 % | DIASTOLIC BLOOD PRESSURE: 78 MMHG

## 2021-07-06 DIAGNOSIS — Z23 ENCOUNTER FOR IMMUNIZATION: ICD-10-CM

## 2021-07-06 DIAGNOSIS — Z12.2 ENCOUNTER FOR SCREENING FOR LUNG CANCER: ICD-10-CM

## 2021-07-06 DIAGNOSIS — Z87.891 PERSONAL HISTORY OF NICOTINE DEPENDENCE: ICD-10-CM

## 2021-07-06 PROCEDURE — 90732 PPSV23 VACC 2 YRS+ SUBQ/IM: CPT | Performed by: FAMILY MEDICINE

## 2021-07-06 PROCEDURE — G0439 PPPS, SUBSEQ VISIT: HCPCS | Performed by: SURGERY

## 2021-07-06 PROCEDURE — G0009 ADMIN PNEUMOCOCCAL VACCINE: HCPCS | Performed by: FAMILY MEDICINE

## 2021-07-06 NOTE — PROGRESS NOTES
Assessment/Plan:      Diagnoses and all orders for this visit:    Encounter for screening for lung cancer  -     CT lung screening program; Future    Personal history of nicotine dependence  -     CT lung screening program; Future    Encounter for immunization  -     PNEUMOCOCCAL POLYSACCHARIDE VACCINE 23-VALENT =>1YO SQ IM    Other orders  -     Cancel: PNEUMOCOCCAL POLYSACCHARIDE VACCINE 23-VALENT =>1YO SQ IM          Subjective:     Patient ID: Christine Douglas is a 77 y o  male  Here for Parents Journey  Interested in lung cancer screening, did quit smoking 1 year ago  Denies SoB/Chest pain  Doing well otherwise  BP at goal, good medication compliance  Review of Systems   Constitutional: Negative for chills and fever  HENT: Negative for congestion, ear pain, sinus pressure, sneezing and sore throat  Eyes: Negative for pain and visual disturbance  Respiratory: Negative for cough and shortness of breath  Cardiovascular: Negative for chest pain and palpitations  Gastrointestinal: Negative for abdominal pain and vomiting  Genitourinary: Negative for dysuria and hematuria  Musculoskeletal: Negative for arthralgias, back pain and myalgias  Skin: Negative for color change and rash  Neurological: Negative for seizures and syncope  Psychiatric/Behavioral: Negative for sleep disturbance  All other systems reviewed and are negative  Objective:     Physical Exam  Vitals reviewed  Constitutional:       Appearance: Normal appearance  HENT:      Head: Normocephalic and atraumatic  Nose: Nose normal       Mouth/Throat:      Mouth: Mucous membranes are moist       Pharynx: Oropharynx is clear  Eyes:      Extraocular Movements: Extraocular movements intact  Conjunctiva/sclera: Conjunctivae normal       Pupils: Pupils are equal, round, and reactive to light  Cardiovascular:      Rate and Rhythm: Normal rate and regular rhythm  Pulses: Normal pulses        Heart sounds: Normal heart sounds  No murmur heard  No friction rub  No gallop  Pulmonary:      Effort: Pulmonary effort is normal       Breath sounds: Normal breath sounds  No wheezing or rhonchi  Abdominal:      General: There is no distension  Palpations: There is no mass  Tenderness: There is no abdominal tenderness  Musculoskeletal:         General: Normal range of motion  Cervical back: Normal range of motion  Skin:     General: Skin is warm  Neurological:      General: No focal deficit present  Mental Status: He is alert and oriented to person, place, and time  Mental status is at baseline             Wt Readings from Last 3 Encounters:   07/06/21 114 kg (252 lb)   04/15/21 113 kg (249 lb 1 9 oz)   03/15/21 113 kg (249 lb 12 8 oz)     Temp Readings from Last 3 Encounters:   07/06/21 98 4 °F (36 9 °C)   03/15/21 97 6 °F (36 4 °C) (Temporal)   06/03/20 97 9 °F (36 6 °C) (Temporal)     BP Readings from Last 3 Encounters:   07/06/21 130/78   04/15/21 132/80   03/15/21 126/86     Pulse Readings from Last 3 Encounters:   07/06/21 68   03/15/21 63   06/03/20 66

## 2021-07-06 NOTE — PATIENT INSTRUCTIONS

## 2021-07-06 NOTE — PROGRESS NOTES
Assessment and Plan:     Problem List Items Addressed This Visit     None      Visit Diagnoses     Encounter for screening for lung cancer        Relevant Orders    CT lung screening program    Personal history of nicotine dependence        Relevant Orders    CT lung screening program    Encounter for immunization               Preventive health issues were discussed with patient, and age appropriate screening tests were ordered as noted in patient's After Visit Summary  Personalized health advice and appropriate referrals for health education or preventive services given if needed, as noted in patient's After Visit Summary  History of Present Illness:     Patient presents for Welcome to Medicare visit       Patient Care Team:  Mitali Galdamez as PCP - General (Family Medicine)  Cynthia Germain MD     Review of Systems:     Review of Systems   Problem List:     Patient Active Problem List   Diagnosis    Hypertension    Anxiety    Insomnia    Gastroesophageal reflux disease without esophagitis    Tobacco abuse    Gastric ulcer    Chronic gastritis without bleeding    Gastroesophageal reflux disease with esophagitis    Depression    Vitamin B12 deficiency    Vitamin D deficiency    Prostate cancer screening    Cervical spondylosis    Osteoarthritis of thoracolumbar spine    Chronic pain syndrome    Cervical disc disorder with radiculopathy of mid-cervical region    Cervical radiculopathy    Bilateral carpal tunnel syndrome    Lumbar spondylosis    Lumbar radiculopathy    Chronic bilateral low back pain without sciatica    Neck pain    Class 2 severe obesity with serious comorbidity and body mass index (BMI) of 38 0 to 38 9 in adult [facility])      Past Medical and Surgical History:     Past Medical History:   Diagnosis Date    Anxiety     Carpal tunnel syndrome     Colon polyps     DJD (degenerative joint disease)     Duodenal ulcer     Gastric reflux     Gastric ulcer     Heel fracture     bilateral    Hypertension     Vitamin B12 deficiency     Vitamin D deficiency      Past Surgical History:   Procedure Laterality Date    ANKLE SURGERY      repair    COLONOSCOPY      polyp removed; benign    COLONOSCOPY  2019    COLONOSCOPY W/ POLYPECTOMY      Followed by Dr Temi Kenny Bilateral     MULTIPLE TOOTH EXTRACTIONS      POLYPECTOMY      TIBIA FRACTURE SURGERY Left       Family History:     Family History   Problem Relation Age of Onset   Watson Espinal Cancer Mother         lung    Heart disease Mother     Cancer Father         prostate    Heart murmur Brother     Diabetes Daughter     Diabetes Son     Diabetes Other       Social History:     Social History     Socioeconomic History    Marital status: /Civil Union     Spouse name: None    Number of children: None    Years of education: None    Highest education level: None   Occupational History    None   Tobacco Use    Smoking status: Former Smoker     Packs/day: 1 00     Years: 40 00     Pack years: 40 00     Types: Cigarettes     Start date: 1976     Quit date: 2020     Years since quittin 0    Smokeless tobacco: Former User     Types: Chew     Quit date:    Vaping Use    Vaping Use: Never used   Substance and Sexual Activity    Alcohol use: No    Drug use: No    Sexual activity: None   Other Topics Concern    None   Social History Narrative    None     Social Determinants of Health     Financial Resource Strain:     Difficulty of Paying Living Expenses:    Food Insecurity:     Worried About Running Out of Food in the Last Year:     Ran Out of Food in the Last Year:    Transportation Needs: No Transportation Needs    Lack of Transportation (Medical): No    Lack of Transportation (Non-Medical):  No   Physical Activity:     Days of Exercise per Week:     Minutes of Exercise per Session:    Stress:     Feeling of Stress :    Social Connections:     Frequency of Communication with Friends and Family:     Frequency of Social Gatherings with Friends and Family:     Attends Scientology Services:     Active Member of Clubs or Organizations:     Attends Club or Organization Meetings:     Marital Status:    Intimate Partner Violence:     Fear of Current or Ex-Partner:     Emotionally Abused:     Physically Abused:     Sexually Abused:       Medications and Allergies:     Current Outpatient Medications   Medication Sig Dispense Refill    ALPRAZolam (XANAX) 0 5 mg tablet Take 1 tablet (0 5 mg total) by mouth every 12 (twelve) hours as needed for anxiety 60 tablet 2    citalopram (CeleXA) 20 mg tablet Take 1 tablet (20 mg total) by mouth daily 90 tablet 1    ergocalciferol (VITAMIN D2) 50,000 units Take 1 capsule (50,000 Units total) by mouth every 28 days 3 capsule 1    famotidine (PEPCID) 20 mg tablet Take 1 tablet (20 mg total) by mouth 2 (two) times a day as needed for indigestion or heartburn 60 tablet 5    glucosamine-chondroitin 500-400 MG tablet Take 1 tablet by mouth 3 (three) times a day 90 tablet 5    lisinopril (ZESTRIL) 10 mg tablet Take 1 tablet (10 mg total) by mouth daily 90 tablet 1    Multiple Vitamin (MULTIVITAMIN) tablet Take 1 tablet by mouth daily 90 tablet 1    pantoprazole (PROTONIX) 40 mg tablet Take 1 tablet (40 mg total) by mouth daily 90 tablet 1     No current facility-administered medications for this visit       No Known Allergies   Immunizations:     Immunization History   Administered Date(s) Administered    INFLUENZA 11/23/1999, 10/26/2020    SARS-CoV-2 / COVID-19 mRNA IM (Wilhemenia Nebo) 03/19/2021, 04/16/2021    Td (adult), adsorbed 12/19/1996    Tdap 11/25/2016, 07/18/2018      Health Maintenance:         Topic Date Due    Lung Cancer Screening  Never done    Colorectal Cancer Screening  02/07/2024    Hepatitis C Screening  Completed         Topic Date Due    Pneumococcal Vaccine: 65+ Years (1 of 1 - PPSV23) Never done   Cam Chavarria Influenza Vaccine (1) 09/01/2021      Medicare Screening Tests and Risk Assessments:         Health Risk Assessment:   Patient rates overall health as good  Patient feels that their physical health rating is same  Patient is satisfied with their life  Eyesight was rated as same  Hearing was rated as slightly worse  Patient feels that their emotional and mental health rating is much better  Patients states they are never, rarely angry  Patient states they are often unusually tired/fatigued  Pain experienced in the last 7 days has been some  Patient's pain rating has been 3/10  Patient states that he has experienced weight loss or gain in last 6 months  Fall Risk Screening: In the past year, patient has experienced: no history of falling in past year      Home Safety:  Patient does not have trouble with stairs inside or outside of their home  Patient has working smoke alarms and has no working carbon monoxide detector  Home safety hazards include: none  Nutrition:   Current diet is Regular  Medications:   Patient is currently taking over-the-counter supplements  OTC medications include: see medication list  Patient is able to manage medications  Activities of Daily Living (ADLs)/Instrumental Activities of Daily Living (IADLs):   Walk and transfer into and out of bed and chair?: Yes  Dress and groom yourself?: Yes    Bathe or shower yourself?: Yes    Feed yourself?  Yes  Do your laundry/housekeeping?: No  Manage your money, pay your bills and track your expenses?: Yes  Make your own meals?: No    Do your own shopping?: No    Previous Hospitalizations:   Any hospitalizations or ED visits within the last 12 months?: No      PREVENTIVE SCREENINGS      Cardiovascular Screening:    General: Screening Not Indicated and History Lipid Disorder      Diabetes Screening:     General: Screening Current      Colorectal Cancer Screening:     General: Screening Current      Abdominal Aortic Aneurysm (AAA) Screening:    Risk factors include: age between 73-67 yo and tobacco use        Hepatitis C Screening:    General: Screening Current    Screening, Brief Intervention, and Referral to Treatment (SBIRT)    Screening  Typical number of drinks in a day: 0    Single Item Drug Screening:  How often have you used an illegal drug (including marijuana) or a prescription medication for non-medical reasons in the past year? never    Single Item Drug Screen Score: 0  Interpretation: Negative screen for possible drug use disorder    No exam data present     Physical Exam:     /78   Pulse 68   Temp 98 4 °F (36 9 °C)   Ht 5' 8" (1 727 m)   Wt 114 kg (252 lb)   SpO2 97%   BMI 38 32 kg/m²     Physical Exam     Honey Moses MD

## 2021-07-13 ENCOUNTER — HOSPITAL ENCOUNTER (OUTPATIENT)
Dept: CT IMAGING | Facility: HOSPITAL | Age: 66
Discharge: HOME | End: 2021-07-13
Payer: MEDICARE

## 2021-07-13 DIAGNOSIS — Z12.2 ENCOUNTER FOR SCREENING FOR LUNG CANCER: ICD-10-CM

## 2021-07-13 DIAGNOSIS — Z87.891 PERSONAL HISTORY OF NICOTINE DEPENDENCE: ICD-10-CM

## 2021-07-13 PROCEDURE — 71271 CT THORAX LUNG CANCER SCR C-: CPT

## 2021-09-02 DIAGNOSIS — E55.9 VITAMIN D DEFICIENCY: ICD-10-CM

## 2021-09-02 RX ORDER — ERGOCALCIFEROL 1.25 MG/1
CAPSULE ORAL
Qty: 3 CAPSULE | Refills: 1 | Status: SHIPPED | OUTPATIENT
Start: 2021-09-02 | End: 2022-01-10 | Stop reason: SDUPTHER

## 2021-09-09 DIAGNOSIS — F41.9 ANXIETY: ICD-10-CM

## 2021-09-10 DIAGNOSIS — K21.00 GASTROESOPHAGEAL REFLUX DISEASE WITH ESOPHAGITIS: ICD-10-CM

## 2021-09-10 RX ORDER — FAMOTIDINE 20 MG/1
TABLET, FILM COATED ORAL
Qty: 60 TABLET | Refills: 5 | Status: SHIPPED | OUTPATIENT
Start: 2021-09-10

## 2021-09-10 RX ORDER — ALPRAZOLAM 0.5 MG/1
0.5 TABLET ORAL EVERY 12 HOURS PRN
Qty: 60 TABLET | Refills: 0 | Status: SHIPPED | OUTPATIENT
Start: 2021-09-10 | End: 2021-11-25 | Stop reason: SDUPTHER

## 2021-09-17 ENCOUNTER — TELEPHONE (OUTPATIENT)
Dept: PAIN MEDICINE | Facility: CLINIC | Age: 66
End: 2021-09-17

## 2021-09-17 NOTE — TELEPHONE ENCOUNTER
Patient is requesting his medical records be faxed to a new pain specialist he going to see   Please fax his records to fax number listed below, thx    Dr Deepak Gilliland   [place]# [phone]  Fax# [phone]    Call back# [phone] (please contact pt with status)

## 2021-09-28 ENCOUNTER — TELEPHONE (OUTPATIENT)
Dept: PAIN MEDICINE | Facility: CLINIC | Age: 66
End: 2021-09-28

## 2021-09-28 NOTE — TELEPHONE ENCOUNTER
Received signed release & faxed records to DAMASO DAVIS [facility], [place], operated by [facility]

## 2021-11-15 DIAGNOSIS — I10 HYPERTENSION, UNSPECIFIED TYPE: ICD-10-CM

## 2021-11-15 RX ORDER — LISINOPRIL 10 MG/1
TABLET ORAL
Qty: 90 TABLET | Refills: 1 | Status: SHIPPED | OUTPATIENT
Start: 2021-11-15 | End: 2021-11-25 | Stop reason: SDUPTHER

## 2021-11-25 DIAGNOSIS — I10 HYPERTENSION, UNSPECIFIED TYPE: ICD-10-CM

## 2021-11-25 DIAGNOSIS — F41.9 ANXIETY: ICD-10-CM

## 2021-11-26 RX ORDER — ALPRAZOLAM 0.5 MG/1
0.5 TABLET ORAL EVERY 12 HOURS PRN
Qty: 60 TABLET | Refills: 0 | Status: SHIPPED | OUTPATIENT
Start: 2021-11-26 | End: 2022-01-10 | Stop reason: SDUPTHER

## 2021-11-26 RX ORDER — LISINOPRIL 10 MG/1
10 TABLET ORAL DAILY
Qty: 90 TABLET | Refills: 0 | Status: SHIPPED | OUTPATIENT
Start: 2021-11-26 | End: 2022-01-10 | Stop reason: SDUPTHER

## 2021-12-20 ENCOUNTER — APPOINTMENT (OUTPATIENT)
Dept: RADIOLOGY | Facility: CLINIC | Age: 66
End: 2021-12-20
Payer: MEDICARE

## 2021-12-20 ENCOUNTER — OFFICE VISIT (OUTPATIENT)
Dept: URGENT CARE | Facility: CLINIC | Age: 66
End: 2021-12-20
Payer: MEDICARE

## 2021-12-20 VITALS
SYSTOLIC BLOOD PRESSURE: 143 MMHG | HEIGHT: 68 IN | WEIGHT: 251 LBS | DIASTOLIC BLOOD PRESSURE: 85 MMHG | OXYGEN SATURATION: 98 % | BODY MASS INDEX: 38.04 KG/M2 | TEMPERATURE: 98.1 F | HEART RATE: 83 BPM

## 2021-12-20 DIAGNOSIS — M25.562 ACUTE PAIN OF LEFT KNEE: ICD-10-CM

## 2021-12-20 DIAGNOSIS — M25.562 ACUTE PAIN OF LEFT KNEE: Primary | ICD-10-CM

## 2021-12-20 DIAGNOSIS — M54.32 SCIATICA OF LEFT SIDE: ICD-10-CM

## 2021-12-20 PROCEDURE — G0463 HOSPITAL OUTPT CLINIC VISIT: HCPCS | Performed by: PHYSICIAN ASSISTANT

## 2021-12-20 PROCEDURE — 99202 OFFICE O/P NEW SF 15 MIN: CPT | Performed by: PHYSICIAN ASSISTANT

## 2021-12-20 PROCEDURE — 73562 X-RAY EXAM OF KNEE 3: CPT

## 2021-12-21 ENCOUNTER — APPOINTMENT (OUTPATIENT)
Dept: LAB | Facility: CLINIC | Age: 66
End: 2021-12-21
Payer: MEDICARE

## 2021-12-21 DIAGNOSIS — E07.9 THYROID DISORDER: ICD-10-CM

## 2021-12-21 DIAGNOSIS — E53.8 VITAMIN B12 DEFICIENCY: ICD-10-CM

## 2021-12-21 DIAGNOSIS — D50.9 IRON DEFICIENCY ANEMIA, UNSPECIFIED IRON DEFICIENCY ANEMIA TYPE: ICD-10-CM

## 2021-12-21 DIAGNOSIS — E78.49 OTHER HYPERLIPIDEMIA: ICD-10-CM

## 2021-12-21 DIAGNOSIS — R73.9 HYPERGLYCEMIA: ICD-10-CM

## 2021-12-21 DIAGNOSIS — E55.9 VITAMIN D DEFICIENCY: ICD-10-CM

## 2021-12-21 LAB
25(OH)D3 SERPL-MCNC: 42.5 NG/ML (ref 30–100)
ALBUMIN SERPL BCP-MCNC: 3.8 G/DL (ref 3.5–5)
ALP SERPL-CCNC: 40 U/L (ref 46–116)
ALT SERPL W P-5'-P-CCNC: 62 U/L (ref 12–78)
ANION GAP SERPL CALCULATED.3IONS-SCNC: 4 MMOL/L (ref 4–13)
AST SERPL W P-5'-P-CCNC: 36 U/L (ref 5–45)
BASOPHILS # BLD AUTO: 0.02 THOUSANDS/ΜL (ref 0–0.1)
BASOPHILS NFR BLD AUTO: 0 % (ref 0–1)
BILIRUB SERPL-MCNC: 0.43 MG/DL (ref 0.2–1)
BUN SERPL-MCNC: 17 MG/DL (ref 5–25)
CALCIUM SERPL-MCNC: 8.9 MG/DL (ref 8.3–10.1)
CHLORIDE SERPL-SCNC: 103 MMOL/L (ref 100–108)
CHOLEST SERPL-MCNC: 137 MG/DL
CO2 SERPL-SCNC: 31 MMOL/L (ref 21–32)
CREAT SERPL-MCNC: 1.09 MG/DL (ref 0.6–1.3)
EOSINOPHIL # BLD AUTO: 0.09 THOUSAND/ΜL (ref 0–0.61)
EOSINOPHIL NFR BLD AUTO: 2 % (ref 0–6)
ERYTHROCYTE [DISTWIDTH] IN BLOOD BY AUTOMATED COUNT: 12.1 % (ref 11.6–15.1)
GFR SERPL CREATININE-BSD FRML MDRD: 69 ML/MIN/1.73SQ M
GLUCOSE P FAST SERPL-MCNC: 92 MG/DL (ref 65–99)
HCT VFR BLD AUTO: 43.6 % (ref 36.5–49.3)
HDLC SERPL-MCNC: 51 MG/DL
HGB BLD-MCNC: 14.2 G/DL (ref 12–17)
IMM GRANULOCYTES # BLD AUTO: 0.02 THOUSAND/UL (ref 0–0.2)
IMM GRANULOCYTES NFR BLD AUTO: 0 % (ref 0–2)
INSULIN SERPL-ACNC: 32.7 MU/L (ref 3–25)
LDLC SERPL CALC-MCNC: 72 MG/DL (ref 0–100)
LYMPHOCYTES # BLD AUTO: 1.47 THOUSANDS/ΜL (ref 0.6–4.47)
LYMPHOCYTES NFR BLD AUTO: 27 % (ref 14–44)
MCH RBC QN AUTO: 30.5 PG (ref 26.8–34.3)
MCHC RBC AUTO-ENTMCNC: 32.6 G/DL (ref 31.4–37.4)
MCV RBC AUTO: 94 FL (ref 82–98)
MONOCYTES # BLD AUTO: 0.53 THOUSAND/ΜL (ref 0.17–1.22)
MONOCYTES NFR BLD AUTO: 10 % (ref 4–12)
NEUTROPHILS # BLD AUTO: 3.32 THOUSANDS/ΜL (ref 1.85–7.62)
NEUTS SEG NFR BLD AUTO: 61 % (ref 43–75)
NONHDLC SERPL-MCNC: 86 MG/DL
NRBC BLD AUTO-RTO: 0 /100 WBCS
PLATELET # BLD AUTO: 201 THOUSANDS/UL (ref 149–390)
PMV BLD AUTO: 9.6 FL (ref 8.9–12.7)
POTASSIUM SERPL-SCNC: 4 MMOL/L (ref 3.5–5.3)
PROT SERPL-MCNC: 7.2 G/DL (ref 6.4–8.2)
RBC # BLD AUTO: 4.65 MILLION/UL (ref 3.88–5.62)
SODIUM SERPL-SCNC: 138 MMOL/L (ref 136–145)
TRIGL SERPL-MCNC: 71 MG/DL
TSH SERPL DL<=0.05 MIU/L-ACNC: 6.1 UIU/ML (ref 0.36–3.74)
VIT B12 SERPL-MCNC: 493 PG/ML (ref 100–900)
WBC # BLD AUTO: 5.45 THOUSAND/UL (ref 4.31–10.16)

## 2021-12-21 PROCEDURE — 80053 COMPREHEN METABOLIC PANEL: CPT

## 2021-12-21 PROCEDURE — 85025 COMPLETE CBC W/AUTO DIFF WBC: CPT

## 2021-12-21 PROCEDURE — 36415 COLL VENOUS BLD VENIPUNCTURE: CPT

## 2021-12-21 PROCEDURE — 84443 ASSAY THYROID STIM HORMONE: CPT

## 2021-12-21 PROCEDURE — 80061 LIPID PANEL: CPT

## 2021-12-21 PROCEDURE — 82607 VITAMIN B-12: CPT

## 2021-12-21 PROCEDURE — 83036 HEMOGLOBIN GLYCOSYLATED A1C: CPT

## 2021-12-21 PROCEDURE — 82306 VITAMIN D 25 HYDROXY: CPT

## 2021-12-21 PROCEDURE — 83525 ASSAY OF INSULIN: CPT

## 2021-12-22 LAB
EST. AVERAGE GLUCOSE BLD GHB EST-MCNC: 120 MG/DL
HBA1C MFR BLD: 5.8 %

## 2022-01-10 ENCOUNTER — OFFICE VISIT (OUTPATIENT)
Dept: FAMILY MEDICINE CLINIC | Facility: CLINIC | Age: 67
End: 2022-01-10
Payer: MEDICARE

## 2022-01-10 VITALS
OXYGEN SATURATION: 98 % | TEMPERATURE: 98 F | BODY MASS INDEX: 38.04 KG/M2 | DIASTOLIC BLOOD PRESSURE: 78 MMHG | HEIGHT: 68 IN | RESPIRATION RATE: 16 BRPM | WEIGHT: 251 LBS | HEART RATE: 84 BPM | SYSTOLIC BLOOD PRESSURE: 134 MMHG

## 2022-01-10 DIAGNOSIS — E07.9 THYROID DISORDER: ICD-10-CM

## 2022-01-10 DIAGNOSIS — I10 HYPERTENSION, UNSPECIFIED TYPE: ICD-10-CM

## 2022-01-10 DIAGNOSIS — M54.50 CHRONIC BILATERAL LOW BACK PAIN WITHOUT SCIATICA: ICD-10-CM

## 2022-01-10 DIAGNOSIS — M25.562 CHRONIC PAIN OF BOTH KNEES: ICD-10-CM

## 2022-01-10 DIAGNOSIS — M47.812 CERVICAL SPONDYLOSIS: ICD-10-CM

## 2022-01-10 DIAGNOSIS — E66.01 CLASS 2 SEVERE OBESITY WITH SERIOUS COMORBIDITY AND BODY MASS INDEX (BMI) OF 38.0 TO 38.9 IN ADULT, UNSPECIFIED OBESITY TYPE (HCC): ICD-10-CM

## 2022-01-10 DIAGNOSIS — G89.29 CHRONIC BILATERAL LOW BACK PAIN WITHOUT SCIATICA: ICD-10-CM

## 2022-01-10 DIAGNOSIS — G89.29 CHRONIC PAIN OF BOTH KNEES: ICD-10-CM

## 2022-01-10 DIAGNOSIS — M54.16 LUMBAR RADICULOPATHY: ICD-10-CM

## 2022-01-10 DIAGNOSIS — M25.561 CHRONIC PAIN OF BOTH KNEES: ICD-10-CM

## 2022-01-10 DIAGNOSIS — D64.9 ANEMIA, UNSPECIFIED TYPE: ICD-10-CM

## 2022-01-10 DIAGNOSIS — G47.09 OTHER INSOMNIA: ICD-10-CM

## 2022-01-10 DIAGNOSIS — F41.9 ANXIETY: ICD-10-CM

## 2022-01-10 DIAGNOSIS — M54.12 CERVICAL RADICULOPATHY: ICD-10-CM

## 2022-01-10 DIAGNOSIS — M17.0 PRIMARY OSTEOARTHRITIS OF BOTH KNEES: ICD-10-CM

## 2022-01-10 DIAGNOSIS — M50.120 CERVICAL DISC DISORDER WITH RADICULOPATHY OF MID-CERVICAL REGION: Primary | ICD-10-CM

## 2022-01-10 DIAGNOSIS — E55.9 VITAMIN D DEFICIENCY: ICD-10-CM

## 2022-01-10 DIAGNOSIS — R73.9 HYPERGLYCEMIA: ICD-10-CM

## 2022-01-10 DIAGNOSIS — E78.49 OTHER HYPERLIPIDEMIA: ICD-10-CM

## 2022-01-10 DIAGNOSIS — K21.9 GASTROESOPHAGEAL REFLUX DISEASE WITHOUT ESOPHAGITIS: ICD-10-CM

## 2022-01-10 DIAGNOSIS — E03.9 ACQUIRED HYPOTHYROIDISM: ICD-10-CM

## 2022-01-10 DIAGNOSIS — K25.9 GASTRIC ULCER, UNSPECIFIED CHRONICITY, UNSPECIFIED WHETHER GASTRIC ULCER HEMORRHAGE OR PERFORATION PRESENT: ICD-10-CM

## 2022-01-10 DIAGNOSIS — E53.8 VITAMIN B12 DEFICIENCY: ICD-10-CM

## 2022-01-10 PROBLEM — Z72.0 TOBACCO ABUSE: Status: RESOLVED | Noted: 2018-01-31 | Resolved: 2022-01-10

## 2022-01-10 PROCEDURE — 99214 OFFICE O/P EST MOD 30 MIN: CPT | Performed by: NURSE PRACTITIONER

## 2022-01-10 RX ORDER — PANTOPRAZOLE SODIUM 40 MG/1
40 TABLET, DELAYED RELEASE ORAL DAILY
Qty: 90 TABLET | Refills: 1 | Status: SHIPPED | OUTPATIENT
Start: 2022-01-10 | End: 2022-07-03

## 2022-01-10 RX ORDER — ERGOCALCIFEROL 1.25 MG/1
50000 CAPSULE ORAL
Qty: 3 CAPSULE | Refills: 1 | Status: SHIPPED | OUTPATIENT
Start: 2022-01-10 | End: 2022-07-18 | Stop reason: SDUPTHER

## 2022-01-10 RX ORDER — ALPRAZOLAM 0.5 MG/1
0.5 TABLET ORAL EVERY 12 HOURS PRN
Qty: 60 TABLET | Refills: 1 | Status: SHIPPED | OUTPATIENT
Start: 2022-01-10 | End: 2022-06-09

## 2022-01-10 RX ORDER — CITALOPRAM 20 MG/1
20 TABLET ORAL DAILY
Qty: 90 TABLET | Refills: 1 | Status: SHIPPED | OUTPATIENT
Start: 2022-01-10 | End: 2022-07-03

## 2022-01-10 RX ORDER — CELECOXIB 100 MG/1
100 CAPSULE ORAL 2 TIMES DAILY
Qty: 180 CAPSULE | Refills: 1 | Status: SHIPPED | OUTPATIENT
Start: 2022-01-10 | End: 2022-07-18 | Stop reason: SDUPTHER

## 2022-01-10 RX ORDER — LISINOPRIL 10 MG/1
10 TABLET ORAL DAILY
Qty: 90 TABLET | Refills: 1 | Status: SHIPPED | OUTPATIENT
Start: 2022-01-10 | End: 2022-07-18 | Stop reason: SDUPTHER

## 2022-01-10 NOTE — PROGRESS NOTES
Assessment/Plan:      Diagnoses and all orders for this visit:    Cervical disc disorder with radiculopathy of mid-cervical region  -     celecoxib (CeleBREX) 100 mg capsule; Take 1 capsule (100 mg total) by mouth 2 (two) times a day    Gastroesophageal reflux disease without esophagitis  -     pantoprazole (PROTONIX) 40 mg tablet; Take 1 tablet (40 mg total) by mouth daily    Cervical spondylosis  -     celecoxib (CeleBREX) 100 mg capsule; Take 1 capsule (100 mg total) by mouth 2 (two) times a day    Lumbar radiculopathy  -     celecoxib (CeleBREX) 100 mg capsule; Take 1 capsule (100 mg total) by mouth 2 (two) times a day    Cervical radiculopathy    Chronic bilateral low back pain without sciatica  -     celecoxib (CeleBREX) 100 mg capsule; Take 1 capsule (100 mg total) by mouth 2 (two) times a day    Vitamin B12 deficiency  -     Vitamin B12; Future    Vitamin D deficiency  -     Vitamin D 25 hydroxy; Future  -     ergocalciferol (VITAMIN D2) 50,000 units; Take 1 capsule (50,000 Units total) by mouth every 28 days    Other insomnia    Chronic pain of both knees  -     Ambulatory referral to Orthopedic Surgery; Future    Class 2 severe obesity with serious comorbidity and body mass index (BMI) of 38 0 to 38 9 in adult, unspecified obesity type (HCC)    Anemia, unspecified type  -     CBC and differential; Future    Hyperglycemia  -     Comprehensive metabolic panel; Future  -     Hemoglobin A1C; Future  -     Insulin, fasting; Future    Other hyperlipidemia  -     Lipid panel; Future    Thyroid disorder  -     TSH, 3rd generation; Future    Hypertension, unspecified type  -     CBC and differential; Future  -     TSH, 3rd generation; Future  -     lisinopril (ZESTRIL) 10 mg tablet; Take 1 tablet (10 mg total) by mouth daily    Anxiety  -     ALPRAZolam (XANAX) 0 5 mg tablet; Take 1 tablet (0 5 mg total) by mouth every 12 (twelve) hours as needed for anxiety  -     citalopram (CeleXA) 20 mg tablet;  Take 1 tablet (20 mg total) by mouth daily    Gastric ulcer, unspecified chronicity, unspecified whether gastric ulcer hemorrhage or perforation present  -     pantoprazole (PROTONIX) 40 mg tablet; Take 1 tablet (40 mg total) by mouth daily    Primary osteoarthritis of both knees  -     celecoxib (CeleBREX) 100 mg capsule; Take 1 capsule (100 mg total) by mouth 2 (two) times a day    Acquired hypothyroidism  -     TSH, 3rd generation; Future          Subjective:     Patient ID: Cristina Hughes is a 74 y o  male  Patient presents to office for follow up and recheck  Complete medical history and medications reviewed with patient and tolerating all medications well without any problems  Vital signs reviewed and stable  Lab results reviewed with patient  Continues with low back pain radiating down to B/L knees and states the left knee pain is worse then the right knee  Patient interested in trying Celebrex for his low back and knee pain  Patient denies any problems or concerns at the present time  Review of Systems    GENERAL:  Feels well, denies any significant changes in weight without trying  SKIN:  Denies rashes, lesions, opened areas, wounds, change in moles or any other skin changes  HEENT:  Denies any head injury or headaches  Negative blurred vision, floaters, spots before eyes, infections, or other vision problems  Negative significant changes in vision or hearing  Negative tinnitus, vertigo, or infections  Negative hay fever, sinus trouble, nasal discharge, bloody noses, or problems with smell  Negative sore throat, bleeding gums, ulcers, or sores  NECK:  Denies lumps, goiter, pain, swollen glands, or lymphadenopathy  BREASTS:  Denies lumps, pain, nipple discharge, swelling, redness, or any other changes  RESPIRATORY:  Denies cough, wheezing, shortness of breath, dyspnea, or orthopnea  CARDIOVASCULAR:  Denies chest pain or palpitations     GASTROINTESTINAL:  Appetite good, denies nausea, vomiting, or indigestion  Bowel movements normal occurring about once daily or every other day  URINARY:  Denies frequency, incontinence, dysuria, hematuria, nocturia, or recent flank pain  GENITAL:  Denies penile discharge, ulcerations, lesions, or other problems  PERIPHERAL VASCULAR:  Denies varicosities, swelling, skin changes, or pain  MUSCULOSKELETAL:  C/O chronic low back pain radiating down to B/L knees and states the left knee pain is worse then the right knee  PSYCHIATRIC:  Denies problems with depression, anxiety, anger, or other psychiatric symptoms  NEUROLOGIC:  Denies fainting, dizziness, memory problems, seizures, tingling, motor or sensory loss  HEMATOLOGIC:  Denies easy bruising, bleeding, or anemia  ENDOCRINE:  Denies thyroid problems, temperature intolerance, excessive sweating, or other endocrine symptoms  Objective:     Physical Exam  Vitals and nursing note reviewed  GENERAL:  Appears well nourished, well groomed, in no acute distress  SKIN:  Palms warm, dry, color good  Nails without clubbing or cyanosis  No lesions, ulcerations, or wounds  HEAD:  Hair is average texture  Scalp without lesions, normocephalic, and atraumatic  EYES:  Visual fields full by confrontation  Conjunctiva pink, sclera white, PERRLA  EARS:  B/L ear canals clear  B/L TMs clear with + light reflex  NOSE: Mucosa pink, moist, septum midline  Negative sinus tenderness  B/L turbinates pink, moist, non-edematous without exudate  MOUTH:  Oral mucosa pink  Pharynx pink, moist, without swelling, redness, or exudate  Dentition ok  Tongue midline  NECK:  Supple, trachea midline, Negative thyromegaly, lymphadenopathy, or swollen glands  LYMPH NODES:  Negative enlargement of neck, axillary, epitrochlear, or inguinal nodes  THORAX/LUNGS  Thorax symmetric with good excursion  Lungs resonant  Breath sounds vesicular with no added sounds      Diaphragm descends within normal limits  CARDIOVASCULAR:  Carotid upstrokes brisk and without bruits  Apical impulse discrete and tapping, barely palpable in the 5th ICS/MCL  Normal S1 and Normal S2, Negative S3 or S4  Negative murmurs, thrills, lifts, or heaves  ABDOMEN:  Protuberant, bowel sounds normal active x 4 quadrants  Negative tenderness  Negative masses  Negative hepatomegaly  Negative splenomegaly  Negative costovertebral tenderness  EXTREMITIES:  Warm, calves supple, non-tender, negative for edema  Negative stasis pigmentation or ulcers  +2 pulses throughout  MUSCULOSKELETAL:   + tenderness of left medial knee upon palpation  + tenderness of lumbar spine upon palpation  NEUROLOGICAL:  Mental status:  Awake, alert, and oriented to person, place, time, and event  Normal thought processes  BMI Counseling: Body mass index is 38 16 kg/m²  The BMI is above normal  Nutrition recommendations include reducing portion sizes, decreasing overall calorie intake, 3-5 servings of fruits/vegetables daily, reducing fast food intake, consuming healthier snacks, decreasing soda and/or juice intake, moderation in carbohydrate intake, increasing intake of lean protein, reducing intake of saturated fat and trans fat and reducing intake of cholesterol  Exercise recommendations include exercising 3-5 times per week

## 2022-01-10 NOTE — PATIENT INSTRUCTIONS
Wellness Visit for Adults   WHAT YOU NEED TO KNOW:   What is a wellness visit? A wellness visit is when you see your healthcare provider to get screened for health problems  Your healthcare provider will also give you advice on how to stay healthy  Write down your questions so you remember to ask them  Ask your healthcare provider how often you should have a wellness visit  What happens at a wellness visit? Your healthcare provider will ask about your health, and your family history of health problems  This includes high blood pressure, heart disease, and cancer  He or she will ask if you have symptoms that concern you, if you smoke, and about your mood  You may also be asked about your intake of medicines, supplements, food, and alcohol  Any of the following may be done:  · Your weight  will be checked  Your height may also be checked so your body mass index (BMI) can be calculated  Your BMI shows if you are at a healthy weight  · Your blood pressure  and heart rate will be checked  Your temperature may also be checked  · Blood and urine tests  may be done  Blood tests may be done to check your cholesterol levels  Abnormal cholesterol levels increase your risk for heart disease and stroke  You may also need a blood or urine test to check for diabetes if you are at increased risk  Urine tests may be done to look for signs of an infection or kidney disease  · A physical exam  includes checking your heartbeat and lungs with a stethoscope  Your healthcare provider may also check your skin to look for sun damage  · Screening tests  may be recommended  A screening test is done to check for diseases that may not cause symptoms  The screening tests you may need depend on your age, gender, family history, and lifestyle habits  For example, colorectal screening may be recommended if you are 48years old or older  What screening tests do I need if I am a woman?    · A Pap smear  is used to screen for cervical cancer  Pap smears are usually done every 3 to 5 years depending on your age  You may need them more often if you have had abnormal Pap smear test results in the past  Ask your healthcare provider how often you should have a Pap smear  · A mammogram  is an x-ray of your breasts to screen for breast cancer  Experts recommend mammograms every 2 years starting at age 48 years  You may need a mammogram at age 52 years or younger if you have an increased risk for breast cancer  Talk to your healthcare provider about when you should start having mammograms and how often you need them  What vaccines might I need? · Get an influenza vaccine  every year  The influenza vaccine protects you from the flu  Several types of viruses cause the flu  The viruses change over time, so new vaccines are made each year  · Get a tetanus-diphtheria (Td) booster vaccine  every 10 years  This vaccine protects you against tetanus and diphtheria  Tetanus is a severe infection that may cause painful muscle spasms and lockjaw  Diphtheria is a severe bacterial infection that causes a thick covering in the back of your mouth and throat  · Get a human papillomavirus (HPV) vaccine  if you are female and aged 23 to 32 or male 23 to 24 and never received it  This vaccine protects you from HPV infection  HPV is the most common infection spread by sexual contact  HPV may also cause vaginal, penile, and anal cancers  · Get a pneumococcal vaccine  if you are aged 72 years or older  The pneumococcal vaccine is an injection given to protect you from pneumococcal disease  Pneumococcal disease is an infection caused by pneumococcal bacteria  The infection may cause pneumonia, meningitis, or an ear infection  · Get a shingles vaccine  if you are 60 or older, even if you have had shingles before  The shingles vaccine is an injection to protect you from the varicella-zoster virus  This is the same virus that causes chickenpox   Shingles is a painful rash that develops in people who had chickenpox or have been exposed to the virus  How can I eat healthy? My Plate is a model for planning healthy meals  It shows the types and amounts of foods that should go on your plate  Fruits and vegetables make up about half of your plate, and grains and protein make up the other half  A serving of dairy is included on the side of your plate  The amount of calories and serving sizes you need depends on your age, gender, weight, and height  Examples of healthy foods are listed below:  · Eat a variety of vegetables  such as dark green, red, and orange vegetables  You can also include canned vegetables low in sodium (salt) and frozen vegetables without added butter or sauces  · Eat a variety of fresh fruits , canned fruit in 100% juice, frozen fruit, and dried fruit  · Include whole grains  At least half of the grains you eat should be whole grains  Examples include whole-wheat bread, wheat pasta, brown rice, and whole-grain cereals such as oatmeal     · Eat a variety of protein foods such as seafood (fish and shellfish), lean meat, and poultry without skin (turkey and chicken)  Examples of lean meats include pork leg, shoulder, or tenderloin, and beef round, sirloin, tenderloin, and extra lean ground beef  Other protein foods include eggs and egg substitutes, beans, peas, soy products, nuts, and seeds  · Choose low-fat dairy products such as skim or 1% milk or low-fat yogurt, cheese, and cottage cheese  · Limit unhealthy fats  such as butter, hard margarine, and shortening  How much exercise do I need? Exercise at least 30 minutes per day on most days of the week  Some examples of exercise include walking, biking, dancing, and swimming  You can also fit in more physical activity by taking the stairs instead of the elevator or parking farther away from stores  Include muscle strengthening activities 2 days each week   Regular exercise provides many health benefits  It helps you manage your weight, and decreases your risk for type 2 diabetes, heart disease, stroke, and high blood pressure  Exercise can also help improve your mood  Ask your healthcare provider about the best exercise plan for you  What are some general health and safety guidelines I should follow? · Do not smoke  Nicotine and other chemicals in cigarettes and cigars can cause lung damage  Ask your healthcare provider for information if you currently smoke and need help to quit  E-cigarettes or smokeless tobacco still contain nicotine  Talk to your healthcare provider before you use these products  · Limit alcohol  A drink of alcohol is 12 ounces of beer, 5 ounces of wine, or 1½ ounces of liquor  · Lose weight, if needed  Being overweight increases your risk of certain health conditions  These include heart disease, high blood pressure, type 2 diabetes, and certain types of cancer  · Protect your skin  Do not sunbathe or use tanning beds  Use sunscreen with a SPF 15 or higher  Apply sunscreen at least 15 minutes before you go outside  Reapply sunscreen every 2 hours  Wear protective clothing, hats, and sunglasses when you are outside  · Drive safely  Always wear your seatbelt  Make sure everyone in your car wears a seatbelt  A seatbelt can save your life if you are in an accident  Do not use your cell phone when you are driving  This could distract you and cause an accident  Pull over if you need to make a call or send a text message  · Practice safe sex  Use latex condoms if are sexually active and have more than one partner  Your healthcare provider may recommend screening tests for sexually transmitted infections (STIs)  · Wear helmets, lifejackets, and protective gear  Always wear a helmet when you ride a bike or motorcycle, go skiing, or play sports that could cause a head injury  Wear protective equipment when you play sports   Wear a lifejacket when you are on a boat or doing water sports  CARE AGREEMENT:   You have the right to help plan your care  Learn about your health condition and how it may be treated  Discuss treatment options with your healthcare providers to decide what care you want to receive  You always have the right to refuse treatment  The above information is an  only  It is not intended as medical advice for individual conditions or treatments  Talk to your doctor, nurse or pharmacist before following any medical regimen to see if it is safe and effective for you  © Copyright [employer] 2021 Information is for End User's use only and may not be sold, redistributed or otherwise used for commercial purposes  All illustrations and images included in CareNotes® are the copyrighted property of [employer]  or [employer]

## 2022-02-16 ENCOUNTER — TELEPHONE (OUTPATIENT)
Dept: FAMILY MEDICINE CLINIC | Facility: CLINIC | Age: 67
End: 2022-02-16

## 2022-02-16 ENCOUNTER — APPOINTMENT (OUTPATIENT)
Dept: LAB | Facility: CLINIC | Age: 67
End: 2022-02-16
Payer: MEDICARE

## 2022-02-16 DIAGNOSIS — E03.9 ACQUIRED HYPOTHYROIDISM: ICD-10-CM

## 2022-02-16 LAB — TSH SERPL DL<=0.05 MIU/L-ACNC: 4.11 UIU/ML (ref 0.36–3.74)

## 2022-02-16 PROCEDURE — 84443 ASSAY THYROID STIM HORMONE: CPT

## 2022-02-16 PROCEDURE — 36415 COLL VENOUS BLD VENIPUNCTURE: CPT

## 2022-02-16 NOTE — TELEPHONE ENCOUNTER
Left vm for pt to contact office for results      ----- Message from Esvin Baez sent at 2/16/2022  4:23 PM EST -----  Please notify patient his TSH is improving so we will monitor with next scheduled labwork

## 2022-02-16 NOTE — TELEPHONE ENCOUNTER
----- Message from Ross Holstein, [address] sent at 2/16/2022  4:23 PM EST -----  Please notify patient his TSH is improving so we will monitor with next scheduled labwork

## 2022-04-15 ENCOUNTER — OFFICE VISIT (OUTPATIENT)
Dept: UROLOGY | Facility: CLINIC | Age: 67
End: 2022-04-15
Payer: MEDICARE

## 2022-04-15 VITALS
DIASTOLIC BLOOD PRESSURE: 82 MMHG | HEART RATE: 72 BPM | SYSTOLIC BLOOD PRESSURE: 126 MMHG | BODY MASS INDEX: 38.32 KG/M2 | WEIGHT: 252 LBS

## 2022-04-15 DIAGNOSIS — N43.2 OTHER HYDROCELE: ICD-10-CM

## 2022-04-15 DIAGNOSIS — R35.0 BENIGN PROSTATIC HYPERPLASIA WITH URINARY FREQUENCY: ICD-10-CM

## 2022-04-15 DIAGNOSIS — N40.1 BENIGN PROSTATIC HYPERPLASIA WITH URINARY FREQUENCY: ICD-10-CM

## 2022-04-15 DIAGNOSIS — Z12.5 PROSTATE CANCER SCREENING: Primary | ICD-10-CM

## 2022-04-15 LAB
POST-VOID RESIDUAL VOLUME, ML POC: 34 ML
SL AMB  POCT GLUCOSE, UA: NORMAL
SL AMB LEUKOCYTE ESTERASE,UA: NORMAL
SL AMB POCT BILIRUBIN,UA: NORMAL
SL AMB POCT BLOOD,UA: NORMAL
SL AMB POCT CLARITY,UA: CLEAR
SL AMB POCT COLOR,UA: YELLOW
SL AMB POCT KETONES,UA: NORMAL
SL AMB POCT NITRITE,UA: NORMAL
SL AMB POCT PH,UA: 5
SL AMB POCT SPECIFIC GRAVITY,UA: 1.03
SL AMB POCT URINE PROTEIN: NORMAL
SL AMB POCT UROBILINOGEN: 0.2

## 2022-04-15 PROCEDURE — 81002 URINALYSIS NONAUTO W/O SCOPE: CPT | Performed by: NURSE PRACTITIONER

## 2022-04-15 PROCEDURE — 51798 US URINE CAPACITY MEASURE: CPT | Performed by: NURSE PRACTITIONER

## 2022-04-15 PROCEDURE — 99213 OFFICE O/P EST LOW 20 MIN: CPT | Performed by: NURSE PRACTITIONER

## 2022-04-15 NOTE — PROGRESS NOTES
4/15/2022    No chief complaint on file  Assessment and Plan    74 y o  male manage by Dr Martinez Ramirez    1  Prostate Cancer Screening  · Last PSA 0 2 on 2020  · Repeat PSA within 1 week  · I will call you with results if they are abnormal  · HALINA in office reveals smooth symmetric prostate, no palpable nodules masses  · Follow-up in 1 year with PSA    2  BPH  · Reports that occasional spraying with urination and postvoid dribbling, denies history of urethral stricture  · Discussed options of tamsulosin 0 4 mg daily however he would like to defer this at this time as his symptoms are not bothersome  · Physical exam reveals no obvious urethral stricture  · PVR 34 mL  · Urine dip in office was negative for leukocytes, nitrates, or blood  3  Hydrocele  · This is chronic and stable has not bothered for 40+ years       History of Present Illness  Jacob Drake III is a 74 y o  male here for annual prostate cancer screening  His last PSA was not since  at that time it was 0 2  He does report a family history of prostate cancer with his father who  from it  HALINA in office reveals smooth symmetric prostate, no palpable nodules masses  Since his last office visit he does report some intermittent spraying with urination and postvoid dribbling  He currently does not take any medication for the management of his BPH symptoms  He denies a history of urethral stricture  He does have a chronic left hydrocele that has been stable for 40+ years  Overall he is doing very well and offers no complaints today  Review of Systems   Constitutional: Negative for chills and fever  HENT: Negative for congestion and sore throat  Respiratory: Negative for cough and shortness of breath  Cardiovascular: Negative for chest pain and leg swelling  Gastrointestinal: Negative for abdominal pain, constipation, diarrhea, nausea and vomiting     Genitourinary: Negative for difficulty urinating, dysuria, flank pain, frequency, hematuria, penile pain, testicular pain and urgency  Chronic left hydrocele, reports occasional spraying with urination, postvoid dribbling   Musculoskeletal: Negative for back pain and gait problem  Skin: Negative for wound  Allergic/Immunologic: Negative for immunocompromised state  Hematological: Does not bruise/bleed easily  Vitals  Vitals:    04/15/22 1415   BP: 126/82   Pulse: 72   Weight: 114 kg (252 lb)       Physical Exam  Vitals reviewed  Constitutional:       General: He is not in acute distress  Appearance: Normal appearance  He is not ill-appearing or toxic-appearing  HENT:      Head: Normocephalic and atraumatic  Eyes:      General: No scleral icterus  Conjunctiva/sclera: Conjunctivae normal    Cardiovascular:      Rate and Rhythm: Normal rate  Pulmonary:      Effort: Pulmonary effort is normal  No respiratory distress  Abdominal:      General: Abdomen is flat  Palpations: Abdomen is soft  Tenderness: There is no abdominal tenderness  There is no right CVA tenderness or left CVA tenderness  Hernia: No hernia is present  Genitourinary:     Comments: Chronic/stable left hydrocele  No obvious urethral stricture noted  HALINA reveals smooth symmetric prostate, no palpable nodules or masses  Musculoskeletal:      Cervical back: Normal range of motion  Right lower leg: No edema  Left lower leg: No edema  Skin:     General: Skin is warm and dry  Coloration: Skin is not jaundiced or pale  Neurological:      General: No focal deficit present  Mental Status: He is alert and oriented to person, place, and time  Mental status is at baseline  Gait: Gait normal    Psychiatric:         Mood and Affect: Mood normal          Behavior: Behavior normal          Thought Content:  Thought content normal          Judgment: Judgment normal          Past History  Past Medical History:   Diagnosis Date    Anxiety     Carpal tunnel syndrome     Colon polyps     DJD (degenerative joint disease)     Duodenal ulcer     Gastric reflux     Gastric ulcer     Heel fracture     bilateral    Hypertension     Vitamin B12 deficiency     Vitamin D deficiency      Social History     Socioeconomic History    Marital status: /Civil Union     Spouse name: None    Number of children: None    Years of education: None    Highest education level: None   Occupational History    None   Tobacco Use    Smoking status: Former Smoker     Packs/day: 1 00     Years: 40 00     Pack years: 40 00     Types: Cigarettes     Start date: 1976     Quit date: 2020     Years since quittin 8    Smokeless tobacco: Former User     Types: Chew     Quit date:    Vaping Use    Vaping Use: Never used   Substance and Sexual Activity    Alcohol use: No    Drug use: No    Sexual activity: None   Other Topics Concern    None   Social History Narrative    None     Social Determinants of Health     Financial Resource Strain: Not on file   Food Insecurity: Not on file   Transportation Needs: No Transportation Needs    Lack of Transportation (Medical): No    Lack of Transportation (Non-Medical):  No   Physical Activity: Not on file   Stress: Not on file   Social Connections: Not on file   Intimate Partner Violence: Not on file   Housing Stability: Not on file     Social History     Tobacco Use   Smoking Status Former Smoker    Packs/day: 1 00    Years: 40 00    Pack years: 40 00    Types: Cigarettes    Start date: 1976   Gayatri Ward Quit date: 2020    Years since quittin 8   Smokeless Tobacco Former User    Types: Chew    Quit date:      Family History   Problem Relation Age of Onset    Cancer Mother         lung    Heart disease Mother     Cancer Father         prostate    Heart murmur Brother     Diabetes Daughter     Diabetes Son     Diabetes Other        The following portions of the patient's history were reviewed and updated as appropriate allergies, current medications, past medical history, past social history, past surgical history and problem list    Imaging:    Results  No results found for this or any previous visit (from the past 1 hour(s))  ]  No results found for: PSA  Lab Results   Component Value Date    CALCIUM 8 9 12/21/2021    K 4 0 12/21/2021    CO2 31 12/21/2021     12/21/2021    BUN 17 12/21/2021    CREATININE 1 09 12/21/2021     Lab Results   Component Value Date    WBC 5 45 12/21/2021    HGB 14 2 12/21/2021    HCT 43 6 12/21/2021    MCV 94 12/21/2021     12/21/2021       [facility]

## 2022-04-27 ENCOUNTER — APPOINTMENT (OUTPATIENT)
Dept: LAB | Facility: CLINIC | Age: 67
End: 2022-04-27
Payer: MEDICARE

## 2022-04-27 DIAGNOSIS — Z12.5 PROSTATE CANCER SCREENING: ICD-10-CM

## 2022-04-27 LAB — PSA SERPL-MCNC: 0.2 NG/ML (ref 0–4)

## 2022-04-27 PROCEDURE — 36415 COLL VENOUS BLD VENIPUNCTURE: CPT

## 2022-04-27 PROCEDURE — G0103 PSA SCREENING: HCPCS

## 2022-06-03 ENCOUNTER — OFFICE VISIT (OUTPATIENT)
Dept: OBGYN CLINIC | Facility: CLINIC | Age: 67
End: 2022-06-03
Payer: MEDICARE

## 2022-06-03 ENCOUNTER — HOSPITAL ENCOUNTER (OUTPATIENT)
Dept: RADIOLOGY | Facility: CLINIC | Age: 67
Discharge: HOME | End: 2022-06-03
Payer: MEDICARE

## 2022-06-03 VITALS
HEART RATE: 65 BPM | BODY MASS INDEX: 38.69 KG/M2 | SYSTOLIC BLOOD PRESSURE: 112 MMHG | WEIGHT: 255.3 LBS | TEMPERATURE: 97.1 F | DIASTOLIC BLOOD PRESSURE: 82 MMHG | HEIGHT: 68 IN

## 2022-06-03 DIAGNOSIS — M25.562 CHRONIC PAIN OF BOTH KNEES: Primary | ICD-10-CM

## 2022-06-03 DIAGNOSIS — E66.01 CLASS 2 SEVERE OBESITY DUE TO EXCESS CALORIES WITH SERIOUS COMORBIDITY AND BODY MASS INDEX (BMI) OF 38.0 TO 38.9 IN ADULT (HCC): ICD-10-CM

## 2022-06-03 DIAGNOSIS — M17.0 PRIMARY OSTEOARTHRITIS OF BOTH KNEES: ICD-10-CM

## 2022-06-03 DIAGNOSIS — G89.29 CHRONIC PAIN OF BOTH KNEES: ICD-10-CM

## 2022-06-03 DIAGNOSIS — M25.562 CHRONIC PAIN OF BOTH KNEES: ICD-10-CM

## 2022-06-03 DIAGNOSIS — M25.561 CHRONIC PAIN OF BOTH KNEES: Primary | ICD-10-CM

## 2022-06-03 DIAGNOSIS — M25.561 CHRONIC PAIN OF BOTH KNEES: ICD-10-CM

## 2022-06-03 DIAGNOSIS — M47.816 LUMBAR SPONDYLOSIS: ICD-10-CM

## 2022-06-03 DIAGNOSIS — G89.29 CHRONIC PAIN OF BOTH KNEES: Primary | ICD-10-CM

## 2022-06-03 PROCEDURE — 73564 X-RAY EXAM KNEE 4 OR MORE: CPT

## 2022-06-03 PROCEDURE — 99205 OFFICE O/P NEW HI 60 MIN: CPT | Performed by: ORTHOPAEDIC SURGERY

## 2022-06-03 NOTE — PROGRESS NOTES
ASSESSMENT/PLAN:    Diagnoses and all orders for this visit:    Chronic pain of both knees  -     XR knee 4+ vw left injury; Future  -     XR knee 4+ vw right injury; Future  -     Ambulatory Referral to Physical Therapy; Future    Primary osteoarthritis of both knees  -     Ambulatory Referral to Physical Therapy; Future    Class 2 severe obesity due to excess calories with serious comorbidity and body mass index (BMI) of 38 0 to 38 9 in [facility])    Lumbar spondylosis        Plan:  Treatment options were discussed  At this time, he did not want to consider injections as he would prefer to see what will be done for his lower back symptoms prior to considering intra-articular injections of his knees  He is willing to begin physical therapy and a prescription was provided  I will see him in 4-6 weeks for re-evaluation  If he wishes to reconsider intra-articular injection of his knees, he can contact the office and I would be happy to see him sooner to proceed with injection of corticosteroid and local anesthetic  I have encouraged him to contact me if any questions or concerns arise  He understands that his weight gain having quit smoking is a contributing factor and is hoping to be able to exercise more to lose weight  In addition, he is aware that his lumbar disease is a contributing factor to lower extremity pain and is planning to see Dr Laura Daley next week for discussion of treatment options  Return for 4-6 weeks  _____________________________________________________  CHIEF COMPLAINT:  Chief Complaint   Patient presents with    Left Knee - Pain    Right Knee - Pain         SUBJECTIVE:  Susana Costa III is a 74y o  year old male who presents for evaluation of left knee pain and right knee stiffness  He also indicates some left knee stiffness  He localizes the left knee pain medially    He notes start-up pain and stiffness but denies pain significant enough to limit his activity level   He admits to symptoms occurring for a few months although he was [place] [facility] [place] has had no treatment  He denies swelling  He denies lower extremity paresthesias  He does have a long history of neck and back complaints for which she has previously seen [facility] but has not been seen for a couple of years  He indicates he is seeing Dr Danilo Rojas next week      PAST MEDICAL HISTORY:  Past Medical History:   Diagnosis Date    Anxiety     Carpal tunnel syndrome     Colon polyps     DJD (degenerative joint disease)     Duodenal ulcer     Gastric reflux     Gastric ulcer     Heel fracture     bilateral    Hypertension     Vitamin B12 deficiency     Vitamin D deficiency        PAST SURGICAL HISTORY:  Past Surgical History:   Procedure Laterality Date    ANKLE SURGERY      repair    COLONOSCOPY      polyp removed; benign    COLONOSCOPY  2019    COLONOSCOPY W/ POLYPECTOMY      Followed by Dr Tonya Shields Bilateral     MULTIPLE TOOTH EXTRACTIONS      POLYPECTOMY      TIBIA FRACTURE SURGERY Left        FAMILY HISTORY:  Family History   Problem Relation Age of Onset   Francesca Bolaños Cancer Mother         lung    Heart disease Mother     Cancer Father         prostate    Heart murmur Brother     Diabetes Daughter     Diabetes Son     Diabetes Other        SOCIAL HISTORY:  Social History     Tobacco Use    Smoking status: Former Smoker     Packs/day: 1 00     Years: 40 00     Pack years: 40 00     Types: Cigarettes     Start date: 1976     Quit date: 2020     Years since quittin 0    Smokeless tobacco: Former User     Types: Chew     Quit date:    Vaping Use    Vaping Use: Never used   Substance Use Topics    Alcohol use: No    Drug use: No       MEDICATIONS:    Current Outpatient Medications:     ALPRAZolam (XANAX) 0 5 mg tablet, Take 1 tablet (0 5 mg total) by mouth every 12 (twelve) hours as needed for anxiety, Disp: 60 tablet, Rfl: 1    celecoxib (CeleBREX) 100 mg capsule, Take 1 capsule (100 mg total) by mouth 2 (two) times a day, Disp: 180 capsule, Rfl: 1    citalopram (CeleXA) 20 mg tablet, Take 1 tablet (20 mg total) by mouth daily, Disp: 90 tablet, Rfl: 1    ergocalciferol (VITAMIN D2) 50,000 units, Take 1 capsule (50,000 Units total) by mouth every 28 days, Disp: 3 capsule, Rfl: 1    famotidine (PEPCID) 20 mg tablet, take 1 tablet by mouth twice a day if needed for INDIGESTION OR HEARTBURN, Disp: 60 tablet, Rfl: 5    glucosamine-chondroitin 500-400 MG tablet, Take 1 tablet by mouth 3 (three) times a day, Disp: 90 tablet, Rfl: 5    lisinopril (ZESTRIL) 10 mg tablet, Take 1 tablet (10 mg total) by mouth daily, Disp: 90 tablet, Rfl: 1    Multiple Vitamin (MULTIVITAMIN) tablet, Take 1 tablet by mouth daily, Disp: 90 tablet, Rfl: 1    pantoprazole (PROTONIX) 40 mg tablet, Take 1 tablet (40 mg total) by mouth daily, Disp: 90 tablet, Rfl: 1    ALLERGIES:  No Known Allergies    Review of systems:   Constitutional: Negative for fatigue, fever or loss of apetite  HENT: Negative  Respiratory: Negative for shortness of breath, dyspnea  Cardiovascular: Negative for chest pain/tightness  Gastrointestinal: Negative for abdominal pain, N/V  Endocrine: Negative for cold/heat intolerance, unexplained weight loss/gain  Genitourinary: Negative for flank pain, dysuria, hematuria  Musculoskeletal:  Positive as in the HPI   Skin: Negative for rash  Neurological:  Negative  Psychiatric/Behavioral: Negative for agitation  _____________________________________________________  PHYSICAL EXAMINATION:    Blood pressure 112/82, pulse 65, temperature (!) 97 1 °F (36 2 °C), temperature source Temporal, height 5' 8" (1 727 m), weight 116 kg (255 lb 4 8 oz)      General: well developed and well nourished, alert, oriented times 3 and appears comfortable  Psychiatric: Normal  HEENT: Benign  Cardiovascular: Regular    Pulmonary: No wheezing or stridor  Abdomen: Soft, Nontender  Skin: No masses, erythema, lacerations, fluctation, ulcerations  Neurovascular: Motor and sensory exams are intact and pulses are palpable  MUSCULOSKELETAL EXAMINATION:    The bilateral knee exam demonstrates skin to be warm and dry  He has mild varus deformity  Examination is somewhat difficult due to his inability to relax  However, ligaments were grossly stable as best can be determined  He denies complaints with patellofemoral subluxation  Patellofemoral compression elicits no pain  He does have tenderness over the medial aspect of his left knee, nontender laterally and anteriorly  The right knee was nontender throughout  He has somewhat limited internal rotation of the hips bilaterally but denies any complaints during hip range of motion  He denies complaints with ankle/foot range of motion  _____________________________________________________  STUDIES REVIEWED:  AP, lateral and sunrise images of his left knee from December 2021 demonstrate some mild degenerative disease with narrowing of the medial joint space, subchondral sclerosis and small osteophytes  The report was reviewed  X-rays obtained today of both knees including weight-bearing images in the AP, lateral, flexed and sunrise views demonstrated narrowing of the medial joint space bilaterally, subchondral sclerosis of the medial tibial plateau with minimal involvement of the patellofemoral or lateral compartments          Ralph Gann

## 2022-06-06 ENCOUNTER — OFFICE VISIT (OUTPATIENT)
Dept: OBGYN CLINIC | Facility: CLINIC | Age: 67
End: 2022-06-06
Payer: MEDICARE

## 2022-06-06 VITALS
HEIGHT: 68 IN | BODY MASS INDEX: 38.65 KG/M2 | WEIGHT: 255 LBS | OXYGEN SATURATION: 96 % | SYSTOLIC BLOOD PRESSURE: 142 MMHG | DIASTOLIC BLOOD PRESSURE: 98 MMHG | HEART RATE: 79 BPM

## 2022-06-06 DIAGNOSIS — G89.29 CHRONIC BILATERAL LOW BACK PAIN WITHOUT SCIATICA: ICD-10-CM

## 2022-06-06 DIAGNOSIS — M54.50 CHRONIC BILATERAL LOW BACK PAIN WITHOUT SCIATICA: ICD-10-CM

## 2022-06-06 DIAGNOSIS — M48.061 SPINAL STENOSIS OF LUMBAR REGION WITHOUT NEUROGENIC CLAUDICATION: ICD-10-CM

## 2022-06-06 DIAGNOSIS — M47.816 LUMBAR SPONDYLOSIS: Primary | ICD-10-CM

## 2022-06-06 PROCEDURE — 99214 OFFICE O/P EST MOD 30 MIN: CPT | Performed by: STUDENT IN AN ORGANIZED HEALTH CARE EDUCATION/TRAINING PROGRAM

## 2022-06-06 NOTE — PROGRESS NOTES
1  Lumbar spondylosis     2  Chronic bilateral low back pain without sciatica     3  Spinal stenosis of lumbar region without neurogenic claudication     4  BMI 38 0-38 9,adult  Ambulatory Referral to Weight Management     Orders Placed This Encounter   Procedures    Ambulatory Referral to Weight Management        Imaging Studies (I personally reviewed images in PACS and report):     MRI lumbar spine 02/10/2020: There is Central right paracentral ,  right subarticular recess and foraminal protrusion at L4-5 level with indentation thecal sac and impingement of the traversing right L5 nerve root with the right lateral recess stenosis, correlate with the right L5   radiculopathy     Small left foraminal protrusion at L3-4 level with mild left foraminal narrowing and mild abutment of left L3 exiting nerve root        Multilevel facet joint disease    PLAN:  Repeat X-ray next visit:  Lumbar spine   Clinical exam and radiographic imaging reviewed with patient today, with impression as per above  I have discussed with the patient the pathophysiology of this diagnosis and reviewed how the examination correlates with this diagnosis   Has PT already ordered previously - I encouraged him to attend for at least 6 weeks before transitioning to a home exercise program    I did consider an MRI of lumbar spine, but patient is agreeable to defer at this time to work with physical therapy for now  If no improvement after therapy, I will consider updating his MRI of his lumbar spine contrast and referring him to pain management to discuss other treatment modalities   Regards to pain control recommended p r n  Use of acetaminophen 500-1000 mg p o  Q 6-8 hours p r n , Celebrex, heat/ice therapy 20 minutes on/off  Return in about 6 weeks (around 7/18/2022)  Portions of the record may have been created with voice recognition software   Occasional wrong word or "sound a like" substitutions may have occurred due to the inherent limitations of voice recognition software  Read the chart carefully and recognize, using context, where substitutions have occurred  CHIEF COMPLAINT:  Chief Complaint   Patient presents with    Spine - Pain         HPI:  Zhou Martell III is a 74 y o  male  who presents for       Visit 6/6/2022 :  Initial evaluation of back pain:  Patient reports has been an ongoing issue for several years  Patient states he has had several from participating sports  Of note, he had seen pain management back in 2020 in which there was discussion in regards to lumbar injections  Patient had deferred this at this time due to [place]  He states he did attend physical therapy back in 2020 as well  He recently saw Orthopedic surgery in regards to his knees and his back pain which he was referred to formal physical therapy which he plans on attending  States his back pain is midline and paralumbar as an aching/sharp pain of moderate intensity  He also states a sense of fatigue of his lower extremities with activity such as climbing ladders or walking  He states he has to rest in order to alleviate his symptoms  He states sense of tightness of his low back as well  He denies any numbness of his lower extremities, bowel/bladder incontinence  He states he quit smoking about 2 years ago and as result, he has gained weight  Denies relief from celebrex, acetaminophen, heat/ice  Denies pain wakes him at night   He states he saw a chiropractor who did "pulsatile therapy" but was recommended against manual manipulation due to the findings of his lumbar MRI in the past     Of note, patient reports a chronic history of left lower extremity numbness/tingling stiffness from a prior fracture/surgery in the past     Medical, Surgical, Family, and Social History    Past Medical History:   Diagnosis Date    Anxiety     Carpal tunnel syndrome     Colon polyps     DJD (degenerative joint disease)     Duodenal ulcer     Gastric reflux     Gastric ulcer     Heel fracture     bilateral    Hypertension     Vitamin B12 deficiency     Vitamin D deficiency      Past Surgical History:   Procedure Laterality Date    ANKLE SURGERY      repair    COLONOSCOPY      polyp removed; benign    COLONOSCOPY  2019    COLONOSCOPY W/ POLYPECTOMY      Followed by Dr Iván Nichols Bilateral    [address] POLYPECTOMY      TIBIA FRACTURE SURGERY Left      Social History   Social History     Substance and Sexual Activity   Alcohol Use No     Social History     Substance and Sexual Activity   Drug Use No     Social History     Tobacco Use   Smoking Status Former Smoker    Packs/day: 1 00    Years: 40 00    Pack years: 40 00    Types: Cigarettes    Start date: 1976   Ivette Barba Quit date: 2020    Years since quittin 0   Smokeless Tobacco Former User    Types: Chew    Quit date:      Family History   Problem Relation Age of Onset    Cancer Mother         lung    Heart disease Mother     Cancer Father         prostate    Heart murmur Brother     Diabetes Daughter     Diabetes Son     Diabetes Other      No Known Allergies       Physical Exam  /98 (BP Location: Left arm, Patient Position: Sitting, Cuff Size: Standard)   Pulse 79   Ht 5' 8" (1 727 m)   Wt 116 kg (255 lb)   SpO2 96%   BMI 38 77 kg/m²     Constitutional:  see vital signs  Gen: obese, normocephalic/atraumatic, well-groomed  Eyes: No inflammation or discharge of conjunctiva or lids; sclera clear   Pulmonary/Chest: Effort normal  No respiratory distress       Ortho Exam  BACK EXAM:  Gait: normal, no trendelenberg gait, no antalgic gait    BACK TENDERNESS:  Spinous Processes: +L5  Paraspinal Muscles: +b/l paralumbar  SI Joint: no  Sacrum: no    ROM:  Flexion: 80  Extension: 10; aggravates  Lateral flexion: 20 b/l  Rotation: 20 b/l    DERMATOMAL SENSATION:  L1: normal   L2: normal   L3: normal   L4: decreased on left (chronic per pt from ankle surgery/injury)  L5: decreased on left (chronic per pt from ankle surgery/injury)  S1: normal    STRENGTH (bilateral):  Knee Extension: 5/5  Knee Flexion: 5/5  Foot Dorsiflexion: 5/5  Great Toe Extension: 4/5 on left, 5/5 on right  Foot Plantarflexion: 5/5  Hip Flexion: 5/5    REFLEXES:  Patellar: 2+ bilateral  Achilles: 2+ bilateral  Clonus: negative bilateral    BACK:   SUPINE STRAIGHT LEG: negative    HIP:  IR/ER of right hip: 10/45  IR/ER of left hip: 10/45  LOG ROLL: negative  YOKO: negative  FADIR: negative

## 2022-06-20 ENCOUNTER — EVALUATION (OUTPATIENT)
Dept: PHYSICAL THERAPY | Facility: CLINIC | Age: 67
End: 2022-06-20
Payer: MEDICARE

## 2022-06-20 DIAGNOSIS — G89.29 CHRONIC PAIN OF BOTH KNEES: Primary | ICD-10-CM

## 2022-06-20 DIAGNOSIS — M25.562 CHRONIC PAIN OF BOTH KNEES: Primary | ICD-10-CM

## 2022-06-20 DIAGNOSIS — M25.561 CHRONIC PAIN OF BOTH KNEES: Primary | ICD-10-CM

## 2022-06-20 DIAGNOSIS — M17.0 PRIMARY OSTEOARTHRITIS OF BOTH KNEES: ICD-10-CM

## 2022-06-20 PROCEDURE — 97110 THERAPEUTIC EXERCISES: CPT | Performed by: PHYSICAL THERAPIST

## 2022-06-20 PROCEDURE — 97162 PT EVAL MOD COMPLEX 30 MIN: CPT | Performed by: PHYSICAL THERAPIST

## 2022-06-20 NOTE — PROGRESS NOTES
PT Evaluation     Today's date: 2022  Patient name: Padma Shetty  : 1955  MRN: [id number]  Referring provider: Lc Stein DO  Dx:   Encounter Diagnosis     ICD-10-CM    1  Chronic pain of both knees  M25 561 Ambulatory Referral to Physical Therapy    M25 562     G89 29    2  Primary osteoarthritis of both knees  M17 0 Ambulatory Referral to Physical Therapy                  Assessment  Assessment details: Patient is a 74year old male who presents to PT with c/o chronic pain in low back and his knees  PT examination shows limitations including weakness, decreased core stability, limited flexibility, limited rom, decreased balance/gait and increased pain limiting functional ability  Patient would benefit from continued PT intervention including exercises for rom, strength and stability, functional training, manual therapy, HEP, balance training, aerobic conditioning and pain relieving modalities in order to maximize functional independence  Impairments: abnormal muscle tone, abnormal or restricted ROM, activity intolerance, impaired balance, impaired physical strength, lacks appropriate home exercise program and pain with function    Goals  ST  Initiate HEP  2  Patient to report decreased pain at worst by 25% in 4 weeks    LT  Patient to report decreased pain at worst by 50% in 8 weeks  2  Patient to increase bilateral LE strength to 5/5 in 8 weeks  3   Patient to increase LE flexibility to [facility] in 8 weeks    Plan  Patient would benefit from: PT eval and skilled physical therapy  Planned modality interventions: cryotherapy and thermotherapy: hydrocollator packs  Other planned modality interventions: Modalities PRN  Planned therapy interventions: abdominal trunk stabilization, IADL retraining, joint mobilization, manual therapy, balance, neuromuscular re-education, patient education, strengthening, stretching, therapeutic activities, therapeutic exercise, therapeutic training, functional ROM exercises, flexibility, graded activity, graded exercise and home exercise program  Frequency: 2x week  Duration in visits: 8  Duration in weeks: 4  Treatment plan discussed with: patient        Subjective Evaluation    History of Present Illness  Date of onset: 2019  Mechanism of injury: Patient presents to PT with c/o chronic pain in low back and knees  He states he was having pain in medial left knee however that has improved some  He does report his knees will fatigue and weaken  Patient was seen in PT 2 years ago for chronic pain and showed some improvement  Patient has difficulty bending forward, walking on unsteady surfaces and other activities involving standing  Patient is to trial PT for 6 weeks prior to returning to MD  Patient is now referred to oppt  Pain  At best pain ratin  At worst pain ratin  Quality: tight and dull ache  Relieving factors: medications  Aggravating factors: walking, standing and stair climbing  Progression: worsening    Patient Goals  Patient goals for therapy: decreased pain          Objective     Concurrent Complaints  Negative for night pain, disturbed sleep, bladder dysfunction, bowel dysfunction, saddle (S4) numbness, cardiac problem, kidney problem, gallbladder problem, stomach problem, ulcer, appendix problem, spleen problem, pancreas problem, history of cancer, history of trauma and infection    Palpation   Left   Muscle spasm in the lumbar paraspinals  Right   Muscle spasm in the lumbar paraspinals       Neurological Testing     Sensation     Lumbar   Left   Intact: light touch    Right   Intact: light touch    Active Range of Motion     Lumbar   Flexion:  Restriction level: moderate  Extension:  Restriction level: moderate  Left lateral flexion:  Restriction level: moderate  Right lateral flexion:  Restriction level: moderate    Additional Active Range of Motion Details  Bilateral Hamstring Flexibility to 60 degrees     Strength/Myotome Testing     Left Hip   Planes of Motion   Flexion: 4  Abduction: 4  Adduction: 4    Right Hip   Planes of Motion   Flexion: 4+  Abduction: 4+  Adduction: 4+    Left Knee   Flexion: 3+  Extension: 4-    Right Knee   Flexion: 4+  Extension: 4+    Left Ankle/Foot   Plantar flexion: 4-    Right Ankle/Foot   Plantar flexion: 4+    General Comments:    Lower quarter screen   Hips: unremarkable  Knees: unremarkable  Foot/ankle: unremarkable                Precautions None       Manuals 6/20/22       LE Stretch                                Neuro Re-Ed         MTP/LTP        PPT        PPT with marchViaSat        Bridges                                Ther Ex        SRC L3 15 min       TR/HR 2x10       Standing SLR 3-way 2x10 B/L, each       Standing Trunk Ext        1/4 Squats                                                                Ther Activity                        Gait Training                        Modalities

## 2022-06-22 ENCOUNTER — APPOINTMENT (OUTPATIENT)
Dept: LAB | Facility: CLINIC | Age: 67
End: 2022-06-22
Payer: MEDICARE

## 2022-06-22 DIAGNOSIS — E07.9 THYROID DISORDER: ICD-10-CM

## 2022-06-22 DIAGNOSIS — R73.9 HYPERGLYCEMIA: ICD-10-CM

## 2022-06-22 DIAGNOSIS — E53.8 VITAMIN B12 DEFICIENCY: ICD-10-CM

## 2022-06-22 DIAGNOSIS — E78.49 OTHER HYPERLIPIDEMIA: ICD-10-CM

## 2022-06-22 DIAGNOSIS — E55.9 VITAMIN D DEFICIENCY: ICD-10-CM

## 2022-06-22 DIAGNOSIS — D64.9 ANEMIA, UNSPECIFIED TYPE: ICD-10-CM

## 2022-06-22 DIAGNOSIS — I10 HYPERTENSION, UNSPECIFIED TYPE: ICD-10-CM

## 2022-06-22 LAB
25(OH)D3 SERPL-MCNC: 42.5 NG/ML (ref 30–100)
ALBUMIN SERPL BCP-MCNC: 3.8 G/DL (ref 3.5–5)
ALP SERPL-CCNC: 45 U/L (ref 46–116)
ALT SERPL W P-5'-P-CCNC: 91 U/L (ref 12–78)
ANION GAP SERPL CALCULATED.3IONS-SCNC: 3 MMOL/L (ref 4–13)
AST SERPL W P-5'-P-CCNC: 69 U/L (ref 5–45)
BASOPHILS # BLD AUTO: 0.02 THOUSANDS/ΜL (ref 0–0.1)
BASOPHILS NFR BLD AUTO: 0 % (ref 0–1)
BILIRUB SERPL-MCNC: 0.45 MG/DL (ref 0.2–1)
BUN SERPL-MCNC: 20 MG/DL (ref 5–25)
CALCIUM SERPL-MCNC: 9.1 MG/DL (ref 8.3–10.1)
CHLORIDE SERPL-SCNC: 109 MMOL/L (ref 100–108)
CHOLEST SERPL-MCNC: 133 MG/DL
CO2 SERPL-SCNC: 29 MMOL/L (ref 21–32)
CREAT SERPL-MCNC: 1.07 MG/DL (ref 0.6–1.3)
EOSINOPHIL # BLD AUTO: 0.09 THOUSAND/ΜL (ref 0–0.61)
EOSINOPHIL NFR BLD AUTO: 2 % (ref 0–6)
ERYTHROCYTE [DISTWIDTH] IN BLOOD BY AUTOMATED COUNT: 12.3 % (ref 11.6–15.1)
GFR SERPL CREATININE-BSD FRML MDRD: 70 ML/MIN/1.73SQ M
GLUCOSE P FAST SERPL-MCNC: 103 MG/DL (ref 65–99)
HCT VFR BLD AUTO: 41.7 % (ref 36.5–49.3)
HDLC SERPL-MCNC: 46 MG/DL
HGB BLD-MCNC: 13.9 G/DL (ref 12–17)
IMM GRANULOCYTES # BLD AUTO: 0.03 THOUSAND/UL (ref 0–0.2)
IMM GRANULOCYTES NFR BLD AUTO: 1 % (ref 0–2)
INSULIN SERPL-ACNC: 27.7 MU/L (ref 3–25)
LDLC SERPL CALC-MCNC: 67 MG/DL (ref 0–100)
LYMPHOCYTES # BLD AUTO: 1.57 THOUSANDS/ΜL (ref 0.6–4.47)
LYMPHOCYTES NFR BLD AUTO: 32 % (ref 14–44)
MCH RBC QN AUTO: 31 PG (ref 26.8–34.3)
MCHC RBC AUTO-ENTMCNC: 33.3 G/DL (ref 31.4–37.4)
MCV RBC AUTO: 93 FL (ref 82–98)
MONOCYTES # BLD AUTO: 0.43 THOUSAND/ΜL (ref 0.17–1.22)
MONOCYTES NFR BLD AUTO: 9 % (ref 4–12)
NEUTROPHILS # BLD AUTO: 2.71 THOUSANDS/ΜL (ref 1.85–7.62)
NEUTS SEG NFR BLD AUTO: 56 % (ref 43–75)
NONHDLC SERPL-MCNC: 87 MG/DL
NRBC BLD AUTO-RTO: 0 /100 WBCS
PLATELET # BLD AUTO: 169 THOUSANDS/UL (ref 149–390)
PMV BLD AUTO: 9.8 FL (ref 8.9–12.7)
POTASSIUM SERPL-SCNC: 4.6 MMOL/L (ref 3.5–5.3)
PROT SERPL-MCNC: 7.5 G/DL (ref 6.4–8.2)
RBC # BLD AUTO: 4.48 MILLION/UL (ref 3.88–5.62)
SODIUM SERPL-SCNC: 141 MMOL/L (ref 136–145)
TRIGL SERPL-MCNC: 99 MG/DL
TSH SERPL DL<=0.05 MIU/L-ACNC: 4.35 UIU/ML (ref 0.45–4.5)
VIT B12 SERPL-MCNC: 519 PG/ML (ref 100–900)
WBC # BLD AUTO: 4.85 THOUSAND/UL (ref 4.31–10.16)

## 2022-06-22 PROCEDURE — 80061 LIPID PANEL: CPT

## 2022-06-22 PROCEDURE — 85025 COMPLETE CBC W/AUTO DIFF WBC: CPT

## 2022-06-22 PROCEDURE — 82607 VITAMIN B-12: CPT

## 2022-06-22 PROCEDURE — 83036 HEMOGLOBIN GLYCOSYLATED A1C: CPT

## 2022-06-22 PROCEDURE — 82306 VITAMIN D 25 HYDROXY: CPT

## 2022-06-22 PROCEDURE — 36415 COLL VENOUS BLD VENIPUNCTURE: CPT

## 2022-06-22 PROCEDURE — 84443 ASSAY THYROID STIM HORMONE: CPT

## 2022-06-22 PROCEDURE — 83525 ASSAY OF INSULIN: CPT

## 2022-06-22 PROCEDURE — 80053 COMPREHEN METABOLIC PANEL: CPT

## 2022-06-23 ENCOUNTER — OFFICE VISIT (OUTPATIENT)
Dept: PHYSICAL THERAPY | Facility: CLINIC | Age: 67
End: 2022-06-23
Payer: MEDICARE

## 2022-06-23 DIAGNOSIS — G89.29 CHRONIC PAIN OF BOTH KNEES: Primary | ICD-10-CM

## 2022-06-23 DIAGNOSIS — M25.562 CHRONIC PAIN OF BOTH KNEES: Primary | ICD-10-CM

## 2022-06-23 DIAGNOSIS — M25.561 CHRONIC PAIN OF BOTH KNEES: Primary | ICD-10-CM

## 2022-06-23 LAB
EST. AVERAGE GLUCOSE BLD GHB EST-MCNC: 120 MG/DL
HBA1C MFR BLD: 5.8 %

## 2022-06-23 PROCEDURE — 97140 MANUAL THERAPY 1/> REGIONS: CPT

## 2022-06-23 PROCEDURE — 97110 THERAPEUTIC EXERCISES: CPT

## 2022-06-23 NOTE — PROGRESS NOTES
Daily Note     Today's date: 2022  Patient name: Eyal Preciado III  : 1955  MRN: [id number]  Referring provider: Claudene Favor, DO  Dx:   Encounter Diagnosis     ICD-10-CM    1  Chronic pain of both knees  M25 561     M25 562     G89 29                   Subjective: Patient reports b/l knees are sore  Patient reports was doing work which required patient to kneel  Objective: See treatment diary below      Assessment: Tolerated treatment well  Patient exhibited good technique with therapeutic exercises      Plan: Continue per plan of care        Manuals 22      LE Stretch  15 min                              Neuro Re-Ed         MTP/LTP  GTB 3x10      PPT  2x10      PPT with marches        Bridges  2x10                              Ther Ex        SRC L3 15 min l3 15 min      TR/HR 2x10 2x10      Standing SLR 3-way 2x10 B/L, each 2x10 b/l each      Standing Trunk Ext        1/4 Squats                                                                Ther Activity                        Gait Training                        Modalities

## 2022-06-27 ENCOUNTER — OFFICE VISIT (OUTPATIENT)
Dept: PHYSICAL THERAPY | Facility: CLINIC | Age: 67
End: 2022-06-27
Payer: MEDICARE

## 2022-06-27 DIAGNOSIS — M25.561 CHRONIC PAIN OF BOTH KNEES: Primary | ICD-10-CM

## 2022-06-27 DIAGNOSIS — M17.0 PRIMARY OSTEOARTHRITIS OF BOTH KNEES: ICD-10-CM

## 2022-06-27 DIAGNOSIS — M25.562 CHRONIC PAIN OF BOTH KNEES: Primary | ICD-10-CM

## 2022-06-27 DIAGNOSIS — G89.29 CHRONIC PAIN OF BOTH KNEES: Primary | ICD-10-CM

## 2022-06-27 PROCEDURE — 97112 NEUROMUSCULAR REEDUCATION: CPT

## 2022-06-27 PROCEDURE — 97140 MANUAL THERAPY 1/> REGIONS: CPT

## 2022-06-27 PROCEDURE — 97110 THERAPEUTIC EXERCISES: CPT

## 2022-06-27 NOTE — PROGRESS NOTES
Daily Note     Today's date: 2022  Patient name: Tamia Galaviz III  : 1955  MRN: [id number]  Referring provider: Zenia Meza DO  Dx:   Encounter Diagnosis     ICD-10-CM    1  Chronic pain of both knees  M25 561     M25 562     G89 29    2  Primary osteoarthritis of both knees  M17 0        Start Time: 1500  Stop Time: 1545  Total time in clinic (min): 45 minutes    Subjective: Patient stated having soreness in knees today  Objective: See treatment diary below      Assessment: Tolerated treatment well  Patient demonstrated some tightness in B HS during PROM, no pain noted  Some cueing required to complete exercises  He experienced some cramping in R HS with supine TE, and was able to complete with short LE extension stretches to subside  Plan: Continue per plan of care        Manuals 22     LE Stretch  15 min 15 min                             Neuro Re-Ed         MTP/LTP  GTB 3x10 GTB 2x10      PPT  2x10 2x10      PPT with marches   2x10      Bridges  2x10 2x10                             Ther Ex        SRC L3 15 min l3 15 min 15 min L3      TR/HR 2x10 2x10 2x10      Standing SLR 3-way 2x10 B/L, each 2x10 b/l each 2x10 B/L each      Standing Trunk Ext        1/4 Squats                                                                Ther Activity                        Gait Training                        Modalities

## 2022-06-29 ENCOUNTER — OFFICE VISIT (OUTPATIENT)
Dept: PHYSICAL THERAPY | Facility: CLINIC | Age: 67
End: 2022-06-29
Payer: MEDICARE

## 2022-06-29 DIAGNOSIS — M17.0 PRIMARY OSTEOARTHRITIS OF BOTH KNEES: ICD-10-CM

## 2022-06-29 DIAGNOSIS — M25.561 CHRONIC PAIN OF BOTH KNEES: Primary | ICD-10-CM

## 2022-06-29 DIAGNOSIS — M25.562 CHRONIC PAIN OF BOTH KNEES: Primary | ICD-10-CM

## 2022-06-29 DIAGNOSIS — G89.29 CHRONIC PAIN OF BOTH KNEES: Primary | ICD-10-CM

## 2022-06-29 PROCEDURE — 97112 NEUROMUSCULAR REEDUCATION: CPT | Performed by: PHYSICAL THERAPIST

## 2022-06-29 PROCEDURE — 97140 MANUAL THERAPY 1/> REGIONS: CPT | Performed by: PHYSICAL THERAPIST

## 2022-06-29 PROCEDURE — 97110 THERAPEUTIC EXERCISES: CPT | Performed by: PHYSICAL THERAPIST

## 2022-06-29 NOTE — PROGRESS NOTES
Daily Note     Today's date: 2022  Patient name: Lily Amado III  : 1955  MRN: [id number]  Referring provider: Erick Winters DO  Dx:   Encounter Diagnosis     ICD-10-CM    1  Chronic pain of both knees  M25 561     M25 562     G89 29    2  Primary osteoarthritis of both knees  M17 0                   Subjective: Patient reports "I am feeling better  My knees and back aren't bothering me as much  [place] the woods [place] 3 hours today"  Objective: See treatment diary below      Assessment: Tolerated treatment well  Patient exhibited good technique with therapeutic exercises  Strength continues to improve  Plan: Continue per plan of care        Manuals 22    LE Stretch  15 min 15 min 15 min                            Neuro Re-Ed         MTP/LTP  GTB 3x10 GTB 2x10  GTB 2x10    PPT  2x10 2x10  2x10    PPT with marches   2x10  2x10    Bridges  2x10 2x10 2x10                            Ther Ex        [address] [facility] L3 15 min l3 15 min 15 min L3  15 min    TR/HR 2x10 2x10 2x10  3x10    Standing SLR 3-way 2x10 B/L, each 2x10 b/l each 2x10 B/L each  3x10 B/L, each    Standing Trunk Ext    10x    1/4 Squats                                                                Ther Activity                        Gait Training                        Modalities

## 2022-07-01 ENCOUNTER — OFFICE VISIT (OUTPATIENT)
Dept: OBGYN CLINIC | Facility: CLINIC | Age: 67
End: 2022-07-01
Payer: MEDICARE

## 2022-07-01 VITALS
TEMPERATURE: 97.1 F | BODY MASS INDEX: 38.71 KG/M2 | DIASTOLIC BLOOD PRESSURE: 82 MMHG | SYSTOLIC BLOOD PRESSURE: 128 MMHG | WEIGHT: 255.4 LBS | HEART RATE: 76 BPM | HEIGHT: 68 IN

## 2022-07-01 DIAGNOSIS — G89.29 CHRONIC PAIN OF BOTH KNEES: ICD-10-CM

## 2022-07-01 DIAGNOSIS — M17.0 PRIMARY OSTEOARTHRITIS OF BOTH KNEES: Primary | ICD-10-CM

## 2022-07-01 DIAGNOSIS — M25.562 CHRONIC PAIN OF BOTH KNEES: ICD-10-CM

## 2022-07-01 DIAGNOSIS — M25.561 CHRONIC PAIN OF BOTH KNEES: ICD-10-CM

## 2022-07-01 PROCEDURE — 99213 OFFICE O/P EST LOW 20 MIN: CPT | Performed by: ORTHOPAEDIC SURGERY

## 2022-07-01 NOTE — PROGRESS NOTES
ASSESSMENT/PLAN:    Diagnoses and all orders for this visit:    Primary osteoarthritis of both knees    Chronic pain of both knees        Plan:  I would recommend follow-up as needed  His symptoms have responded well to therapy and he has only minimal complaints of left knee pain and no complaints of right knee pain today  I have encouraged him to contact me if knee pain returns and we can discuss possible treatment options again  He is to follow up with Dr Anabelle Saleem as recommended regarding his back symptoms  Return if symptoms worsen or fail to improve       _____________________________________________________  CHIEF COMPLAINT:  Chief Complaint   Patient presents with    Follow-up         SUBJECTIVE:  Josie Otero is a 74y o  year old male who presents for follow up of his bilateral knee pain  Since last being seen 4 weeks ago he has seen as significant reduction in pain  He denies any right knee symptoms at this time  He notes a minimal degree of a sharp, episodic type of pain along the medial aspect of his knee  This occurs primarily with prolonged activity        PAST MEDICAL HISTORY:  Past Medical History:   Diagnosis Date    Anxiety     Carpal tunnel syndrome     Colon polyps     DJD (degenerative joint disease)     Duodenal ulcer     Gastric reflux     Gastric ulcer     Heel fracture     bilateral    Hypertension     Vitamin B12 deficiency     Vitamin D deficiency        PAST SURGICAL HISTORY:  Past Surgical History:   Procedure Laterality Date    ANKLE SURGERY      repair    COLONOSCOPY      polyp removed; benign    COLONOSCOPY  02/07/2019    COLONOSCOPY W/ POLYPECTOMY      Followed by Dr Ubaldo Carr Bilateral     MULTIPLE TOOTH EXTRACTIONS      POLYPECTOMY      TIBIA FRACTURE SURGERY Left        FAMILY HISTORY:  Family History   Problem Relation Age of Onset    Cancer Mother         lung    Heart disease Mother     Cancer Father prostate    Heart murmur Brother     Diabetes Daughter     Diabetes Son     Diabetes Other        SOCIAL HISTORY:  Social History     Tobacco Use    Smoking status: Former Smoker     Packs/day: 1 00     Years: 40 00     Pack years: 40 00     Types: Cigarettes     Start date: 1976     Quit date: 2020     Years since quittin 0    Smokeless tobacco: Former User     Types: Chew     Quit date:    Vaping Use    Vaping Use: Never used   Substance Use Topics    Alcohol use: No    Drug use: No       MEDICATIONS:    Current Outpatient Medications:     ALPRAZolam (XANAX) 0 5 mg tablet, take 1 tablet by mouth every 12 hours if needed for anxiety, Disp: 60 tablet, Rfl: 2    celecoxib (CeleBREX) 100 mg capsule, Take 1 capsule (100 mg total) by mouth 2 (two) times a day, Disp: 180 capsule, Rfl: 1    citalopram (CeleXA) 20 mg tablet, Take 1 tablet (20 mg total) by mouth daily, Disp: 90 tablet, Rfl: 1    ergocalciferol (VITAMIN D2) 50,000 units, Take 1 capsule (50,000 Units total) by mouth every 28 days, Disp: 3 capsule, Rfl: 1    famotidine (PEPCID) 20 mg tablet, take 1 tablet by mouth twice a day if needed for INDIGESTION OR HEARTBURN, Disp: 60 tablet, Rfl: 5    glucosamine-chondroitin 500-400 MG tablet, Take 1 tablet by mouth 3 (three) times a day, Disp: 90 tablet, Rfl: 5    lisinopril (ZESTRIL) 10 mg tablet, Take 1 tablet (10 mg total) by mouth daily, Disp: 90 tablet, Rfl: 1    Multiple Vitamin (MULTIVITAMIN) tablet, Take 1 tablet by mouth daily, Disp: 90 tablet, Rfl: 1    pantoprazole (PROTONIX) 40 mg tablet, Take 1 tablet (40 mg total) by mouth daily, Disp: 90 tablet, Rfl: 1    ALLERGIES:  No Known Allergies    REVIEW OF SYSTEMS:  Pertinent items are noted in HPI    A comprehensive review of systems was negative       _____________________________________________________  PHYSICAL EXAMINATION:  General: well developed and well nourished, alert, oriented times 3 and appears comfortable  Psychiatric: Normal  HEENT:  Normocephalic, atraumatic  Cardiovascular:  Regular  Pulmonary: No wheezing or stridor  Skin: No masses, erthema, lacerations, fluctation, ulcerations  Neurovascular: Motor and sensory exams are intact and excellent strength of both knees noted without complaints  Pulses palpable  MUSCULOSKELETAL EXAMINATION:    The bilateral knee exam demonstrates full extension and flexion to 130° without complaints  He has no tenderness  Ligaments are grossly stable  There is no effusion noted  He ambulates without significant difficulty            Enid Bump

## 2022-07-03 DIAGNOSIS — K21.9 GASTROESOPHAGEAL REFLUX DISEASE WITHOUT ESOPHAGITIS: ICD-10-CM

## 2022-07-03 DIAGNOSIS — F41.9 ANXIETY: ICD-10-CM

## 2022-07-03 DIAGNOSIS — K25.9 GASTRIC ULCER, UNSPECIFIED CHRONICITY, UNSPECIFIED WHETHER GASTRIC ULCER HEMORRHAGE OR PERFORATION PRESENT: ICD-10-CM

## 2022-07-03 RX ORDER — PANTOPRAZOLE SODIUM 40 MG/1
TABLET, DELAYED RELEASE ORAL
Qty: 90 TABLET | Refills: 1 | Status: SHIPPED | OUTPATIENT
Start: 2022-07-03

## 2022-07-03 RX ORDER — CITALOPRAM 20 MG/1
TABLET ORAL
Qty: 90 TABLET | Refills: 1 | Status: SHIPPED | OUTPATIENT
Start: 2022-07-03

## 2022-07-05 ENCOUNTER — OFFICE VISIT (OUTPATIENT)
Dept: PHYSICAL THERAPY | Facility: CLINIC | Age: 67
End: 2022-07-05
Payer: MEDICARE

## 2022-07-05 DIAGNOSIS — M25.562 CHRONIC PAIN OF BOTH KNEES: Primary | ICD-10-CM

## 2022-07-05 DIAGNOSIS — M17.0 PRIMARY OSTEOARTHRITIS OF BOTH KNEES: ICD-10-CM

## 2022-07-05 DIAGNOSIS — M25.561 CHRONIC PAIN OF BOTH KNEES: Primary | ICD-10-CM

## 2022-07-05 DIAGNOSIS — G89.29 CHRONIC PAIN OF BOTH KNEES: Primary | ICD-10-CM

## 2022-07-05 PROCEDURE — 97140 MANUAL THERAPY 1/> REGIONS: CPT | Performed by: PHYSICAL THERAPIST

## 2022-07-05 PROCEDURE — 97112 NEUROMUSCULAR REEDUCATION: CPT | Performed by: PHYSICAL THERAPIST

## 2022-07-05 NOTE — PROGRESS NOTES
Daily Note     Today's date: 2022  Patient name: Rozelle Paget III  : 1955  MRN: [id number]  Referring provider: Octaviano Fernandez DO  Dx:   Encounter Diagnosis     ICD-10-CM    1  Chronic pain of both knees  M25 561     M25 562     G89 29    2  Primary osteoarthritis of both knees  M17 0                   Subjective: "My back is feeling a little bit better"  Objective: See treatment diary below      Assessment: Tolerated treatment well  Patient exhibited good technique with therapeutic exercises  Improved core stability noted with exercise today  Plan: Continue per plan of care        Manuals 22   LE Stretch  15 min 15 min 15 min 15 min                           Neuro Re-Ed         MTP/LTP  GTB 3x10 GTB 2x10  GTB 2x10 GTB 2x10   PPT  2x10 2x10  2x10 2x10   PPT with marches   2x10  2x10 2x10   Bridges  2x10 2x10 2x10 2x10                           Ther Ex        [facility] L3 15 min l3 15 min 15 min L3  15 min 15 min   TR/HR 2x10 2x10 2x10  3x10 3x10   Standing SLR 3-way 2x10 B/L, each 2x10 b/l each 2x10 B/L each  3x10 B/L, each 3x10 B/L, each   Standing Trunk Ext    10x 10x   1/4 Squats                                                                Ther Activity                        Gait Training                        Modalities

## 2022-07-06 ENCOUNTER — APPOINTMENT (OUTPATIENT)
Dept: PHYSICAL THERAPY | Facility: CLINIC | Age: 67
End: 2022-07-06
Payer: MEDICARE

## 2022-07-07 ENCOUNTER — APPOINTMENT (OUTPATIENT)
Dept: PHYSICAL THERAPY | Facility: CLINIC | Age: 67
End: 2022-07-07
Payer: MEDICARE

## 2022-07-08 ENCOUNTER — APPOINTMENT (OUTPATIENT)
Dept: PHYSICAL THERAPY | Facility: CLINIC | Age: 67
End: 2022-07-08
Payer: MEDICARE

## 2022-07-11 ENCOUNTER — RA CDI HCC (OUTPATIENT)
Dept: OTHER | Facility: HOSPITAL | Age: 67
End: 2022-07-11

## 2022-07-11 NOTE — PROGRESS NOTES
[facility]  coding opportunities          Chart Reviewed number of suggestions sent to Provider: 1     F32  A Depression  *Found in active problem list - Can Depression be further classified using more specific code from any of the following:     F33 0  Major depressive disorder, recurrent, mild ([facility] ) OR   F33 1  Major depressive disorder, recurrent, moderate ([facility])  OR  F33 2  Major depressive disorder, recurrent, severe without psychotic features ([facility] ) OR F33 8   Other recurrent depressive disorders ([place] [address] ) OR  F33 9   Major depressive disorder, recurrent, unspecified ([place] [address] )       If this is correct, please document and assess at your next [address] [facility], [facility]     [address]         Patients Insurance     [employer]: [employer]

## 2022-07-12 ENCOUNTER — APPOINTMENT (OUTPATIENT)
Dept: PHYSICAL THERAPY | Facility: CLINIC | Age: 67
End: 2022-07-12
Payer: MEDICARE

## 2022-07-15 ENCOUNTER — OFFICE VISIT (OUTPATIENT)
Dept: PHYSICAL THERAPY | Facility: CLINIC | Age: 67
End: 2022-07-15
Payer: MEDICARE

## 2022-07-15 DIAGNOSIS — M25.561 CHRONIC PAIN OF BOTH KNEES: Primary | ICD-10-CM

## 2022-07-15 DIAGNOSIS — M25.562 CHRONIC PAIN OF BOTH KNEES: Primary | ICD-10-CM

## 2022-07-15 DIAGNOSIS — M17.0 PRIMARY OSTEOARTHRITIS OF BOTH KNEES: ICD-10-CM

## 2022-07-15 DIAGNOSIS — G89.29 CHRONIC PAIN OF BOTH KNEES: Primary | ICD-10-CM

## 2022-07-15 PROCEDURE — 97110 THERAPEUTIC EXERCISES: CPT

## 2022-07-15 PROCEDURE — 97140 MANUAL THERAPY 1/> REGIONS: CPT

## 2022-07-15 PROCEDURE — 97112 NEUROMUSCULAR REEDUCATION: CPT

## 2022-07-15 NOTE — PROGRESS NOTES
Daily Note     Today's date: 7/15/2022  Patient name: January Arce III  : 1955  MRN: [id number]  Referring provider: Ashutosh Ortiz DO  Dx:   Encounter Diagnosis     ICD-10-CM    1  Chronic pain of both knees  M25 561     M25 562     G89 29    2  Primary osteoarthritis of both knees  M17 0        Start Time: 0950  Stop Time: 1050  Total time in clinic (min): 60 minutes    Subjective: Patient stated having discomfort in his knee following moving his tree stand within the last week or so  Objective: See treatment diary below      Assessment: Tolerated treatment well  Patient exhibited good technique with therapeutic exercises , including increased intensity with standing TB TE  Some discomfort in L knee with PROM in knee flexion during piriformis stretch ; responded well with modification  Plan: Continue per plan of care        Manuals 7/15 6/23/22 6/27 6/29/22 7/5/22   LE Stretch 15 min  15 min 15 min 15 min 15 min                           Neuro Re-Ed         MTP/LTP BTB 2x10  GTB 3x10 GTB 2x10  GTB 2x10 GTB 2x10   PPT 2x10  2x10 2x10  2x10 2x10   PPT with marches 2x10   2x10  2x10 2x10   Bridges 2x10  2x10 2x10 2x10 2x10                           Ther Ex        [address] L3 10 min  l3 15 min 15 min L3  15 min 15 min   TR/HR 3x10  2x10 2x10  3x10 3x10   Standing SLR 3-way 3x10 B/L each  2x10 b/l each 2x10 B/L each  3x10 B/L, each 3x10 B/L, each   Standing Trunk Ext 10x    10x 10x   1/4 Squats                                                                Ther Activity                        Gait Training                        Modalities

## 2022-07-18 ENCOUNTER — APPOINTMENT (OUTPATIENT)
Dept: PHYSICAL THERAPY | Facility: CLINIC | Age: 67
End: 2022-07-18
Payer: MEDICARE

## 2022-07-18 ENCOUNTER — OFFICE VISIT (OUTPATIENT)
Dept: FAMILY MEDICINE CLINIC | Facility: CLINIC | Age: 67
End: 2022-07-18
Payer: MEDICARE

## 2022-07-18 VITALS
RESPIRATION RATE: 18 BRPM | OXYGEN SATURATION: 94 % | HEART RATE: 76 BPM | BODY MASS INDEX: 38.95 KG/M2 | DIASTOLIC BLOOD PRESSURE: 80 MMHG | WEIGHT: 257 LBS | TEMPERATURE: 98.6 F | HEIGHT: 68 IN | SYSTOLIC BLOOD PRESSURE: 128 MMHG

## 2022-07-18 DIAGNOSIS — G89.29 CHRONIC BILATERAL LOW BACK PAIN WITHOUT SCIATICA: ICD-10-CM

## 2022-07-18 DIAGNOSIS — M50.120 CERVICAL DISC DISORDER WITH RADICULOPATHY OF MID-CERVICAL REGION: ICD-10-CM

## 2022-07-18 DIAGNOSIS — E78.49 OTHER HYPERLIPIDEMIA: ICD-10-CM

## 2022-07-18 DIAGNOSIS — M47.25 OSTEOARTHRITIS OF SPINE WITH RADICULOPATHY, THORACOLUMBAR REGION: ICD-10-CM

## 2022-07-18 DIAGNOSIS — R79.89 ELEVATED LFTS: ICD-10-CM

## 2022-07-18 DIAGNOSIS — I10 HYPERTENSION, UNSPECIFIED TYPE: Primary | ICD-10-CM

## 2022-07-18 DIAGNOSIS — M47.812 CERVICAL SPONDYLOSIS: ICD-10-CM

## 2022-07-18 DIAGNOSIS — R73.9 HYPERGLYCEMIA: ICD-10-CM

## 2022-07-18 DIAGNOSIS — F32.89 OTHER DEPRESSION: ICD-10-CM

## 2022-07-18 DIAGNOSIS — M17.0 PRIMARY OSTEOARTHRITIS OF BOTH KNEES: ICD-10-CM

## 2022-07-18 DIAGNOSIS — E55.9 VITAMIN D DEFICIENCY: ICD-10-CM

## 2022-07-18 DIAGNOSIS — M54.16 LUMBAR RADICULOPATHY: ICD-10-CM

## 2022-07-18 DIAGNOSIS — K21.00 GASTROESOPHAGEAL REFLUX DISEASE WITH ESOPHAGITIS, UNSPECIFIED WHETHER HEMORRHAGE: ICD-10-CM

## 2022-07-18 DIAGNOSIS — K21.00 GASTROESOPHAGEAL REFLUX DISEASE WITH ESOPHAGITIS: ICD-10-CM

## 2022-07-18 DIAGNOSIS — E07.9 THYROID DISORDER: ICD-10-CM

## 2022-07-18 DIAGNOSIS — M54.50 CHRONIC BILATERAL LOW BACK PAIN WITHOUT SCIATICA: ICD-10-CM

## 2022-07-18 DIAGNOSIS — E53.8 VITAMIN B12 DEFICIENCY: ICD-10-CM

## 2022-07-18 DIAGNOSIS — M54.12 CERVICAL RADICULOPATHY: ICD-10-CM

## 2022-07-18 DIAGNOSIS — Z12.2 ENCOUNTER FOR SCREENING FOR LUNG CANCER: ICD-10-CM

## 2022-07-18 DIAGNOSIS — Z87.891 PERSONAL HISTORY OF NICOTINE DEPENDENCE: ICD-10-CM

## 2022-07-18 PROCEDURE — 99214 OFFICE O/P EST MOD 30 MIN: CPT | Performed by: NURSE PRACTITIONER

## 2022-07-18 RX ORDER — CELECOXIB 100 MG/1
100 CAPSULE ORAL 2 TIMES DAILY
Qty: 180 CAPSULE | Refills: 1 | Status: SHIPPED | OUTPATIENT
Start: 2022-07-18

## 2022-07-18 RX ORDER — ERGOCALCIFEROL 1.25 MG/1
50000 CAPSULE ORAL
Qty: 3 CAPSULE | Refills: 1 | Status: SHIPPED | OUTPATIENT
Start: 2022-07-18

## 2022-07-18 RX ORDER — LISINOPRIL 10 MG/1
10 TABLET ORAL DAILY
Qty: 90 TABLET | Refills: 1 | Status: SHIPPED | OUTPATIENT
Start: 2022-07-18

## 2022-07-18 RX ORDER — CELECOXIB 100 MG/1
100 CAPSULE ORAL 2 TIMES DAILY
Qty: 180 CAPSULE | Refills: 1 | Status: SHIPPED | OUTPATIENT
Start: 2022-07-18 | End: 2022-07-18 | Stop reason: SDUPTHER

## 2022-07-18 NOTE — PROGRESS NOTES
Assessment/Plan:      Diagnoses and all orders for this visit:    Hypertension, unspecified type  -     CBC and differential; Future  -     TSH, 3rd generation; Future  -     lisinopril (ZESTRIL) 10 mg tablet; Take 1 tablet (10 mg total) by mouth daily    Gastroesophageal reflux disease with esophagitis, unspecified whether hemorrhage    Osteoarthritis of spine with radiculopathy, thoracolumbar region    Cervical disc disorder with radiculopathy of mid-cervical region  -     Discontinue: celecoxib (CeleBREX) 100 mg capsule; Take 1 capsule (100 mg total) by mouth 2 (two) times a day  -     celecoxib (CeleBREX) 100 mg capsule; Take 1 capsule (100 mg total) by mouth 2 (two) times a day    Encounter for screening for lung cancer  -     CT lung screening program; Future    Personal history of nicotine dependence  -     CT lung screening program; Future    Cervical spondylosis  -     Discontinue: celecoxib (CeleBREX) 100 mg capsule; Take 1 capsule (100 mg total) by mouth 2 (two) times a day  -     celecoxib (CeleBREX) 100 mg capsule; Take 1 capsule (100 mg total) by mouth 2 (two) times a day    Lumbar radiculopathy  -     Discontinue: celecoxib (CeleBREX) 100 mg capsule; Take 1 capsule (100 mg total) by mouth 2 (two) times a day  -     celecoxib (CeleBREX) 100 mg capsule; Take 1 capsule (100 mg total) by mouth 2 (two) times a day    Chronic bilateral low back pain without sciatica  -     Discontinue: celecoxib (CeleBREX) 100 mg capsule; Take 1 capsule (100 mg total) by mouth 2 (two) times a day  -     celecoxib (CeleBREX) 100 mg capsule; Take 1 capsule (100 mg total) by mouth 2 (two) times a day    Vitamin B12 deficiency  -     Vitamin B12; Future    Vitamin D deficiency  -     Vitamin D 25 hydroxy; Future  -     ergocalciferol (VITAMIN D2) 50,000 units;  Take 1 capsule (50,000 Units total) by mouth every 28 days    Other depression    Primary osteoarthritis of both knees  -     Discontinue: celecoxib (CeleBREX) 100 mg capsule; Take 1 capsule (100 mg total) by mouth 2 (two) times a day  -     celecoxib (CeleBREX) 100 mg capsule; Take 1 capsule (100 mg total) by mouth 2 (two) times a day    Other hyperlipidemia  -     Lipid panel; Future    Hyperglycemia  -     Comprehensive metabolic panel; Future  -     Hemoglobin A1C; Future  -     Insulin, fasting; Future    Thyroid disorder  -     TSH, 3rd generation; Future    Cervical radiculopathy    Gastroesophageal reflux disease with esophagitis    Elevated LFTs  -     Comprehensive metabolic panel; Future          Subjective:     Patient ID: Yandel Cho is a 74 y o  male  Patient presents to office for follow up and recheck  Complete medical history and medications reviewed with patient and tolerating all medications well without any problems  Vital signs reviewed and stable  Lab results reviewed with patient  Denies any new problems or concerns at the present time  Currently attending Physical Therapy for low back and B/L knee pain which is helping him significantly  Patient had appointment with Dr Zarina Kay for chronic B/L knee pain which he had X-Rays done which showed DJD and referred to PT  Patient instructed to follow up as needed with Orthopedic  Review of Systems    GENERAL:  Feels well, denies any significant changes in weight without trying  SKIN:  Denies rashes, lesions, opened areas, wounds, change in moles or any other skin changes  HEENT:  Denies any head injury or headaches  Negative blurred vision, floaters, spots before eyes, infections, or other vision problems  Negative significant changes in vision or hearing  Negative tinnitus, vertigo, or infections  Negative hay fever, sinus trouble, nasal discharge, bloody noses, or problems with smell  Negative sore throat, bleeding gums, ulcers, or sores  NECK:  Denies lumps, goiter, pain, swollen glands, or lymphadenopathy    BREASTS:  Denies lumps, pain, nipple discharge, swelling, redness, or any other changes  RESPIRATORY:  Denies cough, wheezing, shortness of breath, dyspnea, or orthopnea  CARDIOVASCULAR:  Denies chest pain or palpitations  GASTROINTESTINAL:  Appetite good, denies nausea, vomiting, or indigestion  Bowel movements normal occurring about once daily or every other day  URINARY:  Denies frequency, incontinence, dysuria, hematuria, nocturia, or recent flank pain  GENITAL:  Denies penile discharge, ulcerations, lesions, or other problems  PERIPHERAL VASCULAR:  Denies varicosities, swelling, skin changes, or pain  MUSCULOSKELETAL:  Denies back, joint, or muscle pain  Negative problems with mobility or movement  PSYCHIATRIC:  Denies problems with depression, anxiety, anger, or other psychiatric symptoms  NEUROLOGIC:  Denies fainting, dizziness, memory problems, seizures, tingling, motor or sensory loss  HEMATOLOGIC:  Denies easy bruising, bleeding, or anemia  ENDOCRINE:  Denies thyroid problems, temperature intolerance, excessive sweating, or other endocrine symptoms  Objective:     Physical Exam  Vitals and nursing note reviewed  GENERAL:  Appears well nourished, well groomed, in no acute distress  SKIN:  Palms warm, dry, color good  Nails without clubbing or cyanosis  No lesions, ulcerations, or wounds  HEAD:  Hair is average texture  Scalp without lesions, normocephalic, and atraumatic  EARS:  B/L ear canals clear  B/L TMs clear with + light reflex  NOSE: Mucosa pink, moist, septum midline  Negative sinus tenderness  B/L turbinates pink, moist, non-edematous without exudate  MOUTH:  Oral mucosa pink  Pharynx pink, moist, without swelling, redness, or exudate  Dentition ok  Tongue midline  NECK:  Supple, trachea midline, Negative thyromegaly, lymphadenopathy, or swollen glands  LYMPH NODES:  Negative enlargement of neck, axillary, epitrochlear, or inguinal nodes    THORAX/LUNGS  Thorax symmetric with good excursion  Lungs resonant  Breath sounds vesicular with no added sounds  Diaphragm descends within normal limits  CARDIOVASCULAR:  Carotid upstrokes brisk and without bruits  Apical impulse discrete and tapping, barely palpable in the 5th ICS/MCL  Normal S1 and Normal S2, Negative S3 or S4  Negative murmurs, thrills, lifts, or heaves  ABDOMEN:  Protuberant, bowel sounds normal active x 4 quadrants  Negative tenderness  Negative masses  Negative hepatomegaly  Negative splenomegaly  Negative costovertebral tenderness  EXTREMITIES:  Warm, calves supple, non-tender, negative for edema  Negative stasis pigmentation or ulcers  +2 pulses throughout  MUSCULOSKELETAL:  Negative joint deformities  Good range of motion in hands, wrists, elbows, shoulders, spine, hips, knees, and ankles  NEUROLOGICAL:  Mental status:  Awake, alert, and oriented to person, place, time, and event  Normal thought processes

## 2022-07-18 NOTE — PATIENT INSTRUCTIONS

## 2022-07-20 ENCOUNTER — OFFICE VISIT (OUTPATIENT)
Dept: PHYSICAL THERAPY | Facility: CLINIC | Age: 67
End: 2022-07-20
Payer: MEDICARE

## 2022-07-20 DIAGNOSIS — M25.561 CHRONIC PAIN OF BOTH KNEES: Primary | ICD-10-CM

## 2022-07-20 DIAGNOSIS — M25.562 CHRONIC PAIN OF BOTH KNEES: Primary | ICD-10-CM

## 2022-07-20 DIAGNOSIS — G89.29 CHRONIC PAIN OF BOTH KNEES: Primary | ICD-10-CM

## 2022-07-20 DIAGNOSIS — M17.0 PRIMARY OSTEOARTHRITIS OF BOTH KNEES: ICD-10-CM

## 2022-07-20 PROCEDURE — 97140 MANUAL THERAPY 1/> REGIONS: CPT | Performed by: PHYSICAL THERAPIST

## 2022-07-20 PROCEDURE — 97112 NEUROMUSCULAR REEDUCATION: CPT | Performed by: PHYSICAL THERAPIST

## 2022-07-20 PROCEDURE — 97110 THERAPEUTIC EXERCISES: CPT | Performed by: PHYSICAL THERAPIST

## 2022-07-20 NOTE — PROGRESS NOTES
Daily Note     Today's date: 2022  Patient name: January Arce III  : 1955  MRN: [id number]  Referring provider: Ashutosh Ortiz DO  Dx:   Encounter Diagnosis     ICD-10-CM    1  Chronic pain of both knees  M25 561     M25 562     G89 29    2  Primary osteoarthritis of both knees  M17 0                   Subjective: "I feel like I'm loosening up"  Objective: See treatment diary below      Assessment: Tolerated treatment well  Patient exhibited good technique with therapeutic exercises  Overall strength continues to improve well  Plan: Continue per plan of care        Manuals 7/15 7/20/22 6/27 6/29/22 7/5/22   LE Stretch 15 min  15 min 15 min 15 min 15 min                           Neuro Re-Ed         MTP/LTP BTB 2x10  BTB 3x10 GTB 2x10  GTB 2x10 GTB 2x10   PPT 2x10  2x10 2x10  2x10 2x10   PPT with marches 2x10  2x10 2x10  2x10 2x10   Bridges 2x10  2x10 2x10 2x10 2x10                           Ther Ex        [facility] L3 10 min  l3 15 min 15 min L3  15 min 15 min   TR/HR 3x10  3x10 2x10  3x10 3x10   Standing SLR 3-way 3x10 B/L each  3x10 b/l each 2x10 B/L each  3x10 B/L, each 3x10 B/L, each   Standing Trunk Ext 10x  10x  10x 10x   1/4 Squats                                                                Ther Activity                        Gait Training                        Modalities

## 2022-07-22 ENCOUNTER — APPOINTMENT (OUTPATIENT)
Dept: PHYSICAL THERAPY | Facility: CLINIC | Age: 67
End: 2022-07-22
Payer: MEDICARE

## 2022-07-25 ENCOUNTER — OFFICE VISIT (OUTPATIENT)
Dept: PHYSICAL THERAPY | Facility: CLINIC | Age: 67
End: 2022-07-25
Payer: MEDICARE

## 2022-07-25 DIAGNOSIS — G89.29 CHRONIC PAIN OF BOTH KNEES: Primary | ICD-10-CM

## 2022-07-25 DIAGNOSIS — M25.561 CHRONIC PAIN OF BOTH KNEES: Primary | ICD-10-CM

## 2022-07-25 DIAGNOSIS — M25.562 CHRONIC PAIN OF BOTH KNEES: Primary | ICD-10-CM

## 2022-07-25 DIAGNOSIS — M17.0 PRIMARY OSTEOARTHRITIS OF BOTH KNEES: ICD-10-CM

## 2022-07-25 PROCEDURE — 97112 NEUROMUSCULAR REEDUCATION: CPT | Performed by: PHYSICAL THERAPIST

## 2022-07-25 PROCEDURE — 97110 THERAPEUTIC EXERCISES: CPT | Performed by: PHYSICAL THERAPIST

## 2022-07-25 PROCEDURE — 97140 MANUAL THERAPY 1/> REGIONS: CPT | Performed by: PHYSICAL THERAPIST

## 2022-07-25 NOTE — PROGRESS NOTES
Daily Note     Today's date: 2022  Patient name: Facundo Pena III  : 1955  MRN: [id number]  Referring provider: Rene Mcknight DO  Dx:   Encounter Diagnosis     ICD-10-CM    1  Chronic pain of both knees  M25 561     M25 562     G89 29    2  Primary osteoarthritis of both knees  M17 0                   Subjective: Patient states "I'm feeling better"  Objective: See treatment diary below      Assessment: Tolerated treatment well  Patient exhibited good technique with therapeutic exercises      Plan: Continue per plan of care        Manuals 7/15 7/20/22 7/25/22 6/29/22 7/5/22   LE Stretch 15 min  15 min 15 min 15 min 15 min                           Neuro Re-Ed         MTP/LTP BTB 2x10  BTB 3x10 BLK 2x10  GTB 2x10 GTB 2x10   PPT 2x10  2x10 2x10  2x10 2x10   PPT with marches 2x10  2x10 2x10  2x10 2x10   Bridges 2x10  2x10 2x10 2x10 2x10                           Ther Ex        [address] L3 10 min  l3 15 min 15 min L3  15 min 15 min   TR/HR 3x10  3x10 3x10  3x10 3x10   Standing SLR 3-way 3x10 B/L each  3x10 b/l each 3x10 B/L each  3x10 B/L, each 3x10 B/L, each   Standing Trunk Ext 10x  10x 10x 10x 10x   1/4 Squats                                                                Ther Activity                        Gait Training                        Modalities

## 2022-07-27 ENCOUNTER — APPOINTMENT (OUTPATIENT)
Dept: PHYSICAL THERAPY | Facility: CLINIC | Age: 67
End: 2022-07-27
Payer: MEDICARE

## 2022-07-29 ENCOUNTER — HOSPITAL ENCOUNTER (OUTPATIENT)
Dept: CT IMAGING | Facility: HOSPITAL | Age: 67
Discharge: HOME | End: 2022-07-29
Payer: MEDICARE

## 2022-07-29 DIAGNOSIS — Z87.891 PERSONAL HISTORY OF NICOTINE DEPENDENCE: ICD-10-CM

## 2022-07-29 DIAGNOSIS — Z12.2 ENCOUNTER FOR SCREENING FOR LUNG CANCER: ICD-10-CM

## 2022-07-29 PROCEDURE — 71271 CT THORAX LUNG CANCER SCR C-: CPT

## 2022-07-29 NOTE — LETTER
[address] [place] [address]  [address] [facility] [address]      August 8, 2022    MRN: [id number]     Phone: [phone]     Dear Mr Aime Bucio recently had a(n) Cat Scan performed on 7/29/2022 at  [address] [place] [address] that was requested by Justino Mclaughlin  The study was reviewed by a radiologist, which is a physician who specializes in medical imaging  The radiologist issued a report describing his or her findings  In that report there was a finding that the radiologist felt warranted further discussion with your health care provider and that discussion would be beneficial to you  The results were sent to Justnio Mclaughlin on 08/04/2022 10:56 AM  We recommend that you contact Justino Mclaughlin at [phone] or set up an appointment to discuss the results of the imaging test  If you have already heard from Justino Mclaughlin regarding the results of your study, you can disregard this letter  This letter is not meant to alarm you, but intended to encourage you to follow-up on your results with the provider that sent you for the imaging study  In addition, we have enclosed answers to frequently asked questions by other patients who have also received a letter to review results with their health care provider (see page two)  Thank you for choosing [address] [place] [address] for your medical imaging needs  FREQUENTLY ASKED QUESTIONS    1  Why am I receiving this letter? [address] [place] [address] requires us to notify patients who have findings on imaging exams that may require more testing or follow-up with a health professional within the next 3 months          2  How serious is the finding on the imaging test?  This letter is sent to all patients who may need follow-up or more testing within the next 3 months  Receiving this letter does not necessarily mean you have a life-threatening imaging finding or disease  Recommendations in the radiologists imaging report are general in nature and it is up to your healthcare provider to say whether those recommendations make sense for your situation  You are strongly encouraged to talk to your health care provider about the results and ask whether additional steps need to be taken  3  Where can I get a copy of the final report for my recent radiology exam?  To get a full copy of the report you can access your records online at [email or web address] or please contact [address] [place] [address] Records Department at [phone] Monday through Friday between 8 am and 6 pm          4  What do I need to do now? Please contact your health care provider who requested the imaging study to discuss what further actions (if any) are needed  You may have already heard from (your ordering provider) in regard to this test in which case you can disregard this letter  NOTICE IN ACCORDANCE WITH THE [facility] PATIENT TEST RESULT INFORMATION ACT OF 2018    You are receiving this notice as a result of a determination by your diagnostic imaging service that further discussions of your test results are warranted and would be beneficial to you  The complete results of your test or tests have been or will be sent to the health care practitioner that ordered the test or tests  It is recommended that you contact your health care practitioner to discuss your results as soon as possible

## 2022-08-05 DIAGNOSIS — Z87.891 HISTORY OF TOBACCO USE: ICD-10-CM

## 2022-08-05 DIAGNOSIS — R91.8 PULMONARY NODULES: Primary | ICD-10-CM

## 2022-09-13 NOTE — PROGRESS NOTES
Patient seen for 8 total visits of PT  Patient cancelled next scheduled appt  No further contact from patient to reschedule  Patient to be d/c at this time due to script expiration

## 2022-12-06 ENCOUNTER — APPOINTMENT (OUTPATIENT)
Dept: LAB | Facility: CLINIC | Age: 67
End: 2022-12-06

## 2022-12-06 DIAGNOSIS — E55.9 VITAMIN D DEFICIENCY: ICD-10-CM

## 2022-12-06 DIAGNOSIS — R79.89 ELEVATED LFTS: ICD-10-CM

## 2022-12-06 DIAGNOSIS — E07.9 THYROID DISORDER: ICD-10-CM

## 2022-12-06 DIAGNOSIS — E53.8 VITAMIN B12 DEFICIENCY: ICD-10-CM

## 2022-12-06 DIAGNOSIS — R73.9 HYPERGLYCEMIA: ICD-10-CM

## 2022-12-06 DIAGNOSIS — E78.49 OTHER HYPERLIPIDEMIA: ICD-10-CM

## 2022-12-06 DIAGNOSIS — I10 HYPERTENSION, UNSPECIFIED TYPE: ICD-10-CM

## 2022-12-06 LAB
25(OH)D3 SERPL-MCNC: 48.5 NG/ML (ref 30–100)
ALBUMIN SERPL BCP-MCNC: 3.5 G/DL (ref 3.5–5)
ALP SERPL-CCNC: 44 U/L (ref 46–116)
ALT SERPL W P-5'-P-CCNC: 47 U/L (ref 12–78)
ANION GAP SERPL CALCULATED.3IONS-SCNC: 3 MMOL/L (ref 4–13)
AST SERPL W P-5'-P-CCNC: 27 U/L (ref 5–45)
BASOPHILS # BLD AUTO: 0.01 THOUSANDS/ÂΜL (ref 0–0.1)
BASOPHILS NFR BLD AUTO: 0 % (ref 0–1)
BILIRUB SERPL-MCNC: 0.35 MG/DL (ref 0.2–1)
BUN SERPL-MCNC: 23 MG/DL (ref 5–25)
CALCIUM SERPL-MCNC: 9.5 MG/DL (ref 8.3–10.1)
CHLORIDE SERPL-SCNC: 108 MMOL/L (ref 96–108)
CHOLEST SERPL-MCNC: 130 MG/DL
CO2 SERPL-SCNC: 29 MMOL/L (ref 21–32)
CREAT SERPL-MCNC: 1.05 MG/DL (ref 0.6–1.3)
EOSINOPHIL # BLD AUTO: 0.08 THOUSAND/ÂΜL (ref 0–0.61)
EOSINOPHIL NFR BLD AUTO: 2 % (ref 0–6)
ERYTHROCYTE [DISTWIDTH] IN BLOOD BY AUTOMATED COUNT: 12.3 % (ref 11.6–15.1)
EST. AVERAGE GLUCOSE BLD GHB EST-MCNC: 114 MG/DL
GFR SERPL CREATININE-BSD FRML MDRD: 72 ML/MIN/1.73SQ M
GLUCOSE P FAST SERPL-MCNC: 119 MG/DL (ref 65–99)
HBA1C MFR BLD: 5.6 %
HCT VFR BLD AUTO: 42.7 % (ref 36.5–49.3)
HDLC SERPL-MCNC: 53 MG/DL
HGB BLD-MCNC: 13.9 G/DL (ref 12–17)
IMM GRANULOCYTES # BLD AUTO: 0.02 THOUSAND/UL (ref 0–0.2)
IMM GRANULOCYTES NFR BLD AUTO: 0 % (ref 0–2)
INSULIN SERPL-ACNC: 28.1 MU/L (ref 3–25)
LDLC SERPL CALC-MCNC: 61 MG/DL (ref 0–100)
LYMPHOCYTES # BLD AUTO: 1.48 THOUSANDS/ÂΜL (ref 0.6–4.47)
LYMPHOCYTES NFR BLD AUTO: 31 % (ref 14–44)
MCH RBC QN AUTO: 30.5 PG (ref 26.8–34.3)
MCHC RBC AUTO-ENTMCNC: 32.6 G/DL (ref 31.4–37.4)
MCV RBC AUTO: 94 FL (ref 82–98)
MONOCYTES # BLD AUTO: 0.45 THOUSAND/ÂΜL (ref 0.17–1.22)
MONOCYTES NFR BLD AUTO: 9 % (ref 4–12)
NEUTROPHILS # BLD AUTO: 2.76 THOUSANDS/ÂΜL (ref 1.85–7.62)
NEUTS SEG NFR BLD AUTO: 58 % (ref 43–75)
NONHDLC SERPL-MCNC: 77 MG/DL
NRBC BLD AUTO-RTO: 0 /100 WBCS
PLATELET # BLD AUTO: 184 THOUSANDS/UL (ref 149–390)
PMV BLD AUTO: 9.6 FL (ref 8.9–12.7)
POTASSIUM SERPL-SCNC: 4.5 MMOL/L (ref 3.5–5.3)
PROT SERPL-MCNC: 7.5 G/DL (ref 6.4–8.4)
RBC # BLD AUTO: 4.55 MILLION/UL (ref 3.88–5.62)
SODIUM SERPL-SCNC: 140 MMOL/L (ref 135–147)
TRIGL SERPL-MCNC: 80 MG/DL
TSH SERPL DL<=0.05 MIU/L-ACNC: 3.22 UIU/ML (ref 0.45–4.5)
VIT B12 SERPL-MCNC: 421 PG/ML (ref 100–900)
WBC # BLD AUTO: 4.8 THOUSAND/UL (ref 4.31–10.16)

## 2022-12-19 DIAGNOSIS — I10 HYPERTENSION, UNSPECIFIED TYPE: ICD-10-CM

## 2022-12-19 RX ORDER — LISINOPRIL 10 MG/1
TABLET ORAL
Qty: 90 TABLET | Refills: 1 | Status: SHIPPED | OUTPATIENT
Start: 2022-12-19

## 2022-12-26 DIAGNOSIS — E55.9 VITAMIN D DEFICIENCY: ICD-10-CM

## 2022-12-27 RX ORDER — ERGOCALCIFEROL 1.25 MG/1
CAPSULE ORAL
Qty: 3 CAPSULE | Refills: 1 | Status: SHIPPED | OUTPATIENT
Start: 2022-12-27

## 2023-01-01 DIAGNOSIS — F41.9 ANXIETY: ICD-10-CM

## 2023-01-01 DIAGNOSIS — K21.9 GASTROESOPHAGEAL REFLUX DISEASE WITHOUT ESOPHAGITIS: ICD-10-CM

## 2023-01-01 DIAGNOSIS — K25.9 GASTRIC ULCER, UNSPECIFIED CHRONICITY, UNSPECIFIED WHETHER GASTRIC ULCER HEMORRHAGE OR PERFORATION PRESENT: ICD-10-CM

## 2023-01-03 RX ORDER — CITALOPRAM 20 MG/1
TABLET ORAL
Qty: 90 TABLET | Refills: 1 | Status: SHIPPED | OUTPATIENT
Start: 2023-01-03

## 2023-01-03 RX ORDER — PANTOPRAZOLE SODIUM 40 MG/1
TABLET, DELAYED RELEASE ORAL
Qty: 90 TABLET | Refills: 1 | Status: SHIPPED | OUTPATIENT
Start: 2023-01-03

## 2023-01-14 ENCOUNTER — RA CDI HCC (OUTPATIENT)
Dept: OTHER | Facility: HOSPITAL | Age: 68
End: 2023-01-14

## 2023-01-23 ENCOUNTER — OFFICE VISIT (OUTPATIENT)
Dept: FAMILY MEDICINE CLINIC | Facility: CLINIC | Age: 68
End: 2023-01-23

## 2023-01-23 VITALS
TEMPERATURE: 98.1 F | WEIGHT: 248 LBS | DIASTOLIC BLOOD PRESSURE: 64 MMHG | SYSTOLIC BLOOD PRESSURE: 129 MMHG | BODY MASS INDEX: 36.73 KG/M2 | RESPIRATION RATE: 18 BRPM | HEIGHT: 69 IN | HEART RATE: 80 BPM | OXYGEN SATURATION: 97 %

## 2023-01-23 DIAGNOSIS — M50.120 CERVICAL DISC DISORDER WITH RADICULOPATHY OF MID-CERVICAL REGION: ICD-10-CM

## 2023-01-23 DIAGNOSIS — G89.29 CHRONIC BILATERAL LOW BACK PAIN WITHOUT SCIATICA: ICD-10-CM

## 2023-01-23 DIAGNOSIS — K21.00 GASTROESOPHAGEAL REFLUX DISEASE WITH ESOPHAGITIS: ICD-10-CM

## 2023-01-23 DIAGNOSIS — R73.9 HYPERGLYCEMIA: ICD-10-CM

## 2023-01-23 DIAGNOSIS — E53.8 VITAMIN B12 DEFICIENCY: ICD-10-CM

## 2023-01-23 DIAGNOSIS — I10 HYPERTENSION, UNSPECIFIED TYPE: ICD-10-CM

## 2023-01-23 DIAGNOSIS — M17.0 PRIMARY OSTEOARTHRITIS OF BOTH KNEES: ICD-10-CM

## 2023-01-23 DIAGNOSIS — M47.816 LUMBAR SPONDYLOSIS: ICD-10-CM

## 2023-01-23 DIAGNOSIS — M54.16 LUMBAR RADICULOPATHY: ICD-10-CM

## 2023-01-23 DIAGNOSIS — F41.9 ANXIETY: ICD-10-CM

## 2023-01-23 DIAGNOSIS — K21.9 GASTROESOPHAGEAL REFLUX DISEASE WITHOUT ESOPHAGITIS: ICD-10-CM

## 2023-01-23 DIAGNOSIS — E78.49 OTHER HYPERLIPIDEMIA: ICD-10-CM

## 2023-01-23 DIAGNOSIS — E07.9 THYROID DISORDER: ICD-10-CM

## 2023-01-23 DIAGNOSIS — M54.50 CHRONIC BILATERAL LOW BACK PAIN WITHOUT SCIATICA: ICD-10-CM

## 2023-01-23 DIAGNOSIS — E55.9 VITAMIN D DEFICIENCY: ICD-10-CM

## 2023-01-23 DIAGNOSIS — D64.9 ANEMIA, UNSPECIFIED TYPE: ICD-10-CM

## 2023-01-23 DIAGNOSIS — M47.812 CERVICAL SPONDYLOSIS: Primary | ICD-10-CM

## 2023-01-23 RX ORDER — FAMOTIDINE 20 MG/1
TABLET, FILM COATED ORAL
Qty: 60 TABLET | Refills: 5 | Status: SHIPPED | OUTPATIENT
Start: 2023-01-23

## 2023-01-23 RX ORDER — CELECOXIB 100 MG/1
100 CAPSULE ORAL 2 TIMES DAILY
Qty: 180 CAPSULE | Refills: 1 | Status: SHIPPED | OUTPATIENT
Start: 2023-01-23

## 2023-01-23 NOTE — PATIENT INSTRUCTIONS

## 2023-01-23 NOTE — PROGRESS NOTES
Name: Antony Bhat      : 1955      MRN: [id number]  Encounter Provider: LYNDSEY Miles  Encounter Date: 2023   Encounter department: [address] [place] [address]     1  Cervical spondylosis  -     celecoxib (CeleBREX) 100 mg capsule; Take 1 capsule (100 mg total) by mouth 2 (two) times a day    2  Lumbar radiculopathy  -     celecoxib (CeleBREX) 100 mg capsule; Take 1 capsule (100 mg total) by mouth 2 (two) times a day    3  Gastroesophageal reflux disease without esophagitis    4  Vitamin B12 deficiency  -     Vitamin B12; Future; Expected date: 2023    5  Vitamin D deficiency  -     Vitamin D 25 hydroxy; Future; Expected date: 2023    6  Anxiety    7  Primary osteoarthritis of both knees  -     celecoxib (CeleBREX) 100 mg capsule; Take 1 capsule (100 mg total) by mouth 2 (two) times a day    8  Lumbar spondylosis    9  Hypertension, unspecified type    10  Hyperglycemia  -     Comprehensive metabolic panel; Future; Expected date: 2023  -     Hemoglobin A1C; Future; Expected date: 2023  -     Insulin, fasting; Future; Expected date: 2023    11  Other hyperlipidemia  -     Lipid panel; Future; Expected date: 2023    12  Thyroid disorder  -     TSH, 3rd generation; Future; Expected date: 2023    13  Anemia, unspecified type  -     CBC and differential; Future; Expected date: 2023    14  Gastroesophageal reflux disease with esophagitis  -     famotidine (PEPCID) 20 mg tablet; 1 tab po 2 x daily prn indigestion    15  Cervical disc disorder with radiculopathy of mid-cervical region  -     celecoxib (CeleBREX) 100 mg capsule; Take 1 capsule (100 mg total) by mouth 2 (two) times a day    16  Chronic bilateral low back pain without sciatica  -     celecoxib (CeleBREX) 100 mg capsule; Take 1 capsule (100 mg total) by mouth 2 (two) times a day      BMI Counseling: Body mass index is 37 16 kg/m²   The BMI is above normal  Nutrition recommendations include decreasing portion sizes, encouraging healthy choices of fruits and vegetables, decreasing fast food intake, consuming healthier snacks, limiting drinks that contain sugar, moderation in carbohydrate intake, increasing intake of lean protein, reducing intake of saturated and trans fat and reducing intake of cholesterol  Exercise recommendations include exercising 3-5 times per week  No pharmacotherapy was ordered  Rationale for BMI follow-up plan is due to patient being overweight or obese  Subjective     Patient presents to office for follow up and recheck  Complete medical history and medications reviewed with patient and tolerating all medications well without any problems  Vital signs reviewed and stable  Lab results reviewed with patient  Continues with chronic neck and low back pain due to Hx DDD with Lumbar Spondylosis, Cervical Spondylosis, and DJD of B/L Knees  Denies any new problems or concerns at the present time  Review of Systems   GENERAL:  Feels well, denies any significant changes in weight without trying  SKIN:  Denies rashes, lesions, opened areas, wounds, change in moles or any other skin changes  HEENT:  Denies any head injury or headaches  Negative blurred vision, floaters, spots before eyes, infections, or other vision problems  Negative significant changes in vision or hearing  Negative tinnitus, vertigo, or infections  Negative hay fever, sinus trouble, nasal discharge, bloody noses, or problems with smell  Negative sore throat, bleeding gums, ulcers, or sores  NECK:  Denies lumps, goiter, pain, swollen glands, or lymphadenopathy  BREASTS:  Denies lumps, pain, nipple discharge, swelling, redness, or any other changes  RESPIRATORY:  Denies cough, wheezing, shortness of breath, dyspnea, or orthopnea  CARDIOVASCULAR:  Denies chest pain or palpitations     GASTROINTESTINAL:  Appetite good, denies nausea, vomiting, or indigestion  Bowel movements normal occurring about once daily or every other day  URINARY:  Denies frequency, incontinence, dysuria, hematuria, nocturia, or recent flank pain  GENITAL:  Denies penile discharge, ulcerations, lesions, or other problems  PERIPHERAL VASCULAR:  Denies varicosities, swelling, skin changes, or pain  MUSCULOSKELETAL:  Continues with chronic neck and low back pain related to DDD and Lumbar Spondylosis  PSYCHIATRIC:  Denies problems with depression, anxiety, anger, or other psychiatric symptoms  NEUROLOGIC:  Denies fainting, dizziness, memory problems, seizures, tingling, motor or sensory loss  HEMATOLOGIC:  Denies easy bruising, bleeding, or anemia  ENDOCRINE:  Denies thyroid problems, temperature intolerance, excessive sweating, or other endocrine symptoms           Past Medical History:   Diagnosis Date   • Anxiety    • Carpal tunnel syndrome    • Colon polyps    • DJD (degenerative joint disease)    • Duodenal ulcer    • Gastric reflux    • Gastric ulcer    • Heel fracture     bilateral   • Hypertension    • Vitamin B12 deficiency    • Vitamin D deficiency      Past Surgical History:   Procedure Laterality Date   • ANKLE SURGERY      repair   • COLONOSCOPY      polyp removed; benign   • COLONOSCOPY  02/07/2019   • COLONOSCOPY W/ POLYPECTOMY      Followed by Dr Bethany Thakkar    • FOOT FRACTURE SURGERY Bilateral    • MULTIPLE TOOTH EXTRACTIONS     • POLYPECTOMY     • TIBIA FRACTURE SURGERY Left      Family History   Problem Relation Age of Onset   • Cancer Mother         lung   • Heart disease Mother    • Cancer Father         prostate   • Heart murmur Brother    • Diabetes Daughter    • Diabetes Son    • Diabetes Other      Social History     Socioeconomic History   • Marital status: /Civil Union     Spouse name: None   • Number of children: None   • Years of education: None   • Highest education level: None   Occupational History   • None   Tobacco Use   • Smoking status: Former     Packs/day: 1 00     Years: 40 00     Pack years: 40 00     Types: Cigarettes     Start date: 1976     Quit date: 2020     Years since quittin 6   • Smokeless tobacco: Former     Types: Chew     Quit date:    Vaping Use   • Vaping Use: Never used   Substance and Sexual Activity   • Alcohol use: No   • Drug use: No   • Sexual activity: None   Other Topics Concern   • None   Social History Narrative   • None     Social Determinants of Health     Financial Resource Strain: Not on file   Food Insecurity: Not on file   Transportation Needs: Not on file   Physical Activity: Not on file   Stress: Not on file   Social Connections: Not on file   Intimate Partner Violence: Not on file   Housing Stability: Not on file     Current Outpatient Medications on File Prior to Visit   Medication Sig   • ALPRAZolam (XANAX) 0 5 mg tablet take 1 tablet by mouth every 12 hours if needed for anxiety   • citalopram (CeleXA) 20 mg tablet take 1 tablet by mouth once daily   • ergocalciferol (VITAMIN D2) 50,000 units take 1 capsule by mouth every 28 DAYS   • glucosamine-chondroitin 500-400 MG tablet Take 1 tablet by mouth 3 (three) times a day   • lisinopril (ZESTRIL) 10 mg tablet take 1 tablet by mouth once daily   • Multiple Vitamin (MULTIVITAMIN) tablet Take 1 tablet by mouth daily   • pantoprazole (PROTONIX) 40 mg tablet take 1 tablet by mouth once daily   • [DISCONTINUED] celecoxib (CeleBREX) 100 mg capsule Take 1 capsule (100 mg total) by mouth 2 (two) times a day   • [DISCONTINUED] famotidine (PEPCID) 20 mg tablet take 1 tablet by mouth twice a day if needed for INDIGESTION OR HEARTBURN     No Known Allergies  Immunization History   Administered Date(s) Administered   • COVID-19 MODERNA VACC 0 5 ML IM 2021, 2021   • INFLUENZA 1999, 10/26/2020   • Pneumococcal Polysaccharide PPV23 2021   • Td (adult), adsorbed 1996   • Tdap 2016, 2018       Objective     BP 129/64 (BP Location: Left arm, Patient Position: Sitting, Cuff Size: Standard)   Pulse 80   Temp 98 1 °F (36 7 °C) (Temporal)   Resp 18   Ht 5' 8 5" (1 74 m)   Wt 112 kg (248 lb)   SpO2 97%   BMI 37 16 kg/m²     Physical Exam  Vitals and nursing note reviewed  GENERAL:  Appears well nourished, well groomed, in no acute distress  SKIN:  Palms warm, dry, color good  Nails without clubbing or cyanosis  No lesions, ulcerations, or wounds  HEAD:  Hair is average texture  Scalp without lesions, normocephalic, and atraumatic  EARS:  B/L ear canals clear  B/L TMs clear with + light reflex  NOSE: Mucosa pink, moist, septum midline  Negative sinus tenderness  B/L turbinates pink, moist, non-edematous without exudate  MOUTH:  Oral mucosa pink  Pharynx pink, moist, without swelling, redness, or exudate  Dentition ok  Tongue midline  NECK:  Supple, trachea midline, Negative thyromegaly, lymphadenopathy, or swollen glands  LYMPH NODES:  Negative enlargement of neck, axillary, epitrochlear, or inguinal nodes  THORAX/LUNGS  Thorax symmetric with good excursion  Lungs resonant  Breath sounds vesicular with no added sounds  Diaphragm descends within normal limits  CARDIOVASCULAR:  Carotid upstrokes brisk and without bruits  Apical impulse discrete and tapping, barely palpable in the 5th ICS/MCL  Normal S1 and Normal S2, Negative S3 or S4  Negative murmurs, thrills, lifts, or heaves  ABDOMEN:  Protuberant, bowel sounds normal active x 4 quadrants  Negative tenderness  Negative masses  Negative hepatomegaly  Negative splenomegaly  Negative costovertebral tenderness  EXTREMITIES:  Warm, calves supple, non-tender, negative for edema  Negative stasis pigmentation or ulcers  +2 pulses throughout  MUSCULOSKELETAL:  Negative joint deformities  Good range of motion in hands, wrists, elbows, shoulders, spine, hips, knees, and ankles    NEUROLOGICAL:  Mental status:  Awake, alert, and oriented to person, place, time, and event  Normal thought processes          LYNDSEY Berrios

## 2023-04-03 ENCOUNTER — OFFICE VISIT (OUTPATIENT)
Dept: FAMILY MEDICINE CLINIC | Facility: CLINIC | Age: 68
End: 2023-04-03

## 2023-04-03 VITALS
HEART RATE: 63 BPM | DIASTOLIC BLOOD PRESSURE: 62 MMHG | OXYGEN SATURATION: 95 % | SYSTOLIC BLOOD PRESSURE: 99 MMHG | RESPIRATION RATE: 18 BRPM | WEIGHT: 252.2 LBS | BODY MASS INDEX: 37.79 KG/M2 | TEMPERATURE: 98.7 F

## 2023-04-03 DIAGNOSIS — E53.8 VITAMIN B12 DEFICIENCY: ICD-10-CM

## 2023-04-03 DIAGNOSIS — E55.9 VITAMIN D DEFICIENCY: ICD-10-CM

## 2023-04-03 DIAGNOSIS — R10.9 LEFT LATERAL ABDOMINAL PAIN: ICD-10-CM

## 2023-04-03 DIAGNOSIS — M50.120 CERVICAL DISC DISORDER WITH RADICULOPATHY OF MID-CERVICAL REGION: ICD-10-CM

## 2023-04-03 DIAGNOSIS — F41.9 ANXIETY: ICD-10-CM

## 2023-04-03 DIAGNOSIS — D64.9 ANEMIA, UNSPECIFIED TYPE: ICD-10-CM

## 2023-04-03 DIAGNOSIS — M25.532 LEFT WRIST PAIN: ICD-10-CM

## 2023-04-03 DIAGNOSIS — M47.816 LUMBAR SPONDYLOSIS: ICD-10-CM

## 2023-04-03 DIAGNOSIS — E78.49 OTHER HYPERLIPIDEMIA: ICD-10-CM

## 2023-04-03 DIAGNOSIS — R73.9 HYPERGLYCEMIA: ICD-10-CM

## 2023-04-03 DIAGNOSIS — I10 HYPERTENSION, UNSPECIFIED TYPE: ICD-10-CM

## 2023-04-03 DIAGNOSIS — E07.9 THYROID DISORDER: ICD-10-CM

## 2023-04-03 DIAGNOSIS — M17.0 PRIMARY OSTEOARTHRITIS OF BOTH KNEES: Primary | ICD-10-CM

## 2023-04-03 RX ORDER — CITALOPRAM 40 MG/1
40 TABLET ORAL DAILY
Qty: 90 TABLET | Refills: 1 | Status: SHIPPED | OUTPATIENT
Start: 2023-04-03

## 2023-04-03 RX ORDER — ALPRAZOLAM 0.5 MG/1
0.5 TABLET ORAL EVERY 12 HOURS PRN
Qty: 60 TABLET | Refills: 2 | Status: SHIPPED | OUTPATIENT
Start: 2023-04-03

## 2023-04-03 NOTE — PROGRESS NOTES
Name: Nathaniel Ayoub      : 1955      MRN: [id number]  Encounter Provider: LYNDSEY Solares  Encounter Date: 4/3/2023   Encounter department: [address] [place] [address]     1  Primary osteoarthritis of both knees    2  Lumbar spondylosis    3  Hypertension, unspecified type    4  Thyroid disorder    5  Other hyperlipidemia    6  Hyperglycemia    7  Anemia, unspecified type    8  Cervical disc disorder with radiculopathy of mid-cervical region    9  Vitamin B12 deficiency    10  Vitamin D deficiency    11  Left wrist pain  -     XR wrist 3+ vw left; Future; Expected date: 2023    12  Left lateral abdominal pain  -     Comprehensive metabolic panel; Future; Expected date: 2023  -     US abdomen complete; Future; Expected date: 2023    13  Anxiety  -     citalopram (CeleXA) 40 mg tablet; Take 1 tablet (40 mg total) by mouth daily  -     ALPRAZolam (XANAX) 0 5 mg tablet; Take 1 tablet (0 5 mg total) by mouth every 12 (twelve) hours as needed for anxiety        Depression Screening and Follow-up Plan: Clincally patient does not have depression  No treatment is required  Subjective     Patient presents to office for follow up and recheck  Complete medical history and medications reviewed with patient and tolerating all medications well without any problems  Vital signs reviewed and stable  Lab results reviewed with patient  C/O left radial pain occurring for the past 4 weeks and feels it started after he was snow shoveling  Patient states he has increased stress in his life with his daughter and grandchildren living with him  Feels the stress is causing him difficulty falling and staying asleep in addition feels he can not function  C/O intermittent left abdominal pain occurring  Patient never scheduled appointment for CT Lung Screening and instructed on importance of having it done    Patient never scheduled appointment with Urology and instructed on importance of rescheduling appointment for prostate check  Denies any other problems or concerns at the present time  Review of Systems   GENERAL:  Feels well, denies any significant changes in weight without trying  SKIN:  Denies rashes, lesions, opened areas, wounds, change in moles or any other skin changes  HEENT:  Denies any head injury or headaches  Negative blurred vision, floaters, spots before eyes, infections, or other vision problems  Negative significant changes in vision or hearing  Negative tinnitus, vertigo, or infections  Negative hay fever, sinus trouble, nasal discharge, bloody noses, or problems with smell  Negative sore throat, bleeding gums, ulcers, or sores  NECK:  Denies lumps, goiter, pain, swollen glands, or lymphadenopathy  BREASTS:  Denies lumps, pain, nipple discharge, swelling, redness, or any other changes  RESPIRATORY:  Denies cough, wheezing, shortness of breath, dyspnea, or orthopnea  CARDIOVASCULAR:  Denies chest pain or palpitations  GASTROINTESTINAL:  Appetite good, denies nausea, vomiting, or indigestion  C/O intermittent left abdominal pain occurring  Bowel movements normal occurring about once daily or every other day  URINARY:  Denies frequency, incontinence, dysuria, hematuria, nocturia, or recent flank pain  GENITAL:  Denies penile discharge, ulcerations, lesions, or other problems  PERIPHERAL VASCULAR:  Denies varicosities, swelling, skin changes, or pain  MUSCULOSKELETAL:  C/O increased pain in left radial wrist radiating up forearm and hand  PSYCHIATRIC:  C/O increased stress in life causing anxiety causing him difficulty functioning at times due to anxiety getting bad so he is unable to function  NEUROLOGIC:  Denies fainting, dizziness, memory problems, seizures, tingling, motor or sensory loss  HEMATOLOGIC:  Denies easy bruising, bleeding, or anemia    ENDOCRINE:  Denies thyroid problems, temperature intolerance, excessive sweating, or other endocrine symptoms           Past Medical History:   Diagnosis Date   • Anxiety    • Carpal tunnel syndrome    • Colon polyps    • DJD (degenerative joint disease)    • Duodenal ulcer    • Gastric reflux    • Gastric ulcer    • Heel fracture     bilateral   • Hypertension    • Vitamin B12 deficiency    • Vitamin D deficiency      Past Surgical History:   Procedure Laterality Date   • ANKLE SURGERY      repair   • COLONOSCOPY      polyp removed; benign   • COLONOSCOPY  2019   • COLONOSCOPY W/ POLYPECTOMY      Followed by Dr Darya Ngo Bilateral    • MULTIPLE TOOTH EXTRACTIONS     • POLYPECTOMY     • TIBIA FRACTURE SURGERY Left      Family History   Problem Relation Age of Onset   • Cancer Mother         lung   • Heart disease Mother    • Cancer Father         prostate   • Heart murmur Brother    • Diabetes Daughter    • Diabetes Son    • Diabetes Other      Social History     Socioeconomic History   • Marital status: /Civil Union     Spouse name: None   • Number of children: None   • Years of education: None   • Highest education level: None   Occupational History   • None   Tobacco Use   • Smoking status: Former     Packs/day: 1 00     Years: 40 00     Pack years: 40 00     Types: Cigarettes     Start date: 1976     Quit date: 2020     Years since quittin 8   • Smokeless tobacco: Former     Types: Chew     Quit date:    Vaping Use   • Vaping Use: Never used   Substance and Sexual Activity   • Alcohol use: No   • Drug use: No   • Sexual activity: None   Other Topics Concern   • None   Social History Narrative   • None     Social Determinants of Health     Financial Resource Strain: Not on file   Food Insecurity: Not on file   Transportation Needs: Not on file   Physical Activity: Not on file   Stress: Not on file   Social Connections: Not on file   Intimate Partner Violence: Not on file   Housing Stability: Not on file     Current Outpatient Medications on File Prior to Visit   Medication Sig   • celecoxib (CeleBREX) 100 mg capsule Take 1 capsule (100 mg total) by mouth 2 (two) times a day   • ergocalciferol (VITAMIN D2) 50,000 units take 1 capsule by mouth every 28 DAYS   • famotidine (PEPCID) 20 mg tablet 1 tab po 2 x daily prn indigestion   • glucosamine-chondroitin 500-400 MG tablet Take 1 tablet by mouth 3 (three) times a day   • lisinopril (ZESTRIL) 10 mg tablet take 1 tablet by mouth once daily   • Multiple Vitamin (MULTIVITAMIN) tablet Take 1 tablet by mouth daily   • pantoprazole (PROTONIX) 40 mg tablet take 1 tablet by mouth once daily   • [DISCONTINUED] ALPRAZolam (XANAX) 0 5 mg tablet take 1 tablet by mouth every 12 hours if needed for anxiety   • [DISCONTINUED] citalopram (CeleXA) 20 mg tablet take 1 tablet by mouth once daily     No Known Allergies  Immunization History   Administered Date(s) Administered   • COVID-19 MODERNA VACC 0 5 ML IM 03/19/2021, 04/16/2021   • INFLUENZA 11/23/1999, 10/26/2020   • Pneumococcal Polysaccharide PPV23 07/06/2021   • Td (adult), adsorbed 12/19/1996   • Tdap 11/25/2016, 07/18/2018       Objective     BP 99/62 (BP Location: Left arm, Patient Position: Sitting, Cuff Size: Standard)   Pulse 63   Temp 98 7 °F (37 1 °C) (Tympanic)   Resp 18   Wt 114 kg (252 lb 3 2 oz)   SpO2 95%   BMI 37 79 kg/m²     Physical Exam  Vitals and nursing note reviewed  GENERAL:  Appears well nourished, well groomed, in no acute distress  SKIN:  Palms warm, dry, color good  Nails without clubbing or cyanosis  No lesions, ulcerations, or wounds  HEAD:  Hair is average texture  Scalp without lesions, normocephalic, and atraumatic  EARS:  B/L ear canals clear  B/L TMs clear with + light reflex  Acuity good to whispered voice  NOSE: Mucosa pink, moist, septum midline  Negative sinus tenderness  B/L turbinates pink, moist, non-edematous without exudate  MOUTH:  Oral mucosa pink    Pharynx pink, moist, without swelling, redness, or exudate  Dentition ok  Tongue midline  NECK:  Supple, trachea midline, Negative thyromegaly, lymphadenopathy, or swollen glands  LYMPH NODES:  Negative enlargement of neck, axillary, epitrochlear, or inguinal nodes  THORAX/LUNGS  Thorax symmetric with good excursion  Lungs resonant  Breath sounds vesicular with no added sounds  Diaphragm descends within normal limits  CARDIOVASCULAR:  Carotid upstrokes brisk and without bruits  Apical impulse discrete and tapping, barely palpable in the 5th ICS/MCL  Normal S1 and Normal S2, Negative S3 or S4  Negative murmurs, thrills, lifts, or heaves  ABDOMEN:  Protuberant, bowel sounds normal active x 4 quadrants  + left lateral abdominal tenderness upon palpation  Negative masses  Negative hepatomegaly  Negative splenomegaly  Negative costovertebral tenderness  EXTREMITIES:  Warm, calves supple, non-tender, negative for edema  Negative stasis pigmentation or ulcers  +2 pulses throughout  MUSCULOSKELETAL:  Negative joint deformities  Good range of motion in hands, wrists, elbows, shoulders, spine, hips, knees, and ankles  + tenderness of left radial area upon palpation and with Active/Passive ROM  NEUROLOGICAL:  Mental status:  Awake, alert, and oriented to person, place, time, and event  Normal thought processes           LYNDSEY Haro

## 2023-04-03 NOTE — PATIENT INSTRUCTIONS
Wellness Visit for Adults   WHAT YOU NEED TO KNOW:   What is a wellness visit? A wellness visit is when you see your healthcare provider to get screened for health problems  Your healthcare provider will also give you advice on how to stay healthy  Write down your questions so you remember to ask them  Ask your healthcare provider how often you should have a wellness visit  What happens at a wellness visit? Your healthcare provider will ask about your health, and your family history of health problems  This includes high blood pressure, heart disease, and cancer  He or she will ask if you have symptoms that concern you, if you smoke, and about your mood  You may also be asked about your intake of medicines, supplements, food, and alcohol  Any of the following may be done: Your weight  will be checked  Your height may also be checked so your body mass index (BMI) can be calculated  Your BMI shows if you are at a healthy weight  Your blood pressure  and heart rate will be checked  Your temperature may also be checked  Blood and urine tests  may be done  Blood tests may be done to check your cholesterol levels  Abnormal cholesterol levels increase your risk for heart disease and stroke  You may also need a blood or urine test to check for diabetes if you are at increased risk  Urine tests may be done to look for signs of an infection or kidney disease  A physical exam  includes checking your heartbeat and lungs with a stethoscope  Your healthcare provider may also check your skin to look for sun damage  Screening tests  may be recommended  A screening test is done to check for diseases that may not cause symptoms  The screening tests you may need depend on your age, gender, family history, and lifestyle habits  For example, colorectal screening may be recommended if you are 48years old or older  What screening tests do I need if I am a woman? A Pap smear  is used to screen for cervical cancer   Pap smears are usually done every 3 to 5 years depending on your age  You may need them more often if you have had abnormal Pap smear test results in the past  Ask your healthcare provider how often you should have a Pap smear  A mammogram  is an x-ray of your breasts to screen for breast cancer  Experts recommend mammograms every 2 years starting at age 48 years  You may need a mammogram at age 52 years or younger if you have an increased risk for breast cancer  Talk to your healthcare provider about when you should start having mammograms and how often you need them  What vaccines might I need? Get an influenza vaccine  every year  The influenza vaccine protects you from the flu  Several types of viruses cause the flu  The viruses change over time, so new vaccines are made each year  Get a tetanus-diphtheria (Td) booster vaccine  every 10 years  This vaccine protects you against tetanus and diphtheria  Tetanus is a severe infection that may cause painful muscle spasms and lockjaw  Diphtheria is a severe bacterial infection that causes a thick covering in the back of your mouth and throat  Get a human papillomavirus (HPV) vaccine  if you are female and aged 23 to 32 or male 23 to 24 and never received it  This vaccine protects you from HPV infection  HPV is the most common infection spread by sexual contact  HPV may also cause vaginal, penile, and anal cancers  Get a pneumococcal vaccine  if you are aged 72 years or older  The pneumococcal vaccine is an injection given to protect you from pneumococcal disease  Pneumococcal disease is an infection caused by pneumococcal bacteria  The infection may cause pneumonia, meningitis, or an ear infection  Get a shingles vaccine  if you are 60 or older, even if you have had shingles before  The shingles vaccine is an injection to protect you from the varicella-zoster virus  This is the same virus that causes chickenpox   Shingles is a painful rash that develops in people who had chickenpox or have been exposed to the virus  How can I eat healthy? My Plate is a model for planning healthy meals  It shows the types and amounts of foods that should go on your plate  Fruits and vegetables make up about half of your plate, and grains and protein make up the other half  A serving of dairy is included on the side of your plate  The amount of calories and serving sizes you need depends on your age, gender, weight, and height  Examples of healthy foods are listed below:  Eat a variety of vegetables  such as dark green, red, and orange vegetables  You can also include canned vegetables low in sodium (salt) and frozen vegetables without added butter or sauces  Eat a variety of fresh fruits , canned fruit in 100% juice, frozen fruit, and dried fruit  Include whole grains  At least half of the grains you eat should be whole grains  Examples include whole-wheat bread, wheat pasta, brown rice, and whole-grain cereals such as oatmeal     Eat a variety of protein foods such as seafood (fish and shellfish), lean meat, and poultry without skin (turkey and chicken)  Examples of lean meats include pork leg, shoulder, or tenderloin, and beef round, sirloin, tenderloin, and extra lean ground beef  Other protein foods include eggs and egg substitutes, beans, peas, soy products, nuts, and seeds  Choose low-fat dairy products such as skim or 1% milk or low-fat yogurt, cheese, and cottage cheese  Limit unhealthy fats  such as butter, hard margarine, and shortening  How much exercise do I need? Exercise at least 30 minutes per day on most days of the week  Some examples of exercise include walking, biking, dancing, and swimming  You can also fit in more physical activity by taking the stairs instead of the elevator or parking farther away from stores  Include muscle strengthening activities 2 days each week  Regular exercise provides many health benefits   It helps you manage your weight, and decreases your risk for type 2 diabetes, heart disease, stroke, and high blood pressure  Exercise can also help improve your mood  Ask your healthcare provider about the best exercise plan for you  What are some general health and safety guidelines I should follow? Do not smoke  Nicotine and other chemicals in cigarettes and cigars can cause lung damage  Ask your healthcare provider for information if you currently smoke and need help to quit  E-cigarettes or smokeless tobacco still contain nicotine  Talk to your healthcare provider before you use these products  Limit alcohol  A drink of alcohol is 12 ounces of beer, 5 ounces of wine, or 1½ ounces of liquor  Lose weight, if needed  Being overweight increases your risk of certain health conditions  These include heart disease, high blood pressure, type 2 diabetes, and certain types of cancer  Protect your skin  Do not sunbathe or use tanning beds  Use sunscreen with a SPF 15 or higher  Apply sunscreen at least 15 minutes before you go outside  Reapply sunscreen every 2 hours  Wear protective clothing, hats, and sunglasses when you are outside  Drive safely  Always wear your seatbelt  Make sure everyone in your car wears a seatbelt  A seatbelt can save your life if you are in an accident  Do not use your cell phone when you are driving  This could distract you and cause an accident  Pull over if you need to make a call or send a text message  Practice safe sex  Use latex condoms if are sexually active and have more than one partner  Your healthcare provider may recommend screening tests for sexually transmitted infections (STIs)  Wear helmets, lifejackets, and protective gear  Always wear a helmet when you ride a bike or motorcycle, go skiing, or play sports that could cause a head injury  Wear protective equipment when you play sports  Wear a lifejacket when you are on a boat or doing water sports      CARE AGREEMENT: You have the right to help plan your care  Learn about your health condition and how it may be treated  Discuss treatment options with your healthcare providers to decide what care you want to receive  You always have the right to refuse treatment  The above information is an  only  It is not intended as medical advice for individual conditions or treatments  Talk to your doctor, nurse or pharmacist before following any medical regimen to see if it is safe and effective for you  © Copyright [employer] 2022 Information is for End User's use only and may not be sold, redistributed or otherwise used for commercial purposes  Tendinitis   WHAT YOU NEED TO KNOW:   What is tendinitis? Tendinitis is painful inflammation or breakdown of your tendons  It may also be called tendinopathy  Tendinitis often occurs in the knee, shoulder, ankle, hip, or elbow  What increases my risk for tendinitis? Injury, overuse, or repeated movement of a joint    Not warming up before exercise, or not resting enough between activities    Use of shoes or exercise equipment that do not fit well    Muscle weakness, balance problems, or poor posture    What are the signs and symptoms of tendinitis? You may have redness, pain, or swelling around your joint, tendon, or muscle  You may also have pain, stiffness, or decreased movement in your joint  How is tendinitis diagnosed? Your healthcare provider will check your range of motion of the affected joint  He or she may also lightly press on your tendon to check for pain  You may also need an ultrasound, x-ray, or MRI to show if you have tendinitis or another condition  Do not enter the MRI room with any metal  Metal can cause serious damage  Tell the provider if you have any metal in or on your body  How is tendinitis treated? Pain medicines  such as NSAIDs and acetaminophen may decrease swelling and pain  These medicines are available without a doctor's order   Ask how much to take and when to take it  Follow directions  NSAIDs and acetaminophen may cause liver or kidney damage if not taken correctly  Steroids  may be used to decrease pain and swelling  They may be given as a pill or as an injection into the affected area  Steroids are rarely used in children  Surgery  may rarely be needed if other treatment does not work  How can I manage my symptoms? Rest your tendon  as directed to help it heal  Ask your healthcare provider if you need to stop putting weight on your affected area  Apply ice  to help decrease swelling and pain  Use an ice pack, or put crushed ice in a plastic bag  Cover the bag with a towel before you place it on the affected area  Apply ice for 10 to 15 minutes every hour, or as directed  Use a support device , such as a cane, splint, shoe insert, or brace  A support device may help reduce your pain  Go to physical therapy  if directed  A physical therapist can teach you exercises to help improve movement and strength, and to decrease pain  You may also learn how to improve your posture, and how to lift or exercise correctly  How can I prevent tendinitis? Warm up and cool down when you exercise  Run in place slowly or walk at a brisk pace to warm your muscles before you exercise  When you finish exercising, walk for a few minutes to cool down  Exercise regularly  to strengthen the muscles around your joint  Ease into an exercise routine for the first 3 weeks to prevent another injury  Ask your healthcare provider about the best exercise plan for you  Rest fully between activities  Use the right equipment  for sports and exercise  Wear braces or tape around weak joints as directed  When should I seek immediate care? You have increased redness over the joint, or swelling in the joint  You suddenly cannot move your joint  When should I call my doctor? You have increased pain even after you take medicine      You have questions or concerns about your condition or care  CARE AGREEMENT:   You have the right to help plan your care  Learn about your health condition and how it may be treated  Discuss treatment options with your healthcare providers to decide what care you want to receive  You always have the right to refuse treatment  The above information is an  only  It is not intended as medical advice for individual conditions or treatments  Talk to your doctor, nurse or pharmacist before following any medical regimen to see if it is safe and effective for you  © Copyright Yanni Trinidad 2022 Information is for End User's use only and may not be sold, redistributed or otherwise used for commercial purposes

## 2023-04-04 ENCOUNTER — APPOINTMENT (OUTPATIENT)
Dept: RADIOLOGY | Facility: CLINIC | Age: 68
End: 2023-04-04

## 2023-04-04 ENCOUNTER — APPOINTMENT (OUTPATIENT)
Dept: LAB | Facility: CLINIC | Age: 68
End: 2023-04-04

## 2023-04-04 DIAGNOSIS — M25.532 LEFT WRIST PAIN: ICD-10-CM

## 2023-04-04 DIAGNOSIS — R10.9 LEFT LATERAL ABDOMINAL PAIN: ICD-10-CM

## 2023-04-04 LAB
ALBUMIN SERPL BCP-MCNC: 3.8 G/DL (ref 3.5–5)
ALP SERPL-CCNC: 42 U/L (ref 46–116)
ALT SERPL W P-5'-P-CCNC: 80 U/L (ref 12–78)
ANION GAP SERPL CALCULATED.3IONS-SCNC: -1 MMOL/L (ref 4–13)
AST SERPL W P-5'-P-CCNC: 44 U/L (ref 5–45)
BILIRUB SERPL-MCNC: 0.46 MG/DL (ref 0.2–1)
BUN SERPL-MCNC: 15 MG/DL (ref 5–25)
CALCIUM SERPL-MCNC: 9.2 MG/DL (ref 8.3–10.1)
CHLORIDE SERPL-SCNC: 107 MMOL/L (ref 96–108)
CO2 SERPL-SCNC: 31 MMOL/L (ref 21–32)
CREAT SERPL-MCNC: 1.13 MG/DL (ref 0.6–1.3)
GFR SERPL CREATININE-BSD FRML MDRD: 65 ML/MIN/1.73SQ M
GLUCOSE P FAST SERPL-MCNC: 105 MG/DL (ref 65–99)
POTASSIUM SERPL-SCNC: 4.4 MMOL/L (ref 3.5–5.3)
PROT SERPL-MCNC: 7.1 G/DL (ref 6.4–8.4)
SODIUM SERPL-SCNC: 137 MMOL/L (ref 135–147)

## 2023-04-05 ENCOUNTER — HOSPITAL ENCOUNTER (OUTPATIENT)
Dept: ULTRASOUND IMAGING | Facility: HOSPITAL | Age: 68
Discharge: HOME | End: 2023-04-05

## 2023-04-05 DIAGNOSIS — R10.9 LEFT LATERAL ABDOMINAL PAIN: ICD-10-CM

## 2023-04-19 DIAGNOSIS — R93.5 ABNORMAL US (ULTRASOUND) OF ABDOMEN: ICD-10-CM

## 2023-04-19 DIAGNOSIS — R10.9 ABDOMINAL PAIN, UNSPECIFIED ABDOMINAL LOCATION: ICD-10-CM

## 2023-04-19 DIAGNOSIS — K76.0 HEPATIC STEATOSIS: Primary | ICD-10-CM

## 2023-04-19 DIAGNOSIS — R16.0 ENLARGED LIVER: ICD-10-CM

## 2023-04-27 ENCOUNTER — OFFICE VISIT (OUTPATIENT)
Dept: OBGYN CLINIC | Facility: CLINIC | Age: 68
End: 2023-04-27

## 2023-04-27 VITALS
SYSTOLIC BLOOD PRESSURE: 124 MMHG | HEART RATE: 65 BPM | DIASTOLIC BLOOD PRESSURE: 71 MMHG | HEIGHT: 69 IN | WEIGHT: 246 LBS | BODY MASS INDEX: 36.43 KG/M2

## 2023-04-27 DIAGNOSIS — M65.4 DE QUERVAIN'S DISEASE (TENOSYNOVITIS): Primary | ICD-10-CM

## 2023-04-27 DIAGNOSIS — M19.032 CMC DJD(CARPOMETACARPAL DEGENERATIVE JOINT DISEASE), LOCALIZED PRIMARY, LEFT: ICD-10-CM

## 2023-04-27 DIAGNOSIS — M25.532 LEFT WRIST PAIN: ICD-10-CM

## 2023-04-27 RX ORDER — BUPIVACAINE HYDROCHLORIDE 2.5 MG/ML
0.5 INJECTION, SOLUTION INFILTRATION; PERINEURAL
Status: COMPLETED | OUTPATIENT
Start: 2023-04-27 | End: 2023-04-27

## 2023-04-27 RX ORDER — BETAMETHASONE SODIUM PHOSPHATE AND BETAMETHASONE ACETATE 3; 3 MG/ML; MG/ML
3 INJECTION, SUSPENSION INTRA-ARTICULAR; INTRALESIONAL; INTRAMUSCULAR; SOFT TISSUE
Status: COMPLETED | OUTPATIENT
Start: 2023-04-27 | End: 2023-04-27

## 2023-04-27 RX ADMIN — BUPIVACAINE HYDROCHLORIDE 0.5 ML: 2.5 INJECTION, SOLUTION INFILTRATION; PERINEURAL at 10:41

## 2023-04-27 RX ADMIN — BETAMETHASONE SODIUM PHOSPHATE AND BETAMETHASONE ACETATE 3 MG: 3; 3 INJECTION, SUSPENSION INTRA-ARTICULAR; INTRALESIONAL; INTRAMUSCULAR; SOFT TISSUE at 10:41

## 2023-04-27 NOTE — PROGRESS NOTES
Assessment:   Diagnosis ICD-10-CM Associated Orders   1  De Quervain's disease (tenosynovitis)  M65 4       2  Left wrist pain  M25 532 Ambulatory Referral to Orthopedic Surgery      3  [facility] DJD(carpometacarpal degenerative joint disease), localized primary, left  M19 032 Ambulatory Referral to Orthopedic Surgery        Plan:  Reviewed today's physical exam findings and x-ray findings with patient at time of visit  X-Ray of left wrist taken on 4/4/2023 were reviewed and showed no acute osseous abnormalities  Degenerative changes of the first carpometacarpal articulation  He was offered, accepted, performed an injection(s) of cortisone to his Left wrist for symptomatic relief of pain and inflammation  Patient tolerated the treatment(s) well  Ice and post injection protocol advised  Weightbearing activities as tolerated  He will be seen for follow-up in 8 weeks for re-evaluation and consideration for repeat injections as necessary  Patient expresses understanding and is in agreement with this treatment plan  The patient was given the opportunity to ask questions or present concerns  The patient has first dorsal compartment tendinitis of his left wrist   This was injected with Celestone and Marcaine  He tolerated procedure quite well  Return back 2 months evaluation  If his condition changes, he would not hesitate to let us know    To do next visit:  Return in about 8 weeks (around 6/22/2023)  The above stated was discussed in layman's terms and the patient expressed understanding  All questions were answered to the patient's satisfaction       Scribe Attestation    I,:  Roxana Nugent am acting as a scribe while in the presence of the attending physician :       I,:  Azar Milner, DO personally performed the services described in this documentation    as scribed in my presence :         Subjective:   Liz Koo is a 69 y o  male who presents today for an Initial evaluation of his left wrist due to pain  Patient was last seen on 4/3/2023 in [id number] [place] [address] at which time x-rays were taken/reviewed and instructed the patient to follow-up with orthopedic surgery  On today's presentation, the patient states he has been experiencing radial pain occurring for the past few months  Denies any previous mechanism of injury or boni trauma to his left upper extremity to warrant his symptoms  He is tender along the extensor compartment especially the EPB and APL tendons  The patient also states that he has previously been diagnosed with carpal tunnel in his bilateral wrists/hands but has been treated conservatively at this time  Review of systems negative unless otherwise specified in HPI  Review of Systems   Constitutional: Negative for chills and fever  HENT: Negative for ear pain and sore throat  Eyes: Negative for pain and visual disturbance  Respiratory: Negative for cough and shortness of breath  Cardiovascular: Negative for chest pain and palpitations  Gastrointestinal: Negative for abdominal pain and vomiting  Genitourinary: Negative for dysuria and hematuria  Musculoskeletal: Negative for arthralgias and back pain  Skin: Negative for color change and rash  Neurological: Negative for seizures and syncope  All other systems reviewed and are negative        Past Medical History:   Diagnosis Date   • Anxiety    • Carpal tunnel syndrome    • Colon polyps    • DJD (degenerative joint disease)    • Duodenal ulcer    • Gastric reflux    • Gastric ulcer    • Heel fracture     bilateral   • Hypertension    • Vitamin B12 deficiency    • Vitamin D deficiency        Past Surgical History:   Procedure Laterality Date   • ANKLE SURGERY      repair   • COLONOSCOPY      polyp removed; benign   • COLONOSCOPY  02/07/2019   • COLONOSCOPY W/ POLYPECTOMY      Followed by Dr Kirk Feliciano Bilateral    • MULTIPLE TOOTH EXTRACTIONS     • POLYPECTOMY     • TIBIA FRACTURE SURGERY Left        Family History   Problem Relation Age of Onset   • Cancer Mother         lung   • Heart disease Mother    • Cancer Father         prostate   • Heart murmur Brother    • Diabetes Daughter    • Diabetes Son    • Diabetes Other        Social History     Occupational History   • Not on file   Tobacco Use   • Smoking status: Former     Packs/day: 1 00     Years: 40 00     Pack years: 40 00     Types: Cigarettes     Start date: 1976     Quit date: 2020     Years since quittin 9   • Smokeless tobacco: Former     Types: Chew     Quit date:    Vaping Use   • Vaping Use: Never used   Substance and Sexual Activity   • Alcohol use: No   • Drug use: No   • Sexual activity: Not on file         Current Outpatient Medications:   •  ALPRAZolam (XANAX) 0 5 mg tablet, Take 1 tablet (0 5 mg total) by mouth every 12 (twelve) hours as needed for anxiety, Disp: 60 tablet, Rfl: 2  •  celecoxib (CeleBREX) 100 mg capsule, Take 1 capsule (100 mg total) by mouth 2 (two) times a day, Disp: 180 capsule, Rfl: 1  •  citalopram (CeleXA) 40 mg tablet, Take 1 tablet (40 mg total) by mouth daily, Disp: 90 tablet, Rfl: 1  •  ergocalciferol (VITAMIN D2) 50,000 units, take 1 capsule by mouth every 28 DAYS, Disp: 3 capsule, Rfl: 1  •  famotidine (PEPCID) 20 mg tablet, 1 tab po 2 x daily prn indigestion, Disp: 60 tablet, Rfl: 5  •  glucosamine-chondroitin 500-400 MG tablet, Take 1 tablet by mouth 3 (three) times a day, Disp: 90 tablet, Rfl: 5  •  lisinopril (ZESTRIL) 10 mg tablet, take 1 tablet by mouth once daily, Disp: 90 tablet, Rfl: 1  •  Multiple Vitamin (MULTIVITAMIN) tablet, Take 1 tablet by mouth daily, Disp: 90 tablet, Rfl: 1  •  pantoprazole (PROTONIX) 40 mg tablet, take 1 tablet by mouth once daily, Disp: 90 tablet, Rfl: 1    No Known Allergies       Vitals:    23 1004   BP: 124/71   Pulse: 65       Objective:                    Ortho Exam  left wrist -  Patient presents with no obvious "anatomical deformity  Skin is warm and dry to touch with no signs of erythema, ecchymosis, infection  TTP over EPB and APL tendons  MMT: 4/5 throughout  No soft tissue swelling or effusion noted  Full FDS, FDP, extensor mechanisms are intact  - Thenar atrophy, - intrinsic atrophy  + Tinel's at carpal tunnel  - Phalen's sign  + Carpal Tunnel Compression  - AP / LM Drawer  + Finklestein  - Ulnar / Radial impaction   - Ortega's  Demonstrates normail wrist, elbow, and shoulder motion  Forearm compartments are soft and supple  2+ Distal radial pulse with brisk capillary refill to the fingers  Radial, median, ulnar motor and sensory distribution intact  Sensation to light touch intact distally    Diagnostics, reviewed and taken today if performed as documented: The attending physician has personally reviewed the pertinent films in PACS and interpretation is as follows:    X-Ray of left wrist taken on 4/4/2023 were reviewed and showed no acute osseous abnormalities  Degenerative changes of the first carpometacarpal articulation  Procedures, if performed today:    Hand/upper extremity injection: R extensor compartment 1  Universal Protocol:  Consent: Verbal consent obtained  Risks and benefits: risks, benefits and alternatives were discussed  Consent given by: patient  Time out: Immediately prior to procedure a \"time out\" was called to verify the correct patient, procedure, equipment, support staff and site/side marked as required    Timeout called at: 4/27/2023 10:38 AM   Patient understanding: patient states understanding of the procedure being performed  Site marked: the operative site was marked  Supporting Documentation  Indications: diagnostic and pain   Procedure Details  Condition:de Quervain's tenosynovitis Site: R extensor compartment 1   Needle size: 25 G  Ultrasound guidance: no  Medications administered: 0 5 mL bupivacaine 0 25 %; 3 mg betamethasone acetate-betamethasone sodium phosphate 6 (3-3) " "mg/mL    Patient tolerance: patient tolerated the procedure well with no immediate complications  Dressing:  Sterile dressing applied       Portions of the record may have been created with voice recognition software  Occasional wrong word or \"sound a like\" substitutions may have occurred due to the inherent limitations of voice recognition software  Read the chart carefully and recognize, using context, where substitutions have occurred    "

## 2023-06-18 DIAGNOSIS — E55.9 VITAMIN D DEFICIENCY: ICD-10-CM

## 2023-06-19 RX ORDER — ERGOCALCIFEROL 1.25 MG/1
CAPSULE ORAL
Qty: 3 CAPSULE | Refills: 1 | Status: SHIPPED | OUTPATIENT
Start: 2023-06-19

## 2023-06-22 ENCOUNTER — OFFICE VISIT (OUTPATIENT)
Dept: OBGYN CLINIC | Facility: CLINIC | Age: 68
End: 2023-06-22
Payer: MEDICARE

## 2023-06-22 VITALS
HEART RATE: 71 BPM | BODY MASS INDEX: 36.73 KG/M2 | SYSTOLIC BLOOD PRESSURE: 129 MMHG | WEIGHT: 248 LBS | DIASTOLIC BLOOD PRESSURE: 80 MMHG | HEIGHT: 69 IN

## 2023-06-22 DIAGNOSIS — M19.032 CMC DJD(CARPOMETACARPAL DEGENERATIVE JOINT DISEASE), LOCALIZED PRIMARY, LEFT: Primary | ICD-10-CM

## 2023-06-22 DIAGNOSIS — M65.4 DE QUERVAIN'S DISEASE (TENOSYNOVITIS): ICD-10-CM

## 2023-06-22 PROCEDURE — 99213 OFFICE O/P EST LOW 20 MIN: CPT | Performed by: ORTHOPAEDIC SURGERY

## 2023-06-22 NOTE — PROGRESS NOTES
Assessment/Plan:   Diagnoses and all orders for this visit:    [facility] DJD(carpometacarpal degenerative joint disease), localized primary, left    De Quervain's disease (tenosynovitis)    Discussed with patient that today's physical exam is essentially benign  he is cleared to resume all activity as desired without limitations or restrictions  he is considered clinically resolved in regards to this issue, and there is no need for scheduled follow-up appointments  Discussed with patient that there is always the opportunity states that his symptoms may recur in the future  Should any questions or concerns arise, he is welcome to contact the office and arrange for follow-up as necessary  Patient expresses understanding and is in agreement with this treatment plan  Patient is a symptomatic in regards to his first dorsal compartment tendinitis  Continue home exercise program   Follow-up on an as-needed basis  If his condition changes, he would not hesitate to let us know    Subjective:   Patient ID: Warden Goldman III  1955     HPI  Patient is a 69 y o  male who presents for follow-up evaluation of de Quervain's tendinosis of the left wrist  He was last seenthis issue on 4/27/2023, at which time he received a CS injection to the 1st dorsal compartment for relief of pain inflammation  Patient states that he has seen complete relief of his symptoms following the injection  He has been able to return to activities as desired without symptom exacerbation  He denies any new onset bruising, swelling, numbness, tingling, or feelings of instability      The following portions of the patient's history were reviewed and updated as appropriate:  Past medical history, past surgical history, Family history, social history, current medications and allergies    Past Medical History:   Diagnosis Date   • Anxiety    • Carpal tunnel syndrome    • Colon polyps    • DJD (degenerative joint disease)    • Duodenal ulcer    • Gastric reflux    • Gastric ulcer    • Heel fracture     bilateral   • Hypertension    • Vitamin B12 deficiency    • Vitamin D deficiency        Past Surgical History:   Procedure Laterality Date   • ANKLE SURGERY      repair   • COLONOSCOPY      polyp removed; benign   • COLONOSCOPY  02/07/2019   • COLONOSCOPY W/ POLYPECTOMY      Followed by Dr Joan Pittman    • Yaneli Sarmiento Bilateral    • MULTIPLE TOOTH EXTRACTIONS     • POLYPECTOMY     • TIBIA FRACTURE SURGERY Left        Family History   Problem Relation Age of Onset   • Cancer Mother         lung   • Heart disease Mother    • Cancer Father         prostate   • Heart murmur Brother    • Diabetes Daughter    • Diabetes Son    • Diabetes Other        Social History     Socioeconomic History   • Marital status: /Civil Union     Spouse name: None   • Number of children: None   • Years of education: None   • Highest education level: None   Occupational History   • None   Tobacco Use   • Smoking status: Former     Packs/day: 1 00     Years: 40 00     Total pack years: 40 00     Types: Cigarettes     Start date: 1/1/1976     Quit date: 6/1/2020     Years since quitting: 3 0   • Smokeless tobacco: Former     Types: Chew     Quit date: 2014   Vaping Use   • Vaping Use: Never used   Substance and Sexual Activity   • Alcohol use: No   • Drug use: No   • Sexual activity: None   Other Topics Concern   • None   Social History Narrative   • None     Social Determinants of Health     Financial Resource Strain: Not on file   Food Insecurity: Not on file   Transportation Needs: No Transportation Needs (4/15/2021)    PRAPARE - Transportation    • Lack of Transportation (Medical): No    • Lack of Transportation (Non-Medical):  No   Physical Activity: Not on file   Stress: Not on file   Social Connections: Not on file   Intimate Partner Violence: Not on file   Housing Stability: Not on file         Current Outpatient Medications:   •  ALPRAZolam (XANAX) 0 5 mg tablet, Take 1 "tablet (0 5 mg total) by mouth every 12 (twelve) hours as needed for anxiety, Disp: 60 tablet, Rfl: 2  •  celecoxib (CeleBREX) 100 mg capsule, Take 1 capsule (100 mg total) by mouth 2 (two) times a day, Disp: 180 capsule, Rfl: 1  •  citalopram (CeleXA) 40 mg tablet, Take 1 tablet (40 mg total) by mouth daily, Disp: 90 tablet, Rfl: 1  •  ergocalciferol (VITAMIN D2) 50,000 units, take 1 capsule by mouth every 28 DAYS, Disp: 3 capsule, Rfl: 1  •  famotidine (PEPCID) 20 mg tablet, 1 tab po 2 x daily prn indigestion, Disp: 60 tablet, Rfl: 5  •  glucosamine-chondroitin 500-400 MG tablet, Take 1 tablet by mouth 3 (three) times a day, Disp: 90 tablet, Rfl: 5  •  lisinopril (ZESTRIL) 10 mg tablet, take 1 tablet by mouth once daily, Disp: 90 tablet, Rfl: 1  •  Multiple Vitamin (MULTIVITAMIN) tablet, Take 1 tablet by mouth daily, Disp: 90 tablet, Rfl: 1  •  pantoprazole (PROTONIX) 40 mg tablet, take 1 tablet by mouth once daily, Disp: 90 tablet, Rfl: 1    No Known Allergies    Review of Systems   Constitutional: Negative for chills, fever and unexpected weight change  HENT: Negative for hearing loss, nosebleeds and sore throat  Eyes: Negative for pain, redness and visual disturbance  Respiratory: Negative for cough, shortness of breath and wheezing  Cardiovascular: Negative for chest pain, palpitations and leg swelling  Gastrointestinal: Negative for abdominal pain, nausea and vomiting  Endocrine: Negative for polydipsia and polyuria  Genitourinary: Negative for dysuria and hematuria  Musculoskeletal:        As noted in HPI   Skin: Negative for rash and wound  Neurological: Negative for dizziness, numbness and headaches  Psychiatric/Behavioral: Negative for decreased concentration and suicidal ideas  The patient is not nervous/anxious           Objective:  /80 (BP Location: Left arm, Patient Position: Sitting, Cuff Size: Large)   Pulse 71   Ht 5' 8 5\" (1 74 m)   Wt 112 kg (248 lb)   BMI 37 16 " kg/m²     Ortho Exam  Left wrist -   Patient presents with no obvious anatomical deformity  Skin is warm dry to touch with no signs of erythema, ecchymosis, or infection  No soft tissue swelling or effusion noted  Demonstrates normal range of motion as compared to contralateral upper extremity  No tenderness to palpation over the 1st dorsal compartment  5/5 MMT throughout  -Finkelstein sign  2+ distal radial pulse of brisk capillary refill to the fingers  Radial, median, and ulnar motor and sensory distributions intact  Sensation to light touch intact distally    Physical Exam  Vitals reviewed  Constitutional:       Appearance: He is well-developed  HENT:      Head: Normocephalic and atraumatic  Nose: Nose normal    Eyes:      Conjunctiva/sclera: Conjunctivae normal    Cardiovascular:      Rate and Rhythm: Normal rate  Pulmonary:      Effort: Pulmonary effort is normal    Musculoskeletal:      Cervical back: Neck supple  Skin:     General: Skin is warm and dry  Capillary Refill: Capillary refill takes less than 2 seconds  Neurological:      Mental Status: He is alert and oriented to person, place, and time     Psychiatric:         Mood and Affect: Mood normal          Behavior: Behavior normal         Diagnostic Test Review:  No new imaging reviewed this visit    Procedures   No procedures performed this visit    Scribe Attestation    I,:  Linda Norton am acting as a scribe while in the presence of the attending physician :       I,:  Dwain Walker DO personally performed the services described in this documentation    as scribed in my presence :

## 2023-06-28 DIAGNOSIS — I10 HYPERTENSION, UNSPECIFIED TYPE: ICD-10-CM

## 2023-06-29 RX ORDER — LISINOPRIL 10 MG/1
TABLET ORAL
Qty: 90 TABLET | Refills: 1 | Status: SHIPPED | OUTPATIENT
Start: 2023-06-29

## 2023-07-07 DIAGNOSIS — K25.9 GASTRIC ULCER, UNSPECIFIED CHRONICITY, UNSPECIFIED WHETHER GASTRIC ULCER HEMORRHAGE OR PERFORATION PRESENT: ICD-10-CM

## 2023-07-07 DIAGNOSIS — K21.9 GASTROESOPHAGEAL REFLUX DISEASE WITHOUT ESOPHAGITIS: ICD-10-CM

## 2023-07-11 ENCOUNTER — APPOINTMENT (OUTPATIENT)
Dept: LAB | Facility: CLINIC | Age: 68
End: 2023-07-11
Payer: MEDICARE

## 2023-07-11 DIAGNOSIS — E78.49 OTHER HYPERLIPIDEMIA: ICD-10-CM

## 2023-07-11 DIAGNOSIS — E55.9 VITAMIN D DEFICIENCY: ICD-10-CM

## 2023-07-11 DIAGNOSIS — E07.9 THYROID DISORDER: ICD-10-CM

## 2023-07-11 DIAGNOSIS — D64.9 ANEMIA, UNSPECIFIED TYPE: ICD-10-CM

## 2023-07-11 DIAGNOSIS — R73.9 HYPERGLYCEMIA: ICD-10-CM

## 2023-07-11 DIAGNOSIS — E53.8 VITAMIN B12 DEFICIENCY: ICD-10-CM

## 2023-07-11 DIAGNOSIS — R79.89 ELEVATED LFTS: ICD-10-CM

## 2023-07-11 LAB
25(OH)D3 SERPL-MCNC: 32.2 NG/ML (ref 30–100)
ALBUMIN SERPL BCP-MCNC: 3.8 G/DL (ref 3.5–5)
ALP SERPL-CCNC: 42 U/L (ref 46–116)
ALT SERPL W P-5'-P-CCNC: 77 U/L (ref 12–78)
ANION GAP SERPL CALCULATED.3IONS-SCNC: 3 MMOL/L
AST SERPL W P-5'-P-CCNC: 49 U/L (ref 5–45)
BASOPHILS # BLD AUTO: 0.01 THOUSANDS/ÂΜL (ref 0–0.1)
BASOPHILS NFR BLD AUTO: 0 % (ref 0–1)
BILIRUB SERPL-MCNC: 0.43 MG/DL (ref 0.2–1)
BUN SERPL-MCNC: 16 MG/DL (ref 5–25)
CALCIUM SERPL-MCNC: 8.8 MG/DL (ref 8.3–10.1)
CHLORIDE SERPL-SCNC: 108 MMOL/L (ref 96–108)
CHOLEST SERPL-MCNC: 130 MG/DL
CO2 SERPL-SCNC: 29 MMOL/L (ref 21–32)
CREAT SERPL-MCNC: 1.04 MG/DL (ref 0.6–1.3)
EOSINOPHIL # BLD AUTO: 0.09 THOUSAND/ÂΜL (ref 0–0.61)
EOSINOPHIL NFR BLD AUTO: 2 % (ref 0–6)
ERYTHROCYTE [DISTWIDTH] IN BLOOD BY AUTOMATED COUNT: 12.2 % (ref 11.6–15.1)
GFR SERPL CREATININE-BSD FRML MDRD: 72 ML/MIN/1.73SQ M
GLUCOSE P FAST SERPL-MCNC: 120 MG/DL (ref 65–99)
HCT VFR BLD AUTO: 40.3 % (ref 36.5–49.3)
HDLC SERPL-MCNC: 55 MG/DL
HGB BLD-MCNC: 13.3 G/DL (ref 12–17)
IMM GRANULOCYTES # BLD AUTO: 0.02 THOUSAND/UL (ref 0–0.2)
IMM GRANULOCYTES NFR BLD AUTO: 1 % (ref 0–2)
INSULIN SERPL-ACNC: 19.22 UIU/ML (ref 1.9–23)
LDLC SERPL CALC-MCNC: 59 MG/DL (ref 0–100)
LYMPHOCYTES # BLD AUTO: 1.22 THOUSANDS/ÂΜL (ref 0.6–4.47)
LYMPHOCYTES NFR BLD AUTO: 30 % (ref 14–44)
MCH RBC QN AUTO: 31.4 PG (ref 26.8–34.3)
MCHC RBC AUTO-ENTMCNC: 33 G/DL (ref 31.4–37.4)
MCV RBC AUTO: 95 FL (ref 82–98)
MONOCYTES # BLD AUTO: 0.41 THOUSAND/ÂΜL (ref 0.17–1.22)
MONOCYTES NFR BLD AUTO: 10 % (ref 4–12)
NEUTROPHILS # BLD AUTO: 2.32 THOUSANDS/ÂΜL (ref 1.85–7.62)
NEUTS SEG NFR BLD AUTO: 57 % (ref 43–75)
NONHDLC SERPL-MCNC: 75 MG/DL
NRBC BLD AUTO-RTO: 0 /100 WBCS
PLATELET # BLD AUTO: 181 THOUSANDS/UL (ref 149–390)
PMV BLD AUTO: 9.5 FL (ref 8.9–12.7)
POTASSIUM SERPL-SCNC: 4.6 MMOL/L (ref 3.5–5.3)
PROT SERPL-MCNC: 7.1 G/DL (ref 6.4–8.4)
RBC # BLD AUTO: 4.24 MILLION/UL (ref 3.88–5.62)
SODIUM SERPL-SCNC: 140 MMOL/L (ref 135–147)
TRIGL SERPL-MCNC: 80 MG/DL
TSH SERPL DL<=0.05 MIU/L-ACNC: 3.46 UIU/ML (ref 0.45–4.5)
VIT B12 SERPL-MCNC: 366 PG/ML (ref 180–914)
WBC # BLD AUTO: 4.07 THOUSAND/UL (ref 4.31–10.16)

## 2023-07-11 PROCEDURE — 36415 COLL VENOUS BLD VENIPUNCTURE: CPT

## 2023-07-11 PROCEDURE — 83036 HEMOGLOBIN GLYCOSYLATED A1C: CPT

## 2023-07-11 PROCEDURE — 80061 LIPID PANEL: CPT

## 2023-07-11 PROCEDURE — 82306 VITAMIN D 25 HYDROXY: CPT

## 2023-07-11 PROCEDURE — 82607 VITAMIN B-12: CPT

## 2023-07-11 PROCEDURE — 85025 COMPLETE CBC W/AUTO DIFF WBC: CPT

## 2023-07-11 PROCEDURE — 80053 COMPREHEN METABOLIC PANEL: CPT

## 2023-07-11 PROCEDURE — 84443 ASSAY THYROID STIM HORMONE: CPT

## 2023-07-11 PROCEDURE — 83525 ASSAY OF INSULIN: CPT

## 2023-07-12 LAB
EST. AVERAGE GLUCOSE BLD GHB EST-MCNC: 111 MG/DL
HBA1C MFR BLD: 5.5 %

## 2023-07-13 ENCOUNTER — CONSULT (OUTPATIENT)
Dept: GASTROENTEROLOGY | Facility: CLINIC | Age: 68
End: 2023-07-13
Payer: MEDICARE

## 2023-07-13 VITALS
SYSTOLIC BLOOD PRESSURE: 142 MMHG | HEIGHT: 69 IN | TEMPERATURE: 98.4 F | DIASTOLIC BLOOD PRESSURE: 77 MMHG | BODY MASS INDEX: 36.88 KG/M2 | RESPIRATION RATE: 18 BRPM | HEART RATE: 56 BPM | WEIGHT: 249 LBS

## 2023-07-13 DIAGNOSIS — Z11.59 ENCOUNTER FOR SCREENING FOR VIRAL DISEASE: ICD-10-CM

## 2023-07-13 DIAGNOSIS — K76.0 HEPATIC STEATOSIS: Primary | ICD-10-CM

## 2023-07-13 DIAGNOSIS — R16.0 ENLARGED LIVER: ICD-10-CM

## 2023-07-13 DIAGNOSIS — E66.01 CLASS 2 SEVERE OBESITY DUE TO EXCESS CALORIES WITH SERIOUS COMORBIDITY AND BODY MASS INDEX (BMI) OF 38.0 TO 38.9 IN ADULT (HCC): ICD-10-CM

## 2023-07-13 DIAGNOSIS — Z86.010 PERSONAL HISTORY OF COLONIC POLYPS: ICD-10-CM

## 2023-07-13 DIAGNOSIS — R74.01 ELEVATED TRANSAMINASE LEVEL: ICD-10-CM

## 2023-07-13 PROCEDURE — 99214 OFFICE O/P EST MOD 30 MIN: CPT | Performed by: PHYSICIAN ASSISTANT

## 2023-07-13 NOTE — PROGRESS NOTES
[facility] - Outpatient Consultation  Chrissy Bausrto III 69 y.o. male MRN: [id number]  Encounter: [id number]    ASSESSMENT AND PLAN:      1. Hepatic steatosis  2. Enlarged liver  3. Elevated transaminase level  4. Class 2 severe obesity due to excess calories with serious comorbidity and body mass index (BMI) of 38.0 to 38.9 in adult ([address])  5. Encounter for screening for viral disease    Patient with a history of hepatic steatosis as well as elevated transaminases and a hepatocellular pattern. Suspect primary cause is NAFLD, though he does endorse a distant EtOH use history. We did review given persistent elevation in transaminases we will perform serologic investigation for additional causes of elevated liver testing including viral hepatitis, genetic and autoimmune causes. We reviewed NAFLD fibrosis score is indeterminant, and we will proceed with an elastography now. We reviewed possible sequelae of fatty liver disease. Discussed recommendations in regards to fatty liver includin. Strict control of contributing comorbidities (obesity, prediabetes/diabetes, hypertension, and hypertriglyceridemia). 2. Weight loss of approx 10-15% of patient's current body weight over a period of 6-12 months through low fat diet and cardiovascular exercise as tolerated. We did discuss a referral to weight management, though patient politely declines at this time. 3. Limiting alcohol consumption, preferably complete abstinence. 4. Monitor hepatic function every 6 months with routine labs.    5. We will check for immunity to Hepatitis A/B.     NAFLD Fibrosis Score is: .154    NAFLD Score Correlated Fibrosis Severity   <0.12 F0-F2   0.12-0.676 Indeterminate Score   >0.676 F3-F4   **Fibrosis Severity Scale: F0 = no fibrosis; F1= mild fibrosis; F2 = moderate fibrosis; F3 = severe fibrosis; F4 = cirrhosis    NAFLD Score Component Values:  Component Value Date   Age: 69 y.o.     BMI: 37.31 kg/m²    IFG or DM: No    AST: 49 U/L 7/11/2023   ALT: 77 U/L 7/11/2023   Platelet: 146 Thousands/uL 7/11/2023   Albumin: 3.8 g/dL 7/11/2023     - Ambulatory Referral to Gastroenterology  - Chronic Hepatitis Panel; Future  - Iron Panel (Includes Ferritin, Iron Sat%, Iron, and TIBC); Future  - Anti-smooth muscle antibody, IgG; Future  - Antimitochondrial antibody; Future  - Alpha-1-antitrypsin; Future  - RAGHAVENDRA Screen w/ Reflex to Titer/Pattern; Future  - Ceruloplasmin; Future  - Tissue transglutaminase, IgA; Future  - IgG, IgA, IgM; Future  - Protime-INR; Future  - US elastography/UGAP; Future  - Hepatitis A antibody, total; Future  - Hepatitis B surface antibody; Future    6. Personal history of colonic polyps    Noted on colonoscopy from 2019, though pathology was not available at time of dictation. Patient shares he was told he was due for repeat in 2024. Continue with high-fiber diet and excellent hydration. We will plan to order a colonoscopy at his follow-up office visit to tentatively be scheduled for 02/2024. We will also attempt to obtain pathology report. We will follow up with pt in 4-6 months.   ______________________________________________________________________    HPI: Patient is a 69 y.o. male who presents today for a consultation regarding fatty liver. Pmhx sig for GERD, gastric ulcer, HTN, chronic pain syndrome, BMI 37, chronic pain syndrome, anxiety/insomnia. Pt is new to clinic. He is feeling well overall. He does have a history of heartburn and indigestion. He takes PPI daily and his symptoms are well-controlled. Denies any breakthrough heartburn, nausea, emesis, dysphagia or odynophagia. Pt is moving bowels without issue. He denies constipation, diarrhea, abdominal pain, no large volume BRBPR or melena. Does note some excess gas production. He does note a history of hemorrhoids, with a scant amt of blood on wipe. Pt denies any abnormal weight loss.        Etoh: none; previous regular drinker in 46s   Tobacco: quit   NSAIDs: rare, takes celebrex   Acetaminophen: PRN     04/2023: AST 44, ALT 80, ALP 42, albumin 3.8, T. bili 0.46  04/2023: Abd US: Enlarged fatty liver    Endoscopic history:   02/2019: six 5 to 6 mm polyps, in the rectum, 25 cm, 38, 40 cm proximal to the anus    Review of Systems   Otherwise Per HPI    Historical Information   Past Medical History:   Diagnosis Date   • Anxiety    • Carpal tunnel syndrome    • Colon polyps    • DJD (degenerative joint disease)    • Duodenal ulcer    • Gastric reflux    • Gastric ulcer    • Heel fracture     bilateral   • Hypertension    • Vitamin B12 deficiency    • Vitamin D deficiency      Past Surgical History:   Procedure Laterality Date   • ANKLE SURGERY      repair   • COLONOSCOPY      polyp removed; benign   • COLONOSCOPY  02/07/2019   • COLONOSCOPY W/ POLYPECTOMY      Followed by Dr. Hung Fernandez Bilateral    • MULTIPLE TOOTH EXTRACTIONS     • POLYPECTOMY     • TIBIA FRACTURE SURGERY Left      Social History   Social History     Substance and Sexual Activity   Alcohol Use No     Social History     Substance and Sexual Activity   Drug Use No     Social History     Tobacco Use   Smoking Status Former   • Packs/day: 1.00   • Years: 40.00   • Total pack years: 40.00   • Types: Cigarettes   • Start date: 1/1/1976   • Quit date: 6/1/2020   • Years since quitting: 3.1   Smokeless Tobacco Former   • Types: Chew   • Quit date: 2014     Family History   Problem Relation Age of Onset   • Cancer Mother         lung   • Heart disease Mother    • Cancer Father         prostate   • Heart murmur Brother    • Diabetes Daughter    • Diabetes Son    • Diabetes Other        Meds/Allergies       Current Outpatient Medications:   •  ALPRAZolam (XANAX) 0.5 mg tablet  •  celecoxib (CeleBREX) 100 mg capsule  •  citalopram (CeleXA) 40 mg tablet  •  ergocalciferol (VITAMIN D2) 50,000 units  •  famotidine (PEPCID) 20 mg tablet  • glucosamine-chondroitin 500-400 MG tablet  •  lisinopril (ZESTRIL) 10 mg tablet  •  Multiple Vitamin (MULTIVITAMIN) tablet  •  pantoprazole (PROTONIX) 40 mg tablet    No Known Allergies        Objective     There were no vitals taken for this visit. There is no height or weight on file to calculate BMI. Physical Exam  Vitals and nursing note reviewed. Constitutional:       Appearance: Normal appearance. HENT:      Head: Normocephalic and atraumatic. Eyes:      General: No scleral icterus. Conjunctiva/sclera: Conjunctivae normal.   Cardiovascular:      Rate and Rhythm: Normal rate. Pulmonary:      Effort: Pulmonary effort is normal. No respiratory distress. Abdominal:      General: Abdomen is protuberant. Bowel sounds are normal. There is no distension. Palpations: There is no mass. Tenderness: There is no abdominal tenderness. There is no rebound. Skin:     General: Skin is warm and dry. Coloration: Skin is not jaundiced or pale. Neurological:      General: No focal deficit present. Mental Status: He is alert and oriented to person, place, and time. Psychiatric:         Mood and Affect: Mood normal.         Behavior: Behavior normal.        Lab Results:   No visits with results within 1 Day(s) from this visit.    Latest known visit with results is:   Appointment on 07/11/2023   Component Date Value   • WBC 07/11/2023 4.07 (L)    • RBC 07/11/2023 4.24    • Hemoglobin 07/11/2023 13.3    • Hematocrit 07/11/2023 40.3    • MCV 07/11/2023 95    • MCH 07/11/2023 31.4    • MCHC 07/11/2023 33.0    • RDW 07/11/2023 12.2    • MPV 07/11/2023 9.5    • Platelets 47/38/3144 181    • nRBC 07/11/2023 0    • Neutrophils Relative 07/11/2023 57    • Immat GRANS % 07/11/2023 1    • Lymphocytes Relative 07/11/2023 30    • Monocytes Relative 07/11/2023 10    • Eosinophils Relative 07/11/2023 2    • Basophils Relative 07/11/2023 0    • Neutrophils Absolute 07/11/2023 2.32    • Immature Grans Absolute 07/11/2023 0.02    • Lymphocytes Absolute 07/11/2023 1.22    • Monocytes Absolute 07/11/2023 0.41    • Eosinophils Absolute 07/11/2023 0.09    • Basophils Absolute 07/11/2023 0.01    • Sodium 07/11/2023 140    • Potassium 07/11/2023 4.6    • Chloride 07/11/2023 108    • CO2 07/11/2023 29    • ANION GAP 07/11/2023 3    • BUN 07/11/2023 16    • Creatinine 07/11/2023 1.04    • Glucose, Fasting 07/11/2023 120 (H)    • Calcium 07/11/2023 8.8    • AST 07/11/2023 49 (H)    • ALT 07/11/2023 77    • Alkaline Phosphatase 07/11/2023 42 (L)    • Total Protein 07/11/2023 7.1    • Albumin 07/11/2023 3.8    • Total Bilirubin 07/11/2023 0.43    • eGFR 07/11/2023 72    • Hemoglobin A1C 07/11/2023 5.5    • EAG 07/11/2023 111    • Insulin, Fasting 07/11/2023 19.22    • Cholesterol 07/11/2023 130    • Triglycerides 07/11/2023 80    • HDL, Direct 07/11/2023 55    • LDL Calculated 07/11/2023 59    • Non-HDL-Chol (CHOL-HDL) 07/11/2023 75    • TSH 3RD GENERATON 07/11/2023 3.459    • Vitamin B-12 07/11/2023 366    • Vit D, 25-Hydroxy 07/11/2023 32.2      Radiology Results:   No results found. Lex Sen PA-C    **Please note:  Dictation voice to text software may have been used in the creation of this record. Occasional wrong word or “sound alike” substitutions may have occurred due to the inherent limitations of voice recognition software. Read the chart carefully and recognize, using context, where substitutions have occurred. **

## 2023-07-13 NOTE — PATIENT INSTRUCTIONS
Please have blood work completed. Please have special ultrasound completed to evaluate for any scarring or fibrosis. Continue on pantoprazole now. If you need pepcid, I would recommend taking this later in the day. You will be due for repeat colonoscopy in 02/2024.

## 2023-07-14 ENCOUNTER — HOSPITAL ENCOUNTER (OUTPATIENT)
Dept: CT IMAGING | Facility: HOSPITAL | Age: 68
End: 2023-07-14
Payer: MEDICARE

## 2023-07-14 DIAGNOSIS — R91.8 PULMONARY NODULES: ICD-10-CM

## 2023-07-14 PROCEDURE — 71250 CT THORAX DX C-: CPT

## 2023-07-14 PROCEDURE — G1004 CDSM NDSC: HCPCS

## 2023-07-17 ENCOUNTER — APPOINTMENT (OUTPATIENT)
Dept: LAB | Facility: CLINIC | Age: 68
End: 2023-07-17
Payer: MEDICARE

## 2023-07-17 DIAGNOSIS — Z11.59 ENCOUNTER FOR SCREENING FOR VIRAL DISEASE: ICD-10-CM

## 2023-07-17 DIAGNOSIS — K76.0 HEPATIC STEATOSIS: ICD-10-CM

## 2023-07-17 DIAGNOSIS — R74.01 ELEVATED TRANSAMINASE LEVEL: ICD-10-CM

## 2023-07-17 LAB
FERRITIN SERPL-MCNC: 163 NG/ML (ref 24–336)
IGA SERPL-MCNC: 266 MG/DL (ref 70–400)
IGG SERPL-MCNC: 1120 MG/DL (ref 700–1600)
IGM SERPL-MCNC: 30 MG/DL (ref 40–230)
INR PPP: 1.01 (ref 0.84–1.19)
IRON SATN MFR SERPL: 32 % (ref 20–50)
IRON SERPL-MCNC: 103 UG/DL (ref 65–175)
PROTHROMBIN TIME: 13.5 SECONDS (ref 11.6–14.5)
TIBC SERPL-MCNC: 324 UG/DL (ref 250–450)

## 2023-07-17 PROCEDURE — 85610 PROTHROMBIN TIME: CPT

## 2023-07-17 PROCEDURE — 86381 MITOCHONDRIAL ANTIBODY EACH: CPT

## 2023-07-17 PROCEDURE — 86038 ANTINUCLEAR ANTIBODIES: CPT

## 2023-07-17 PROCEDURE — 86708 HEPATITIS A ANTIBODY: CPT

## 2023-07-17 PROCEDURE — 82728 ASSAY OF FERRITIN: CPT

## 2023-07-17 PROCEDURE — 86705 HEP B CORE ANTIBODY IGM: CPT

## 2023-07-17 PROCEDURE — 86803 HEPATITIS C AB TEST: CPT

## 2023-07-17 PROCEDURE — 82784 ASSAY IGA/IGD/IGG/IGM EACH: CPT

## 2023-07-17 PROCEDURE — 87340 HEPATITIS B SURFACE AG IA: CPT

## 2023-07-17 PROCEDURE — 86704 HEP B CORE ANTIBODY TOTAL: CPT

## 2023-07-17 PROCEDURE — 82390 ASSAY OF CERULOPLASMIN: CPT

## 2023-07-17 PROCEDURE — 82103 ALPHA-1-ANTITRYPSIN TOTAL: CPT

## 2023-07-17 PROCEDURE — 83540 ASSAY OF IRON: CPT

## 2023-07-17 PROCEDURE — 83550 IRON BINDING TEST: CPT

## 2023-07-17 PROCEDURE — 36415 COLL VENOUS BLD VENIPUNCTURE: CPT

## 2023-07-17 PROCEDURE — 86364 TISS TRNSGLTMNASE EA IG CLAS: CPT

## 2023-07-17 PROCEDURE — 86015 ACTIN ANTIBODY EACH: CPT

## 2023-07-17 PROCEDURE — 86706 HEP B SURFACE ANTIBODY: CPT

## 2023-07-18 LAB
A1AT SERPL-MCNC: 129 MG/DL (ref 101–187)
ACTIN IGG SERPL-ACNC: 10 UNITS (ref 0–19)
ANA SER QL IA: NEGATIVE
CERULOPLASMIN SERPL-MCNC: 21.9 MG/DL (ref 16–31)
HAV AB SER QL IA: NORMAL
HBV CORE AB SER QL: NORMAL
HBV CORE IGM SER QL: NORMAL
HBV SURFACE AB SER-ACNC: 3.91 MIU/ML
HBV SURFACE AG SER QL: NORMAL
HCV AB SER QL: NORMAL
MITOCHONDRIA M2 IGG SER-ACNC: <20 UNITS (ref 0–20)
TTG IGA SER-ACNC: <2 U/ML (ref 0–3)

## 2023-07-18 RX ORDER — PANTOPRAZOLE SODIUM 40 MG/1
TABLET, DELAYED RELEASE ORAL
Qty: 90 TABLET | Refills: 1 | Status: SHIPPED | OUTPATIENT
Start: 2023-07-18

## 2023-07-19 ENCOUNTER — HOSPITAL ENCOUNTER (OUTPATIENT)
Dept: ULTRASOUND IMAGING | Facility: HOSPITAL | Age: 68
Discharge: HOME | End: 2023-07-19
Payer: MEDICARE

## 2023-07-19 DIAGNOSIS — K76.0 HEPATIC STEATOSIS: ICD-10-CM

## 2023-07-19 PROCEDURE — 76981 USE PARENCHYMA: CPT

## 2023-07-31 ENCOUNTER — RA CDI HCC (OUTPATIENT)
Dept: OTHER | Facility: HOSPITAL | Age: 68
End: 2023-07-31

## 2023-07-31 ENCOUNTER — TELEPHONE (OUTPATIENT)
Dept: GASTROENTEROLOGY | Facility: CLINIC | Age: 68
End: 2023-07-31

## 2023-07-31 DIAGNOSIS — K76.0 HEPATIC STEATOSIS: Primary | ICD-10-CM

## 2023-07-31 DIAGNOSIS — R16.0 ENLARGED LIVER: ICD-10-CM

## 2023-07-31 NOTE — PROGRESS NOTES
[facility] coding opportunities       Chart reviewed, no opportunity found: CHART REVIEWED, NO OPPORTUNITY FOUND        Patients Insurance     Medicare Insurance: Medicare

## 2023-07-31 NOTE — TELEPHONE ENCOUNTER
Patient with elevated transaminases and hepatic steatosis. Serologic investigation for possible etiologies negative for viral hepatitis, autoimmune and genetic causes of liver disease. Elastography with S3 F2. Patient does not consume EtOH. At this time, recommend diligent work on weight reduction and control of comorbidities. Repeat LFTs in 3 to 6 months. If elevation persist, would consider liver biopsy. I also think it is reasonable for patient to see our liver specialist at least once for definitive recommendations as well. Finally, he is not immune to hepatitis A/B  And I would recommend vaccinations through his PCP.

## 2023-07-31 NOTE — TELEPHONE ENCOUNTER
Called patient and gave him results, could you put in a referral for the weight management. I will send all information out for him. Patient expressed understanding.

## 2023-08-07 ENCOUNTER — OFFICE VISIT (OUTPATIENT)
Dept: FAMILY MEDICINE CLINIC | Facility: CLINIC | Age: 68
End: 2023-08-07
Payer: MEDICARE

## 2023-08-07 VITALS
HEART RATE: 67 BPM | BODY MASS INDEX: 35.69 KG/M2 | WEIGHT: 238.2 LBS | DIASTOLIC BLOOD PRESSURE: 80 MMHG | SYSTOLIC BLOOD PRESSURE: 144 MMHG | OXYGEN SATURATION: 95 % | TEMPERATURE: 98.3 F | RESPIRATION RATE: 18 BRPM

## 2023-08-07 DIAGNOSIS — I10 HYPERTENSION, UNSPECIFIED TYPE: ICD-10-CM

## 2023-08-07 DIAGNOSIS — M47.25 OSTEOARTHRITIS OF SPINE WITH RADICULOPATHY, THORACOLUMBAR REGION: ICD-10-CM

## 2023-08-07 DIAGNOSIS — K76.0 HEPATIC STEATOSIS: ICD-10-CM

## 2023-08-07 DIAGNOSIS — K21.00 GASTROESOPHAGEAL REFLUX DISEASE WITH ESOPHAGITIS: ICD-10-CM

## 2023-08-07 DIAGNOSIS — M50.120 CERVICAL DISC DISORDER WITH RADICULOPATHY OF MID-CERVICAL REGION: ICD-10-CM

## 2023-08-07 DIAGNOSIS — E55.9 VITAMIN D DEFICIENCY: ICD-10-CM

## 2023-08-07 DIAGNOSIS — Z00.00 ENCOUNTER FOR ANNUAL WELLNESS VISIT (AWV) IN MEDICARE PATIENT: Primary | ICD-10-CM

## 2023-08-07 DIAGNOSIS — K21.9 GASTROESOPHAGEAL REFLUX DISEASE WITHOUT ESOPHAGITIS: ICD-10-CM

## 2023-08-07 DIAGNOSIS — M54.50 CHRONIC BILATERAL LOW BACK PAIN WITHOUT SCIATICA: ICD-10-CM

## 2023-08-07 DIAGNOSIS — M54.16 LUMBAR RADICULOPATHY: ICD-10-CM

## 2023-08-07 DIAGNOSIS — E07.9 THYROID DISORDER: ICD-10-CM

## 2023-08-07 DIAGNOSIS — M17.0 PRIMARY OSTEOARTHRITIS OF BOTH KNEES: ICD-10-CM

## 2023-08-07 DIAGNOSIS — D64.9 ANEMIA, UNSPECIFIED TYPE: ICD-10-CM

## 2023-08-07 DIAGNOSIS — M47.23 OSTEOARTHRITIS OF SPINE WITH RADICULOPATHY, CERVICOTHORACIC REGION: ICD-10-CM

## 2023-08-07 DIAGNOSIS — M47.812 CERVICAL SPONDYLOSIS: ICD-10-CM

## 2023-08-07 DIAGNOSIS — E53.8 VITAMIN B12 DEFICIENCY: ICD-10-CM

## 2023-08-07 DIAGNOSIS — K25.9 GASTRIC ULCER, UNSPECIFIED CHRONICITY, UNSPECIFIED WHETHER GASTRIC ULCER HEMORRHAGE OR PERFORATION PRESENT: ICD-10-CM

## 2023-08-07 DIAGNOSIS — E78.49 OTHER HYPERLIPIDEMIA: ICD-10-CM

## 2023-08-07 DIAGNOSIS — G89.29 CHRONIC BILATERAL LOW BACK PAIN WITHOUT SCIATICA: ICD-10-CM

## 2023-08-07 DIAGNOSIS — R73.9 HYPERGLYCEMIA: ICD-10-CM

## 2023-08-07 DIAGNOSIS — F41.9 ANXIETY: ICD-10-CM

## 2023-08-07 DIAGNOSIS — G47.09 OTHER INSOMNIA: ICD-10-CM

## 2023-08-07 PROCEDURE — G0438 PPPS, INITIAL VISIT: HCPCS | Performed by: NURSE PRACTITIONER

## 2023-08-07 RX ORDER — PANTOPRAZOLE SODIUM 40 MG/1
40 TABLET, DELAYED RELEASE ORAL DAILY
Qty: 90 TABLET | Refills: 1 | Status: SHIPPED | OUTPATIENT
Start: 2023-08-07

## 2023-08-07 RX ORDER — FAMOTIDINE 20 MG/1
TABLET, FILM COATED ORAL
Qty: 60 TABLET | Refills: 5 | Status: SHIPPED | OUTPATIENT
Start: 2023-08-07

## 2023-08-07 RX ORDER — LANOLIN ALCOHOL/MO/W.PET/CERES
1 CREAM (GRAM) TOPICAL 3 TIMES DAILY
Qty: 90 TABLET | Refills: 5 | Status: SHIPPED | OUTPATIENT
Start: 2023-08-07

## 2023-08-07 RX ORDER — ALPRAZOLAM 0.5 MG/1
0.5 TABLET ORAL EVERY 12 HOURS PRN
Qty: 60 TABLET | Refills: 2 | Status: SHIPPED | OUTPATIENT
Start: 2023-08-07

## 2023-08-07 RX ORDER — ERGOCALCIFEROL 1.25 MG/1
50000 CAPSULE ORAL
Qty: 3 CAPSULE | Refills: 1 | Status: SHIPPED | OUTPATIENT
Start: 2023-08-07

## 2023-08-07 RX ORDER — LISINOPRIL 10 MG/1
10 TABLET ORAL DAILY
Qty: 90 TABLET | Refills: 1 | Status: SHIPPED | OUTPATIENT
Start: 2023-08-07

## 2023-08-07 RX ORDER — CELECOXIB 100 MG/1
100 CAPSULE ORAL 2 TIMES DAILY
Qty: 180 CAPSULE | Refills: 1 | Status: SHIPPED | OUTPATIENT
Start: 2023-08-07

## 2023-08-07 RX ORDER — CITALOPRAM 40 MG/1
40 TABLET ORAL DAILY
Qty: 90 TABLET | Refills: 1 | Status: SHIPPED | OUTPATIENT
Start: 2023-08-07

## 2023-08-07 RX ORDER — LANOLIN ALCOHOL/MO/W.PET/CERES
1000 CREAM (GRAM) TOPICAL DAILY
Qty: 90 TABLET | Refills: 2 | Status: SHIPPED | OUTPATIENT
Start: 2023-08-07

## 2023-08-07 NOTE — PROGRESS NOTES
Assessment and Plan:     Problem List Items Addressed This Visit        Digestive    Gastroesophageal reflux disease without esophagitis    Relevant Medications    famotidine (PEPCID) 20 mg tablet    pantoprazole (PROTONIX) 40 mg tablet    Gastric ulcer    Relevant Medications    famotidine (PEPCID) 20 mg tablet    pantoprazole (PROTONIX) 40 mg tablet    Gastroesophageal reflux disease with esophagitis    Relevant Medications    famotidine (PEPCID) 20 mg tablet    pantoprazole (PROTONIX) 40 mg tablet       Cardiovascular and Mediastinum    Hypertension    Relevant Medications    lisinopril (ZESTRIL) 10 mg tablet    Other Relevant Orders    TSH, 3rd generation       Nervous and Auditory    Cervical disc disorder with radiculopathy of mid-cervical region    Relevant Medications    celecoxib (CeleBREX) 100 mg capsule    Lumbar radiculopathy    Relevant Medications    celecoxib (CeleBREX) 100 mg capsule       Musculoskeletal and Integument    Cervical spondylosis    Relevant Medications    celecoxib (CeleBREX) 100 mg capsule    glucosamine-chondroitin 500-400 MG tablet    Osteoarthritis of thoracolumbar spine    Relevant Medications    glucosamine-chondroitin 500-400 MG tablet    Primary osteoarthritis of both knees    Relevant Medications    celecoxib (CeleBREX) 100 mg capsule       Other    Anxiety    Relevant Medications    ALPRAZolam (XANAX) 0.5 mg tablet    citalopram (CeleXA) 40 mg tablet    Insomnia    Vitamin B12 deficiency    Relevant Medications    vitamin B-12 (VITAMIN B-12) 1,000 mcg tablet    Other Relevant Orders    Vitamin B12    Vitamin D deficiency    Relevant Medications    ergocalciferol (VITAMIN D2) 50,000 units    Other Relevant Orders    Vitamin D 25 hydroxy    Chronic bilateral low back pain without sciatica    Relevant Medications    celecoxib (CeleBREX) 100 mg capsule   Other Visit Diagnoses     Encounter for annual wellness visit (AWV) in Medicare patient    -  Primary    Other hyperlipidemia Relevant Orders    Lipid panel    Hyperglycemia        Relevant Orders    Comprehensive metabolic panel    Hemoglobin A1C    Insulin, fasting    Anemia, unspecified type        Relevant Medications    vitamin B-12 (VITAMIN B-12) 1,000 mcg tablet    Other Relevant Orders    CBC and differential    Hepatic steatosis        Relevant Orders    Comprehensive metabolic panel    Osteoarthritis of spine with radiculopathy, cervicothoracic region        Relevant Medications    glucosamine-chondroitin 500-400 MG tablet    Thyroid disorder        Relevant Orders    TSH, 3rd generation           Preventive health issues were discussed with patient, and age appropriate screening tests were ordered as noted in patient's After Visit Summary. Personalized health advice and appropriate referrals for health education or preventive services given if needed, as noted in patient's After Visit Summary.      History of Present Illness:     Patient presents for a Medicare Wellness Visit    HPI   Patient Care Team:  Marcella Escobar as PCP - General (Family Medicine)  Elida Melissa MD     Review of Systems:     Review of Systems     Problem List:     Patient Active Problem List   Diagnosis   • Hypertension   • Anxiety   • Insomnia   • Gastroesophageal reflux disease without esophagitis   • Gastric ulcer   • Chronic gastritis without bleeding   • Gastroesophageal reflux disease with esophagitis   • Depression   • Vitamin B12 deficiency   • Vitamin D deficiency   • Prostate cancer screening   • Cervical spondylosis   • Osteoarthritis of thoracolumbar spine   • Chronic pain syndrome   • Cervical disc disorder with radiculopathy of mid-cervical region   • Cervical radiculopathy   • Bilateral carpal tunnel syndrome   • Lumbar spondylosis   • Lumbar radiculopathy   • Chronic bilateral low back pain without sciatica   • Neck pain   • Class 2 severe obesity with serious comorbidity and body mass index (BMI) of 38.0 to 38.9 in adult ([address] [facility])   • Chronic pain of both knees   • Primary osteoarthritis of both knees      Past Medical and Surgical History:     Past Medical History:   Diagnosis Date   • Anxiety    • Carpal tunnel syndrome    • Colon polyps    • DJD (degenerative joint disease)    • Duodenal ulcer    • Gastric reflux    • Gastric ulcer    • Heel fracture     bilateral   • Hypertension    • Vitamin B12 deficiency    • Vitamin D deficiency      Past Surgical History:   Procedure Laterality Date   • ANKLE SURGERY      repair   • COLONOSCOPY      polyp removed; benign   • COLONOSCOPY  02/07/2019   • COLONOSCOPY W/ POLYPECTOMY      Followed by Dr. Jhony Sharma    • FOOT FRACTURE SURGERY Bilateral    • MULTIPLE TOOTH EXTRACTIONS     • POLYPECTOMY     • TIBIA FRACTURE SURGERY Left       Family History:     Family History   Problem Relation Age of Onset   • Cancer Mother         lung   • Heart disease Mother    • Cancer Father         prostate   • Heart murmur Brother    • Diabetes Daughter    • Diabetes Son    • Diabetes Other       Social History:     Social History     Socioeconomic History   • Marital status: /Civil Union     Spouse name: None   • Number of children: None   • Years of education: None   • Highest education level: None   Occupational History   • None   Tobacco Use   • Smoking status: Former     Packs/day: 1.00     Years: 40.00     Total pack years: 40.00     Types: Cigarettes     Start date: 1/1/1976     Quit date: 6/1/2020     Years since quitting: 3.1   • Smokeless tobacco: Former     Types: Chew     Quit date: 2014   Vaping Use   • Vaping Use: Never used   Substance and Sexual Activity   • Alcohol use: No   • Drug use: No   • Sexual activity: None   Other Topics Concern   • None   Social History Narrative   • None     Social Determinants of Health     Financial Resource Strain: Not on file   Food Insecurity: Not on file   Transportation Needs: No Transportation Needs (4/15/2021)    PRAPARE - Transportation    • Lack of Transportation (Medical): No    • Lack of Transportation (Non-Medical): No   Physical Activity: Not on file   Stress: Not on file   Social Connections: Not on file   Intimate Partner Violence: Not on file   Housing Stability: Not on file      Medications and Allergies:     Current Outpatient Medications   Medication Sig Dispense Refill   • ALPRAZolam (XANAX) 0.5 mg tablet Take 1 tablet (0.5 mg total) by mouth every 12 (twelve) hours as needed for anxiety 60 tablet 2   • celecoxib (CeleBREX) 100 mg capsule Take 1 capsule (100 mg total) by mouth 2 (two) times a day 180 capsule 1   • citalopram (CeleXA) 40 mg tablet Take 1 tablet (40 mg total) by mouth daily 90 tablet 1   • ergocalciferol (VITAMIN D2) 50,000 units Take 1 capsule (50,000 Units total) by mouth every 28 days 3 capsule 1   • famotidine (PEPCID) 20 mg tablet 1 tab po 2 x daily prn indigestion 60 tablet 5   • glucosamine-chondroitin 500-400 MG tablet Take 1 tablet by mouth 3 (three) times a day 90 tablet 5   • lisinopril (ZESTRIL) 10 mg tablet Take 1 tablet (10 mg total) by mouth daily 90 tablet 1   • Multiple Vitamin (MULTIVITAMIN) tablet Take 1 tablet by mouth daily 90 tablet 1   • pantoprazole (PROTONIX) 40 mg tablet Take 1 tablet (40 mg total) by mouth daily 90 tablet 1   • vitamin B-12 (VITAMIN B-12) 1,000 mcg tablet Take 1 tablet (1,000 mcg total) by mouth daily 90 tablet 2     No current facility-administered medications for this visit.      No Known Allergies   Immunizations:     Immunization History   Administered Date(s) Administered   • COVID-19 MODERNA VACC 0.5 ML IM 03/19/2021, 04/16/2021   • INFLUENZA 11/23/1999, 10/26/2020   • Pneumococcal Polysaccharide PPV23 07/06/2021   • Td (adult), adsorbed 12/19/1996   • Tdap 11/25/2016, 07/18/2018      Health Maintenance:         Topic Date Due   • Colorectal Cancer Screening  02/07/2024   • Lung Cancer Screening  07/14/2024   • Hepatitis C Screening  Completed         Topic Date Due   • COVID-19 Vaccine (3 - Moderna series) 06/11/2021   • Pneumococcal Vaccine: 65+ Years (2 - PCV) 07/06/2022   • Influenza Vaccine (1) 09/01/2023      Medicare Screening Tests and Risk Assessments:         Health Risk Assessment:   Patient rates overall health as very good. Patient feels that their physical health rating is much better. Patient is very satisfied with their life. Eyesight was rated as slightly worse. Hearing was rated as much worse. Patient feels that their emotional and mental health rating is much better. Patients states they are never, rarely angry. Patient states they are sometimes unusually tired/fatigued. Pain experienced in the last 7 days has been a lot. Patient's pain rating has been 6/10. Patient states that he has experienced weight loss or gain in last 6 months. Depression Screening:   PHQ-9 Score: 0      Fall Risk Screening: In the past year, patient has experienced: no history of falling in past year      Home Safety:  Patient does not have trouble with stairs inside or outside of their home. Patient has working smoke alarms and has no working carbon monoxide detector. Home safety hazards include: none. Nutrition:   Current diet is Regular. Medications:   Patient is currently taking over-the-counter supplements. OTC medications include: see medication list. Patient is able to manage medications. Activities of Daily Living (ADLs)/Instrumental Activities of Daily Living (IADLs):   Walk and transfer into and out of bed and chair?: Yes  Dress and groom yourself?: Yes    Bathe or shower yourself?: Yes    Feed yourself?  Yes  Do your laundry/housekeeping?: No  Manage your money, pay your bills and track your expenses?: Yes  Make your own meals?: No    Do your own shopping?: No    Previous Hospitalizations:   Any hospitalizations or ED visits within the last 12 months?: No      PREVENTIVE SCREENINGS      Cardiovascular Screening:    General: Screening Not Indicated and History Lipid Disorder Diabetes Screening:     General: Screening Current      Colorectal Cancer Screening:     General: Screening Current      Abdominal Aortic Aneurysm (AAA) Screening:    Risk factors include: age between 70-77 yo and tobacco use        Lung Cancer Screening:     General: Screening Current      Hepatitis C Screening:    General: Screening Current    Screening, Brief Intervention, and Referral to Treatment (SBIRT)    Screening  Typical number of drinks in a day: 0  Typical number of drinks in a week: 0  Interpretation: Low risk drinking behavior. AUDIT-C Screenin) How often did you have a drink containing alcohol in the past year? never  2) How many drinks did you have on a typical day when you were drinking in the past year? 0  3) How often did you have 6 or more drinks on one occasion in the past year? never    AUDIT-C Score: 0  Interpretation: Score 0-3 (male): Negative screen for alcohol misuse    No results found.      Physical Exam:     /80   Pulse 67   Temp 98.3 °F (36.8 °C) (Temporal)   Resp 18   Wt 108 kg (238 lb 3.2 oz)   SpO2 95%   BMI 35.69 kg/m²     Physical Exam     Bosie Prader, CRNP

## 2023-08-07 NOTE — PROGRESS NOTES
Assessment and Plan:     Problem List Items Addressed This Visit        Digestive    Gastroesophageal reflux disease without esophagitis    Relevant Medications    famotidine (PEPCID) 20 mg tablet    pantoprazole (PROTONIX) 40 mg tablet    Gastric ulcer    Relevant Medications    famotidine (PEPCID) 20 mg tablet    pantoprazole (PROTONIX) 40 mg tablet    Gastroesophageal reflux disease with esophagitis    Relevant Medications    famotidine (PEPCID) 20 mg tablet    pantoprazole (PROTONIX) 40 mg tablet       Cardiovascular and Mediastinum    Hypertension    Relevant Medications    lisinopril (ZESTRIL) 10 mg tablet    Other Relevant Orders    TSH, 3rd generation       Nervous and Auditory    Cervical disc disorder with radiculopathy of mid-cervical region    Relevant Medications    celecoxib (CeleBREX) 100 mg capsule    Lumbar radiculopathy    Relevant Medications    celecoxib (CeleBREX) 100 mg capsule       Musculoskeletal and Integument    Cervical spondylosis    Relevant Medications    celecoxib (CeleBREX) 100 mg capsule    glucosamine-chondroitin 500-400 MG tablet    Osteoarthritis of thoracolumbar spine    Relevant Medications    glucosamine-chondroitin 500-400 MG tablet    Primary osteoarthritis of both knees    Relevant Medications    celecoxib (CeleBREX) 100 mg capsule       Other    Anxiety    Relevant Medications    ALPRAZolam (XANAX) 0.5 mg tablet    citalopram (CeleXA) 40 mg tablet    Insomnia    Vitamin B12 deficiency    Relevant Medications    vitamin B-12 (VITAMIN B-12) 1,000 mcg tablet    Other Relevant Orders    Vitamin B12    Vitamin D deficiency    Relevant Medications    ergocalciferol (VITAMIN D2) 50,000 units    Other Relevant Orders    Vitamin D 25 hydroxy    Chronic bilateral low back pain without sciatica    Relevant Medications    celecoxib (CeleBREX) 100 mg capsule   Other Visit Diagnoses     Encounter for annual wellness visit (AWV) in Medicare patient    -  Primary    Other hyperlipidemia Relevant Orders    Lipid panel    Hyperglycemia        Relevant Orders    Comprehensive metabolic panel    Hemoglobin A1C    Insulin, fasting    Anemia, unspecified type        Relevant Medications    vitamin B-12 (VITAMIN B-12) 1,000 mcg tablet    Other Relevant Orders    CBC and differential    Hepatic steatosis        Relevant Orders    Comprehensive metabolic panel    Osteoarthritis of spine with radiculopathy, cervicothoracic region        Relevant Medications    glucosamine-chondroitin 500-400 MG tablet    Thyroid disorder        Relevant Orders    TSH, 3rd generation           Preventive health issues were discussed with patient, and age appropriate screening tests were ordered as noted in patient's After Visit Summary. Personalized health advice and appropriate referrals for health education or preventive services given if needed, as noted in patient's After Visit Summary. History of Present Illness:     Patient presents for a Medicare Wellness Visit    Patient presents to office for annual medicare wellness exam. Complete medical history and medications reviewed with patient and tolerating all medications well without any problems. Vital signs reviewed and stable. Lab results reviewed with patient. Patient continues to be followed by [facility] for Hx Hepatic Steatosis. Continues to be followed by [facility] for routine ophthalmology care. Continues to be followed by Dr. Garcia Chowdhury for hx Tenosynovitits and [facility] DJ[facility]. Patient is taking Celebrex 100mg daily not twice daily for her arthritic pain but states he usually takes Tylenol 2 x daily. Instructed on importance of taking the Celebrex 2 x daily so he can decrease his Tylenol intake. Denies any other problems or concerns at the present time.       Patient Care Team:  Gordon Fountain as PCP - General (Family Medicine)  Pradeep Sapp MD     Review of Systems:     Review of Systems   GENERAL: Feels well, denies any significant changes in weight without trying. SKIN:  Denies rashes, lesions, opened areas, wounds, change in moles or any other skin changes. HEENT:  Denies any head injury or headaches. Negative blurred vision, floaters, spots before eyes, infections, or other vision problems. Negative significant changes in vision or hearing. Negative tinnitus, vertigo, or infections. Negative hay fever, sinus trouble, nasal discharge, bloody noses, or problems with smell. Negative sore throat, bleeding gums, ulcers, or sores. NECK:  Denies lumps, goiter, pain, swollen glands, or lymphadenopathy. BREASTS:  Denies lumps, pain, nipple discharge, swelling, redness, or any other changes. RESPIRATORY:  Denies cough, wheezing, shortness of breath, dyspnea, or orthopnea. CARDIOVASCULAR:  Denies chest pain or palpitations. GASTROINTESTINAL:  Appetite good, denies nausea, vomiting, or indigestion. Bowel movements normal occurring about once daily or every other day. URINARY:  Denies frequency, incontinence, dysuria, hematuria, nocturia, or recent flank pain. GENITAL:  Denies penile discharge, ulcerations, lesions, or other problems. PERIPHERAL VASCULAR:  Denies varicosities, swelling, skin changes, or pain. MUSCULOSKELETAL:  Denies back, joint, or muscle pain. Negative problems with mobility or movement. PSYCHIATRIC:  Denies problems with depression, anxiety, anger, or other psychiatric symptoms. NEUROLOGIC:  Denies fainting, dizziness, memory problems, seizures, tingling, motor or sensory loss. HEMATOLOGIC:  Denies easy bruising, bleeding, or anemia. ENDOCRINE:  Denies thyroid problems, temperature intolerance, excessive sweating, or other endocrine symptoms.           Problem List:     Patient Active Problem List   Diagnosis   • Hypertension   • Anxiety   • Insomnia   • Gastroesophageal reflux disease without esophagitis   • Gastric ulcer   • Chronic gastritis without bleeding   • Gastroesophageal reflux disease with esophagitis   • Depression   • Vitamin B12 deficiency   • Vitamin D deficiency   • Prostate cancer screening   • Cervical spondylosis   • Osteoarthritis of thoracolumbar spine   • Chronic pain syndrome   • Cervical disc disorder with radiculopathy of mid-cervical region   • Cervical radiculopathy   • Bilateral carpal tunnel syndrome   • Lumbar spondylosis   • Lumbar radiculopathy   • Chronic bilateral low back pain without sciatica   • Neck pain   • Class 2 severe obesity with serious comorbidity and body mass index (BMI) of 38.0 to 38.9 in adult [facility])   • Chronic pain of both knees   • Primary osteoarthritis of both knees      Past Medical and Surgical History:     Past Medical History:   Diagnosis Date   • Anxiety    • Carpal tunnel syndrome    • Colon polyps    • DJD (degenerative joint disease)    • Duodenal ulcer    • Gastric reflux    • Gastric ulcer    • Heel fracture     bilateral   • Hypertension    • Vitamin B12 deficiency    • Vitamin D deficiency      Past Surgical History:   Procedure Laterality Date   • ANKLE SURGERY      repair   • COLONOSCOPY      polyp removed; benign   • COLONOSCOPY  02/07/2019   • COLONOSCOPY W/ POLYPECTOMY      Followed by Dr. Olea Factor    • FOOT FRACTURE SURGERY Bilateral    • MULTIPLE TOOTH EXTRACTIONS     • POLYPECTOMY     • TIBIA FRACTURE SURGERY Left       Family History:     Family History   Problem Relation Age of Onset   • Cancer Mother         lung   • Heart disease Mother    • Cancer Father         prostate   • Heart murmur Brother    • Diabetes Daughter    • Diabetes Son    • Diabetes Other       Social History:     Social History     Socioeconomic History   • Marital status: /Civil Union     Spouse name: None   • Number of children: None   • Years of education: None   • Highest education level: None   Occupational History   • None   Tobacco Use   • Smoking status: Former     Packs/day: 1.00     Years: 40.00 Total pack years: 40.00     Types: Cigarettes     Start date: 1/1/1976     Quit date: 6/1/2020     Years since quitting: 3.1   • Smokeless tobacco: Former     Types: Chew     Quit date: 2014   Vaping Use   • Vaping Use: Never used   Substance and Sexual Activity   • Alcohol use: No   • Drug use: No   • Sexual activity: None   Other Topics Concern   • None   Social History Narrative   • None     Social Determinants of Health     Financial Resource Strain: Not on file   Food Insecurity: Not on file   Transportation Needs: No Transportation Needs (4/15/2021)    PRAPARE - Transportation    • Lack of Transportation (Medical): No    • Lack of Transportation (Non-Medical):  No   Physical Activity: Not on file   Stress: Not on file   Social Connections: Not on file   Intimate Partner Violence: Not on file   Housing Stability: Not on file      Medications and Allergies:     Current Outpatient Medications   Medication Sig Dispense Refill   • ALPRAZolam (XANAX) 0.5 mg tablet Take 1 tablet (0.5 mg total) by mouth every 12 (twelve) hours as needed for anxiety 60 tablet 2   • celecoxib (CeleBREX) 100 mg capsule Take 1 capsule (100 mg total) by mouth 2 (two) times a day 180 capsule 1   • citalopram (CeleXA) 40 mg tablet Take 1 tablet (40 mg total) by mouth daily 90 tablet 1   • ergocalciferol (VITAMIN D2) 50,000 units Take 1 capsule (50,000 Units total) by mouth every 28 days 3 capsule 1   • famotidine (PEPCID) 20 mg tablet 1 tab po 2 x daily prn indigestion 60 tablet 5   • glucosamine-chondroitin 500-400 MG tablet Take 1 tablet by mouth 3 (three) times a day 90 tablet 5   • lisinopril (ZESTRIL) 10 mg tablet Take 1 tablet (10 mg total) by mouth daily 90 tablet 1   • Multiple Vitamin (MULTIVITAMIN) tablet Take 1 tablet by mouth daily 90 tablet 1   • pantoprazole (PROTONIX) 40 mg tablet Take 1 tablet (40 mg total) by mouth daily 90 tablet 1   • vitamin B-12 (VITAMIN B-12) 1,000 mcg tablet Take 1 tablet (1,000 mcg total) by mouth daily 90 tablet 2     No current facility-administered medications for this visit. No Known Allergies   Immunizations:     Immunization History   Administered Date(s) Administered   • COVID-19 MODERNA VACC 0.5 ML IM 03/19/2021, 04/16/2021   • INFLUENZA 11/23/1999, 10/26/2020   • Pneumococcal Polysaccharide PPV23 07/06/2021   • Td (adult), adsorbed 12/19/1996   • Tdap 11/25/2016, 07/18/2018      Health Maintenance:         Topic Date Due   • Colorectal Cancer Screening  02/07/2024   • Lung Cancer Screening  07/14/2024   • Hepatitis C Screening  Completed         Topic Date Due   • COVID-19 Vaccine (3 - Moderna series) 06/11/2021   • Pneumococcal Vaccine: 65+ Years (2 - PCV) 07/06/2022   • Influenza Vaccine (1) 09/01/2023      Medicare Screening Tests and Risk Assessments:     Xiomy Ramon is here for his Subsequent Wellness visit. Last Medicare Wellness visit information reviewed, patient interviewed, no change since last AWV. Health Risk Assessment:   Patient rates overall health as good. Patient feels that their physical health rating is same. Patient is satisfied with their life. Eyesight was rated as same. Hearing was rated as same. Patient feels that their emotional and mental health rating is same. Patients states they are never, rarely angry. Patient states they are sometimes unusually tired/fatigued. Pain experienced in the last 7 days has been some. Patient's pain rating has been 4/10. Patient states that he has experienced no weight loss or gain in last 6 months. Depression Screening:   PHQ-9 Score: 0      Fall Risk Screening: In the past year, patient has experienced: no history of falling in past year      Home Safety:  Patient does not have trouble with stairs inside or outside of their home. Patient has working smoke alarms and has working carbon monoxide detector. Home safety hazards include: none. Nutrition:   Current diet is Regular and Low Cholesterol.      Medications:   Patient is currently taking over-the-counter supplements. OTC medications include: see medication list. Patient is able to manage medications. Activities of Daily Living (ADLs)/Instrumental Activities of Daily Living (IADLs):   Walk and transfer into and out of bed and chair?: Yes  Dress and groom yourself?: Yes    Bathe or shower yourself?: Yes    Feed yourself? Yes  Do your laundry/housekeeping?: Yes  Manage your money, pay your bills and track your expenses?: Yes  Make your own meals?: Yes    Do your own shopping?: Yes    Previous Hospitalizations:   Any hospitalizations or ED visits within the last 12 months?: No      Advance Care Planning:   Living will: Yes    Durable POA for healthcare:  Yes    Advanced directive: Yes    Advanced directive counseling given: Yes    Five wishes given: Yes    Patient declined ACP directive: No    End of Life Decisions reviewed with patient: Yes    Provider agrees with end of life decisions: Yes      Cognitive Screening:   Provider or family/friend/caregiver concerned regarding cognition?: No    PREVENTIVE SCREENINGS      Cardiovascular Screening:    General: History Lipid Disorder, Screening Current and Risks and Benefits Discussed      Diabetes Screening:     General: Screening Current and Risks and Benefits Discussed      Colorectal Cancer Screening:     General: Screening Current    Due for: Cologuard      Prostate Cancer Screening:    General: Risks and Benefits Discussed      Osteoporosis Screening:    General: Risks and Benefits Discussed and Screening Not Indicated      Abdominal Aortic Aneurysm (AAA) Screening:    Risk factors include: age between 70-75 yo and tobacco use        General: Risks and Benefits Discussed and Screening Current      Lung Cancer Screening:     General: Screening Current and Risks and Benefits Discussed      Hepatitis C Screening:    General: Screening Current and Risks and Benefits Discussed    Screening, Brief Intervention, and Referral to Treatment (SBIRT)    Screening  Typical number of drinks in a day: 0  Typical number of drinks in a week: 0  Interpretation: Low risk drinking behavior. AUDIT-C Screenin) How often did you have a drink containing alcohol in the past year? never  2) How many drinks did you have on a typical day when you were drinking in the past year? 0  3) How often did you have 6 or more drinks on one occasion in the past year? never    AUDIT-C Score: 0  Interpretation: Score 0-3 (male): Negative screen for alcohol misuse    Single Item Drug Screening:  How often have you used an illegal drug (including marijuana) or a prescription medication for non-medical reasons in the past year? never    Single Item Drug Screen Score: 0  Interpretation: Negative screen for possible drug use disorder    Other Counseling Topics:   Car/seat belt/driving safety, skin self-exam, sunscreen and calcium and vitamin D intake and regular weightbearing exercise. No results found. Physical Exam:     /80   Pulse 67   Temp 98.3 °F (36.8 °C) (Temporal)   Resp 18   Wt 108 kg (238 lb 3.2 oz)   SpO2 95%   BMI 35.69 kg/m²     Physical Exam  Vitals and nursing note reviewed. GENERAL:  Appears well nourished, well groomed, in no acute distress. SKIN:  Palms warm, dry, color good. Nails without clubbing or cyanosis. No lesions, ulcerations, or wounds. HEAD:  Hair is average texture. Scalp without lesions, normocephalic, and atraumatic. EYES:  Visual fields full by confrontation. EARS:  B/L ear canals clear. B/L TMs clear with + light reflex. NOSE: Mucosa pink, moist, septum midline. Negative sinus tenderness. B/L turbinates pink, moist, non-edematous without exudate. MOUTH:  Oral mucosa pink. Pharynx pink, moist, without swelling, redness, or exudate. Dentition ok. Tongue midline. NECK:  Supple, trachea midline, Negative thyromegaly, lymphadenopathy, or swollen glands.   LYMPH NODES:  Negative enlargement of neck, axillary, epitrochlear, or inguinal nodes. THORAX/LUNGS  Thorax symmetric with good excursion. Lungs resonant. Breath sounds vesicular with no added sounds. Diaphragm descends within normal limits. CARDIOVASCULAR:  Carotid upstrokes brisk and without bruits. Apical impulse discrete and tapping, barely palpable in the 5th ICS/MCL. Normal S1 and Normal S2, Negative S3 or S4. Negative murmurs, thrills, lifts, or heaves. ABDOMEN:  Protuberant, bowel sounds normal active x 4 quadrants. Negative tenderness. Negative masses. Negative hepatomegaly. Negative splenomegaly. Negative costovertebral tenderness. EXTREMITIES:  Warm, calves supple, non-tender, negative for edema. Negative stasis pigmentation or ulcers. +2 pulses throughout. MUSCULOSKELETAL:  Negative joint deformities. Good range of motion in hands, wrists, elbows, shoulders, spine, hips, knees, and ankles. NEUROLOGICAL:  Mental status:  Awake, alert, and oriented to person, place, time, and event. Normal thought processes.         LYNDSEY Cunningham

## 2023-08-07 NOTE — PATIENT INSTRUCTIONS
Wellness Visit for Adults   WHAT YOU NEED TO KNOW:   What is a wellness visit? A wellness visit is when you see your healthcare provider to get screened for health problems. Your healthcare provider will also give you advice on how to stay healthy. Write down your questions so you remember to ask them. Ask your healthcare provider how often you should have a wellness visit. What happens at a wellness visit? Your healthcare provider will ask about your health, and your family history of health problems. This includes high blood pressure, heart disease, and cancer. He or she will ask if you have symptoms that concern you, if you smoke, and about your mood. You may also be asked about your intake of medicines, supplements, food, and alcohol. Any of the following may be done:  • Your weight  will be checked. Your height may also be checked so your body mass index (BMI) can be calculated. Your BMI shows if you are at a healthy weight. • Your blood pressure  and heart rate will be checked. Your temperature may also be checked. • Blood and urine tests  may be done. Blood tests may be done to check your cholesterol levels. Abnormal cholesterol levels increase your risk for heart disease and stroke. You may also need a blood or urine test to check for diabetes if you are at increased risk. Urine tests may be done to look for signs of an infection or kidney disease. • A physical exam  includes checking your heartbeat and lungs with a stethoscope. Your healthcare provider may also check your skin to look for sun damage. • Screening tests  may be recommended. A screening test is done to check for diseases that may not cause symptoms. The screening tests you may need depend on your age, gender, family history, and lifestyle habits. For example, colorectal screening may be recommended if you are 48years old or older. What screening tests do I need if I am a woman?    • A Pap smear  is used to screen for cervical cancer. Pap smears are usually done every 3 to 5 years depending on your age. You may need them more often if you have had abnormal Pap smear test results in the past. Ask your healthcare provider how often you should have a Pap smear. • A mammogram  is an x-ray of your breasts to screen for breast cancer. Experts recommend mammograms every 2 years starting at age 48 years. You may need a mammogram at age 52 years or younger if you have an increased risk for breast cancer. Talk to your healthcare provider about when you should start having mammograms and how often you need them. What vaccines might I need? • Get an influenza vaccine  every year. The influenza vaccine protects you from the flu. Several types of viruses cause the flu. The viruses change over time, so new vaccines are made each year. • Get a tetanus-diphtheria (Td) booster vaccine  every 10 years. This vaccine protects you against tetanus and diphtheria. Tetanus is a severe infection that may cause painful muscle spasms and lockjaw. Diphtheria is a severe bacterial infection that causes a thick covering in the back of your mouth and throat. • Get a human papillomavirus (HPV) vaccine  if you are female and aged 23 to 32 or male 23 to 24 and never received it. This vaccine protects you from HPV infection. HPV is the most common infection spread by sexual contact. HPV may also cause vaginal, penile, and anal cancers. • Get a pneumococcal vaccine  if you are aged 72 years or older. The pneumococcal vaccine is an injection given to protect you from pneumococcal disease. Pneumococcal disease is an infection caused by pneumococcal bacteria. The infection may cause pneumonia, meningitis, or an ear infection. • Get a shingles vaccine  if you are 60 or older, even if you have had shingles before. The shingles vaccine is an injection to protect you from the varicella-zoster virus. This is the same virus that causes chickenpox.  Shingles is a painful rash that develops in people who had chickenpox or have been exposed to the virus. How can I eat healthy? My Plate is a model for planning healthy meals. It shows the types and amounts of foods that should go on your plate. Fruits and vegetables make up about half of your plate, and grains and protein make up the other half. A serving of dairy is included on the side of your plate. The amount of calories and serving sizes you need depends on your age, gender, weight, and height. Examples of healthy foods are listed below:  • Eat a variety of vegetables  such as dark green, red, and orange vegetables. You can also include canned vegetables low in sodium (salt) and frozen vegetables without added butter or sauces. • Eat a variety of fresh fruits , canned fruit in 100% juice, frozen fruit, and dried fruit. • Include whole grains. At least half of the grains you eat should be whole grains. Examples include whole-wheat bread, wheat pasta, brown rice, and whole-grain cereals such as oatmeal.    • Eat a variety of protein foods such as seafood (fish and shellfish), lean meat, and poultry without skin (turkey and chicken). Examples of lean meats include pork leg, shoulder, or tenderloin, and beef round, sirloin, tenderloin, and extra lean ground beef. Other protein foods include eggs and egg substitutes, beans, peas, soy products, nuts, and seeds. • Choose low-fat dairy products such as skim or 1% milk or low-fat yogurt, cheese, and cottage cheese. • Limit unhealthy fats  such as butter, hard margarine, and shortening. How much exercise do I need? Exercise at least 30 minutes per day on most days of the week. Some examples of exercise include walking, biking, dancing, and swimming. You can also fit in more physical activity by taking the stairs instead of the elevator or parking farther away from stores. Include muscle strengthening activities 2 days each week.  Regular exercise provides many health benefits. It helps you manage your weight, and decreases your risk for type 2 diabetes, heart disease, stroke, and high blood pressure. Exercise can also help improve your mood. Ask your healthcare provider about the best exercise plan for you. What are some general health and safety guidelines I should follow? • Do not smoke. Nicotine and other chemicals in cigarettes and cigars can cause lung damage. Ask your healthcare provider for information if you currently smoke and need help to quit. E-cigarettes or smokeless tobacco still contain nicotine. Talk to your healthcare provider before you use these products. • Limit alcohol. A drink of alcohol is 12 ounces of beer, 5 ounces of wine, or 1½ ounces of liquor. • Lose weight, if needed. Being overweight increases your risk of certain health conditions. These include heart disease, high blood pressure, type 2 diabetes, and certain types of cancer. • Protect your skin. Do not sunbathe or use tanning beds. Use sunscreen with a SPF 15 or higher. Apply sunscreen at least 15 minutes before you go outside. Reapply sunscreen every 2 hours. Wear protective clothing, hats, and sunglasses when you are outside. • Drive safely. Always wear your seatbelt. Make sure everyone in your car wears a seatbelt. A seatbelt can save your life if you are in an accident. Do not use your cell phone when you are driving. This could distract you and cause an accident. Pull over if you need to make a call or send a text message. • Practice safe sex. Use latex condoms if are sexually active and have more than one partner. Your healthcare provider may recommend screening tests for sexually transmitted infections (STIs). • Wear helmets, lifejackets, and protective gear. Always wear a helmet when you ride a bike or motorcycle, go skiing, or play sports that could cause a head injury. Wear protective equipment when you play sports.  Wear a lifejacket when you are on a boat or doing water sports. CARE AGREEMENT:   You have the right to help plan your care. Learn about your health condition and how it may be treated. Discuss treatment options with your healthcare providers to decide what care you want to receive. You always have the right to refuse treatment. The above information is an  only. It is not intended as medical advice for individual conditions or treatments. Talk to your doctor, nurse or pharmacist before following any medical regimen to see if it is safe and effective for you. © Copyright [employer] 2022 Information is for End User's use only and may not be sold, redistributed or otherwise used for commercial purposes. Medicare Preventive Visit Patient Instructions  Thank you for completing your Welcome to Medicare Visit or Medicare Annual Wellness Visit today. Your next wellness visit will be due in one year (8/7/2024). The screening/preventive services that you may require over the next 5-10 years are detailed below. Some tests may not apply to you based off risk factors and/or age. Screening tests ordered at today's visit but not completed yet may show as past due. Also, please note that scanned in results may not display below. Preventive Screenings:  Service Recommendations Previous Testing/Comments   Colorectal Cancer Screening  · Colonoscopy    · Fecal Occult Blood Test (FOBT)/Fecal Immunochemical Test (FIT)  · Fecal DNA/Cologuard Test  · Flexible Sigmoidoscopy Age: 43-73 years old   Colonoscopy: every 10 years (May be performed more frequently if at higher risk)  OR  FOBT/FIT: every 1 year  OR  Cologuard: every 3 years  OR  Sigmoidoscopy: every 5 years  Screening may be recommended earlier than age 39 if at higher risk for colorectal cancer. Also, an individualized decision between you and your healthcare provider will decide whether screening between the ages of 77-80 would be appropriate.  Colonoscopy: 02/07/2019  FOBT/FIT: Not on file  Cologuard: Not on file  Sigmoidoscopy: Not on file    Screening Current  Screening Current  Due for Cologuard     Prostate Cancer Screening Individualized decision between patient and health care provider in men between ages of 53-66   Medicare will cover every 12 months beginning on the day after your 50th birthday PSA: 0.2 ng/mL     Risks and Benefits Discussed     Hepatitis C Screening Once for adults born between 1945 and 1965  More frequently in patients at high risk for Hepatitis C Hep C Antibody: 12/13/2018    Screening Current  Risks and Benefits Discussed  Screening Current   Diabetes Screening 1-2 times per year if you're at risk for diabetes or have pre-diabetes Fasting glucose: 120 mg/dL (7/11/2023)  A1C: 5.5 % (7/11/2023)  Screening Current  Risks and Benefits Discussed  Screening Current   Cholesterol Screening Once every 5 years if you don't have a lipid disorder. May order more often based on risk factors. Lipid panel: 07/11/2023  History Lipid Disorder  Screening Current  Risks and Benefits Discussed  Screening Not Indicated  History Lipid Disorder      Other Preventive Screenings Covered by Medicare:  1. Abdominal Aortic Aneurysm (AAA) Screening: covered once if your at risk. You're considered to be at risk if you have a family history of AAA or a male between the age of 70-76 who smoking at least 100 cigarettes in your lifetime. 2. Lung Cancer Screening: covers low dose CT scan once per year if you meet all of the following conditions: (1) Age 48-67; (2) No signs or symptoms of lung cancer; (3) Current smoker or have quit smoking within the last 15 years; (4) You have a tobacco smoking history of at least 20 pack years (packs per day x number of years you smoked); (5) You get a written order from a healthcare provider.   3. Glaucoma Screening: covered annually if you're considered high risk: (1) You have diabetes OR (2) Family history of glaucoma OR (3)  aged 48 and older OR (4)  American aged 72 and older  3. Osteoporosis Screening: covered every 2 years if you meet one of the following conditions: (1) Have a vertebral abnormality; (2) On glucocorticoid therapy for more than 3 months; (3) Have primary hyperparathyroidism; (4) On osteoporosis medications and need to assess response to drug therapy. 5. HIV Screening: covered annually if you're between the age of 14-79. Also covered annually if you are younger than 13 and older than 72 with risk factors for HIV infection. For pregnant patients, it is covered up to 3 times per pregnancy. Immunizations:  Immunization Recommendations   Influenza Vaccine Annual influenza vaccination during flu season is recommended for all persons aged >= 6 months who do not have contraindications   Pneumococcal Vaccine   * Pneumococcal conjugate vaccine = PCV13 (Prevnar 13), PCV15 (Vaxneuvance), PCV20 (Prevnar 20)  * Pneumococcal polysaccharide vaccine = PPSV23 (Pneumovax) Adults 20-63 years old: 1-3 doses may be recommended based on certain risk factors  Adults 72 years old: 1-2 doses may be recommended based off what pneumonia vaccine you previously received   Hepatitis B Vaccine 3 dose series if at intermediate or high risk (ex: diabetes, end stage renal disease, liver disease)   Tetanus (Td) Vaccine - COST NOT COVERED BY MEDICARE PART B Following completion of primary series, a booster dose should be given every 10 years to maintain immunity against tetanus. Td may also be given as tetanus wound prophylaxis. Tdap Vaccine - COST NOT COVERED BY MEDICARE PART B Recommended at least once for all adults. For pregnant patients, recommended with each pregnancy.    Shingles Vaccine (Shingrix) - COST NOT COVERED BY MEDICARE PART B  2 shot series recommended in those aged 48 and above     Health Maintenance Due:      Topic Date Due   • Colorectal Cancer Screening  02/07/2024   • Lung Cancer Screening  07/14/2024   • Hepatitis C Screening  Completed Immunizations Due:      Topic Date Due   • COVID-19 Vaccine (3 - Moderna series) 06/11/2021   • Pneumococcal Vaccine: 65+ Years (2 - PCV) 07/06/2022   • Influenza Vaccine (1) 09/01/2023     Advance Directives   What are advance directives? Advance directives are legal documents that state your wishes and plans for medical care. These plans are made ahead of time in case you lose your ability to make decisions for yourself. Advance directives can apply to any medical decision, such as the treatments you want, and if you want to donate organs. What are the types of advance directives? There are many types of advance directives, and each state has rules about how to use them. You may choose a combination of any of the following:  · Living will: This is a written record of the treatment you want. You can also choose which treatments you do not want, which to limit, and which to stop at a certain time. This includes surgery, medicine, IV fluid, and tube feedings. · Durable power of  for healthcare [facility]): This is a written record that states who you want to make healthcare choices for you when you are unable to make them for yourself. This person, called a proxy, is usually a family member or a friend. You may choose more than 1 proxy. · Do not resuscitate (DNR) order:  A DNR order is used in case your heart stops beating or you stop breathing. It is a request not to have certain forms of treatment, such as CPR. A DNR order may be included in other types of advance directives. · Medical directive: This covers the care that you want if you are in a coma, near death, or unable to make decisions for yourself. You can list the treatments you want for each condition. Treatment may include pain medicine, surgery, blood transfusions, dialysis, IV or tube feedings, and a ventilator (breathing machine). · Values history:   This document has questions about your views, beliefs, and how you feel and think about life. This information can help others choose the care that you would choose. Why are advance directives important? An advance directive helps you control your care. Although spoken wishes may be used, it is better to have your wishes written down. Spoken wishes can be misunderstood, or not followed. Treatments may be given even if you do not want them. An advance directive may make it easier for your family to make difficult choices about your care. Weight Management   Why it is important to manage your weight:  Being overweight increases your risk of health conditions such as heart disease, high blood pressure, type 2 diabetes, and certain types of cancer. It can also increase your risk for osteoarthritis, sleep apnea, and other respiratory problems. Aim for a slow, steady weight loss. Even a small amount of weight loss can lower your risk of health problems. How to lose weight safely:  A safe and healthy way to lose weight is to eat fewer calories and get regular exercise. You can lose up about 1 pound a week by decreasing the number of calories you eat by 500 calories each day. Healthy meal plan for weight management:  A healthy meal plan includes a variety of foods, contains fewer calories, and helps you stay healthy. A healthy meal plan includes the following:  · Eat whole-grain foods more often. A healthy meal plan should contain fiber. Fiber is the part of grains, fruits, and vegetables that is not broken down by your body. Whole-grain foods are healthy and provide extra fiber in your diet. Some examples of whole-grain foods are whole-wheat breads and pastas, oatmeal, brown rice, and bulgur. · Eat a variety of vegetables every day. Include dark, leafy greens such as spinach, kale, laurie greens, and mustard greens. Eat yellow and orange vegetables such as carrots, sweet potatoes, and winter squash. · Eat a variety of fruits every day.   Choose fresh or canned fruit (canned in its own juice or light syrup) instead of juice. Fruit juice has very little or no fiber. · Eat low-fat dairy foods. Drink fat-free (skim) milk or 1% milk. Eat fat-free yogurt and low-fat cottage cheese. Try low-fat cheeses such as mozzarella and other reduced-fat cheeses. · Choose meat and other protein foods that are low in fat. Choose beans or other legumes such as split peas or lentils. Choose fish, skinless poultry (chicken or turkey), or lean cuts of red meat (beef or pork). Before you cook meat or poultry, cut off any visible fat. · Use less fat and oil. Try baking foods instead of frying them. Add less fat, such as margarine, sour cream, regular salad dressing and mayonnaise to foods. Eat fewer high-fat foods. Some examples of high-fat foods include french fries, doughnuts, ice cream, and cakes. · Eat fewer sweets. Limit foods and drinks that are high in sugar. This includes candy, cookies, regular soda, and sweetened drinks. Exercise:  Exercise at least 30 minutes per day on most days of the week. Some examples of exercise include walking, biking, dancing, and swimming. You can also fit in more physical activity by taking the stairs instead of the elevator or parking farther away from stores. Ask your healthcare provider about the best exercise plan for you. © Copyright [employer] 2018 Information is for End User's use only and may not be sold, redistributed or otherwise used for commercial purposes.  All illustrations and images included in CareNotes® are the copyrighted property of [employer] or  [employer]

## 2023-11-28 ENCOUNTER — APPOINTMENT (OUTPATIENT)
Dept: LAB | Facility: CLINIC | Age: 68
End: 2023-11-28
Payer: MEDICARE

## 2023-11-28 DIAGNOSIS — K76.0 HEPATIC STEATOSIS: ICD-10-CM

## 2023-11-28 LAB
ALBUMIN SERPL BCP-MCNC: 4.2 G/DL (ref 3.5–5)
ALP SERPL-CCNC: 38 U/L (ref 34–104)
ALT SERPL W P-5'-P-CCNC: 72 U/L (ref 7–52)
AST SERPL W P-5'-P-CCNC: 49 U/L (ref 13–39)
BASOPHILS # BLD AUTO: 0.01 THOUSANDS/ÂΜL (ref 0–0.1)
BASOPHILS NFR BLD AUTO: 0 % (ref 0–1)
BILIRUB DIRECT SERPL-MCNC: 0.1 MG/DL (ref 0–0.2)
BILIRUB SERPL-MCNC: 0.54 MG/DL (ref 0.2–1)
EOSINOPHIL # BLD AUTO: 0.07 THOUSAND/ÂΜL (ref 0–0.61)
EOSINOPHIL NFR BLD AUTO: 2 % (ref 0–6)
ERYTHROCYTE [DISTWIDTH] IN BLOOD BY AUTOMATED COUNT: 12.4 % (ref 11.6–15.1)
HCT VFR BLD AUTO: 43.1 % (ref 36.5–49.3)
HGB BLD-MCNC: 14.2 G/DL (ref 12–17)
IMM GRANULOCYTES # BLD AUTO: 0.02 THOUSAND/UL (ref 0–0.2)
IMM GRANULOCYTES NFR BLD AUTO: 1 % (ref 0–2)
LYMPHOCYTES # BLD AUTO: 1.3 THOUSANDS/ÂΜL (ref 0.6–4.47)
LYMPHOCYTES NFR BLD AUTO: 30 % (ref 14–44)
MCH RBC QN AUTO: 31.7 PG (ref 26.8–34.3)
MCHC RBC AUTO-ENTMCNC: 32.9 G/DL (ref 31.4–37.4)
MCV RBC AUTO: 96 FL (ref 82–98)
MONOCYTES # BLD AUTO: 0.46 THOUSAND/ÂΜL (ref 0.17–1.22)
MONOCYTES NFR BLD AUTO: 11 % (ref 4–12)
NEUTROPHILS # BLD AUTO: 2.53 THOUSANDS/ÂΜL (ref 1.85–7.62)
NEUTS SEG NFR BLD AUTO: 56 % (ref 43–75)
NRBC BLD AUTO-RTO: 0 /100 WBCS
PLATELET # BLD AUTO: 182 THOUSANDS/UL (ref 149–390)
PMV BLD AUTO: 10 FL (ref 8.9–12.7)
PROT SERPL-MCNC: 7 G/DL (ref 6.4–8.4)
RBC # BLD AUTO: 4.48 MILLION/UL (ref 3.88–5.62)
WBC # BLD AUTO: 4.39 THOUSAND/UL (ref 4.31–10.16)

## 2023-11-28 PROCEDURE — 36415 COLL VENOUS BLD VENIPUNCTURE: CPT

## 2023-11-28 PROCEDURE — 80076 HEPATIC FUNCTION PANEL: CPT

## 2023-11-28 PROCEDURE — 85025 COMPLETE CBC W/AUTO DIFF WBC: CPT

## 2023-12-11 RX ORDER — PREDNISOLONE ACETATE 10 MG/ML
SUSPENSION/ DROPS OPHTHALMIC
COMMUNITY
Start: 2023-09-22

## 2023-12-12 ENCOUNTER — OFFICE VISIT (OUTPATIENT)
Dept: GASTROENTEROLOGY | Facility: CLINIC | Age: 68
End: 2023-12-12
Payer: MEDICARE

## 2023-12-12 VITALS
TEMPERATURE: 98.7 F | DIASTOLIC BLOOD PRESSURE: 80 MMHG | SYSTOLIC BLOOD PRESSURE: 128 MMHG | HEIGHT: 69 IN | WEIGHT: 249 LBS | RESPIRATION RATE: 16 BRPM | HEART RATE: 67 BPM | BODY MASS INDEX: 36.88 KG/M2 | OXYGEN SATURATION: 96 %

## 2023-12-12 DIAGNOSIS — E66.01 CLASS 2 SEVERE OBESITY DUE TO EXCESS CALORIES WITH SERIOUS COMORBIDITY AND BODY MASS INDEX (BMI) OF 38.0 TO 38.9 IN ADULT: ICD-10-CM

## 2023-12-12 DIAGNOSIS — K21.9 GASTROESOPHAGEAL REFLUX DISEASE, UNSPECIFIED WHETHER ESOPHAGITIS PRESENT: ICD-10-CM

## 2023-12-12 DIAGNOSIS — R74.01 ELEVATED TRANSAMINASE LEVEL: ICD-10-CM

## 2023-12-12 DIAGNOSIS — Z86.010 PERSONAL HISTORY OF COLONIC POLYPS: Primary | ICD-10-CM

## 2023-12-12 DIAGNOSIS — K76.0 HEPATIC STEATOSIS: ICD-10-CM

## 2023-12-12 PROCEDURE — 99214 OFFICE O/P EST MOD 30 MIN: CPT | Performed by: PHYSICIAN ASSISTANT

## 2023-12-12 NOTE — PROGRESS NOTES
[facility] - Outpatient Follow-up Note  Davina Fowler III 77 y.o. male MRN: [id number]  Encounter: [id number]    ASSESSMENT AND PLAN:      1. Personal history of colonic polyps    Patient with a personal history of colon polyps from his last colonoscopy in 2019, though polyp pathology not yet able to be obtained. Patient shared that he was due for a repeat in 5 years, thus we will plan to schedule colonoscopy for 02/2024. He should continue with high-fiber diet, excellent hydration, and constipation prevention. He is requesting an alternative to GoLytely, and we will complete MiraLAX/Dulcolax/Gatorade bowel prep. Risks associated with endoscopic evaluation discussed with patient today, including but not limited to bleeding, infection, perforation, and organ injury, all of which are low. Pt demonstrates understanding and is agreeable to the plan. - Colonoscopy; Future    2. Gastroesophageal reflux disease, unspecified whether esophagitis present    Noted, history of such. Patient notes a distant history of a peptic and duodenal ulcer in his late teens. He is on PPI daily. He utilizes H2 RA as needed. During review of systems, he did endorse a very rare intermittent dysphagia and that he feels things are going down the wrong pipe and he may cough with certain oral intake. I did review with patient that given duration of symptoms and complaint of dysphagia, I would recommend an upper endoscopy for completeness to evaluate for Lariso's esophagus or other intraluminal pathology. He believes he has had an upper endoscopy within the past 5 years, though I do not have this information available at time of dictation. Patient would like to hold off on any additional upper endoscopy until we are able to obtain his records. He should otherwise continue with diet and lifestyle modifications for GERD. 3. Hepatic steatosis  4. Elevated transaminase level  5.  Class 2 severe obesity due to excess calories with serious comorbidity and body mass index (BMI) of 38.0 to 38.9 in adult     Noted, as well as elevated transaminases in a hepatocellular pattern. Suspect metabolic/NAFLD. Elastography with S3, F2. Serologic investigation for competing causes of chronic liver disease negative for autoimmune, genetic, and infectious causes. We did review given persistent elevation, liver biopsy should be considered given the possibility of persistent microscopic inflammation/damage to the liver parenchyma over time. Alternatively, we reviewed that it may be reasonable for patient to discuss findings and symptoms with hepatologist first prior to proceeding with additional investigation. Patient prefers the latter and we will help facilitate an appointment with Dr. Ann Stephenson. Discussed recommendations in regards to fatty liver including:   Strict control of contributing comorbidities (obesity, prediabetes/diabetes, hypertension, and hypertriglyceridemia). Weight loss of approx 10-15% of patient's current body weight over a period of 6-12 months through low fat diet and cardiovascular exercise as tolerated. Pt has been referred to Weight Management and encourage pt to follow through with this. Limiting alcohol consumption, preferably complete abstinence. Monitor hepatic function every 6 months with routine labs. We will follow up after colonoscopy evaluation. ______________________________________________________________________    SUBJECTIVE: Patient is a 77 y.o. male who presents today for follow-up regarding hepatic steatosis. Pmhx sig for GERD, gastric ulcer, HTN, chronic pain syndrome, BMI 37, chronic pain syndrome, anxiety/insomnia. Pt was last evaluated in clinic in 07/2023. He had hepatic steatosis, and had elastography which demonstrated F2/S3. No current etoh intake. No yellowing of eyes or skin. No pruritus, no new abdominal swelling, LE edema. No confusion or syncope.  No bleeding diatheses. Pt denies any signifcant heartburn, indigestion, nausea, emesis or odynophagia. When asked about dysphagia, he notes very rarely feels like something will go down the wrong pipe, will cough with his swallow. No early satiety. Denies any unintentional weight loss. Believes he has had EGD within past 5 years. Pt is having formed brown stools daily. Denies any BRBPR or melena. Some constipation depending on oral intake, though no sig abd pain or rectal pain related to defecation. No nocturnal Bms. No family hx of CRC.     Etoh: none; previous regular drinker in 46s   Tobacco: quit   NSAIDs: rare, takes celebrex daily   Acetaminophen: PRN      04/2023: AST 44, ALT 80, ALP 42, albumin 3.8, T. bili 0.46  04/2023: Abd US: Enlarged fatty liver  07/2023: Elastography: F2, S3   07/2023: chronic hepatitis panel negative, TSAT 32, TIBC 324, iron 103, ferritin 163, ASMA 10, AMA < 20, A1AT 129, RAGHAVENDRA negative, ceruloplasmin 21.9, Ttg Ig A <2, IgA 266, IgG 1120, IgM 30, INR 1.01, hepatitis A non-reactive, Hep B surface abs 3.91  11/2023: Hb 14.2, MCV 96, Plt 182, AST 49, ALT 72, ALP 38, albumin 4.2, t bili 0.54     Endoscopic history:   02/2019: six 5 to 6 mm polyps, in the rectum, 25 cm, 38, 40 cm proximal to the anus    Review of Systems   Otherwise Per HPI    Historical Information   Past Medical History:   Diagnosis Date    Anxiety     Carpal tunnel syndrome     Colon polyps     DJD (degenerative joint disease)     Duodenal ulcer     Gastric reflux     Gastric ulcer     Heel fracture     bilateral    Hypertension     Vitamin B12 deficiency     Vitamin D deficiency      Past Surgical History:   Procedure Laterality Date    ANKLE SURGERY      repair    COLONOSCOPY      polyp removed; benign    COLONOSCOPY  02/07/2019    COLONOSCOPY W/ POLYPECTOMY      Followed by Dr. Hattie Nolan History   Social History     Substance and Sexual Activity   Alcohol Use No     Social History     Substance and Sexual Activity   Drug Use No     Social History     Tobacco Use   Smoking Status Former    Packs/day: 1.00    Years: 40.00    Total pack years: 40.00    Types: Cigarettes    Start date: 1/1/1976    Quit date: 6/1/2020    Years since quitting: 3.5   Smokeless Tobacco Former    Types: Chew    Quit date: 2014     Family History   Problem Relation Age of Onset    Cancer Mother         lung    Heart disease Mother     Cancer Father         prostate    Heart murmur Brother     Diabetes Daughter     Diabetes Son     Diabetes Other      Meds/Allergies       Current Outpatient Medications:     ALPRAZolam (XANAX) 0.5 mg tablet    celecoxib (CeleBREX) 100 mg capsule    citalopram (CeleXA) 40 mg tablet    ergocalciferol (VITAMIN D2) 50,000 units    famotidine (PEPCID) 20 mg tablet    glucosamine-chondroitin 500-400 MG tablet    lisinopril (ZESTRIL) 10 mg tablet    Multiple Vitamin (MULTIVITAMIN) tablet    pantoprazole (PROTONIX) 40 mg tablet    prednisoLONE acetate (PRED FORTE) 1 % ophthalmic suspension    vitamin B-12 (VITAMIN B-12) 1,000 mcg tablet    No Known Allergies    Objective     Blood pressure 128/80, pulse 67, temperature 98.7 °F (37.1 °C), temperature source Tympanic, resp. rate 16, height 5' 8.5" (1.74 m), weight 113 kg (249 lb), SpO2 96 %. Body mass index is 37.31 kg/m². Physical Exam  Vitals and nursing note reviewed. Constitutional:       Appearance: Normal appearance. He is obese. HENT:      Head: Normocephalic and atraumatic. Eyes:      General: No scleral icterus. Conjunctiva/sclera: Conjunctivae normal.   Cardiovascular:      Rate and Rhythm: Normal rate. Pulmonary:      Effort: Pulmonary effort is normal. No respiratory distress. Abdominal:      General: Abdomen is protuberant. Bowel sounds are normal. There is no distension. Palpations: Abdomen is soft.       Tenderness: There is no abdominal tenderness. There is no guarding or rebound. Skin:     General: Skin is warm and dry. Coloration: Skin is not jaundiced. Neurological:      General: No focal deficit present. Mental Status: He is alert and oriented to person, place, and time. Comments: No asterixis   Psychiatric:         Mood and Affect: Mood normal.         Behavior: Behavior normal.       Lab Results:   No visits with results within 1 Day(s) from this visit. Latest known visit with results is:   Appointment on 11/28/2023   Component Date Value    Total Bilirubin 11/28/2023 0.54     Bilirubin, Direct 11/28/2023 0.10     Alkaline Phosphatase 11/28/2023 38     AST 11/28/2023 49 (H)     ALT 11/28/2023 72 (H)     Total Protein 11/28/2023 7.0     Albumin 11/28/2023 4.2     WBC 11/28/2023 4.39     RBC 11/28/2023 4.48     Hemoglobin 11/28/2023 14.2     Hematocrit 11/28/2023 43.1     MCV 11/28/2023 96     MCH 11/28/2023 31.7     MCHC 11/28/2023 32.9     RDW 11/28/2023 12.4     MPV 11/28/2023 10.0     Platelets 66/55/0668 182     nRBC 11/28/2023 0     Neutrophils Relative 11/28/2023 56     Immat GRANS % 11/28/2023 1     Lymphocytes Relative 11/28/2023 30     Monocytes Relative 11/28/2023 11     Eosinophils Relative 11/28/2023 2     Basophils Relative 11/28/2023 0     Neutrophils Absolute 11/28/2023 2.53     Immature Grans Absolute 11/28/2023 0.02     Lymphocytes Absolute 11/28/2023 1.30     Monocytes Absolute 11/28/2023 0.46     Eosinophils Absolute 11/28/2023 0.07     Basophils Absolute 11/28/2023 0.01      Radiology Results:   No results found. Areli Medina PA-C    **Please note:  Dictation voice to text software may have been used in the creation of this record. Occasional wrong word or “sound alike” substitutions may have occurred due to the inherent limitations of voice recognition software. Read the chart carefully and recognize, using context, where substitutions have occurred. **

## 2023-12-12 NOTE — PATIENT INSTRUCTIONS
Stay on the pantoprazole/Protonix every morning to protect your stomach so that you do not develop heartburn or another ulcer. You can use the famotidine/Pepcid as needed for breakthrough heartburn. You are due for a colonoscopy in February 2024. If I cannot find records for a previous upper endoscopy within the past 5 years, I think it would be reasonable to check 1 of these given the duration of your gastro reflux history, your swallowing symptoms, and the fact that you had ulcers in the past.    For your fatty liver, I will have you meet with Dr. Almas Saucedo who is our liver specialist.  He can talk to you about whether he thinks there is any additional investigation to be done. The greatest help to treat fatty liver would be slow sustained weight loss. Continue to control blood pressure well, blood sugar, and cholesterol/triglycerides.

## 2024-01-10 ENCOUNTER — TELEPHONE (OUTPATIENT)
Dept: FAMILY MEDICINE CLINIC | Facility: CLINIC | Age: 69
End: 2024-01-10

## 2024-01-15 DIAGNOSIS — E55.9 VITAMIN D DEFICIENCY: ICD-10-CM

## 2024-01-15 RX ORDER — ERGOCALCIFEROL 1.25 MG/1
CAPSULE ORAL
Qty: 3 CAPSULE | Refills: 1 | Status: SHIPPED | OUTPATIENT
Start: 2024-01-15

## 2024-01-29 ENCOUNTER — APPOINTMENT (OUTPATIENT)
Dept: LAB | Facility: CLINIC | Age: 69
End: 2024-01-29
Payer: MEDICARE

## 2024-01-29 ENCOUNTER — RA CDI HCC (OUTPATIENT)
Dept: OTHER | Facility: HOSPITAL | Age: 69
End: 2024-01-29

## 2024-01-29 DIAGNOSIS — D64.9 ANEMIA, UNSPECIFIED TYPE: ICD-10-CM

## 2024-01-29 DIAGNOSIS — K76.0 HEPATIC STEATOSIS: ICD-10-CM

## 2024-01-29 DIAGNOSIS — E55.9 VITAMIN D DEFICIENCY: ICD-10-CM

## 2024-01-29 DIAGNOSIS — E53.8 VITAMIN B12 DEFICIENCY: ICD-10-CM

## 2024-01-29 DIAGNOSIS — E78.49 OTHER HYPERLIPIDEMIA: ICD-10-CM

## 2024-01-29 DIAGNOSIS — R73.9 HYPERGLYCEMIA: ICD-10-CM

## 2024-01-29 LAB
25(OH)D3 SERPL-MCNC: 36.9 NG/ML (ref 30–100)
ALBUMIN SERPL BCP-MCNC: 4.3 G/DL (ref 3.5–5)
ALP SERPL-CCNC: 40 U/L (ref 34–104)
ALT SERPL W P-5'-P-CCNC: 57 U/L (ref 7–52)
ANION GAP SERPL CALCULATED.3IONS-SCNC: 9 MMOL/L
AST SERPL W P-5'-P-CCNC: 36 U/L (ref 13–39)
BASOPHILS # BLD AUTO: 0.03 THOUSANDS/ÂΜL (ref 0–0.1)
BASOPHILS NFR BLD AUTO: 1 % (ref 0–1)
BILIRUB SERPL-MCNC: 0.66 MG/DL (ref 0.2–1)
BUN SERPL-MCNC: 18 MG/DL (ref 5–25)
CALCIUM SERPL-MCNC: 9.1 MG/DL (ref 8.4–10.2)
CHLORIDE SERPL-SCNC: 102 MMOL/L (ref 96–108)
CHOLEST SERPL-MCNC: 125 MG/DL
CO2 SERPL-SCNC: 29 MMOL/L (ref 21–32)
CREAT SERPL-MCNC: 1.08 MG/DL (ref 0.6–1.3)
EOSINOPHIL # BLD AUTO: 0.08 THOUSAND/ÂΜL (ref 0–0.61)
EOSINOPHIL NFR BLD AUTO: 2 % (ref 0–6)
ERYTHROCYTE [DISTWIDTH] IN BLOOD BY AUTOMATED COUNT: 12 % (ref 11.6–15.1)
EST. AVERAGE GLUCOSE BLD GHB EST-MCNC: 126 MG/DL
GFR SERPL CREATININE-BSD FRML MDRD: 69 ML/MIN/1.73SQ M
GLUCOSE P FAST SERPL-MCNC: 115 MG/DL (ref 65–99)
HBA1C MFR BLD: 6 %
HCT VFR BLD AUTO: 44.5 % (ref 36.5–49.3)
HDLC SERPL-MCNC: 52 MG/DL
HGB BLD-MCNC: 14.9 G/DL (ref 12–17)
IMM GRANULOCYTES # BLD AUTO: 0.01 THOUSAND/UL (ref 0–0.2)
IMM GRANULOCYTES NFR BLD AUTO: 0 % (ref 0–2)
INSULIN SERPL-ACNC: 24.74 UIU/ML (ref 1.9–23)
LDLC SERPL CALC-MCNC: 60 MG/DL (ref 0–100)
LYMPHOCYTES # BLD AUTO: 1.45 THOUSANDS/ÂΜL (ref 0.6–4.47)
LYMPHOCYTES NFR BLD AUTO: 28 % (ref 14–44)
MCH RBC QN AUTO: 31.6 PG (ref 26.8–34.3)
MCHC RBC AUTO-ENTMCNC: 33.5 G/DL (ref 31.4–37.4)
MCV RBC AUTO: 95 FL (ref 82–98)
MONOCYTES # BLD AUTO: 0.48 THOUSAND/ÂΜL (ref 0.17–1.22)
MONOCYTES NFR BLD AUTO: 9 % (ref 4–12)
NEUTROPHILS # BLD AUTO: 3.12 THOUSANDS/ÂΜL (ref 1.85–7.62)
NEUTS SEG NFR BLD AUTO: 60 % (ref 43–75)
NONHDLC SERPL-MCNC: 73 MG/DL
NRBC BLD AUTO-RTO: 0 /100 WBCS
PLATELET # BLD AUTO: 196 THOUSANDS/UL (ref 149–390)
PMV BLD AUTO: 9.7 FL (ref 8.9–12.7)
POTASSIUM SERPL-SCNC: 4.4 MMOL/L (ref 3.5–5.3)
PROT SERPL-MCNC: 6.9 G/DL (ref 6.4–8.4)
RBC # BLD AUTO: 4.71 MILLION/UL (ref 3.88–5.62)
SODIUM SERPL-SCNC: 140 MMOL/L (ref 135–147)
TRIGL SERPL-MCNC: 64 MG/DL
TSH SERPL DL<=0.05 MIU/L-ACNC: 2.66 UIU/ML (ref 0.45–4.5)
VIT B12 SERPL-MCNC: 706 PG/ML (ref 180–914)
WBC # BLD AUTO: 5.17 THOUSAND/UL (ref 4.31–10.16)

## 2024-01-29 PROCEDURE — 80053 COMPREHEN METABOLIC PANEL: CPT

## 2024-01-29 PROCEDURE — 80061 LIPID PANEL: CPT

## 2024-01-29 PROCEDURE — 83036 HEMOGLOBIN GLYCOSYLATED A1C: CPT

## 2024-01-29 PROCEDURE — 83525 ASSAY OF INSULIN: CPT

## 2024-01-29 PROCEDURE — 82306 VITAMIN D 25 HYDROXY: CPT

## 2024-01-29 PROCEDURE — 85025 COMPLETE CBC W/AUTO DIFF WBC: CPT

## 2024-01-29 PROCEDURE — 36415 COLL VENOUS BLD VENIPUNCTURE: CPT

## 2024-01-29 PROCEDURE — 82607 VITAMIN B-12: CPT

## 2024-02-05 ENCOUNTER — OFFICE VISIT (OUTPATIENT)
Dept: FAMILY MEDICINE CLINIC | Facility: CLINIC | Age: 69
End: 2024-02-05
Payer: MEDICARE

## 2024-02-05 VITALS
TEMPERATURE: 97.9 F | BODY MASS INDEX: 36.5 KG/M2 | DIASTOLIC BLOOD PRESSURE: 82 MMHG | SYSTOLIC BLOOD PRESSURE: 116 MMHG | RESPIRATION RATE: 16 BRPM | HEART RATE: 93 BPM | OXYGEN SATURATION: 95 % | WEIGHT: 246.4 LBS | HEIGHT: 69 IN

## 2024-02-05 DIAGNOSIS — M47.816 LUMBAR SPONDYLOSIS: ICD-10-CM

## 2024-02-05 DIAGNOSIS — Z23 ENCOUNTER FOR IMMUNIZATION: ICD-10-CM

## 2024-02-05 DIAGNOSIS — E07.9 THYROID DISORDER: ICD-10-CM

## 2024-02-05 DIAGNOSIS — I10 PRIMARY HYPERTENSION: ICD-10-CM

## 2024-02-05 DIAGNOSIS — K21.00 GASTROESOPHAGEAL REFLUX DISEASE WITH ESOPHAGITIS: ICD-10-CM

## 2024-02-05 DIAGNOSIS — M47.895 OTHER OSTEOARTHRITIS OF SPINE, THORACOLUMBAR REGION: ICD-10-CM

## 2024-02-05 DIAGNOSIS — I10 HYPERTENSION, UNSPECIFIED TYPE: ICD-10-CM

## 2024-02-05 DIAGNOSIS — G56.03 BILATERAL CARPAL TUNNEL SYNDROME: ICD-10-CM

## 2024-02-05 DIAGNOSIS — F41.9 ANXIETY: ICD-10-CM

## 2024-02-05 DIAGNOSIS — M54.50 CHRONIC BILATERAL LOW BACK PAIN WITHOUT SCIATICA: ICD-10-CM

## 2024-02-05 DIAGNOSIS — D22.9 MULTIPLE ATYPICAL NEVI: ICD-10-CM

## 2024-02-05 DIAGNOSIS — K21.9 GASTROESOPHAGEAL REFLUX DISEASE WITHOUT ESOPHAGITIS: Primary | ICD-10-CM

## 2024-02-05 DIAGNOSIS — G89.29 CHRONIC BILATERAL LOW BACK PAIN WITHOUT SCIATICA: ICD-10-CM

## 2024-02-05 DIAGNOSIS — M17.0 PRIMARY OSTEOARTHRITIS OF BOTH KNEES: ICD-10-CM

## 2024-02-05 DIAGNOSIS — M47.812 CERVICAL SPONDYLOSIS: ICD-10-CM

## 2024-02-05 DIAGNOSIS — E53.8 VITAMIN B12 DEFICIENCY: ICD-10-CM

## 2024-02-05 DIAGNOSIS — D50.8 OTHER IRON DEFICIENCY ANEMIA: ICD-10-CM

## 2024-02-05 DIAGNOSIS — M54.16 LUMBAR RADICULOPATHY: ICD-10-CM

## 2024-02-05 DIAGNOSIS — R73.9 HYPERGLYCEMIA: ICD-10-CM

## 2024-02-05 DIAGNOSIS — M50.120 CERVICAL DISC DISORDER WITH RADICULOPATHY OF MID-CERVICAL REGION: ICD-10-CM

## 2024-02-05 DIAGNOSIS — K76.0 HEPATIC STEATOSIS: ICD-10-CM

## 2024-02-05 DIAGNOSIS — M47.23 OSTEOARTHRITIS OF SPINE WITH RADICULOPATHY, CERVICOTHORACIC REGION: ICD-10-CM

## 2024-02-05 DIAGNOSIS — E55.9 VITAMIN D DEFICIENCY: ICD-10-CM

## 2024-02-05 DIAGNOSIS — R25.1 TREMOR OF BOTH HANDS: ICD-10-CM

## 2024-02-05 DIAGNOSIS — M47.25 OSTEOARTHRITIS OF SPINE WITH RADICULOPATHY, THORACOLUMBAR REGION: ICD-10-CM

## 2024-02-05 DIAGNOSIS — K25.9 GASTRIC ULCER, UNSPECIFIED CHRONICITY, UNSPECIFIED WHETHER GASTRIC ULCER HEMORRHAGE OR PERFORATION PRESENT: ICD-10-CM

## 2024-02-05 DIAGNOSIS — E78.2 MIXED HYPERLIPIDEMIA: ICD-10-CM

## 2024-02-05 PROCEDURE — G0008 ADMIN INFLUENZA VIRUS VAC: HCPCS

## 2024-02-05 PROCEDURE — 99214 OFFICE O/P EST MOD 30 MIN: CPT | Performed by: NURSE PRACTITIONER

## 2024-02-05 PROCEDURE — 90662 IIV NO PRSV INCREASED AG IM: CPT

## 2024-02-05 RX ORDER — LANOLIN ALCOHOL/MO/W.PET/CERES
1 CREAM (GRAM) TOPICAL 3 TIMES DAILY
Qty: 90 TABLET | Refills: 5 | Status: SHIPPED | OUTPATIENT
Start: 2024-02-05

## 2024-02-05 RX ORDER — PANTOPRAZOLE SODIUM 40 MG/1
40 TABLET, DELAYED RELEASE ORAL DAILY
Qty: 90 TABLET | Refills: 1 | Status: SHIPPED | OUTPATIENT
Start: 2024-02-05

## 2024-02-05 RX ORDER — FAMOTIDINE 20 MG/1
TABLET, FILM COATED ORAL
Qty: 60 TABLET | Refills: 5 | Status: SHIPPED | OUTPATIENT
Start: 2024-02-05

## 2024-02-05 RX ORDER — MULTIVITAMIN
1 TABLET ORAL DAILY
Qty: 90 TABLET | Refills: 1 | Status: SHIPPED | OUTPATIENT
Start: 2024-02-05

## 2024-02-05 RX ORDER — LISINOPRIL 10 MG/1
10 TABLET ORAL DAILY
Qty: 90 TABLET | Refills: 1 | Status: SHIPPED | OUTPATIENT
Start: 2024-02-05

## 2024-02-05 RX ORDER — ALPRAZOLAM 0.5 MG/1
0.5 TABLET ORAL EVERY 12 HOURS PRN
Qty: 60 TABLET | Refills: 2 | Status: SHIPPED | OUTPATIENT
Start: 2024-02-05

## 2024-02-05 RX ORDER — ERGOCALCIFEROL 1.25 MG/1
50000 CAPSULE ORAL
Qty: 3 CAPSULE | Refills: 1 | Status: SHIPPED | OUTPATIENT
Start: 2024-02-05

## 2024-02-05 RX ORDER — LANOLIN ALCOHOL/MO/W.PET/CERES
1000 CREAM (GRAM) TOPICAL DAILY
Qty: 90 TABLET | Refills: 2 | Status: SHIPPED | OUTPATIENT
Start: 2024-02-05

## 2024-02-05 RX ORDER — CELECOXIB 100 MG/1
100 CAPSULE ORAL 2 TIMES DAILY
Qty: 180 CAPSULE | Refills: 1 | Status: SHIPPED | OUTPATIENT
Start: 2024-02-05

## 2024-02-05 RX ORDER — CITALOPRAM 40 MG/1
40 TABLET ORAL DAILY
Qty: 90 TABLET | Refills: 1 | Status: SHIPPED | OUTPATIENT
Start: 2024-02-05

## 2024-02-05 NOTE — PROGRESS NOTES
Name: Uriel Howell III      : 1955      MRN: [id number]  Encounter Provider: LYNDSEY Hester  Encounter Date: 2024   Encounter department: [facility]    Assessment & Plan     1. Gastroesophageal reflux disease without esophagitis  -     pantoprazole (PROTONIX) 40 mg tablet; Take 1 tablet (40 mg total) by mouth daily    2. Primary hypertension  -     CBC and differential; Future; Expected date: 2024    3. Cervical disc disorder with radiculopathy of mid-cervical region  -     celecoxib (CeleBREX) 100 mg capsule; Take 1 capsule (100 mg total) by mouth 2 (two) times a day    4. Lumbar radiculopathy  -     celecoxib (CeleBREX) 100 mg capsule; Take 1 capsule (100 mg total) by mouth 2 (two) times a day    5. Cervical spondylosis  -     celecoxib (CeleBREX) 100 mg capsule; Take 1 capsule (100 mg total) by mouth 2 (two) times a day    6. Lumbar spondylosis    7. Other osteoarthritis of spine, thoracolumbar region    8. Anxiety  -     ALPRAZolam (XANAX) 0.5 mg tablet; Take 1 tablet (0.5 mg total) by mouth every 12 (twelve) hours as needed for anxiety  -     citalopram (CeleXA) 40 mg tablet; Take 1 tablet (40 mg total) by mouth daily    9. Vitamin B12 deficiency  -     Vitamin B12; Future; Expected date: 2024  -     vitamin B-12 (VITAMIN B-12) 1,000 mcg tablet; Take 1 tablet (1,000 mcg total) by mouth daily  -     Multiple Vitamin (multivitamin) tablet; Take 1 tablet by mouth daily    10. Vitamin D deficiency  -     Vitamin D 25 hydroxy; Future; Expected date: 2024  -     ergocalciferol (VITAMIN D2) 50,000 units; Take 1 capsule (50,000 Units total) by mouth every 28 days  -     Multiple Vitamin (multivitamin) tablet; Take 1 tablet by mouth daily    11. Encounter for immunization  -     influenza vaccine, high-dose, PF 0.7 mL (FLUZONE HIGH-DOSE)    12. Tremor of both hands  -     EMG 2 Limb Upper Extremity; Future    13. Hepatic steatosis  -      Comprehensive metabolic panel; Future; Expected date: 08/05/2024    14. Bilateral carpal tunnel syndrome    15. Other iron deficiency anemia  -     CBC and differential; Future; Expected date: 08/05/2024    16. Hyperglycemia  -     Comprehensive metabolic panel; Future; Expected date: 08/05/2024  -     Hemoglobin A1C; Future; Expected date: 08/05/2024  -     Insulin, fasting; Future; Expected date: 08/05/2024    17. Mixed hyperlipidemia  -     Lipid panel; Future; Expected date: 08/05/2024    18. Thyroid disorder  -     TSH, 3rd generation; Future; Expected date: 08/05/2024    19. Primary osteoarthritis of both knees  -     celecoxib (CeleBREX) 100 mg capsule; Take 1 capsule (100 mg total) by mouth 2 (two) times a day    20. Chronic bilateral low back pain without sciatica  -     celecoxib (CeleBREX) 100 mg capsule; Take 1 capsule (100 mg total) by mouth 2 (two) times a day    21. Gastroesophageal reflux disease with esophagitis  -     famotidine (PEPCID) 20 mg tablet; 1 tab po 2 x daily prn indigestion    22. Osteoarthritis of spine with radiculopathy, cervicothoracic region  -     glucosamine-chondroitin 500-400 MG tablet; Take 1 tablet by mouth 3 (three) times a day    23. Osteoarthritis of spine with radiculopathy, thoracolumbar region  -     glucosamine-chondroitin 500-400 MG tablet; Take 1 tablet by mouth 3 (three) times a day    24. Hypertension, unspecified type  -     lisinopril (ZESTRIL) 10 mg tablet; Take 1 tablet (10 mg total) by mouth daily    25. Gastric ulcer, unspecified chronicity, unspecified whether gastric ulcer hemorrhage or perforation present  -     pantoprazole (PROTONIX) 40 mg tablet; Take 1 tablet (40 mg total) by mouth daily    26. Multiple atypical nevi  -     Ambulatory Referral to Dermatology; Future           Subjective     Patient presents to office for follow up and recheck. Complete medical history and medications reviewed with patient and tolerating all medications well without any  problems. Vital signs reviewed and stable.  Lab results reviewed with patient.  C/O tremors of B/L hands occurring.  Patient is scheduled for routine Colonoscopy on 2/20/24 at [facility].  Patient has follow up appointment scheduled with [facility] for monitoring and evaluation of BRANCH.  C/O tingling and numbess in B/L hands and fingers that he was told previously he had B/L Carpal Tunnel Syndrome.  Denies any other problems or concerns at the present time.       Review of Systems  GENERAL:  Feels well, denies any significant changes in weight without trying.  SKIN:  Denies rashes, lesions, opened areas, wounds, change in moles or any other skin changes.  HEENT:  Denies any head injury or headaches.    Negative blurred vision, floaters, spots before eyes, infections, or other vision problems.  Negative significant changes in vision or hearing.    Negative tinnitus, vertigo, or infections.    Negative hay fever, sinus trouble, nasal discharge, bloody noses, or problems with smell.    Negative sore throat, bleeding gums, ulcers, or sores.   NECK:  Denies lumps, goiter, pain, swollen glands, or lymphadenopathy.  BREASTS:  Denies lumps, pain, nipple discharge, swelling, redness, or any other changes.  RESPIRATORY:  Denies cough, wheezing, shortness of breath, dyspnea, or orthopnea.  CARDIOVASCULAR:  Denies chest pain or palpitations.   GASTROINTESTINAL:  Appetite good, denies nausea, vomiting, or indigestion.    Bowel movements normal occurring about once daily or every other day.  URINARY:  Denies frequency, incontinence, dysuria, hematuria, nocturia, or recent flank pain.   GENITAL:  Denies penile discharge, ulcerations, lesions, or other problems.   PERIPHERAL VASCULAR:  Denies varicosities, swelling, skin changes, or pain.  MUSCULOSKELETAL:  Denies back, joint, or muscle pain.    Negative problems with mobility or movement.   PSYCHIATRIC:  Denies problems with depression, anxiety, anger, or other  psychiatric symptoms.  NEUROLOGIC:  Denies fainting, dizziness, memory problems, seizures, tingling, motor or sensory loss.  Tremors of B/L hands occurring.   HEMATOLOGIC:  Denies easy bruising, bleeding, or anemia.  ENDOCRINE:  Denies thyroid problems, temperature intolerance, excessive sweating, or other endocrine symptoms.      Past Medical History:   Diagnosis Date    Anxiety     Carpal tunnel syndrome     Colon polyps     DJD (degenerative joint disease)     Duodenal ulcer     Gastric reflux     Gastric ulcer     Heel fracture     bilateral    Hypertension     Vitamin B12 deficiency     Vitamin D deficiency      Past Surgical History:   Procedure Laterality Date    ANKLE SURGERY      repair    COLONOSCOPY      polyp removed; benign    COLONOSCOPY  02/07/2019    COLONOSCOPY W/ POLYPECTOMY      Followed by Dr. Pang     FOOT FRACTURE SURGERY Bilateral     MULTIPLE TOOTH EXTRACTIONS      POLYPECTOMY      TIBIA FRACTURE SURGERY Left      Family History   Problem Relation Age of Onset    Cancer Mother         lung    Heart disease Mother     Cancer Father         prostate    Heart murmur Brother     Diabetes Daughter     Diabetes Son     Diabetes Other      Social History     Socioeconomic History    Marital status: /Civil Union     Spouse name: None    Number of children: None    Years of education: None    Highest education level: None   Occupational History    None   Tobacco Use    Smoking status: Former     Current packs/day: 0.00     Average packs/day: 1 pack/day for 44.4 years (44.4 ttl pk-yrs)     Types: Cigarettes     Start date: 1/1/1976     Quit date: 6/1/2020     Years since quitting: 3.6    Smokeless tobacco: Former     Types: Chew     Quit date: 2014   Vaping Use    Vaping status: Never Used   Substance and Sexual Activity    Alcohol use: No    Drug use: No    Sexual activity: None   Other Topics Concern    None   Social History Narrative    None     Social Determinants of Health     Financial  Resource Strain: Not on file   Food Insecurity: Not on file   Transportation Needs: No Transportation Needs (4/15/2021)    PRAPARE - Transportation     Lack of Transportation (Medical): No     Lack of Transportation (Non-Medical): No   Physical Activity: Not on file   Stress: Not on file   Social Connections: Not on file   Intimate Partner Violence: Not on file   Housing Stability: Not on file     Current Outpatient Medications on File Prior to Visit   Medication Sig    [DISCONTINUED] ALPRAZolam (XANAX) 0.5 mg tablet Take 1 tablet (0.5 mg total) by mouth every 12 (twelve) hours as needed for anxiety    [DISCONTINUED] celecoxib (CeleBREX) 100 mg capsule Take 1 capsule (100 mg total) by mouth 2 (two) times a day    [DISCONTINUED] citalopram (CeleXA) 40 mg tablet Take 1 tablet (40 mg total) by mouth daily    [DISCONTINUED] ergocalciferol (VITAMIN D2) 50,000 units take 1 capsule by mouth EVERY 28 DAYS    [DISCONTINUED] famotidine (PEPCID) 20 mg tablet 1 tab po 2 x daily prn indigestion    [DISCONTINUED] glucosamine-chondroitin 500-400 MG tablet Take 1 tablet by mouth 3 (three) times a day    [DISCONTINUED] lisinopril (ZESTRIL) 10 mg tablet Take 1 tablet (10 mg total) by mouth daily    [DISCONTINUED] Multiple Vitamin (MULTIVITAMIN) tablet Take 1 tablet by mouth daily    [DISCONTINUED] pantoprazole (PROTONIX) 40 mg tablet Take 1 tablet (40 mg total) by mouth daily    [DISCONTINUED] vitamin B-12 (VITAMIN B-12) 1,000 mcg tablet Take 1 tablet (1,000 mcg total) by mouth daily    [DISCONTINUED] prednisoLONE acetate (PRED FORTE) 1 % ophthalmic suspension POST LASER:  INSTILL 1 DROP INTO OPERATIVE EYE(S) 4 TIMES A DAY FOR 5 DAYS (Patient not taking: Reported on 2/5/2024)     No Known Allergies  Immunization History   Administered Date(s) Administered    COVID-19 MODERNA VACC 0.5 ML IM 03/19/2021, 04/16/2021    INFLUENZA 11/23/1999, 10/26/2020, 11/27/2022    Influenza, high dose seasonal 0.7 mL 02/05/2024    Pneumococcal  "Polysaccharide PPV23 07/06/2021    Td (adult), adsorbed 12/19/1996    Tdap 11/25/2016, 07/18/2018       Objective     /82 (BP Location: Left arm, Patient Position: Sitting)   Pulse 93   Temp 97.9 °F (36.6 °C) (Tympanic)   Resp 16   Ht 5' 8.5\" (1.74 m)   Wt 112 kg (246 lb 6.4 oz)   SpO2 95%   BMI 36.92 kg/m²     Physical Exam  Vitals and nursing note reviewed.     GENERAL:  Appears well nourished, well groomed, in no acute distress.  SKIN:  Numerous dark centered macular nevi over back. Dark centered irregular bordered nevi of right temporal area. Several dark centered nevi over forehead.   HEAD:  Hair is average texture.  Scalp without lesions, normocephalic, and atraumatic.  EARS:  B/L ear canals clear.  B/L TMs clear with + light reflex.    NOSE: Mucosa pink, moist, septum midline.  Negative sinus tenderness.   B/L turbinates pink, moist, non-edematous without exudate.   MOUTH:  Oral mucosa pink.  Pharynx pink, moist, without swelling, redness, or exudate.  Dentition ok.  Tongue midline.   NECK:  Supple, trachea midline, Negative thyromegaly, lymphadenopathy, or swollen glands.  LYMPH NODES:  Negative enlargement of neck, axillary, epitrochlear, or inguinal nodes.  THORAX/LUNGS  Thorax symmetric with good excursion.   Lungs resonant.  Breath sounds vesicular with no added sounds.    Diaphragm descends within normal limits.   CARDIOVASCULAR:  Carotid upstrokes brisk and without bruits.   Apical impulse discrete and tapping, barely palpable in the 5th ICS/MCL.    Normal S1 and Normal S2, Negative S3 or S4.    Negative murmurs, thrills, lifts, or heaves.  ABDOMEN:  Protuberant, bowel sounds normal active x 4 quadrants.    Negative tenderness.  Negative masses.  Negative hepatomegaly.  Negative splenomegaly.  Negative costovertebral tenderness.  EXTREMITIES:  Warm, calves supple, non-tender, negative for edema.  Negative stasis pigmentation or ulcers.   +2 pulses throughout.  + temors of B/L hands.  + " Phalen and + Tinnel Signs of B/L Hands.   MUSCULOSKELETAL:  Negative joint deformities.    Good range of motion in hands, wrists, elbows, shoulders, spine, hips, knees, and ankles.  Negative spinal curvature.  NEUROLOGICAL:  Mental status:  Awake, alert, and oriented to person, place, time, and event    LYNDSEY Hester

## 2024-02-20 ENCOUNTER — ANESTHESIA (OUTPATIENT)
Dept: PERIOP | Facility: HOSPITAL | Age: 69
End: 2024-02-20

## 2024-02-20 ENCOUNTER — ANESTHESIA EVENT (OUTPATIENT)
Dept: PERIOP | Facility: HOSPITAL | Age: 69
End: 2024-02-20

## 2024-02-20 ENCOUNTER — HOSPITAL ENCOUNTER (OUTPATIENT)
Dept: PERIOP | Facility: HOSPITAL | Age: 69
Setting detail: OUTPATIENT SURGERY
Discharge: HOME | End: 2024-02-20
Admitting: INTERNAL MEDICINE
Payer: MEDICARE

## 2024-02-20 VITALS
RESPIRATION RATE: 18 BRPM | DIASTOLIC BLOOD PRESSURE: 64 MMHG | TEMPERATURE: 98.6 F | OXYGEN SATURATION: 97 % | SYSTOLIC BLOOD PRESSURE: 133 MMHG | HEART RATE: 62 BPM

## 2024-02-20 DIAGNOSIS — Z86.010 PERSONAL HISTORY OF COLONIC POLYPS: ICD-10-CM

## 2024-02-20 PROCEDURE — 88305 TISSUE EXAM BY PATHOLOGIST: CPT | Performed by: PATHOLOGY

## 2024-02-20 PROCEDURE — 45385 COLONOSCOPY W/LESION REMOVAL: CPT | Performed by: INTERNAL MEDICINE

## 2024-02-20 PROCEDURE — 45380 COLONOSCOPY AND BIOPSY: CPT | Performed by: INTERNAL MEDICINE

## 2024-02-20 RX ORDER — LIDOCAINE HYDROCHLORIDE 20 MG/ML
INJECTION, SOLUTION EPIDURAL; INFILTRATION; INTRACAUDAL; PERINEURAL AS NEEDED
Status: DISCONTINUED | OUTPATIENT
Start: 2024-02-20 | End: 2024-02-20

## 2024-02-20 RX ORDER — SODIUM CHLORIDE, SODIUM LACTATE, POTASSIUM CHLORIDE, CALCIUM CHLORIDE 600; 310; 30; 20 MG/100ML; MG/100ML; MG/100ML; MG/100ML
INJECTION, SOLUTION INTRAVENOUS CONTINUOUS PRN
Status: DISCONTINUED | OUTPATIENT
Start: 2024-02-20 | End: 2024-02-20

## 2024-02-20 RX ORDER — PROPOFOL 10 MG/ML
INJECTION, EMULSION INTRAVENOUS AS NEEDED
Status: DISCONTINUED | OUTPATIENT
Start: 2024-02-20 | End: 2024-02-20

## 2024-02-20 RX ADMIN — PROPOFOL 50 MG: 10 INJECTION, EMULSION INTRAVENOUS at 09:13

## 2024-02-20 RX ADMIN — PROPOFOL 50 MG: 10 INJECTION, EMULSION INTRAVENOUS at 09:10

## 2024-02-20 RX ADMIN — LIDOCAINE HYDROCHLORIDE 100 MG: 20 INJECTION, SOLUTION EPIDURAL; INFILTRATION; INTRACAUDAL; PERINEURAL at 09:04

## 2024-02-20 RX ADMIN — PROPOFOL 50 MG: 10 INJECTION, EMULSION INTRAVENOUS at 09:09

## 2024-02-20 RX ADMIN — SODIUM CHLORIDE, SODIUM LACTATE, POTASSIUM CHLORIDE, AND CALCIUM CHLORIDE: .6; .31; .03; .02 INJECTION, SOLUTION INTRAVENOUS at 08:48

## 2024-02-20 RX ADMIN — PROPOFOL 150 MG: 10 INJECTION, EMULSION INTRAVENOUS at 09:04

## 2024-02-20 NOTE — ANESTHESIA POSTPROCEDURE EVALUATION
Post-Op Assessment Note    CV Status:  Stable  Pain Score: 0    Pain management: adequate       Mental Status:  Awake   Hydration Status:  Euvolemic   PONV Controlled:  Controlled   Airway Patency:  Patent     Post Op Vitals Reviewed: Yes    No anethesia notable event occurred.    Staff: CRNA               BP   124/77   Temp   98.6   Pulse  67   Resp   12   SpO2   96

## 2024-02-20 NOTE — ANESTHESIA PREPROCEDURE EVALUATION
Procedure:  COLONOSCOPY    Relevant Problems   CARDIO   (+) Hypertension      GI/HEPATIC   (+) Gastric ulcer   (+) Gastroesophageal reflux disease with esophagitis   (+) Gastroesophageal reflux disease without esophagitis      MUSCULOSKELETAL   (+) Cervical spondylosis   (+) Chronic bilateral low back pain without sciatica   (+) Lumbar spondylosis   (+) Osteoarthritis of thoracolumbar spine   (+) Primary osteoarthritis of both knees      NEURO/PSYCH   (+) Anxiety   (+) Chronic bilateral low back pain without sciatica   (+) Chronic pain syndrome   (+) Depression             Anesthesia Plan  ASA Score- 2     Anesthesia Type- IV sedation with anesthesia with ASA Monitors.         Additional Monitors:     Airway Plan:            Plan Factors-    Chart reviewed.                      Induction- intravenous.    Postoperative Plan-     Informed Consent- Anesthetic plan and risks discussed with patient.  I personally reviewed this patient with the CRNA. Discussed and agreed on the Anesthesia Plan with the CRNA..

## 2024-02-20 NOTE — H&P
History and Physical -  [facility]  Uriel Howell III 68 y.o. male MRN: [id number]                  HPI: Uriel Howell III is a 68 y.o. year old male who presents for history of colon polyps.      REVIEW OF SYSTEMS: Per the HPI, and otherwise unremarkable.    Historical Information   Past Medical History:   Diagnosis Date    Anxiety     Carpal tunnel syndrome     Colon polyps     DJD (degenerative joint disease)     Duodenal ulcer     Gastric reflux     Gastric ulcer     Heel fracture     bilateral    Hypertension     Vitamin B12 deficiency     Vitamin D deficiency      Past Surgical History:   Procedure Laterality Date    ANKLE SURGERY      repair    COLONOSCOPY      polyp removed; benign    COLONOSCOPY  02/07/2019    COLONOSCOPY W/ POLYPECTOMY      Followed by Dr. Pang     FOOT FRACTURE SURGERY Bilateral     MULTIPLE TOOTH EXTRACTIONS      POLYPECTOMY      TIBIA FRACTURE SURGERY Left      Social History   Social History     Substance and Sexual Activity   Alcohol Use No     Social History     Substance and Sexual Activity   Drug Use No     Social History     Tobacco Use   Smoking Status Former    Current packs/day: 0.00    Average packs/day: 1 pack/day for 44.4 years (44.4 ttl pk-yrs)    Types: Cigarettes    Start date: 1/1/1976    Quit date: 6/1/2020    Years since quitting: 3.7   Smokeless Tobacco Former    Types: Chew    Quit date: 2014     Family History   Problem Relation Age of Onset    Cancer Mother         lung    Heart disease Mother     Cancer Father         prostate    Heart murmur Brother     Diabetes Daughter     Diabetes Son     Diabetes Other        Meds/Allergies       Current Outpatient Medications:     ALPRAZolam (XANAX) 0.5 mg tablet    celecoxib (CeleBREX) 100 mg capsule    citalopram (CeleXA) 40 mg tablet    ergocalciferol (VITAMIN D2) 50,000 units    famotidine (PEPCID) 20 mg tablet    lisinopril (ZESTRIL) 10 mg tablet    Multiple Vitamin (multivitamin)  tablet    pantoprazole (PROTONIX) 40 mg tablet    vitamin B-12 (VITAMIN B-12) 1,000 mcg tablet    glucosamine-chondroitin 500-400 MG tablet    No Known Allergies    Objective     /82   Pulse 65   Temp 98.5 °F (36.9 °C) (Tympanic)   Resp 18   SpO2 95%       PHYSICAL EXAM    Gen: NAD  Head: NCAT  CV: RRR  CHEST: Clear  ABD: soft, NT/ND  EXT: no edema      ASSESSMENT/PLAN:  This is a 68 y.o. year old male here for colonoscopy, and he is stable and optimized for his procedure.

## 2024-02-21 PROBLEM — Z12.5 PROSTATE CANCER SCREENING: Status: RESOLVED | Noted: 2019-02-26 | Resolved: 2024-02-21

## 2024-02-21 PROCEDURE — 88305 TISSUE EXAM BY PATHOLOGIST: CPT | Performed by: PATHOLOGY

## 2024-02-25 NOTE — RESULT ENCOUNTER NOTE
Polyps were adenomas (precancerous but no cancer) so repeat colonoscopy in 3 years. This was communicated via [employer].

## 2024-02-27 ENCOUNTER — OFFICE VISIT (OUTPATIENT)
Dept: GASTROENTEROLOGY | Facility: CLINIC | Age: 69
End: 2024-02-27
Payer: MEDICARE

## 2024-02-27 VITALS
WEIGHT: 245.6 LBS | BODY MASS INDEX: 36.38 KG/M2 | DIASTOLIC BLOOD PRESSURE: 82 MMHG | HEIGHT: 69 IN | SYSTOLIC BLOOD PRESSURE: 120 MMHG | OXYGEN SATURATION: 94 % | TEMPERATURE: 98 F | RESPIRATION RATE: 16 BRPM | HEART RATE: 72 BPM

## 2024-02-27 DIAGNOSIS — K76.0 NAFLD (NONALCOHOLIC FATTY LIVER DISEASE): ICD-10-CM

## 2024-02-27 DIAGNOSIS — K74.02 ADVANCED HEPATIC FIBROSIS: ICD-10-CM

## 2024-02-27 DIAGNOSIS — Z86.010 PERSONAL HISTORY OF COLONIC POLYPS: ICD-10-CM

## 2024-02-27 DIAGNOSIS — R74.8 ELEVATED LIVER ENZYMES: Primary | ICD-10-CM

## 2024-02-27 PROCEDURE — 99213 OFFICE O/P EST LOW 20 MIN: CPT | Performed by: FAMILY MEDICINE

## 2024-02-27 NOTE — PROGRESS NOTES
[facility] - Outpatient Consultation  Uriel Howell III 68 y.o. male MRN: [id number]  Encounter: [id number]          ASSESSMENT AND PLAN:      1. NAFLD (nonalcoholic fatty liver disease)  2. Advanced hepatic fibrosis  3. Elevated liver enzymes  Patient with mild intermittently elevated transaminases with ALT predominance since June 2020 well, as well as a deficient alkaline phosphatase level, prompting an ultrasound notable for hepatomegaly and hepatic steatosis. Subsequent US elastography was notable for F2 fibrosis and S3 steatosis. Complete serologic evaluation was negative for competing causes of liver disease as well as celiac disease. No clinical, serologic or radiographic evidence of chronic liver disease.    Suspect that his elevated liver enzymes are secondary to NAFLD in the setting of multiple metabolic risk factors. It was previously mentioned that he may need a liver biopsy for confirmation. In light of a negative serologic workup, mild degree of his elevations and low concern for cirrhosis would recommend trending his liver function tests with weight loss prior to discussion regarding a biopsy.    Discussed the pathophysiology of fatty liver disease and potential to progress to cirrhosis over time if left untreated. Also discussed recommendations in regards to the treatment of fatty liver, particularly including steady and sustainable weight loss of approx 10-15% of patient's current body weight over a 6-12 month period. Patient is not currently interested in pursuing medications for weight loss but does struggle significantly with eating a healthful diet and portion control. Recommended a referral to nutrition services for nutritional counseling, patient agreeable and orders placed. Also recommended optimization of his metabolic risk factors. Patient reportedly does not drink.     He will repeat his liver function tests (CBC, CMP, INR) as well as an A1AT  phenotype to assess for A1AT Pi* carrier status prior to his next follow-up appointment. He will also repeat a US and US elastography 1 year from his last in order to monitor fibrosis.    NAFLD Fibrosis Score is: -.387    NAFLD Score Correlated Fibrosis Severity   <0.12 F0-F2   0.12-0.676 Indeterminate Score   >0.676 F3-F4   **Fibrosis Severity Scale: F0 = no fibrosis; F1= mild fibrosis; F2 = moderate fibrosis; F3 = severe fibrosis; F4 = cirrhosis    - Ambulatory Referral to Hepatology  - Ambulatory Referral to Nutrition Services; Future  - CBC and differential; Future  - Comprehensive metabolic panel; Future  - Protime-INR; Future  - Alpha 1 Antitrypsin Phenotype; Future  - US elastography/UGAP; Future  - US abdomen complete; Future    4. Personal history of colonic polyps  Patient is up-to-date with CRC screening. Colonoscopy (2/20/2024) notable for 3 subcentimeter polyps and mild diverticulosis. Recommended repeat x 3 years due to personal history of colonic polyps.    Follow-up in 6 months or sooner if necessary.     ______________________________________________________________________    HPI: Patient is a 68 y.o. male with PMH significant for obesity, HTN, GERD with a history of gastric ulcer, vitamin B12 deficiency, vitamin D deficiency, anxiety with depression and chronic pain syndrome who presents today for a consultation regarding elevated liver enzymes and fatty liver disease.    Patient is familiar to [facility] gastroenterology last seen by Hannah Dave PA-C (12/12/2023). He has been noted to have mild and intermittently elevated transaminases with ALT predominance since June 2022. Also noted to have a deficient alkaline phosphatase level. This prompted an abdominal ultrasound notable for an enlarged (23.7 cm) and fatty appearing liver (severe hepatic steatosis). Subsequent US elastography was notable for F2 fibrosis and S3 steatosis He has also had a complete serologic evaluation negative for viral  hepatitis as well as autoimmune and other genetic causes of liver disease. Also negative for celiac disease. Serologies also showed normal liver synthetic function and are without thrombocytopenia or chronic hypoalbuminemia.    In regards to metabolic risk factors, patient's BMI is 36.80 with prediabetes and hypertension. Denies family hsitory of liver dsieae or liver related cancers. 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 Reports that he is physically active but that he struggles with eating a healthful diet and portion control.      REVIEW OF SYSTEMS:    CONSTITUTIONAL: Denies any fever, chills, rigors, and weight loss.  HEENT: No earache or tinnitus. Denies hearing loss or visual disturbances.  CARDIOVASCULAR: No chest pain or palpitations.   RESPIRATORY: Denies any cough, hemoptysis, shortness of breath or dyspnea on exertion.  GASTROINTESTINAL: As noted in the History of Present Illness.   GENITOURINARY: No problems with urination. Denies any hematuria or dysuria.  NEUROLOGIC: No dizziness or vertigo, denies headaches.   MUSCULOSKELETAL: Denies any muscle or joint pain.   SKIN: Denies skin rashes or itching.   ENDOCRINE: Denies excessive thirst. Denies intolerance to heat or cold.  PSYCHOSOCIAL: Denies depression or anxiety. Denies any recent memory loss.       Historical Information   Past Medical History:   Diagnosis Date   • Anxiety    • Carpal tunnel syndrome    • Colon polyps    • DJD (degenerative joint disease)    • Duodenal ulcer    • Gastric reflux    • Gastric ulcer    • Heel fracture     bilateral   • Hypertension    • Vitamin B12 deficiency    • Vitamin D deficiency      Past Surgical History:   Procedure Laterality Date   • ANKLE SURGERY      repair   • COLONOSCOPY      polyp removed; benign   • COLONOSCOPY  02/07/2019   • COLONOSCOPY W/ POLYPECTOMY      Followed by Dr. Pang    • FOOT FRACTURE SURGERY Bilateral    • MULTIPLE TOOTH EXTRACTIONS     •  "POLYPECTOMY     • TIBIA FRACTURE SURGERY Left      Social History   Social History     Substance and Sexual Activity   Alcohol Use No     Social History     Substance and Sexual Activity   Drug Use No     Social History     Tobacco Use   Smoking Status Former   • Current packs/day: 0.00   • Average packs/day: 1 pack/day for 44.4 years (44.4 ttl pk-yrs)   • Types: Cigarettes   • Start date: 1/1/1976   • Quit date: 6/1/2020   • Years since quitting: 3.7   Smokeless Tobacco Former   • Types: Chew   • Quit date: 2014     Family History   Problem Relation Age of Onset   • Cancer Mother         lung   • Heart disease Mother    • Cancer Father         prostate   • Heart murmur Brother    • Diabetes Daughter    • Diabetes Son    • Diabetes Other        Meds/Allergies       Current Outpatient Medications:   •  ALPRAZolam (XANAX) 0.5 mg tablet  •  celecoxib (CeleBREX) 100 mg capsule  •  citalopram (CeleXA) 40 mg tablet  •  ergocalciferol (VITAMIN D2) 50,000 units  •  famotidine (PEPCID) 20 mg tablet  •  glucosamine-chondroitin 500-400 MG tablet  •  lisinopril (ZESTRIL) 10 mg tablet  •  Multiple Vitamin (multivitamin) tablet  •  pantoprazole (PROTONIX) 40 mg tablet  •  vitamin B-12 (VITAMIN B-12) 1,000 mcg tablet    No Known Allergies        Objective     Blood pressure 120/82, pulse 72, temperature 98 °F (36.7 °C), temperature source Temporal, resp. rate 16, height 5' 8.5\" (1.74 m), weight 111 kg (245 lb 9.6 oz), SpO2 94%. Body mass index is 36.8 kg/m².        PHYSICAL EXAM:      General Appearance:   Alert, cooperative, no distress   HEENT:   Normocephalic, atraumatic, anicteric.     Neck:  Supple, symmetrical, trachea midline   Lungs:   Clear to auscultation bilaterally; no rales, rhonchi or wheezing; respirations unlabored    Heart::   Regular rate and rhythm; no murmur, rub, or gallop.   Abdomen:   Soft, non-tender, non-distended; normal bowel sounds; no masses, no organomegaly    Genitalia:   Deferred    Rectal:   " Deferred    Extremities:  No cyanosis, clubbing or edema    Pulses:  2+ and symmetric    Skin:  No jaundice, rashes, or lesions    Lymph nodes:  No palpable cervical lymphadenopathy        Lab Results:   No visits with results within 1 Day(s) from this visit.   Latest known visit with results is:   Hospital Outpatient Visit on 02/20/2024   Component Date Value   • Case Report 02/20/2024                      Value:Surgical Pathology Report                         Case: [id number]                                  Authorizing Provider:  Tony Sutton MD           Collected:           02/20/2024 0912              Ordering Location:     [facility] Received:            02/20/2024 1054                                     Operating Room                                                               Pathologist:           Marisol Santos MD                                                       Specimens:   A) - Large Intestine, Cecum, cecal polyp                                                            B) - Large Intestine, Right/Ascending Colon, ascending colon polyp                                  C) - Large Intestine, Sigmoid Colon, sigmoid polyp                                        • Final Diagnosis 02/20/2024                      Value:This result contains rich text formatting which cannot be displayed here.   • Note 02/20/2024                      Value:This result contains rich text formatting which cannot be displayed here.   • Additional Information 02/20/2024                      Value:This result contains rich text formatting which cannot be displayed here.   • Synoptic Checklist 02/20/2024                      Value:                            COLON/RECTUM POLYP FORM - GI - All Specimens                                                                                     :    Adenoma(s)     • Gross Description 02/20/2024                      Value:This result contains rich text formatting  which cannot be displayed here.         Radiology Results:   Colonoscopy    Addendum Date: 2/25/2024 Addendum:   [facility] Operating Room [address] [address] [phone] DATE OF SERVICE: 2/20/24 PHYSICIAN(S): Attending: Tony Sutton MD Fellow: No Staff Documented INDICATION: Personal history of colonic polyps POST-OP DIAGNOSIS: See the impression below. HISTORY: Prior colonoscopy: 5 years ago. BOWEL PREPARATION: Miralax/Dulcolax PREPROCEDURE: Informed consent was obtained for the procedure, including sedation. Risks including but not limited to bleeding, infection, perforation, adverse drug reaction and aspiration were explained in detail. Also explained about less than 100% sensitivity with the exam and other alternatives. The patient was placed in the left lateral decubitus position. Procedure: Colonoscopy DETAILS OF PROCEDURE: Patient was taken to the procedure room where a time out was performed to confirm correct patient and correct procedure. The patient underwent monitored anesthesia care, which was administered by an anesthesia professional. The patient's blood pressure, heart rate, level of consciousness, oxygen, respirations and ECG were monitored throughout the procedure. A digital rectal exam was performed. The scope was introduced through the anus and advanced to the cecum. Retroflexion was performed in the rectum. The quality of bowel preparation was evaluated using the [place] Bowel Preparation Scale with scores of: right colon = 2, transverse colon = 2, left colon = 2. The total BBPS score was 6. Bowel prep was adequate. The patient experienced no blood loss. The procedure was not difficult. The patient tolerated the procedure well. There were no apparent adverse events. ANESTHESIA INFORMATION: ASA: II Anesthesia Type: IV Sedation with Anesthesia MEDICATIONS: No administrations occurring from 0850 to 0923 on 02/20/24 FINDINGS: Sessile polyp measuring 5-9 mm in the  cecum; performed cold snare removal Sessile polyp measuring smaller than 5 mm; performed cold forceps biopsy Sessile polyp measuring 5-9 mm in the sigmoid colon; performed cold snare removal Diverticula of mild severity in the sigmoid colon EVENTS: Procedure Events Event Event Time ENDO CECUM REACHED 2/20/2024  9:11 AM ENDO SCOPE OUT TIME 2/20/2024  9:23 AM SPECIMENS: ID Type Source Tests Collected by Time Destination 1 : cecal polyp Tissue Large Intestine, Cecum TISSUE EXAM Tony Sutton MD 2/20/2024  9:12 AM  2 : ascending colon polyp Tissue Large Intestine, Right/Ascending Colon TISSUE EXAM Tony Sutton MD 2/20/2024  9:16 AM  3 : sigmoid polyp Tissue Large Intestine, Sigmoid Colon TISSUE EXAM Tony Sutton MD 2/20/2024  9:20 AM  EQUIPMENT: Colonoscope - ENDOCUFF VISION LRG GREEN ID 11.2 IMPRESSION: 3 subcentimeter polyps were removed Diverticulosis of mild severity in the sigmoid colon RECOMMENDATION:  Repeat colonoscopy in 3 years  Personal history of colon polyps    Tony Sutton MD     Result Date: 2/25/2024  Narrative: Table formatting from the original result was not included. [facility] Operating Room [address] [facility] [address] [phone] DATE OF SERVICE: 2/20/24 PHYSICIAN(S): Attending: Tony Sutton MD Fellow: No Staff Documented INDICATION: Personal history of colonic polyps POST-OP DIAGNOSIS: See the impression below. HISTORY: Prior colonoscopy: 5 years ago. BOWEL PREPARATION: Miralax/Dulcolax PREPROCEDURE: Informed consent was obtained for the procedure, including sedation. Risks including but not limited to bleeding, infection, perforation, adverse drug reaction and aspiration were explained in detail. Also explained about less than 100% sensitivity with the exam and other alternatives. The patient was placed in the left lateral decubitus position. Procedure: Colonoscopy DETAILS OF PROCEDURE: Patient was taken to the procedure room where a time out was performed to confirm  correct patient and correct procedure. The patient underwent monitored anesthesia care, which was administered by an anesthesia professional. The patient's blood pressure, heart rate, level of consciousness, oxygen, respirations and ECG were monitored throughout the procedure. A digital rectal exam was performed. The scope was introduced through the anus and advanced to the cecum. Retroflexion was performed in the rectum. The quality of bowel preparation was evaluated using the [place] Bowel Preparation Scale with scores of: right colon = 2, transverse colon = 2, left colon = 2. The total BBPS score was 6. Bowel prep was adequate. The patient experienced no blood loss. The procedure was not difficult. The patient tolerated the procedure well. There were no apparent adverse events. ANESTHESIA INFORMATION: ASA: II Anesthesia Type: IV Sedation with Anesthesia MEDICATIONS: No administrations occurring from 0850 to 0923 on 02/20/24 FINDINGS: Sessile polyp measuring 5-9 mm in the cecum; performed cold snare removal Sessile polyp measuring smaller than 5 mm; performed cold forceps biopsy Sessile polyp measuring 5-9 mm in the sigmoid colon; performed cold snare removal Diverticula of mild severity in the sigmoid colon EVENTS: Procedure Events Event Event Time ENDO CECUM REACHED 2/20/2024  9:11 AM ENDO SCOPE OUT TIME 2/20/2024  9:23 AM SPECIMENS: ID Type Source Tests Collected by Time Destination 1 : cecal polyp Tissue Large Intestine, Cecum TISSUE EXAM Tony Sutton MD 2/20/2024  9:12 AM  2 : ascending colon polyp Tissue Large Intestine, Right/Ascending Colon TISSUE EXAM Tony Sutton MD 2/20/2024  9:16 AM  3 : sigmoid polyp Tissue Large Intestine, Sigmoid Colon TISSUE EXAM Tony Sutton MD 2/20/2024  9:20 AM  EQUIPMENT: Colonoscope - ENDOCUFF VISION LRG GREEN ID 11.2     Impression: 3 subcentimeter polyps were removed Diverticulosis of mild severity in the sigmoid colon RECOMMENDATION:  Repeat colonoscopy in 5 years   Personal history of colon polyps    MD Sita Suero PA-C     **Please note: Dictation voice to text software may have been used in the creation of this record. Occasional wrong word or “sound alike” substitutions may have occurred due to the inherent limitations of voice recognition software. Read the chart carefully and recognize, using context, where substitutions have occurred.**

## 2024-03-20 ENCOUNTER — TELEPHONE (OUTPATIENT)
Dept: FAMILY MEDICINE CLINIC | Facility: CLINIC | Age: 69
End: 2024-03-20

## 2024-04-22 ENCOUNTER — APPOINTMENT (OUTPATIENT)
Dept: LAB | Facility: HOSPITAL | Age: 69
End: 2024-04-22
Payer: MEDICARE

## 2024-04-22 ENCOUNTER — OFFICE VISIT (OUTPATIENT)
Dept: GASTROENTEROLOGY | Facility: CLINIC | Age: 69
End: 2024-04-22
Payer: MEDICARE

## 2024-04-22 VITALS
HEART RATE: 69 BPM | DIASTOLIC BLOOD PRESSURE: 75 MMHG | TEMPERATURE: 98 F | OXYGEN SATURATION: 94 % | WEIGHT: 239 LBS | BODY MASS INDEX: 35.4 KG/M2 | SYSTOLIC BLOOD PRESSURE: 131 MMHG | HEIGHT: 69 IN | RESPIRATION RATE: 16 BRPM

## 2024-04-22 DIAGNOSIS — R10.12 LEFT UPPER QUADRANT PAIN: ICD-10-CM

## 2024-04-22 DIAGNOSIS — K21.9 GASTROESOPHAGEAL REFLUX DISEASE WITHOUT ESOPHAGITIS: ICD-10-CM

## 2024-04-22 DIAGNOSIS — R74.01 ELEVATED TRANSAMINASE LEVEL: ICD-10-CM

## 2024-04-22 DIAGNOSIS — R19.4 CHANGE IN BOWEL HABITS: ICD-10-CM

## 2024-04-22 DIAGNOSIS — R10.12 LEFT UPPER QUADRANT PAIN: Primary | ICD-10-CM

## 2024-04-22 DIAGNOSIS — K76.0 NAFLD (NONALCOHOLIC FATTY LIVER DISEASE): ICD-10-CM

## 2024-04-22 DIAGNOSIS — Z86.010 PERSONAL HISTORY OF COLONIC POLYPS: ICD-10-CM

## 2024-04-22 LAB
ALBUMIN SERPL BCP-MCNC: 4.5 G/DL (ref 3.5–5)
ALP SERPL-CCNC: 39 U/L (ref 34–104)
ALT SERPL W P-5'-P-CCNC: 45 U/L (ref 7–52)
ANION GAP SERPL CALCULATED.3IONS-SCNC: 7 MMOL/L (ref 4–13)
AST SERPL W P-5'-P-CCNC: 33 U/L (ref 13–39)
BASOPHILS # BLD AUTO: 0.01 THOUSANDS/ÂΜL (ref 0–0.1)
BASOPHILS NFR BLD AUTO: 0 % (ref 0–1)
BILIRUB SERPL-MCNC: 0.53 MG/DL (ref 0.2–1)
BUN SERPL-MCNC: 19 MG/DL (ref 5–25)
CALCIUM SERPL-MCNC: 9.7 MG/DL (ref 8.4–10.2)
CHLORIDE SERPL-SCNC: 102 MMOL/L (ref 96–108)
CO2 SERPL-SCNC: 28 MMOL/L (ref 21–32)
CREAT SERPL-MCNC: 1.16 MG/DL (ref 0.6–1.3)
EOSINOPHIL # BLD AUTO: 0.07 THOUSAND/ÂΜL (ref 0–0.61)
EOSINOPHIL NFR BLD AUTO: 1 % (ref 0–6)
ERYTHROCYTE [DISTWIDTH] IN BLOOD BY AUTOMATED COUNT: 12 % (ref 11.6–15.1)
GFR SERPL CREATININE-BSD FRML MDRD: 63 ML/MIN/1.73SQ M
GLUCOSE SERPL-MCNC: 95 MG/DL (ref 65–140)
HCT VFR BLD AUTO: 42.6 % (ref 36.5–49.3)
HGB BLD-MCNC: 14.4 G/DL (ref 12–17)
IMM GRANULOCYTES # BLD AUTO: 0.03 THOUSAND/UL (ref 0–0.2)
IMM GRANULOCYTES NFR BLD AUTO: 1 % (ref 0–2)
LIPASE SERPL-CCNC: 22 U/L (ref 11–82)
LYMPHOCYTES # BLD AUTO: 1.55 THOUSANDS/ÂΜL (ref 0.6–4.47)
LYMPHOCYTES NFR BLD AUTO: 24 % (ref 14–44)
MCH RBC QN AUTO: 31.4 PG (ref 26.8–34.3)
MCHC RBC AUTO-ENTMCNC: 33.8 G/DL (ref 31.4–37.4)
MCV RBC AUTO: 93 FL (ref 82–98)
MONOCYTES # BLD AUTO: 0.48 THOUSAND/ÂΜL (ref 0.17–1.22)
MONOCYTES NFR BLD AUTO: 8 % (ref 4–12)
NEUTROPHILS # BLD AUTO: 4.23 THOUSANDS/ÂΜL (ref 1.85–7.62)
NEUTS SEG NFR BLD AUTO: 66 % (ref 43–75)
NRBC BLD AUTO-RTO: 0 /100 WBCS
PLATELET # BLD AUTO: 175 THOUSANDS/UL (ref 149–390)
PMV BLD AUTO: 9.2 FL (ref 8.9–12.7)
POTASSIUM SERPL-SCNC: 4.1 MMOL/L (ref 3.5–5.3)
PROT SERPL-MCNC: 7.4 G/DL (ref 6.4–8.4)
RBC # BLD AUTO: 4.58 MILLION/UL (ref 3.88–5.62)
SODIUM SERPL-SCNC: 137 MMOL/L (ref 135–147)
WBC # BLD AUTO: 6.37 THOUSAND/UL (ref 4.31–10.16)

## 2024-04-22 PROCEDURE — 36415 COLL VENOUS BLD VENIPUNCTURE: CPT

## 2024-04-22 PROCEDURE — 80053 COMPREHEN METABOLIC PANEL: CPT

## 2024-04-22 PROCEDURE — 83690 ASSAY OF LIPASE: CPT

## 2024-04-22 PROCEDURE — 99214 OFFICE O/P EST MOD 30 MIN: CPT | Performed by: PHYSICIAN ASSISTANT

## 2024-04-22 PROCEDURE — 85025 COMPLETE CBC W/AUTO DIFF WBC: CPT

## 2024-04-22 RX ORDER — FLUTICASONE PROPIONATE 50 MCG
SPRAY, SUSPENSION (ML) NASAL
COMMUNITY
Start: 2024-04-15

## 2024-04-22 RX ORDER — PANTOPRAZOLE SODIUM 40 MG/1
40 TABLET, DELAYED RELEASE ORAL 2 TIMES DAILY
Qty: 180 TABLET | Refills: 1 | Status: SHIPPED | OUTPATIENT
Start: 2024-04-22

## 2024-04-22 NOTE — PROGRESS NOTES
[facility] - Outpatient Follow-up Note  Uriel Howell III 68 y.o. male MRN: [id number]  Encounter: [id number]    ASSESSMENT AND PLAN:      1. Left upper quadrant pain    This patient with a history of GERD and a distant history of peptic ulcer disease is here today with his wife to discuss new onset of left upper quadrant pain for a few weeks.  Etiology is unclear at this time.  It is present for most of the day though not necessarily affected by oral intake or defecation, which is somewhat atypical for primary GI etiology.  Differential includes PUD, pancreatitis, atypical biliary pathology, diverticulitis/colitis, atypical rib injury, herpes zoster (though I do not see any rash), nephrolithiasis, atypical cardiac disease, etc.    At this time, given the acuity and significance of tenderness to palpation on examination, I am recommending stat blood work and a CT scan.  I am recommending patient remain on a clear liquid diet at this time.  Recommend temporary escalation of PPI to twice daily dosing.  Additional recs pending CT results.   Likely next steps if CT negative would be to consider EGD for additional evaluation.     - CT abdomen pelvis w contrast; Future  - CBC and differential; Future  - Comprehensive metabolic panel; Future  - Lipase; Future    2. Change in bowel habits    Patient does note more acute change in bowel habits over the past couple of days, which began after the onset of the left upper quadrant pain.  It is uncertain as to whether patient may be dealing with an acute gastroenteritis of some kind versus irritable bowel syndrome versus food sensitivity vs medication SE vs other etiol.     I think at this time it is reasonable to monitor a bit longer with clear liquid diet, BRAT diet, supportive care.   If symptoms persists or alarm features present, would check stool studies.   Pt is UTD on colonoscopy, in 02/2024, thus no strong indication to repeat at  present.     3. Gastroesophageal reflux disease without esophagitis    Patient has a history of such.  He notes a distant history of peptic and duodenal ulcer in his late teens.  Despite PPI usage, he is having breakthrough reflux symptoms and at last office visit endorsed intermittent dysphagia.  I do think at this point given progression in symptoms, it is reasonable to repeat an EGD to evaluate for potential sources of treatment refractory symptoms.  EGD would evaluate for reflux esophagitis, EOE, web/ring/stricture, gastritis, peptic ulcer disease, etc.    Recommend PPI BID now.   Okay for PRN use of famotidine.   Work on small frequent meals, diet and lifestyle modifications for GERD.   Likely needs EGD, though hold off on ordering/scheduling until CT has been completed.     - pantoprazole (PROTONIX) 40 mg tablet; Take 1 tablet (40 mg total) by mouth 2 (two) times a day  Dispense: 180 tablet; Refill: 1    4. Personal history of colonic polyps    Patient with personal history of colon polyps on colonoscopy in 02/2024.  Recall in 3 years for surveillance purposes.    5. NAFLD (nonalcoholic fatty liver disease)  6. Elevated transaminase level    Pt with hx of elevated transaminases and an elastography which demonstrated S3 steatosis and F2 fibrosis.  Serologic investigation for chronic liver disease negative for autoimmune, genetic, and infectious etiologies.  He did have evaluation by Hepatology, and they are recommending trending LFTs regularly prior to considering liver biopsy.     Discussed recommendations in regards to fatty liver including:   Strict control of contributing comorbidities (obesity, prediabetes/diabetes, hypertension, and hypertriglyceridemia).  Weight loss of approx 10-15% of patient's current body weight over a period of 6-12 months through low fat diet and cardiovascular exercise as tolerated.  Monitor hepatic function every 6 months with routine labs.     We will follow up in 3 months to  "reassess symptoms.   ______________________________________________________________________    SUBJECTIVE: Patient is a 68 y.o. male who presents today for follow-up regarding abdominal pain. Pmhx sig for GERD, gastric ulcer, HTN, chronic pain syndrome, BMI 37, chronic pain syndrome, anxiety/insomnia.      T was last evaluated in clinic by our hepatology team in 02/2024.  At that time, he recommended to monitor LFTs and repeat an elastography in the future.  Close he has been seeing general GI for heartburn and his history of polyps.  He underwent a colonoscopy in 02/2024 with numerous adenomatous polyps removed.  A 3-year recall was recommended.    04/22/24:     Patient is here today with his wife.  He shares that for about 2 weeks or so, he has been dealing with left upper quadrant pain/rib pain which radiates around to the back.  He denies any obvious precipitants, no infectious exposure, travel, change in medications or supplements, change in diet, injury.  Is weaning self off benzodiazepine. It feels like a pressure and a \"picking\" sensation.  No rash in this area. He wakes up with the pain and it gradually decreases throughout the day.  He notes some nausea though no emesis.  His symptoms do not seem to be related to oral intake or defecation.  He then shares that he has had a change in bowel habits over the past 2.5 days.  He went from formed stool daily to having some loose urgent stool, a Hollister 5-7.  No BRBPR or melena.  No urinary symptoms.    He is having some worsening reflux.  He notes a burning of his mouth/tongue.    Notes he started weaning off of Xanax over the past 1.5 to 2 weeks.  Wonders if this is playing a role.  Feeling lots of anxiety.  Feeling tired, achy. No abnormal weight loss.     Etoh: none; previous regular drinker in 50s   Tobacco: quit   NSAIDs: rare, takes celebrex daily   Acetaminophen: PRN      04/2023: AST 44, ALT 80, ALP 42, albumin 3.8, T. bili 0.46  04/2023: Abd US: Enlarged " fatty liver  07/2023: Elastography: F2, S3   07/2023: chronic hepatitis panel negative, TSAT 32, TIBC 324, iron 103, ferritin 163, ASMA 10, AMA < 20, A1AT 129, RAGHAVENDRA negative, ceruloplasmin 21.9, Ttg Ig A <2, IgA 266, IgG 1120, IgM 30, INR 1.01, hepatitis A non-reactive, Hep B surface abs 3.91  11/2023: Hb 14.2, MCV 96, Plt 182, AST 49, ALT 72, ALP 38, albumin 4.2, t bili 0.54    Endoscopic history:   02/2019: six 5 to 6 mm polyps, in the rectum, 25 cm, 38, 40 cm proximal to the anus  02/2024: Colon: 3 subcentimeter polyps were removed; Diverticulosis of mild severity in the sigmoid colon   A. Large Intestine, Cecum, cecal polyp: Tubular adenoma; No evidence of high grade dysplasia or malignancy seen.   B. Large Intestine, Right/Ascending Colon, ascending colon polyp: Adenomatous polyp. No evidence of high grade dysplasia or malignancy.   C. Large Intestine, Sigmoid Colon, sigmoid polyp:Fragments of colonic polyp with focal serrated features, suggestive of serrated adenoma; No evidence of high grade dysplasia or malignancy      Review of Systems   Constitutional:  Negative for chills, fatigue and unexpected weight change.   HENT:  Positive for trouble swallowing. Negative for mouth sores.    Eyes: Negative.    Gastrointestinal:  Positive for abdominal pain.   Endocrine: Negative.    Skin: Negative.    Allergic/Immunologic: Negative.    Hematological: Negative.    Otherwise Per HPI    Historical Information   Past Medical History:   Diagnosis Date    Anxiety     Carpal tunnel syndrome     Colon polyps     DJD (degenerative joint disease)     Duodenal ulcer     Gastric reflux     Gastric ulcer     Heel fracture     bilateral    Hypertension     Vitamin B12 deficiency     Vitamin D deficiency      Past Surgical History:   Procedure Laterality Date    ANKLE SURGERY      repair    COLONOSCOPY      polyp removed; benign    COLONOSCOPY  02/07/2019    COLONOSCOPY W/ POLYPECTOMY      Followed by Dr. Pang     FOOT FRACTURE  "SURGERY Bilateral     MULTIPLE TOOTH EXTRACTIONS      POLYPECTOMY      TIBIA FRACTURE SURGERY Left      Social History   Social History     Substance and Sexual Activity   Alcohol Use No     Social History     Substance and Sexual Activity   Drug Use No     Social History     Tobacco Use   Smoking Status Former    Current packs/day: 0.00    Average packs/day: 1 pack/day for 44.4 years (44.4 ttl pk-yrs)    Types: Cigarettes    Start date: 1/1/1976    Quit date: 6/1/2020    Years since quitting: 3.8   Smokeless Tobacco Former    Types: Chew    Quit date: 2014     Family History   Problem Relation Age of Onset    Cancer Mother         lung    Heart disease Mother     Cancer Father         prostate    Heart murmur Brother     Diabetes Daughter     Diabetes Son     Diabetes Other        Meds/Allergies       Current Outpatient Medications:     celecoxib (CeleBREX) 100 mg capsule    citalopram (CeleXA) 40 mg tablet    ergocalciferol (VITAMIN D2) 50,000 units    fluticasone (FLONASE) 50 mcg/act nasal spray    lisinopril (ZESTRIL) 10 mg tablet    Multiple Vitamin (multivitamin) tablet    pantoprazole (PROTONIX) 40 mg tablet    vitamin B-12 (VITAMIN B-12) 1,000 mcg tablet    ALPRAZolam (XANAX) 0.5 mg tablet    No Known Allergies    Objective     Blood pressure 131/75, pulse 69, temperature 98 °F (36.7 °C), temperature source Tympanic, resp. rate 16, height 5' 8.5\" (1.74 m), weight 108 kg (239 lb), SpO2 94%. Body mass index is 35.81 kg/m².    Physical Exam  Vitals and nursing note reviewed.   Constitutional:       General: He is not in acute distress.     Appearance: He is well-developed. He is obese.   HENT:      Head: Normocephalic and atraumatic.   Eyes:      General: No scleral icterus.     Conjunctiva/sclera: Conjunctivae normal.   Cardiovascular:      Rate and Rhythm: Normal rate.   Pulmonary:      Effort: Pulmonary effort is normal. No respiratory distress.   Abdominal:      General: Bowel sounds are normal. There is no " distension.      Palpations: Abdomen is soft.      Tenderness: There is abdominal tenderness. There is no guarding or rebound.   Skin:     General: Skin is warm and dry.      Coloration: Skin is not jaundiced.      Findings: No rash.   Neurological:      General: No focal deficit present.      Mental Status: He is alert and oriented to person, place, and time.   Psychiatric:         Mood and Affect: Mood normal.         Behavior: Behavior normal.       Lab Results:   No visits with results within 1 Day(s) from this visit.   Latest known visit with results is:   Hospital Outpatient Visit on 02/20/2024   Component Date Value    Case Report 02/20/2024                      Value:Surgical Pathology Report                         Case: [id number]                                  Authorizing Provider:  Tony Sutton MD           Collected:           02/20/2024 0912              Ordering Location:     [facility] Received:            02/20/2024 1054                                     Operating Room                                                               Pathologist:           Marisol Santos MD                                                       Specimens:   A) - Large Intestine, Cecum, cecal polyp                                                            B) - Large Intestine, Right/Ascending Colon, ascending colon polyp                                  C) - Large Intestine, Sigmoid Colon, sigmoid polyp                                         Final Diagnosis 02/20/2024                      Value:This result contains rich text formatting which cannot be displayed here.    Note 02/20/2024                      Value:This result contains rich text formatting which cannot be displayed here.    Additional Information 02/20/2024                      Value:This result contains rich text formatting which cannot be displayed here.    Synoptic Checklist 02/20/2024                      Value:                             COLON/RECTUM POLYP FORM - GI - All Specimens                                                                                     :    Adenoma(s)      Gross Description 02/20/2024                      Value:This result contains rich text formatting which cannot be displayed here.     Radiology Results:   No results found.    Hannah Dave PA-C    **Please note:  Dictation voice to text software may have been used in the creation of this record.  Occasional wrong word or “sound alike” substitutions may have occurred due to the inherent limitations of voice recognition software.  Read the chart carefully and recognize, using context, where substitutions have occurred.**

## 2024-04-22 NOTE — PATIENT INSTRUCTIONS
Increase pantoprazole to twice daily.   [place] diet for now.   Blood work and CT scan completed.   If symptoms worsen, get to ER.  Ultimately I will want to get you to have an upper EGD.

## 2024-04-23 ENCOUNTER — HOSPITAL ENCOUNTER (OUTPATIENT)
Dept: CT IMAGING | Facility: HOSPITAL | Age: 69
Discharge: HOME | End: 2024-04-23
Payer: MEDICARE

## 2024-04-23 ENCOUNTER — TELEPHONE (OUTPATIENT)
Dept: GASTROENTEROLOGY | Facility: CLINIC | Age: 69
End: 2024-04-23

## 2024-04-23 DIAGNOSIS — R10.12 LEFT UPPER QUADRANT PAIN: ICD-10-CM

## 2024-04-23 PROCEDURE — 74177 CT ABD & PELVIS W/CONTRAST: CPT

## 2024-04-23 RX ADMIN — IOHEXOL 100 ML: 350 INJECTION, SOLUTION INTRAVENOUS at 07:56

## 2024-04-23 NOTE — TELEPHONE ENCOUNTER
----- Message from Hannah Dave PA-C sent at 4/23/2024 12:14 PM EDT -----  Please call Uriel and inform him the CAT scan looks okay.  There is no obvious infection or inflammation identified in his belly.  No evidence of diverticulitis, colitis, pancreatitis, etc.  I think we should see how he does with the twice daily pantoprazole.  If he develops a rash in this area he should get seen right away.  You could also follow-up with his PCP to investigate into other possible origins of his pain outside of the GI tract.    The next step in this investigation given his other chronic GI issues would be upper endoscopy. Would he be open to this?

## 2024-04-23 NOTE — TELEPHONE ENCOUNTER
Patients GI provider:  SOULEYMANE Dave    Number to return call: [phone]    Reason for call: Pt calling returning call to go over results from 4/23/2024 CT Scan. Patient is requesting a call back from Hannah. Please reach out to patient, thank you.    Scheduled procedure/appointment date if applicable: Apt/procedure 09/13/2024

## 2024-04-24 ENCOUNTER — PREP FOR PROCEDURE (OUTPATIENT)
Dept: SURGERY | Facility: CLINIC | Age: 69
End: 2024-04-24

## 2024-04-24 DIAGNOSIS — K21.9 GASTROESOPHAGEAL REFLUX DISEASE WITHOUT ESOPHAGITIS: Primary | ICD-10-CM

## 2024-04-24 NOTE — TELEPHONE ENCOUNTER
Pt transferred to myself by Tisha Weldon'sSOULEYMANE, message and recommendation relayed to pt. Pt agreeable to move forward with EGD. Pt will await callback to schedule when order is placed.

## 2024-04-24 NOTE — TELEPHONE ENCOUNTER
I attempted to call the patient but it went to voicemail.  I did outline a result note thoroughly and someone can feel free to relay this to the patient if he calls back and I am seeing patients this afternoon.  Thank you

## 2024-04-24 NOTE — TELEPHONE ENCOUNTER
Patient is scheduled for tues, June 11, 2024 at [facility] for this EGD. I called himand went over the prep and sent this information to him in the mail today.

## 2024-06-11 ENCOUNTER — ANESTHESIA (OUTPATIENT)
Dept: PERIOP | Facility: HOSPITAL | Age: 69
End: 2024-06-11

## 2024-06-11 ENCOUNTER — HOSPITAL ENCOUNTER (OUTPATIENT)
Dept: PERIOP | Facility: HOSPITAL | Age: 69
Setting detail: OUTPATIENT SURGERY
Discharge: HOME | End: 2024-06-11
Payer: MEDICARE

## 2024-06-11 ENCOUNTER — ANESTHESIA EVENT (OUTPATIENT)
Dept: PERIOP | Facility: HOSPITAL | Age: 69
End: 2024-06-11

## 2024-06-11 VITALS
DIASTOLIC BLOOD PRESSURE: 77 MMHG | OXYGEN SATURATION: 95 % | HEART RATE: 77 BPM | WEIGHT: 236 LBS | TEMPERATURE: 97.9 F | SYSTOLIC BLOOD PRESSURE: 121 MMHG | HEIGHT: 69 IN | BODY MASS INDEX: 34.96 KG/M2 | RESPIRATION RATE: 18 BRPM

## 2024-06-11 DIAGNOSIS — K21.9 GASTROESOPHAGEAL REFLUX DISEASE WITHOUT ESOPHAGITIS: ICD-10-CM

## 2024-06-11 PROCEDURE — 88342 IMHCHEM/IMCYTCHM 1ST ANTB: CPT | Performed by: PATHOLOGY

## 2024-06-11 PROCEDURE — 43239 EGD BIOPSY SINGLE/MULTIPLE: CPT | Performed by: INTERNAL MEDICINE

## 2024-06-11 PROCEDURE — 88305 TISSUE EXAM BY PATHOLOGIST: CPT | Performed by: PATHOLOGY

## 2024-06-11 RX ORDER — PROPOFOL 10 MG/ML
INJECTION, EMULSION INTRAVENOUS AS NEEDED
Status: DISCONTINUED | OUTPATIENT
Start: 2024-06-11 | End: 2024-06-11

## 2024-06-11 RX ORDER — ONDANSETRON 2 MG/ML
4 INJECTION INTRAMUSCULAR; INTRAVENOUS ONCE AS NEEDED
Status: DISCONTINUED | OUTPATIENT
Start: 2024-06-11 | End: 2024-06-15 | Stop reason: HOSPADM

## 2024-06-11 RX ORDER — SODIUM CHLORIDE, SODIUM LACTATE, POTASSIUM CHLORIDE, CALCIUM CHLORIDE 600; 310; 30; 20 MG/100ML; MG/100ML; MG/100ML; MG/100ML
INJECTION, SOLUTION INTRAVENOUS CONTINUOUS PRN
Status: DISCONTINUED | OUTPATIENT
Start: 2024-06-11 | End: 2024-06-11

## 2024-06-11 RX ORDER — LIDOCAINE HYDROCHLORIDE 20 MG/ML
INJECTION, SOLUTION EPIDURAL; INFILTRATION; INTRACAUDAL; PERINEURAL AS NEEDED
Status: DISCONTINUED | OUTPATIENT
Start: 2024-06-11 | End: 2024-06-11

## 2024-06-11 RX ADMIN — PROPOFOL 50 MG: 10 INJECTION, EMULSION INTRAVENOUS at 09:36

## 2024-06-11 RX ADMIN — PROPOFOL 20 MG: 10 INJECTION, EMULSION INTRAVENOUS at 09:40

## 2024-06-11 RX ADMIN — PROPOFOL 100 MG: 10 INJECTION, EMULSION INTRAVENOUS at 09:34

## 2024-06-11 RX ADMIN — LIDOCAINE HYDROCHLORIDE 100 MG: 20 INJECTION, SOLUTION EPIDURAL; INFILTRATION; INTRACAUDAL; PERINEURAL at 09:34

## 2024-06-11 RX ADMIN — PROPOFOL 20 MG: 10 INJECTION, EMULSION INTRAVENOUS at 09:38

## 2024-06-11 RX ADMIN — SODIUM CHLORIDE, SODIUM LACTATE, POTASSIUM CHLORIDE, AND CALCIUM CHLORIDE: .6; .31; .03; .02 INJECTION, SOLUTION INTRAVENOUS at 09:31

## 2024-06-11 NOTE — H&P
H&P EXAM - Outpatient Endoscopy   Uriel Howell III 68 y.o. male MRN: [id number]    [facility]   Encounter: [id number]        History and Physical -  [facility]  Uriel Howell III 68 y.o. male MRN: [id number]                  HPI: Uriel Howell III is a 68 y.o. year old male who presents for abdominal pain, gerd      REVIEW OF SYSTEMS: Per the HPI, and otherwise unremarkable.    Historical Information   Past Medical History:   Diagnosis Date    Anxiety     Carpal tunnel syndrome     Colon polyps     DJD (degenerative joint disease)     Duodenal ulcer     Gastric reflux     Gastric ulcer     Heel fracture     bilateral    Hypertension     Vitamin B12 deficiency     Vitamin D deficiency      Past Surgical History:   Procedure Laterality Date    ANKLE SURGERY      repair    COLONOSCOPY      polyp removed; benign    COLONOSCOPY  2019    COLONOSCOPY W/ POLYPECTOMY      Followed by Dr. Pang     FOOT FRACTURE SURGERY Bilateral     MULTIPLE TOOTH EXTRACTIONS      POLYPECTOMY      TIBIA FRACTURE SURGERY Left      Social History   Social History     Substance and Sexual Activity   Alcohol Use No     Social History     Substance and Sexual Activity   Drug Use No     Social History     Tobacco Use   Smoking Status Former    Current packs/day: 0.00    Average packs/day: 1 pack/day for 44.4 years (44.4 ttl pk-yrs)    Types: Cigarettes    Start date: 1976    Quit date: 2020    Years since quittin.0   Smokeless Tobacco Former    Types: Chew    Quit date:      Family History   Problem Relation Age of Onset    Cancer Mother         lung    Heart disease Mother     Cancer Father         prostate    Heart murmur Brother     Diabetes Daughter     Diabetes Son     Diabetes Other        Meds/Allergies     Not in a hospital admission.    No Known Allergies    Objective     /71   Pulse 73   Temp (!) 97.3 °F (36.3 °C) (Tympanic)   Resp 18   " Ht 5' 9\" (1.753 m)   Wt 107 kg (236 lb)   SpO2 97%   BMI 34.85 kg/m²       PHYSICAL EXAM    Gen: NAD  CV: RRR  CHEST: Clear  ABD: soft, NT/ND  EXT: no edema      ASSESSMENT/PLAN:  This is a 68 y.o. year old male here for egd, and he is stable and optimized for his procedure.            "

## 2024-06-11 NOTE — ANESTHESIA POSTPROCEDURE EVALUATION
Post-Op Assessment Note    CV Status:  Stable    Pain management: satisfactory to patient       Mental Status:  Awake and sleepy   Hydration Status:  Euvolemic   PONV Controlled:  Controlled   Airway Patency:  Patent     Post Op Vitals Reviewed: Yes    No anethesia notable event occurred.    Staff: CRNA               BP   123/75   Temp   97.7   Pulse  68   Resp   18   SpO2   99

## 2024-06-17 ENCOUNTER — TELEPHONE (OUTPATIENT)
Age: 69
End: 2024-06-17

## 2024-06-17 DIAGNOSIS — A04.8 H. PYLORI INFECTION: Primary | ICD-10-CM

## 2024-06-17 PROCEDURE — 88342 IMHCHEM/IMCYTCHM 1ST ANTB: CPT | Performed by: PATHOLOGY

## 2024-06-17 PROCEDURE — 88305 TISSUE EXAM BY PATHOLOGIST: CPT | Performed by: PATHOLOGY

## 2024-06-17 NOTE — TELEPHONE ENCOUNTER
Pt returned results call regarding H Pylori. Detailed instructions will be sent via [employer] per pt's request.     Pt already on Protonix and made aware to take for 14 days. Hold for 2 weeks prior to submitting stool sample (1 month after completing treatment).      Pt uses [facility].    Orders/Meds queued up for provider authorization.

## 2024-06-17 NOTE — RESULT ENCOUNTER NOTE
Please call the patient with the results    Stomach biopsy shows infection with H pylori. This is a bacteria that can live in the stomach and cause abdominal pain and ulcers. Please send H pylori treatment per protocol.   The rest of the biopsies were normal.

## 2024-06-18 ENCOUNTER — TELEPHONE (OUTPATIENT)
Age: 69
End: 2024-06-18

## 2024-06-18 DIAGNOSIS — A04.8 H. PYLORI INFECTION: Primary | ICD-10-CM

## 2024-06-18 RX ORDER — TETRACYCLINE HYDROCHLORIDE 500 MG/1
500 CAPSULE ORAL EVERY 6 HOURS SCHEDULED
Qty: 56 CAPSULE | Refills: 0 | Status: SHIPPED | OUTPATIENT
Start: 2024-06-18 | End: 2024-06-18

## 2024-06-18 RX ORDER — METRONIDAZOLE 250 MG/1
250 TABLET ORAL EVERY 6 HOURS SCHEDULED
Qty: 56 TABLET | Refills: 0 | Status: SHIPPED | OUTPATIENT
Start: 2024-06-18 | End: 2024-07-02

## 2024-06-18 RX ORDER — BISMUTH SUBSALICYLATE 262 MG/1
262 TABLET, CHEWABLE ORAL
Qty: 56 TABLET | Refills: 0 | Status: SHIPPED | OUTPATIENT
Start: 2024-06-18 | End: 2024-07-02

## 2024-06-18 RX ORDER — DOXYCYCLINE 100 MG/1
100 TABLET ORAL 2 TIMES DAILY
Qty: 28 TABLET | Refills: 0 | Status: SHIPPED | OUTPATIENT
Start: 2024-06-18 | End: 2024-07-02

## 2024-06-18 NOTE — TELEPHONE ENCOUNTER
PA for tetracycline    Submitted via    [x]CMM-KEY  [id number]   []Surescritu.nr-Case ID #   []Faxed to plan   []Other website   []Phone call Case ID #     Office notes sent, clinical questions answered. Awaiting determination    Turnaround time for your insurance to make a decision on your Prior Authorization can take 7-21 business days.

## 2024-06-18 NOTE — TELEPHONE ENCOUNTER
PA for Tetracycline Denied    Reason:(Screenshot if applicable)        Message sent to office clinical pool Yes    Denial letter scanned into Media Yes    Appeal started No ( Provider will need to decide if appeal is warranted and send clinical documentation to PA team for initiation.)    **Please follow up with your patient regarding denial and next steps**

## 2024-07-30 DIAGNOSIS — F41.9 ANXIETY: ICD-10-CM

## 2024-07-30 RX ORDER — CITALOPRAM 40 MG/1
40 TABLET ORAL DAILY
Qty: 100 TABLET | Refills: 1 | Status: SHIPPED | OUTPATIENT
Start: 2024-07-30

## 2024-08-02 ENCOUNTER — HOSPITAL ENCOUNTER (OUTPATIENT)
Dept: ULTRASOUND IMAGING | Facility: HOSPITAL | Age: 69
Discharge: HOME | End: 2024-08-02
Payer: MEDICARE

## 2024-08-02 DIAGNOSIS — K76.0 NAFLD (NONALCOHOLIC FATTY LIVER DISEASE): ICD-10-CM

## 2024-08-02 DIAGNOSIS — R74.8 ELEVATED LIVER ENZYMES: ICD-10-CM

## 2024-08-02 PROCEDURE — 76981 USE PARENCHYMA: CPT

## 2024-08-02 PROCEDURE — 76700 US EXAM ABDOM COMPLETE: CPT

## 2024-08-13 ENCOUNTER — OFFICE VISIT (OUTPATIENT)
Dept: GASTROENTEROLOGY | Facility: CLINIC | Age: 69
End: 2024-08-13
Payer: MEDICARE

## 2024-08-13 VITALS
SYSTOLIC BLOOD PRESSURE: 130 MMHG | HEART RATE: 61 BPM | TEMPERATURE: 98.8 F | DIASTOLIC BLOOD PRESSURE: 72 MMHG | OXYGEN SATURATION: 94 % | HEIGHT: 69 IN | BODY MASS INDEX: 34.45 KG/M2 | WEIGHT: 232.6 LBS

## 2024-08-13 DIAGNOSIS — K21.9 GASTROESOPHAGEAL REFLUX DISEASE, UNSPECIFIED WHETHER ESOPHAGITIS PRESENT: Primary | ICD-10-CM

## 2024-08-13 DIAGNOSIS — K76.0 METABOLIC DYSFUNCTION-ASSOCIATED STEATOTIC LIVER DISEASE (MASLD): ICD-10-CM

## 2024-08-13 DIAGNOSIS — R74.8 ELEVATED LIVER ENZYMES: ICD-10-CM

## 2024-08-13 DIAGNOSIS — A04.8 H. PYLORI INFECTION: ICD-10-CM

## 2024-08-13 DIAGNOSIS — R10.12 LEFT UPPER QUADRANT PAIN: ICD-10-CM

## 2024-08-13 DIAGNOSIS — K74.02 ADVANCED HEPATIC FIBROSIS: ICD-10-CM

## 2024-08-13 DIAGNOSIS — Z86.010 PERSONAL HISTORY OF COLONIC POLYPS: ICD-10-CM

## 2024-08-13 PROCEDURE — 99214 OFFICE O/P EST MOD 30 MIN: CPT | Performed by: FAMILY MEDICINE

## 2024-08-13 NOTE — PROGRESS NOTES
[facility] - Outpatient Follow-up Note  Uriel Howell III 68 y.o. male MRN: [id number]  Encounter: [id number]          ASSESSMENT AND PLAN:      1. Metabolic dysfunction-associated steatotic liver disease (MASLD)  2. Advanced hepatic fibrosis  3. Elevated liver enzymes (resolved)  Patient with mild intermittently elevated serum transaminases with ALT predominance since June 2022 prompting an abdominal ultrasound notable for hepatomegaly and hepatic steatosis. He had a complete serologic evaluation which is negative for competing causes of liver disease as well as celiac disease. He recently had a repeat had a repeat abdominal ultrasound which again demonstrated hepatomegaly and hepatic steatosis. He also had an updated ultrasound elastography showing F3 fibrosis.    We reviewed his most recent labs (4/22) which showed normalization of his serum transaminases likely related to his recent weight loss. Unfortunately, he has still had progression of his fibrosis (F2-->F3).  Discussed the importance of a more aggressive approach to weight loss and recommended a referral to weight management for consideration of a GLP-1 a seeing as these medications have been associated with the reduction of steatohepatitis and reversal of hepatic fibrosis. Patient would rather discuss starting this medication with his primary care provider, Dr. Deric Sahu with [facility], and will communicate these recommendations.    We also discussed Rezdiffra which is a new medication for the treatment of advanced hepatic fibrosis secondary to MASLD. Prefer a GLP-1a to Rezdiffra given the added benefit of weight loss but could consider starting this medication if no considerable improvement.    He will also continue working on optimization of his metabolic risk factors and complete alcohol avoidance but after discussion with he and his wife diet and portion control seems to be his biggest issue.  Otherwise, he will have his previously ordered labs drawn in October 2024 and we will plan for repeat US elastography 1 year from his last, hopefully in the setting of significant weight loss, for surveillance of hepatic fibrosis.    - CBC, CMP, INR, A1AT phenotype in October 2024    4. Gastroesophageal reflux disease, unspecified whether esophagitis present  5. Left upper quadrant pain  6. H. pylori infection  Patient has been following with Hannah Dave PA-C for multiple GI complaints including a longstanding history of GERD and PUD with associated breakthrough reflux and left upper quadrant abdominal pain.  His acid suppression regimen was adjusted to include pantoprazole 40 mg twice daily and Pepcid as needed. He had a cross-sectional imaging which was unremarkable, aside from hepatic steatosis, as well as a CBC, CMP and lipase which were normal.     He ultimately had an EGD (6/11/2024) with gastric biopsies positive for H. pylori. He has since been treated with quadruple therapy and has had complete resolution of his prior symptoms. He has not taken a PPI in over 4 weeks and is ordered for an H. pylori stool antigen for confirmation of eradication.  Encouraged to complete this at his earliest convenience.    He may resume pantoprazole 40 mg once daily if he were to have recurrent GERD related symptoms after completing his H. pylori stool antigen. Also counseled on dietary/lifestyle modifications to help prevent reflux.    - H. pylori stool antigen    7. Personal history of colonic polyps  Patient is up-to-date with CRC screening. Colonoscopy (2/20/2024) notable for 3 subcentimeter polyps (SSA x 1 and TA x 1) and diverticulosis. Recommended he repeat a colonoscopy in 3 years due to personal history of colonic polyps. Next due 2/2027.    Follow-up in 4 months.    ______________________________________________________________________    SUBJECTIVE: Patient is a 68 y.o. male with PMH significant for obesity,  pre-diabetes, HTN, GERD with a history of gastric ulcer, vitamin B12 deficiency, vitamin D deficiency, anxiety with depression and chronic pain syndrome who presents today for follow-up regarding fatty liver disease.     Interval history  - US with hepatomegaly and hepatic steatosis.   - US elastography with F3 fibrosis (IQR/median 19.3%).  - Most recent labs (4/22) with normal serum transaminases. He was ordered for liver function tests and A1AT phenotype to have drawn prior to this appointment which were not completed.  - EGD (6/11/2024) notable for small type I hiatal hernia and moderate edematous and erythematous mucosa in the stomach. Biopsies positive for H. pylori and he was prescribed triple therapy on 6/18/2024 and completed treatment on 7/4/2024. Denies any further left-sided abdominal pain.   - He has lost 13 lbs since his last appointment which he attributes to increased movement.      Extended liver history  Uriel is familiar to [facility] gastroenterology last seen by Hannah Dave PA-C (12/12/2023). He has been noted to have mild and intermittently elevated transaminases with ALT predominance since June 2022. Also noted to have a deficient alkaline phosphatase level. This prompted an abdominal ultrasound notable for an enlarged (23.7 cm) and fatty appearing liver (severe hepatic steatosis). Subsequent US elastography was notable for F2 fibrosis and S3 steatosis He has also had a complete serologic evaluation negative for viral hepatitis as well as autoimmune and other genetic causes of liver disease. Also negative for celiac disease. Serologies also showed normal liver synthetic function and are without thrombocytopenia or chronic hypoalbuminemia.     In regards to metabolic risk factors, patient's BMI is 36.80 with prediabetes and hypertension. Denies family hsitory of liver disease or liver related cancers. 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 Reports  that he is physically active but that he struggles with eating a healthful diet and portion control.      REVIEW OF SYSTEMS IS OTHERWISE NEGATIVE.      Historical Information   Past Medical History:   Diagnosis Date   • Anxiety    • Carpal tunnel syndrome    • Colon polyps    • DJD (degenerative joint disease)    • Duodenal ulcer    • Gastric reflux    • Gastric ulcer    • Heel fracture     bilateral   • Hypertension    • Vitamin B12 deficiency    • Vitamin D deficiency      Past Surgical History:   Procedure Laterality Date   • ANKLE SURGERY      repair   • COLONOSCOPY      polyp removed; benign   • COLONOSCOPY  2019   • COLONOSCOPY W/ POLYPECTOMY      Followed by Dr. Pang    • FOOT FRACTURE SURGERY Bilateral    • MULTIPLE TOOTH EXTRACTIONS     • POLYPECTOMY     • TIBIA FRACTURE SURGERY Left      Social History   Social History     Substance and Sexual Activity   Alcohol Use No     Social History     Substance and Sexual Activity   Drug Use No     Social History     Tobacco Use   Smoking Status Former   • Current packs/day: 0.00   • Average packs/day: 1 pack/day for 44.4 years (44.4 ttl pk-yrs)   • Types: Cigarettes   • Start date: 1976   • Quit date: 2020   • Years since quittin.2   Smokeless Tobacco Former   • Types: Chew   • Quit date:      Family History   Problem Relation Age of Onset   • Cancer Mother         lung   • Heart disease Mother    • Cancer Father         prostate   • Heart murmur Brother    • Diabetes Daughter    • Diabetes Son    • Diabetes Other        Meds/Allergies       Current Outpatient Medications:   •  busPIRone (BUSPAR) 10 mg tablet  •  celecoxib (CeleBREX) 100 mg capsule  •  citalopram (CeleXA) 40 mg tablet  •  ergocalciferol (VITAMIN D2) 50,000 units  •  lisinopril (ZESTRIL) 10 mg tablet  •  Multiple Vitamin (multivitamin) tablet  •  vitamin B-12 (VITAMIN B-12) 1,000 mcg tablet  •  ALPRAZolam (XANAX) 0.5 mg tablet  •  fluticasone (FLONASE) 50 mcg/act nasal  "spray  •  pantoprazole (PROTONIX) 40 mg tablet    No Known Allergies        Objective     Blood pressure 130/72, pulse 61, temperature 98.8 °F (37.1 °C), height 5' 9\" (1.753 m), weight 106 kg (232 lb 9.6 oz), SpO2 94%. Body mass index is 34.35 kg/m².      PHYSICAL EXAM:      General Appearance:   Alert, cooperative, no distress   HEENT:   Normocephalic, atraumatic, anicteric.     Neck:  Supple, symmetrical, trachea midline   Lungs:   Clear to auscultation bilaterally; no rales, rhonchi or wheezing; respirations unlabored    Heart::   Regular rate and rhythm; no murmur, rub, or gallop.   Abdomen:   Soft, non-tender, non-distended; normal bowel sounds; no masses, no organomegaly    Genitalia:   Deferred    Rectal:   Deferred    Extremities:  No cyanosis, clubbing or edema    Pulses:  2+ and symmetric    Skin:  No jaundice, rashes, or lesions    Lymph nodes:  No palpable cervical lymphadenopathy        Lab Results:   No visits with results within 1 Day(s) from this visit.   Latest known visit with results is:   Hospital Outpatient Visit on 06/11/2024   Component Date Value   • Case Report 06/11/2024                      Value:Surgical Pathology Report                         Case: [id number]                                  Authorizing Provider:  Diana M Jaiyeola, MD       Collected:           06/11/2024 0936              Ordering Location:     [facility] Received:            06/11/2024 1222                                     Operating Room                                                               Pathologist:           Chandrakant Jay MD                                                                 Specimens:   A) - Duodenum, cold fcp bx r/o celiacs                                                              B) - Stomach, cold fcp bx r/o h pylori                                                          "     C) - Esophagus, cold fcp bx r/o EOE-distal                                                          D) - Esophagus, cold fcp bx r/o EOE-proximal                                              • Final Diagnosis 06/11/2024                      Value:A. Duodenum, bx r/o celiac:  - Small bowel mucosa with no significant histopathologic abnormality.  - Negative for malabsorption pattern.  - Negative for malignancy.     B. Stomach, bx r/o h pylori:  - Helicobacter-associated gastritis with mild activity.  - Immunohistochemistry for H. pylori is positive.  - Negative for dysplasia and malignancy.     C. Esophagus, bx r/o EOE-distal:  - Squamocolumnar mucosa with no significant histopathologic abnormality.  - No intraepithelial eosinophils identified.  - Negative for intestinal metaplasia, dysplasia, and malignancy.     D. Esophagus, bx r/o EOE-proximal:  - Squamous mucosa with no significant histopathologic abnormality.  - No intraepithelial eosinophils identified.  - Negative for intestinal metaplasia, dysplasia, and malignancy.     • Additional Information 06/11/2024                      Value:All reported additional testing was performed with appropriately reactive controls.  These tests were developed and their performance characteristics determined by [facility] or appropriate performing facility, though some tests may be performed on tissues which have not been validated for performance characteristics (such as staining performed on alcohol exposed cell blocks and decalcified tissues).  Results should be interpreted with caution and in the context of the patients’ clinical condition. These tests may not be cleared or approved by the U.S. Food and Drug Administration, though the FDA has determined that such clearance or approval is not necessary. These tests are used for clinical purposes and they should not be regarded as investigational or for research. This laboratory has been approved by  "CLIA 88, designated as a high-complexity laboratory and is qualified to perform these tests.  .  Interpretation performed at [facility], [address] [facility] [address]     • Gross Description 06/11/2024                      Value:A. The specimen is received in formalin, labeled with the patient's name and hospital number, and is designated \" duodenum cold forcep biopsy, rule out celiac's\".  The specimen is strained into an embedding bag and consists of multiple tan-brown friable soft tissue fragments measuring in aggregate of 1.5 x 0.3 x 0.2 cm.  Entirely submitted. Screened cassette.  B. The specimen is received in formalin, labeled with the patient's name and hospital number, and is designated \" stomach cold forcep biopsy, rule out H. pylori\".  The specimen consists of multiple tan-brown soft tissue fragments measuring in aggregate of 1.1 x 0.4 x 0.2 cm.  Entirely submitted. Screened cassette.  C. The specimen is received in formalin, labeled with the patient's name and hospital number, and is designated \" esophagus cold forcep biopsy, rule out EOE-distal\".  The specimen consists of 2 white-tan soft tissue fragments measuring 0.4 and 0.7 cm in greatest dimension.  Entirely submitted. Screened cassette.  D.                           The specimen is received in formalin, labeled with the patient's name and hospital number, and is designated \" esophagus cold forcep biopsy, rule out EOE-proximal\".  The specimen consists of 2 white-tan soft tissue fragments measuring 0.3 and 0.4 cm in greatest dimension.  Entirely submitted. Screened cassette.    Note: The estimated total formalin fixation time based upon information provided by the submitting clinician and the standard processing schedule is under 72 hours.    -[facility]           Radiology Results:   US abdomen complete    Result Date: 8/5/2024  Narrative: ABDOMEN ULTRASOUND, COMPLETE INDICATION: K76.0: Fatty (change of) liver, not " elsewhere classified R74.8: Abnormal levels of other serum enzymes. COMPARISON: Ultrasound abdomen 4/5/2023. TECHNIQUE: Real-time ultrasound of the abdomen was performed with a curvilinear transducer with both volumetric sweeps and still imaging techniques. FINDINGS: PANCREAS: Visualized portions of the pancreas are within normal limits. AORTA AND IVC: Visualized portions are normal for patient age. LIVER: Size: Mildly enlarged. The liver measures 18.9 cm in the midclavicular line. Contour: Surface contour is smooth. Parenchyma: There is marked diffuse increased echogenicity with smooth echotexture and significant beam attenuation with loss of periportal echogenicity. Most consistent with severe hepatic steatosis. No liver mass identified. Limited imaging of the main portal vein shows it to be patent and hepatopetal. BILIARY: The gallbladder is normal in caliber. No wall thickening or pericholecystic fluid. No stones or sludge identified. No sonographic Valdez's sign. No intrahepatic biliary dilatation. CBD measures 3.0 mm. No choledocholithiasis. KIDNEY: Right kidney measures 11.2 x 5.1 x 5.8 cm. Volume 174.6 mL Kidney within normal limits. Left kidney measures 10.1 x 6.0 x 4.9 cm. Volume 155.2 mL Kidney within normal limits. SPLEEN: Measures 12.0 x 12.9 x 4.4 cm. Volume 355.9 mL Top-normal in size. ASCITES: None.     Impression: Hepatomegaly with steatosis. Workstation performed: [id number]     US elastography/UGAP    Result Date: 8/5/2024  Narrative: ELASTOGRAPHY, LIVER ULTRASOUND INDICATION: K76.0: Fatty (change of) liver, not elsewhere classified R74.8: Abnormal levels of other serum enzymes. COMPARISON: Elastography 7/19/2023. TECHNIQUE: Targeted ultrasound of the liver was performed with a curvilinear transducer on a [employer] e10 utilizing 2D SWE. A total of 10 shear wave Elastography samples were obtained. FINDINGS: Shear Wave Elastography sampling was performed to evaluate stiffness changes within the liver  associated with fibrosis. E1 10.84 kPa E2 8.48 kPa E3 11.4 kPa E4 11.4 kPa E5 9.77 kPa E6 9.19 kPa E7 9.31 kPa E8 12.76 kPa E9 12.13 kPa E10 9.71 kPa E median: 10.3 (previously 8.99) kPa. The corresponding Metavir score is F3 (severe fibrosis), 9.40-11.87 kPa. 1.77-1.99 m/s. IQR/median: 19.3% (IQR of less than 30% is considered a satisfactory data set). Note: The stage of liver fibrosis may be overestimated by clinical conditions unrelated to fibrosis, including but not limited to, nonfasting, hepatic inflammation, vascular congestion, biliary obstruction, and infiltrative liver disease. Furthermore, in  some patients with NAFLD, the cut-off values for compensated advanced chronic liver disease may be lower (7-9 kPa). In causes other than viral hepatitis and nonalcoholic fatty liver disease, the cut-off values are not well established. When comparing measurements across time, a clinically significant change should be considered when the delta change is greater than 10%. In all these conditions, however, liver stiffness values within the normal range exclude significant liver fibrosis. Ultrasound-guided attenuation parameter (UGAP) Liver Steatosis Grading A1 0.8 dB/cm/MHz A2 0.88 dB/cm/MHz A3 0.92 dB/cm/MHz A4 0.96 dB/cm/MHz A5 0.97 dB/cm/MHz A6 0.91 dB/cm/MHz A7 0.86 dB/cm/MHz A8 0.94 dB/cm/MHz A9 0.92 dB/cm/MHz A10 0.91 dB/cm/MHz Attenuation coefficient: 0.91 (previously 0.90) dB/cm/MHz Liver steatosis grading: S3 ( > 67% steatosis) > 0.76 dB/cm/MHz IQR/Med: 5.3% (Less than or equal to 30% is a satisfactory data set). Reference White paper for [employer] ultrasound-guided attenuation parameter [email or web address]?la=en-[email or web address]     Impression: Metavir score: F3, Severe Fibrosis. According to the updated SRU consensus statement, a liver stiffness of 10.3 kPa, suggestive of compensated advanced chronic liver disease (cACLD) but need further test for confirmation.  https://pubs.rsna.org/doi/10.1148/radiol.5712672613 Liver steatosis grading: S3 ( > 67% steatosis) Workstation performed: [id number]       Sita Lombardo PA-C     **Please note: Dictation voice to text software may have been used in the creation of this record. Occasional wrong word or “sound alike” substitutions may have occurred due to the inherent limitations of voice recognition software. Read the chart carefully and recognize, using context, where substitutions have occurred.**

## 2024-08-13 NOTE — Clinical Note
Patient's PCP is out of network - [facility]. Can we please reach out to him and see if he would be willing to have a conversation with Mr. Howell about starting a GLP-1a for both weight loss and treatment of his fatty liver disease given advanced hepatic fibrosis on elastography? Thank you in advance!

## 2024-08-14 ENCOUNTER — TELEPHONE (OUTPATIENT)
Dept: FAMILY MEDICINE CLINIC | Facility: CLINIC | Age: 69
End: 2024-08-14

## 2024-08-14 ENCOUNTER — TELEPHONE (OUTPATIENT)
Dept: GASTROENTEROLOGY | Facility: CLINIC | Age: 69
End: 2024-08-14

## 2024-08-14 NOTE — TELEPHONE ENCOUNTER
[facility]  Patient's PCP is out of network - Dr. Deric Sahu with [facility]. Can we please reach out to him and see if he would be willing to have a conversation with Mr. Howell about starting a GLP-1a for both weight loss and treatment of his fatty liver disease given advanced hepatic fibrosis on elastography?     Spoke to Dr. Deric Beckman's office regarding attached message. Message sent to Dr. Sahu requesting follow up with our office regarding attached provider message.

## 2024-08-15 NOTE — TELEPHONE ENCOUNTER
Patients GI provider: MARIFER SALAZAR    Number to return call: [phone]    Reason for call: Dr. Ching office returning call to Calista. Please return patients phone call to further assist.

## 2024-08-19 NOTE — TELEPHONE ENCOUNTER
Returned call to Dr. Sahu's office and spoke with Anabell.     Dr. Sahu placed a GI nutrition referral to assist with weight loss and to discuss GLP1 receptor agonist. Their office contacted the patient to make aware.

## 2024-09-13 ENCOUNTER — OFFICE VISIT (OUTPATIENT)
Dept: GASTROENTEROLOGY | Facility: CLINIC | Age: 69
End: 2024-09-13
Payer: MEDICARE

## 2024-09-13 VITALS
BODY MASS INDEX: 33.89 KG/M2 | HEIGHT: 69 IN | WEIGHT: 228.8 LBS | SYSTOLIC BLOOD PRESSURE: 138 MMHG | TEMPERATURE: 97.6 F | OXYGEN SATURATION: 96 % | HEART RATE: 65 BPM | DIASTOLIC BLOOD PRESSURE: 70 MMHG

## 2024-09-13 DIAGNOSIS — K21.9 GASTROESOPHAGEAL REFLUX DISEASE WITHOUT ESOPHAGITIS: ICD-10-CM

## 2024-09-13 DIAGNOSIS — K74.02 ADVANCED HEPATIC FIBROSIS: ICD-10-CM

## 2024-09-13 DIAGNOSIS — Z86.010 PERSONAL HISTORY OF COLONIC POLYPS: ICD-10-CM

## 2024-09-13 DIAGNOSIS — K76.0 METABOLIC DYSFUNCTION-ASSOCIATED STEATOTIC LIVER DISEASE (MASLD): ICD-10-CM

## 2024-09-13 DIAGNOSIS — E66.01 CLASS 2 SEVERE OBESITY DUE TO EXCESS CALORIES WITH SERIOUS COMORBIDITY AND BODY MASS INDEX (BMI) OF 38.0 TO 38.9 IN ADULT (HCC): ICD-10-CM

## 2024-09-13 DIAGNOSIS — A04.8 H. PYLORI INFECTION: Primary | ICD-10-CM

## 2024-09-13 PROCEDURE — 99214 OFFICE O/P EST MOD 30 MIN: CPT | Performed by: PHYSICIAN ASSISTANT

## 2024-09-13 NOTE — PATIENT INSTRUCTIONS
Please stop the pantoprazole.   Have the stool testing completed to make sure that we got rid of the h pylori infection.   If needed, then you can restart pantoprazole.     We are recommending that you start on one of the injection medications that helps with weight loss, this can also help to reverse fatty liver and liver fibrosis. You need to follow up with the GI Nutrition Team at [facility].

## 2024-09-13 NOTE — PROGRESS NOTES
[facility] - Outpatient Follow-up Note  Uriel Howell III 69 y.o. male MRN: [id number]  Encounter: [id number]    ASSESSMENT AND PLAN:      1. H. pylori infection  2. Gastroesophageal reflux disease without esophagitis    Pt with hx of GERD and months of LUQ pain.   CT in 04/2024 with no source of pain identified.   EGD in 06/2024 with gastritis, bx positive for h pylori infection.   Tx with quadruple therapy.   We did review recommendation for stool test for cure.   Pt has been taking PPI, though has been off intermittently since last OV with no sig symptoms.   Continue to work on diet and lifestyle modifications for GERD.   Trial off PPI for now, if symptoms recur though h pylori is negative, we will plan to restart.     3. Advanced hepatic fibrosis  4. Metabolic dysfunction-associated steatotic liver disease (MASLD)  5. Class 2 severe obesity due to excess calories with serious comorbidity and body mass index (BMI) of 38.0 to 38.9 in adult (HCC)    Pt has hx of hepatic steatosis. He was evaluated by Hepatology in 08/2024.   He had complete serologic investigation which was negative for competing causes of liver disease.   His elastography in 08/2024 demonstrated F3, S3 despite weight reduction.   We did reiterate Sita's recommendation for more aggressive approach to weight reduction. She did inquire to PCP regarding starting GLP-1, though pt did not follow up with [facility].   I will refer locally as pt is interested in having additional evaluation.   She also reviewed Rezdifra which is new medication to treat advanced hepatic fibrosis 2/2 to MASLD, though at present prefer GLP-1 given added benefit of weight reduction.   Recommendation for repeat elastography in 1 year.     Discussed recommendations in regards to fatty liver including:   Strict control of contributing comorbidities (obesity, prediabetes/diabetes, hypertension, and  hypertriglyceridemia).  Weight loss of approx 10-15% of patient's current body weight over a period of 6-12 months through low fat diet and cardiovascular exercise as tolerated.  Limiting alcohol consumption, preferably complete abstinence.  Monitor hepatic function every 6 months with routine labs.     - Ambulatory Referral to Weight Management; Future    6. Personal history of colonic polyps    Pt with personal hx of colon polyps on colonoscopy in 02/2024.   Recall in 3 years for surveillance purposes.     We will follow up with pt in regards to general GI in 6 months.   ______________________________________________________________________    SUBJECTIVE: Patient is a 69 y.o. male who presents today for follow-up regarding abdominal pain. Pmhx sig for GERD, gastric ulcer, HTN, chronic pain syndrome, BMI 37, chronic pain syndrome, anxiety/insomnia.      Pt was last evaluated by GY in 04/2024 and by Hepatology in 06/2024. He was complaining of LUQ pain and GERD symptoms. He had EGD completed which demonstrated gastritis and bx were positive for h pylori. He was evaluated by Hepatology given concern for liver fibrosis. He started working on weight loss though elastography in 08/2024 demonstrated S3/Frr.     09/13/24:     Pt is feeling great today! Pt denies heartburn, indigestion, nausea, emesis, dysphagia. No early satiety. No longer having any LUQ pain.     Lost 20+ lbs intentionally since last OV.     Pt feels Bms are stable. Having daily BM, no BRBPR or melena. Typically Glen 4-5 stools. No abd pain or rectal pain related to defecation.     Etoh: none; previous regular drinker in 50s   Tobacco: quit   NSAIDs: rare, takes celebrex daily   Acetaminophen: PRN      04/2023: AST 44, ALT 80, ALP 42, albumin 3.8, T. bili 0.46  04/2023: Abd US: Enlarged fatty liver  07/2023: Elastography: F2, S3   07/2023: chronic hepatitis panel negative, TSAT 32, TIBC 324, iron 103, ferritin 163, ASMA 10, AMA < 20, A1AT 129, RAGHAVENDRA  negative, ceruloplasmin 21.9, Ttg Ig A <2, IgA 266, IgG 1120, IgM 30, INR 1.01, hepatitis A non-reactive, Hep B surface abs 3.91  11/2023: Hb 14.2, MCV 96, Plt 182, AST 49, ALT 72, ALP 38, albumin 4.2, t bili 0.54  04/2024: Hb 14.4, MCV 93, platelets 175, BUN 19, creatinine 1.16, AST 33, ALT 45, albumin 4.5, ALP 39, T. bili 0.53  04/2024: CT A/P: No acute abnormality in the abdomen or pelvis. Hepatic steatosis.   08/2024: Elastography: S3, F3  08/2024: Hepatomegaly with steatosis.     Endoscopic history:   02/2019: Colon: six 5 to 6 mm polyps, in the rectum, 25 cm, 38, 40 cm proximal to the anus  02/2024: Colon: 3 subcentimeter polyps were removed; Diverticulosis of mild severity in the sigmoid colon   A. Large Intestine, Cecum, cecal polyp: Tubular adenoma; No evidence of high grade dysplasia or malignancy seen.   B. Large Intestine, Right/Ascending Colon, ascending colon polyp: Adenomatous polyp. No evidence of high grade dysplasia or malignancy.   C. Large Intestine, Sigmoid Colon, sigmoid polyp:Fragments of colonic polyp with focal serrated features, suggestive of serrated adenoma; No evidence of high grade dysplasia or malignancy   06/2024: EGD: Moderate edematous, erythematous mucosa in the stomach; small type 1 hiatal hernia   A. Duodenum, bx r/o celiac: Small bowel mucosa with no significant histopathologic abnormality. Negative for malabsorption pattern. Negative for malignancy.   B. Stomach, bx r/o h pylori: Helicobacter-associated gastritis with mild activity. Immunohistochemistry for H. pylori is positive. Negative for dysplasia and malignancy.   C. Esophagus, bx r/o EOE-distal: Squamocolumnar mucosa with no significant histopathologic abnormality. No intraepithelial eosinophils identified. Negative for intestinal metaplasia, dysplasia, and malignancy.   D. Esophagus, bx r/o EOE-proximal: Squamous mucosa with no significant histopathologic abnormality. No intraepithelial eosinophils identified. Negative  for intestinal metaplasia, dysplasia, and malignancy.    Review of Systems   Otherwise Per HPI    Historical Information   Past Medical History:   Diagnosis Date    Anxiety     Carpal tunnel syndrome     Colon polyps     DJD (degenerative joint disease)     Duodenal ulcer     Gastric reflux     Gastric ulcer     Heel fracture     bilateral    Hypertension     Vitamin B12 deficiency     Vitamin D deficiency      Past Surgical History:   Procedure Laterality Date    ANKLE SURGERY      repair    COLONOSCOPY      polyp removed; benign    COLONOSCOPY  2019    COLONOSCOPY W/ POLYPECTOMY      Followed by Dr. Pang     FOOT FRACTURE SURGERY Bilateral     MULTIPLE TOOTH EXTRACTIONS      POLYPECTOMY      TIBIA FRACTURE SURGERY Left      Social History   Social History     Substance and Sexual Activity   Alcohol Use No     Social History     Substance and Sexual Activity   Drug Use No     Social History     Tobacco Use   Smoking Status Former    Current packs/day: 0.00    Average packs/day: 1 pack/day for 44.4 years (44.4 ttl pk-yrs)    Types: Cigarettes    Start date: 1976    Quit date: 2020    Years since quittin.2   Smokeless Tobacco Former    Types: Chew    Quit date:      Family History   Problem Relation Age of Onset    Cancer Mother         lung    Heart disease Mother     Cancer Father         prostate    Heart murmur Brother     Diabetes Daughter     Diabetes Son     Diabetes Other        Meds/Allergies       Current Outpatient Medications:     busPIRone (BUSPAR) 10 mg tablet    celecoxib (CeleBREX) 100 mg capsule    citalopram (CeleXA) 40 mg tablet    ergocalciferol (VITAMIN D2) 50,000 units    lisinopril (ZESTRIL) 10 mg tablet    Multiple Vitamin (multivitamin) tablet    vitamin B-12 (VITAMIN B-12) 1,000 mcg tablet    ALPRAZolam (XANAX) 0.5 mg tablet    fluticasone (FLONASE) 50 mcg/act nasal spray    pantoprazole (PROTONIX) 40 mg tablet    No Known Allergies    Objective     Blood pressure  "138/70, pulse 65, temperature 97.6 °F (36.4 °C), temperature source Temporal, height 5' 9\" (1.753 m), weight 104 kg (228 lb 12.8 oz), SpO2 96%. Body mass index is 33.79 kg/m².    Physical Exam  Vitals and nursing note reviewed.   Constitutional:       General: He is not in acute distress.     Appearance: He is well-developed. He is obese.   HENT:      Head: Normocephalic and atraumatic.   Eyes:      Conjunctiva/sclera: Conjunctivae normal.   Cardiovascular:      Rate and Rhythm: Normal rate.      Heart sounds: No murmur heard.  Pulmonary:      Effort: Pulmonary effort is normal. No respiratory distress.      Breath sounds: Normal breath sounds.   Abdominal:      General: Abdomen is protuberant. Bowel sounds are normal. There is no distension.      Palpations: Abdomen is soft.      Tenderness: There is no abdominal tenderness. There is no guarding.   Musculoskeletal:         General: No swelling.   Skin:     General: Skin is warm and dry.      Coloration: Skin is not jaundiced.   Neurological:      General: No focal deficit present.      Mental Status: He is alert and oriented to person, place, and time.   Psychiatric:         Mood and Affect: Mood normal.         Lab Results:   No visits with results within 1 Day(s) from this visit.   Latest known visit with results is:   Hospital Outpatient Visit on 06/11/2024   Component Date Value    Case Report 06/11/2024                      Value:Surgical Pathology Report                         Case: [id number]                                  Authorizing Provider:  Diana M Jaiyeola, MD       Collected:           06/11/2024 0936              Ordering Location:     [facility] Received:            06/11/2024 1222                                     Operating Room                                                               Pathologist:           Chandrakant Jay MD      "                                                            Specimens:   A) - Duodenum, cold fcp bx r/o celiacs                                                              B) - Stomach, cold fcp bx r/o h pylori                                                              C) - Esophagus, cold fcp bx r/o EOE-distal                                                          D) - Esophagus, cold fcp bx r/o EOE-proximal                                               Final Diagnosis 06/11/2024                      Value:A. Duodenum, bx r/o celiac:  - Small bowel mucosa with no significant histopathologic abnormality.  - Negative for malabsorption pattern.  - Negative for malignancy.     B. Stomach, bx r/o h pylori:  - Helicobacter-associated gastritis with mild activity.  - Immunohistochemistry for H. pylori is positive.  - Negative for dysplasia and malignancy.     C. Esophagus, bx r/o EOE-distal:  - Squamocolumnar mucosa with no significant histopathologic abnormality.  - No intraepithelial eosinophils identified.  - Negative for intestinal metaplasia, dysplasia, and malignancy.     D. Esophagus, bx r/o EOE-proximal:  - Squamous mucosa with no significant histopathologic abnormality.  - No intraepithelial eosinophils identified.  - Negative for intestinal metaplasia, dysplasia, and malignancy.      Additional Information 06/11/2024                      Value:All reported additional testing was performed with appropriately reactive controls.  These tests were developed and their performance characteristics determined by [facility] or appropriate performing facility, though some tests may be performed on tissues which have not been validated for performance characteristics (such as staining performed on alcohol exposed cell blocks and decalcified tissues).  Results should be interpreted with caution and in the context of the patients’ clinical condition. These tests may not be cleared or approved by the  "U.S. Food and Drug Administration, though the FDA has determined that such clearance or approval is not necessary. These tests are used for clinical purposes and they should not be regarded as investigational or for research. This laboratory has been approved by CLIA 88, designated as a high-complexity laboratory and is qualified to perform these tests.  .  Interpretation performed at [facility], [address] [facility] [address]      Gross Description 06/11/2024                      Value:A. The specimen is received in formalin, labeled with the patient's name and hospital number, and is designated \" duodenum cold forcep biopsy, rule out celiac's\".  The specimen is strained into an embedding bag and consists of multiple tan-brown friable soft tissue fragments measuring in aggregate of 1.5 x 0.3 x 0.2 cm.  Entirely submitted. Screened cassette.  B. The specimen is received in formalin, labeled with the patient's name and hospital number, and is designated \" stomach cold forcep biopsy, rule out H. pylori\".  The specimen consists of multiple tan-brown soft tissue fragments measuring in aggregate of 1.1 x 0.4 x 0.2 cm.  Entirely submitted. Screened cassette.  C. The specimen is received in formalin, labeled with the patient's name and hospital number, and is designated \" esophagus cold forcep biopsy, rule out EOE-distal\".  The specimen consists of 2 white-tan soft tissue fragments measuring 0.4 and 0.7 cm in greatest dimension.  Entirely submitted. Screened cassette.  D.                           The specimen is received in formalin, labeled with the patient's name and hospital number, and is designated \" esophagus cold forcep biopsy, rule out EOE-proximal\".  The specimen consists of 2 white-tan soft tissue fragments measuring 0.3 and 0.4 cm in greatest dimension.  Entirely submitted. Screened cassette.    Note: The estimated total formalin fixation time based upon information provided by " the submitting clinician and the standard processing schedule is under 72 hours.    -[facility]         Radiology Results:   No results found.    Hannah Dave PA-C    **Please note:  Dictation voice to text software may have been used in the creation of this record.  Occasional wrong word or “sound alike” substitutions may have occurred due to the inherent limitations of voice recognition software.  Read the chart carefully and recognize, using context, where substitutions have occurred.**

## 2024-09-23 DIAGNOSIS — I10 HYPERTENSION, UNSPECIFIED TYPE: ICD-10-CM

## 2024-09-24 RX ORDER — LISINOPRIL 10 MG/1
10 TABLET ORAL DAILY
Qty: 90 TABLET | Refills: 0 | Status: SHIPPED | OUTPATIENT
Start: 2024-09-24

## 2024-09-27 ENCOUNTER — TELEPHONE (OUTPATIENT)
Dept: FAMILY MEDICINE CLINIC | Facility: CLINIC | Age: 69
End: 2024-09-27

## 2024-09-27 NOTE — TELEPHONE ENCOUNTER
09/27/24 1:34 PM        The office's request has been received, reviewed, and the patient chart updated. The PCP has successfully been removed with a patient attribution note. This message will now be completed.        Thank you  Rebecca Reina

## 2024-09-30 ENCOUNTER — TELEPHONE (OUTPATIENT)
Dept: FAMILY MEDICINE CLINIC | Facility: CLINIC | Age: 69
End: 2024-09-30

## 2024-10-02 ENCOUNTER — HOSPITAL ENCOUNTER (EMERGENCY)
Facility: HOSPITAL | Age: 69
Discharge: HOME | End: 2024-10-02
Attending: EMERGENCY MEDICINE
Payer: MEDICARE

## 2024-10-02 ENCOUNTER — APPOINTMENT (EMERGENCY)
Dept: RADIOLOGY | Facility: HOSPITAL | Age: 69
End: 2024-10-02
Payer: MEDICARE

## 2024-10-02 VITALS
RESPIRATION RATE: 17 BRPM | TEMPERATURE: 98.4 F | DIASTOLIC BLOOD PRESSURE: 78 MMHG | WEIGHT: 228 LBS | OXYGEN SATURATION: 96 % | HEART RATE: 62 BPM | BODY MASS INDEX: 33.67 KG/M2 | SYSTOLIC BLOOD PRESSURE: 138 MMHG

## 2024-10-02 DIAGNOSIS — M79.18 RIGHT BUTTOCK PAIN: Primary | ICD-10-CM

## 2024-10-02 PROCEDURE — 73502 X-RAY EXAM HIP UNI 2-3 VIEWS: CPT

## 2024-10-02 PROCEDURE — 99283 EMERGENCY DEPT VISIT LOW MDM: CPT

## 2024-10-02 PROCEDURE — 72100 X-RAY EXAM L-S SPINE 2/3 VWS: CPT

## 2024-10-02 PROCEDURE — 99284 EMERGENCY DEPT VISIT MOD MDM: CPT | Performed by: EMERGENCY MEDICINE

## 2024-10-02 RX ORDER — METHYLPREDNISOLONE 4 MG
TABLET, DOSE PACK ORAL
Qty: 21 TABLET | Refills: 0 | Status: SHIPPED | OUTPATIENT
Start: 2024-10-02

## 2024-10-02 RX ORDER — ACETAMINOPHEN 325 MG/1
975 TABLET ORAL ONCE
Status: COMPLETED | OUTPATIENT
Start: 2024-10-02 | End: 2024-10-02

## 2024-10-02 RX ORDER — MORPHINE SULFATE 15 MG/1
7.5-15 TABLET ORAL EVERY 8 HOURS PRN
Qty: 4 TABLET | Refills: 0 | Status: SHIPPED | OUTPATIENT
Start: 2024-10-02

## 2024-10-02 RX ORDER — PREDNISONE 20 MG/1
60 TABLET ORAL ONCE
Status: COMPLETED | OUTPATIENT
Start: 2024-10-02 | End: 2024-10-02

## 2024-10-02 RX ADMIN — PREDNISONE 60 MG: 20 TABLET ORAL at 09:25

## 2024-10-02 RX ADMIN — ACETAMINOPHEN 325MG 975 MG: 325 TABLET ORAL at 09:24

## 2024-10-02 NOTE — ED PROVIDER NOTES
Final diagnoses:   Right buttock pain - With associated right leg paresthesia     ED Disposition       ED Disposition   Discharge    Condition   Stable    Date/Time   Wed Oct 2, 2024  9:38 AM    Comment   Uriel Howell III discharge to home/self care.                   Assessment & Plan       Medical Decision Making  This patient presents with right buttock pain radiating down right lower extremity with numbness.   Diagnostic considerations include epidural abscess, HNP, sciatica. See ED Course.       Amount and/or Complexity of Data Reviewed  Radiology: ordered and independent interpretation performed. Decision-making details documented in ED Course.  ECG/medicine tests: ordered and independent interpretation performed. Decision-making details documented in ED Course.    Risk  OTC drugs.  Prescription drug management.        ED Course as of 10/02/24 0948   Wed Oct 02, 2024   0937 XR spine lumbar 2 or 3 views injury  No fracture interpreted by me   0938 XR hip/pelv 2-3 vws right if performed  No fracture interpreted by me   0944 X-ray results and care discussed for likely musculoskeletal cause of buttock pain with paresthesias associated, will contact his clinician and make appointment within a week and return immediately if worse or additional symptoms, spouse present and supportive       Medications   predniSONE tablet 60 mg (60 mg Oral Given 10/2/24 0925)   acetaminophen (TYLENOL) tablet 975 mg (975 mg Oral Given 10/2/24 0924)       ED Risk Strat Scores                           SBIRT 22yo+      Flowsheet Row Most Recent Value   Initial Alcohol Screen: US AUDIT-C     1. How often do you have a drink containing alcohol? 0 Filed at: 10/02/2024 0902   2. How many drinks containing alcohol do you have on a typical day you are drinking?  0 Filed at: 10/02/2024 0902   3a. Male UNDER 65: How often do you have five or more drinks on one occasion? 0 Filed at: 10/02/2024 0902   3b. FEMALE Any Age, or MALE 65+: How  often do you have 4 or more drinks on one occassion? 0 Filed at: 10/02/2024 0902   Audit-C Score 0 Filed at: 10/02/2024 0902   CONSTANCE: How many times in the past year have you...    Used an illegal drug or used a prescription medication for non-medical reasons? Never Filed at: 10/02/2024 0902                            History of Present Illness       Chief Complaint   Patient presents with    Leg Pain     Pt states was carrying something heavy on Friday and started with right hip and leg pain after.        Past Medical History:   Diagnosis Date    Anxiety     Carpal tunnel syndrome     Colon polyps     DJD (degenerative joint disease)     Duodenal ulcer     Gastric reflux     Gastric ulcer     Heel fracture     bilateral    Hypertension     Vitamin B12 deficiency     Vitamin D deficiency       Past Surgical History:   Procedure Laterality Date    ANKLE SURGERY      repair    COLONOSCOPY      polyp removed; benign    COLONOSCOPY  2019    COLONOSCOPY W/ POLYPECTOMY      Followed by Dr. Pang     FOOT FRACTURE SURGERY Bilateral     MULTIPLE TOOTH EXTRACTIONS      POLYPECTOMY      TIBIA FRACTURE SURGERY Left       Family History   Problem Relation Age of Onset    Cancer Mother         lung    Heart disease Mother     Cancer Father         prostate    Heart murmur Brother     Diabetes Daughter     Diabetes Son     Diabetes Other       Social History     Tobacco Use    Smoking status: Former     Current packs/day: 0.00     Average packs/day: 1 pack/day for 44.4 years (44.4 ttl pk-yrs)     Types: Cigarettes     Start date: 1976     Quit date: 2020     Years since quittin.3    Smokeless tobacco: Former     Types: Chew     Quit date:    Vaping Use    Vaping status: Never Used   Substance Use Topics    Alcohol use: No    Drug use: No      E-Cigarette/Vaping    E-Cigarette Use Never User       E-Cigarette/Vaping Substances    Nicotine No     THC No     CBD No       I have reviewed and agree with the  history as documented.     69-year-old male accompanied by spouse describes fairly severe right buttock pain, stabbing, radiating down right lateral thigh with hypoesthesia in right lateral leg.  No numbness or weakness.  Pain is worse with ambulating.  No fecal or urinary incontinence or retention.  No fever or chills.  No direct injury.  Patient describes history of sciatica.  Patient notes symptoms occurred 5 days ago when he threw heavy item over shoulder and walked up steps, works as a contractor, but too painful recently      History provided by:  Patient and spouse  Leg Pain  Location:  Buttock  Buttock location:  R buttock  Pain details:     Quality:  Aching, shooting and sharp    Radiates to:  R leg    Severity:  Severe    Onset quality:  Sudden    Timing:  Constant    Progression:  Unchanged  Chronicity:  New  Relieved by:  NSAIDs  Worsened by:  Abduction  Associated symptoms: no fever    Risk factors: obesity        Review of Systems   Constitutional:  Negative for fever.   All other systems reviewed and are negative.          Objective       ED Triage Vitals   Temperature Pulse Blood Pressure Respirations SpO2 Patient Position - Orthostatic VS   10/02/24 0900 10/02/24 0903 10/02/24 0903 10/02/24 0900 10/02/24 0903 10/02/24 0903   98.4 °F (36.9 °C) 87 149/71 18 94 % Lying      Temp Source Heart Rate Source BP Location FiO2 (%) Pain Score    10/02/24 0900 10/02/24 0915 10/02/24 0903 -- 10/02/24 0900    Temporal Monitor Left arm  10 - Worst Possible Pain      Vitals      Date and Time Temp Pulse SpO2 Resp BP Pain Score FACES Pain Rating User   10/02/24 0924 -- -- -- -- -- 6 -- PK   10/02/24 0915 -- 61 96 % 16 132/66 -- -- PK   10/02/24 0903 -- 87 94 % 18 149/71 -- -- MY   10/02/24 0900 98.4 °F (36.9 °C) -- -- 18 -- 10 - Worst Possible Pain -- MY            Physical Exam  Vitals and nursing note reviewed.   Constitutional:       General: He is not in acute distress.     Appearance: Normal appearance.       Comments: Pleasant, well-appearing, conversational, articulate   HENT:      Head: Normocephalic and atraumatic.      Right Ear: External ear normal.      Left Ear: External ear normal.      Nose: Nose normal.   Eyes:      Conjunctiva/sclera: Conjunctivae normal.   Cardiovascular:      Comments: Good color  Pulmonary:      Effort: Pulmonary effort is normal. No respiratory distress.   Abdominal:      General: Abdomen is flat.   Musculoskeletal:         General: No deformity.      Cervical back: Neck supple.      Comments: No lumbar spine tenderness or deformity, right hip intact range of motion, no crepitance, right buttock mild general tenderness without overlying skin changes, deep tendon reflexes 2/4 at knees and ankles bilaterally, mild decrease sensation at right lateral leg, great toes intact strength in flexion and extension bilaterally, symmetrically, no leg or thigh swelling or tenderness   Skin:     General: Skin is warm and dry.   Neurological:      General: No focal deficit present.      Mental Status: He is alert and oriented to person, place, and time.      Motor: No weakness.      Coordination: Coordination normal.   Psychiatric:         Mood and Affect: Mood normal.         Behavior: Behavior normal.         Results Reviewed       None            XR spine lumbar 2 or 3 views injury    (Results Pending)   XR hip/pelv 2-3 vws right if performed    (Results Pending)       Procedures    ED Medication and Procedure Management   Prior to Admission Medications   Prescriptions Last Dose Informant Patient Reported? Taking?   Multiple Vitamin (multivitamin) tablet  Self No No   Sig: Take 1 tablet by mouth daily   busPIRone (BUSPAR) 10 mg tablet  Self Yes No   Sig: Take 10 mg by mouth 3 (three) times a day Once daily   celecoxib (CeleBREX) 100 mg capsule  Self No No   Sig: Take 1 capsule (100 mg total) by mouth 2 (two) times a day   citalopram (CeleXA) 40 mg tablet  Self No No   Sig: take 1 tablet by mouth once  daily   ergocalciferol (VITAMIN D2) 50,000 units  Self No No   Sig: Take 1 capsule (50,000 Units total) by mouth every 28 days   lisinopril (ZESTRIL) 10 mg tablet   No No   Sig: take 1 tablet by mouth once daily   pantoprazole (PROTONIX) 40 mg tablet  Self No No   Sig: Take 1 tablet (40 mg total) by mouth 2 (two) times a day   Patient not taking: Reported on 8/13/2024   vitamin B-12 (VITAMIN B-12) 1,000 mcg tablet  Self No No   Sig: Take 1 tablet (1,000 mcg total) by mouth daily      Facility-Administered Medications: None     Patient's Medications   Discharge Prescriptions    METHYLPREDNISOLONE 4 MG TABLET THERAPY PACK    Use as directed on package       Start Date: 10/2/2024 End Date: --       Order Dose: --       Quantity: 21 tablet    Refills: 0    MORPHINE (MSIR) 15 MG TABLET    Take 0.5-1 tablets (7.5-15 mg total) by mouth every 8 (eight) hours as needed for severe pain for up to 4 doses Max Daily Amount: 45 mg       Start Date: 10/2/2024 End Date: --       Order Dose: 7.5-15 mg       Quantity: 4 tablet    Refills: 0     No discharge procedures on file.  ED SEPSIS DOCUMENTATION   Time reflects when diagnosis was documented in both MDM as applicable and the Disposition within this note       Time User Action Codes Description Comment    10/2/2024  9:38 Naveed Venegas Add [M79.18] Right buttock pain     10/2/2024  9:38 Naveed Venegas Modify [M79.18] Right buttock pain With associated right leg paresthesia                 Naveed Tinajero DO  10/02/24 0949

## 2024-10-02 NOTE — DISCHARGE INSTRUCTIONS
Right buttock pain with right lower extremity paresthesia  Return immediately if worse or any new symptoms  Tylenol 1000 mg every 6 hours as needed  and/or  Advil 400 mg every 6 hours as needed  May take both together

## 2024-10-12 DIAGNOSIS — K21.9 GASTROESOPHAGEAL REFLUX DISEASE WITHOUT ESOPHAGITIS: ICD-10-CM

## 2024-10-14 RX ORDER — PANTOPRAZOLE SODIUM 40 MG/1
40 TABLET, DELAYED RELEASE ORAL 2 TIMES DAILY
Qty: 180 TABLET | Refills: 1 | Status: SHIPPED | OUTPATIENT
Start: 2024-10-14

## 2024-11-20 ENCOUNTER — TELEPHONE (OUTPATIENT)
Age: 69
End: 2024-11-20

## 2024-12-17 ENCOUNTER — TELEPHONE (OUTPATIENT)
Dept: ENDOCRINOLOGY | Facility: CLINIC | Age: 69
End: 2024-12-17

## 2024-12-17 ENCOUNTER — OFFICE VISIT (OUTPATIENT)
Dept: GASTROENTEROLOGY | Facility: CLINIC | Age: 69
End: 2024-12-17
Payer: MEDICARE

## 2024-12-17 VITALS
HEART RATE: 67 BPM | OXYGEN SATURATION: 99 % | DIASTOLIC BLOOD PRESSURE: 62 MMHG | BODY MASS INDEX: 33.92 KG/M2 | TEMPERATURE: 98.4 F | RESPIRATION RATE: 15 BRPM | SYSTOLIC BLOOD PRESSURE: 118 MMHG | HEIGHT: 69 IN | WEIGHT: 229 LBS

## 2024-12-17 DIAGNOSIS — K21.9 GASTROESOPHAGEAL REFLUX DISEASE, UNSPECIFIED WHETHER ESOPHAGITIS PRESENT: ICD-10-CM

## 2024-12-17 DIAGNOSIS — Z86.0100 HISTORY OF COLONIC POLYPS: ICD-10-CM

## 2024-12-17 DIAGNOSIS — A04.8 H. PYLORI INFECTION: ICD-10-CM

## 2024-12-17 DIAGNOSIS — K75.81 METABOLIC DYSFUNCTION-ASSOCIATED STEATOHEPATITIS (MASH): Primary | ICD-10-CM

## 2024-12-17 DIAGNOSIS — K74.02 ADVANCED HEPATIC FIBROSIS: ICD-10-CM

## 2024-12-17 DIAGNOSIS — R74.8 ELEVATED LIVER ENZYMES: ICD-10-CM

## 2024-12-17 PROCEDURE — 99213 OFFICE O/P EST LOW 20 MIN: CPT | Performed by: FAMILY MEDICINE

## 2024-12-17 RX ORDER — RESMETIROM 100 MG/1
100 TABLET, COATED ORAL DAILY
Start: 2024-12-17

## 2024-12-17 NOTE — TELEPHONE ENCOUNTER
LVM for patient to call Nutrition and schedule apt with them I was unable to on my end I did highlight phone number for patient on check out paper

## 2024-12-17 NOTE — PROGRESS NOTES
[facility] - Outpatient Follow-up Note  Uriel Howell III 69 y.o. male MRN: [id number]  Encounter: [id number]          ASSESSMENT AND PLAN:      1. Metabolic dysfunction-associated steatotic liver disease (MASLD) (Primary)  2. Elevated liver enzymes  3. Advanced hepatic fibrosis  Patient with mild and intermittently elevated serum transaminases with ALT predominance since June 2022 prompting an abdominal ultrasound notable for hepatomegaly and hepatic steatosis. He had a complete serologic evaluation which was negative for competing causes of liver disease as well as celiac disease. He had a repeat had a repeat abdominal ultrasound which again demonstrated hepatomegaly and hepatic steatosis. He also had an ultrasound elastography showing F3 fibrosis.     Since his last appointment, he did not complete his labs (CBC, CMP, INR, A1AT deficiency) as requested. His weight is overall stable. Reiterated the importance of a more aggressive approach to weight loss and recommended a referral to weight management for consideration of a GLP-1 a seeing as these medications have been associated with the reduction of steatohepatitis and reversal of hepatic fibrosis. He previously referred to weight management by Hannah Rocha PA-C but has yet to establish care. Encouraged him to complete his labs and schedule a consultation with weight management.     Also discussed Rezdiffra which is a new medication for the treatment of advanced hepatic fibrosis secondary to MASLD. Prefer a GLP-1a to Rezdiffra given the added benefit of weight loss but recommend starting this now given that he has gone nearly 4 months without additional or substantial weight loss. Patient is agreeable.     He will also continue working on optimization of his metabolic risk factors and complete alcohol avoidance. Otherwise, will plan for repeat US elastography 1 year from his last for surveillance of hepatic  fibrosis (~8/2025).     - CBC, CMP, INR, A1AT phenotype  - Weight management  - Rezdiffra    4. Gastroesophageal reflux disease, unspecified whether esophagitis present  5. H. pylori infection  Patient has been following with Hannah Dave PA-C for multiple GI complaints including a longstanding history of GERD and PUD with associated breakthrough reflux and left upper quadrant abdominal pain.  His acid suppression regimen was adjusted to include pantoprazole 40 mg twice daily and Pepcid as needed. He had a cross-sectional imaging which was unremarkable, aside from hepatic steatosis, as well as a CBC, CMP and lipase which were normal.      He ultimately had an EGD (6/11/2024) with gastric biopsies positive for H. pylori. He has since been treated with quadruple therapy and has had complete resolution of his prior symptoms. He continues to take pantoprazole 40 mg once daily but has not complete his H pylori stool Ag for confirmation of eradication.     Encouraged to complete this at his earliest convenience. He is aware to hold his PPI x14 days prior to completing study. May use OTC TUMS or Pepcid for symptomatic relief during this time. Resume pantoprazole after completion of stool study.     - H pylori stool Ag    6. History of colonic polyps  Patient is up-to-date with CRC screening. Colonoscopy (2/20/2024) notable for 3 subcentimeter polyps (SSA x 1 and TA x 1) and diverticulosis. Recommended he repeat a colonoscopy in 3 years due to personal history of colonic polyps. Next due 2/2027.       Follow-up in 6 months or sooner if necessary.     ______________________________________________________________________    SUBJECTIVE: Patient is a 69 y.o. male with PMH significant for obesity, pre-diabetes, HTN, GERD with a history of gastric ulcer, vitamin B12 deficiency, vitamin D deficiency, anxiety with depression and chronic pain syndrome who presents today for follow-up regarding fatty liver disease.     Interval  history  - Last seen by Hannah Rocha PA-C on 9/13/2024.   - Referred to weight management but has not established care.   - He did not have his labs (CBC, CMP, INR, A1AT deficiency) drawn as ordered.   - He did not complete his H pylori stool Ag for confirmation of eradication as ordered.   - His weight is overall stable since his last appointment.     Extended liver history  Uriel is familiar to [facility] gastroenterology last seen by Hannah Dave PA-C (12/12/2023). He has been noted to have mild and intermittently elevated transaminases with ALT predominance since June 2022. Also noted to have a deficient alkaline phosphatase level. This prompted an abdominal ultrasound notable for an enlarged (23.7 cm) and fatty appearing liver (severe hepatic steatosis). Subsequent US elastography was notable for F2 fibrosis and S3 steatosis He has also had a complete serologic evaluation negative for viral hepatitis as well as autoimmune and other genetic causes of liver disease. Also negative for celiac disease. Serologies also showed normal liver synthetic function and are without thrombocytopenia or chronic hypoalbuminemia.     In regards to metabolic risk factors, patient's BMI is 36.80 with prediabetes and hypertension. Denies family hsitory of liver disease or liver related cancers. 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 Reports that he is physically active but that he struggles with eating a healthful diet and portion control.      REVIEW OF SYSTEMS IS OTHERWISE NEGATIVE.      Historical Information   Past Medical History:   Diagnosis Date   • Anxiety    • Carpal tunnel syndrome    • Colon polyps    • DJD (degenerative joint disease)    • Duodenal ulcer    • Fatty liver    • Gastric reflux    • Gastric ulcer    • Heel fracture     bilateral   • Hypertension    • Vitamin B12 deficiency    • Vitamin D deficiency      Past Surgical History:   Procedure Laterality Date   •  "ANKLE SURGERY      repair   • COLONOSCOPY      polyp removed; benign   • COLONOSCOPY  2019   • COLONOSCOPY W/ POLYPECTOMY      Followed by Dr. Pang    • FOOT FRACTURE SURGERY Bilateral    • MULTIPLE TOOTH EXTRACTIONS     • POLYPECTOMY     • TIBIA FRACTURE SURGERY Left      Social History   Social History     Substance and Sexual Activity   Alcohol Use No     Social History     Substance and Sexual Activity   Drug Use No     Social History     Tobacco Use   Smoking Status Former   • Current packs/day: 0.00   • Average packs/day: 1 pack/day for 44.4 years (44.4 ttl pk-yrs)   • Types: Cigarettes   • Start date: 1976   • Quit date: 2020   • Years since quittin.6   Smokeless Tobacco Former   • Types: Chew   • Quit date: 2014     Family History   Problem Relation Age of Onset   • Cancer Mother         lung   • Heart disease Mother    • Cancer Father         prostate   • Heart murmur Brother    • Diabetes Daughter    • Diabetes Son    • Diabetes Other        Meds/Allergies       Current Outpatient Medications:   •  busPIRone (BUSPAR) 10 mg tablet  •  celecoxib (CeleBREX) 100 mg capsule  •  citalopram (CeleXA) 40 mg tablet  •  ergocalciferol (VITAMIN D2) 50,000 units  •  lisinopril (ZESTRIL) 10 mg tablet  •  methylPREDNISolone 4 MG tablet therapy pack  •  morphine (MSIR) 15 mg tablet  •  Multiple Vitamin (multivitamin) tablet  •  pantoprazole (PROTONIX) 40 mg tablet  •  resmetirom (Rezdiffra)  •  vitamin B-12 (VITAMIN B-12) 1,000 mcg tablet    No Known Allergies        Objective     Blood pressure 118/62, pulse 67, temperature 98.4 °F (36.9 °C), temperature source Temporal, resp. rate 15, height 5' 9\" (1.753 m), weight 104 kg (229 lb), SpO2 99%. Body mass index is 33.82 kg/m².      PHYSICAL EXAM:      General Appearance:   Alert, cooperative, no distress   HEENT:   Normocephalic, atraumatic, anicteric.     Neck:  Supple, symmetrical, trachea midline   Lungs:   Clear to auscultation bilaterally; no " rales, rhonchi or wheezing; respirations unlabored    Heart::   Regular rate and rhythm; no murmur, rub, or gallop.   Abdomen:   Soft, non-tender, non-distended; normal bowel sounds; no masses, no organomegaly    Genitalia:   Deferred    Rectal:   Deferred    Extremities:  No cyanosis, clubbing or edema    Pulses:  2+ and symmetric    Skin:  No jaundice, rashes, or lesions    Lymph nodes:  No palpable cervical lymphadenopathy        Lab Results:   No visits with results within 1 Day(s) from this visit.   Latest known visit with results is:   Hospital Outpatient Visit on 06/11/2024   Component Date Value   • Case Report 06/11/2024                      Value:Surgical Pathology Report                         Case: [id number]                                  Authorizing Provider:  Diana M Jaiyeola, MD       Collected:           06/11/2024 0936              Ordering Location:     On license of [facility] Received:            06/11/2024 1222                                     Operating Room                                                               Pathologist:           Chandrakant Jay MD                                                                 Specimens:   A) - Duodenum, cold fcp bx r/o celiacs                                                              B) - Stomach, cold fcp bx r/o h pylori                                                              C) - Esophagus, cold fcp bx r/o EOE-distal                                                          D) - Esophagus, cold fcp bx r/o EOE-proximal                                              • Final Diagnosis 06/11/2024                      Value:A. Duodenum, bx r/o celiac:  - Small bowel mucosa with no significant histopathologic abnormality.  - Negative for malabsorption pattern.  - Negative for malignancy.     B. Stomach, bx r/o h pylori:  - Helicobacter-associated  "gastritis with mild activity.  - Immunohistochemistry for H. pylori is positive.  - Negative for dysplasia and malignancy.     C. Esophagus, bx r/o EOE-distal:  - Squamocolumnar mucosa with no significant histopathologic abnormality.  - No intraepithelial eosinophils identified.  - Negative for intestinal metaplasia, dysplasia, and malignancy.     D. Esophagus, bx r/o EOE-proximal:  - Squamous mucosa with no significant histopathologic abnormality.  - No intraepithelial eosinophils identified.  - Negative for intestinal metaplasia, dysplasia, and malignancy.     • Additional Information 06/11/2024                      Value:All reported additional testing was performed with appropriately reactive controls.  These tests were developed and their performance characteristics determined by [facility] or appropriate performing facility, though some tests may be performed on tissues which have not been validated for performance characteristics (such as staining performed on alcohol exposed cell blocks and decalcified tissues).  Results should be interpreted with caution and in the context of the patients’ clinical condition. These tests may not be cleared or approved by the U.S. Food and Drug Administration, though the FDA has determined that such clearance or approval is not necessary. These tests are used for clinical purposes and they should not be regarded as investigational or for research. This laboratory has been approved by CLIA 88, designated as a high-complexity laboratory and is qualified to perform these tests.  .  Interpretation performed at [facility], [address] [place] [id number]     • Gross Description 06/11/2024                      Value:A. The specimen is received in formalin, labeled with the patient's name and hospital number, and is designated \" duodenum cold forcep biopsy, rule out celiac's\".  The specimen is strained into an embedding bag and " "consists of multiple tan-brown friable soft tissue fragments measuring in aggregate of 1.5 x 0.3 x 0.2 cm.  Entirely submitted. Screened cassette.  B. The specimen is received in formalin, labeled with the patient's name and hospital number, and is designated \" stomach cold forcep biopsy, rule out H. pylori\".  The specimen consists of multiple tan-brown soft tissue fragments measuring in aggregate of 1.1 x 0.4 x 0.2 cm.  Entirely submitted. Screened cassette.  C. The specimen is received in formalin, labeled with the patient's name and hospital number, and is designated \" esophagus cold forcep biopsy, rule out EOE-distal\".  The specimen consists of 2 white-tan soft tissue fragments measuring 0.4 and 0.7 cm in greatest dimension.  Entirely submitted. Screened cassette.  D.                           The specimen is received in formalin, labeled with the patient's name and hospital number, and is designated \" esophagus cold forcep biopsy, rule out EOE-proximal\".  The specimen consists of 2 white-tan soft tissue fragments measuring 0.3 and 0.4 cm in greatest dimension.  Entirely submitted. Screened cassette.    Note: The estimated total formalin fixation time based upon information provided by the submitting clinician and the standard processing schedule is under 72 hours.    -[facility]           Radiology Results:   No results found.    Sita Lombardo PA-C     **Please note: Dictation voice to text software may have been used in the creation of this record. Occasional wrong word or “sound alike” substitutions may have occurred due to the inherent limitations of voice recognition software. Read the chart carefully and recognize, using context, where substitutions have occurred.**  "

## 2024-12-18 ENCOUNTER — DOCUMENTATION (OUTPATIENT)
Age: 69
End: 2024-12-18

## 2024-12-18 ENCOUNTER — TRANSCRIBE ORDERS (OUTPATIENT)
Age: 69
End: 2024-12-18

## 2025-01-03 ENCOUNTER — TELEPHONE (OUTPATIENT)
Age: 70
End: 2025-01-03

## 2025-01-03 NOTE — TELEPHONE ENCOUNTER
Called and left a message for pt to call me back I need to speak with pt. In regards to his Rezdiffra Medication    DC instructions

## 2025-01-03 NOTE — TELEPHONE ENCOUNTER
Spoke with Patient support from [facility] they stated that patient's signature was received today and that medication was approved on 12/18/2024 from [facility] Nurse will be in contact with pt to f/u with shipment.

## 2025-01-06 NOTE — TELEPHONE ENCOUNTER
The PA team does the Prior auths.  Any information to the pharmacy / pt assistance is handled by the GI office.    Please advise the PA team if the pt needs a prior auth for the medication Rezdiffra.  Thank you

## 2025-01-14 ENCOUNTER — APPOINTMENT (OUTPATIENT)
Dept: LAB | Facility: MEDICAL CENTER | Age: 70
End: 2025-01-14
Payer: MEDICARE

## 2025-01-14 DIAGNOSIS — A04.8 H. PYLORI INFECTION: ICD-10-CM

## 2025-01-14 PROCEDURE — 87338 HPYLORI STOOL AG IA: CPT

## 2025-01-15 ENCOUNTER — APPOINTMENT (EMERGENCY)
Dept: RADIOLOGY | Facility: HOSPITAL | Age: 70
End: 2025-01-15
Payer: MEDICARE

## 2025-01-15 ENCOUNTER — HOSPITAL ENCOUNTER (EMERGENCY)
Facility: HOSPITAL | Age: 70
Discharge: HOME | End: 2025-01-15
Attending: EMERGENCY MEDICINE
Payer: MEDICARE

## 2025-01-15 ENCOUNTER — RESULTS FOLLOW-UP (OUTPATIENT)
Dept: GASTROENTEROLOGY | Facility: MEDICAL CENTER | Age: 70
End: 2025-01-15

## 2025-01-15 VITALS
TEMPERATURE: 98.1 F | OXYGEN SATURATION: 95 % | DIASTOLIC BLOOD PRESSURE: 80 MMHG | RESPIRATION RATE: 16 BRPM | WEIGHT: 249.34 LBS | BODY MASS INDEX: 36.82 KG/M2 | HEART RATE: 88 BPM | SYSTOLIC BLOOD PRESSURE: 126 MMHG

## 2025-01-15 DIAGNOSIS — S61.213A LACERATION OF LEFT MIDDLE FINGER WITHOUT FOREIGN BODY WITHOUT DAMAGE TO NAIL, INITIAL ENCOUNTER: ICD-10-CM

## 2025-01-15 DIAGNOSIS — S62.631B OPEN DISPLACED FRACTURE OF DISTAL PHALANX OF LEFT INDEX FINGER, INITIAL ENCOUNTER: Primary | ICD-10-CM

## 2025-01-15 LAB
ALBUMIN SERPL BCG-MCNC: 4.2 G/DL (ref 3.5–5)
ALP SERPL-CCNC: 29 U/L (ref 34–104)
ALT SERPL W P-5'-P-CCNC: 18 U/L (ref 7–52)
ANION GAP SERPL CALCULATED.3IONS-SCNC: 5 MMOL/L (ref 4–13)
AST SERPL W P-5'-P-CCNC: 17 U/L (ref 13–39)
BASOPHILS # BLD AUTO: 0.01 THOUSANDS/ΜL (ref 0–0.1)
BASOPHILS NFR BLD AUTO: 0 % (ref 0–1)
BILIRUB SERPL-MCNC: 0.54 MG/DL (ref 0.2–1)
BUN SERPL-MCNC: 25 MG/DL (ref 5–25)
CALCIUM SERPL-MCNC: 9.7 MG/DL (ref 8.4–10.2)
CHLORIDE SERPL-SCNC: 102 MMOL/L (ref 96–108)
CO2 SERPL-SCNC: 28 MMOL/L (ref 21–32)
CREAT SERPL-MCNC: 1.02 MG/DL (ref 0.6–1.3)
EOSINOPHIL # BLD AUTO: 0.05 THOUSAND/ΜL (ref 0–0.61)
EOSINOPHIL NFR BLD AUTO: 1 % (ref 0–6)
ERYTHROCYTE [DISTWIDTH] IN BLOOD BY AUTOMATED COUNT: 11.9 % (ref 11.6–15.1)
GFR SERPL CREATININE-BSD FRML MDRD: 73 ML/MIN/1.73SQ M
GLUCOSE SERPL-MCNC: 101 MG/DL (ref 65–140)
H PYLORI AG STL QL IA: NEGATIVE
HCT VFR BLD AUTO: 39.4 % (ref 36.5–49.3)
HGB BLD-MCNC: 13.8 G/DL (ref 12–17)
IMM GRANULOCYTES # BLD AUTO: 0.02 THOUSAND/UL (ref 0–0.2)
IMM GRANULOCYTES NFR BLD AUTO: 0 % (ref 0–2)
LYMPHOCYTES # BLD AUTO: 0.96 THOUSANDS/ΜL (ref 0.6–4.47)
LYMPHOCYTES NFR BLD AUTO: 19 % (ref 14–44)
MCH RBC QN AUTO: 31.7 PG (ref 26.8–34.3)
MCHC RBC AUTO-ENTMCNC: 35 G/DL (ref 31.4–37.4)
MCV RBC AUTO: 90 FL (ref 82–98)
MONOCYTES # BLD AUTO: 0.47 THOUSAND/ΜL (ref 0.17–1.22)
MONOCYTES NFR BLD AUTO: 9 % (ref 4–12)
NEUTROPHILS # BLD AUTO: 3.52 THOUSANDS/ΜL (ref 1.85–7.62)
NEUTS SEG NFR BLD AUTO: 70 % (ref 43–75)
NRBC BLD AUTO-RTO: 0 /100 WBCS
PLATELET # BLD AUTO: 151 THOUSANDS/UL (ref 149–390)
PMV BLD AUTO: 8.8 FL (ref 8.9–12.7)
POTASSIUM SERPL-SCNC: 4.7 MMOL/L (ref 3.5–5.3)
PROT SERPL-MCNC: 6.7 G/DL (ref 6.4–8.4)
RBC # BLD AUTO: 4.36 MILLION/UL (ref 3.88–5.62)
SODIUM SERPL-SCNC: 135 MMOL/L (ref 135–147)
WBC # BLD AUTO: 5.03 THOUSAND/UL (ref 4.31–10.16)

## 2025-01-15 PROCEDURE — 96365 THER/PROPH/DIAG IV INF INIT: CPT

## 2025-01-15 PROCEDURE — 12002 RPR S/N/AX/GEN/TRNK2.6-7.5CM: CPT | Performed by: PHYSICIAN ASSISTANT

## 2025-01-15 PROCEDURE — 96366 THER/PROPH/DIAG IV INF ADDON: CPT

## 2025-01-15 PROCEDURE — 99284 EMERGENCY DEPT VISIT MOD MDM: CPT | Performed by: PHYSICIAN ASSISTANT

## 2025-01-15 PROCEDURE — 36415 COLL VENOUS BLD VENIPUNCTURE: CPT | Performed by: PHYSICIAN ASSISTANT

## 2025-01-15 PROCEDURE — 73140 X-RAY EXAM OF FINGER(S): CPT

## 2025-01-15 PROCEDURE — 90471 IMMUNIZATION ADMIN: CPT

## 2025-01-15 PROCEDURE — 80053 COMPREHEN METABOLIC PANEL: CPT | Performed by: PHYSICIAN ASSISTANT

## 2025-01-15 PROCEDURE — 73130 X-RAY EXAM OF HAND: CPT

## 2025-01-15 PROCEDURE — 99284 EMERGENCY DEPT VISIT MOD MDM: CPT

## 2025-01-15 PROCEDURE — 85025 COMPLETE CBC W/AUTO DIFF WBC: CPT | Performed by: PHYSICIAN ASSISTANT

## 2025-01-15 PROCEDURE — 90715 TDAP VACCINE 7 YRS/> IM: CPT | Performed by: PHYSICIAN ASSISTANT

## 2025-01-15 RX ORDER — CEFAZOLIN SODIUM 2 G/50ML
2000 SOLUTION INTRAVENOUS ONCE
Status: COMPLETED | OUTPATIENT
Start: 2025-01-15 | End: 2025-01-15

## 2025-01-15 RX ADMIN — TETANUS TOXOID, REDUCED DIPHTHERIA TOXOID AND ACELLULAR PERTUSSIS VACCINE, ADSORBED 0.5 ML: 5; 2.5; 8; 8; 2.5 SUSPENSION INTRAMUSCULAR at 10:47

## 2025-01-15 RX ADMIN — CEFAZOLIN SODIUM 2000 MG: 2 SOLUTION INTRAVENOUS at 10:47

## 2025-01-15 NOTE — DISCHARGE INSTRUCTIONS
These follow-up with Dr. Montano tomorrow.  He stated that he will see you in the clinic.    Your sutures that were placed in your left middle digit will need to be removed in 7 to 10 days.  Please keep them clean and dry for 24 hours.

## 2025-01-15 NOTE — ED PROVIDER NOTES
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 Pang     FOOT FRACTURE SURGERY Bilateral     MULTIPLE TOOTH EXTRACTIONS      POLYPECTOMY      TIBIA FRACTURE SURGERY Left       Family History   Problem Relation Age of Onset    Cancer Mother         lung    Heart disease Mother     Cancer Father         prostate    Heart murmur Brother     Diabetes Daughter     Diabetes Son     Diabetes Other       Social History     Tobacco Use    Smoking status: Former     Current packs/day: 0.00     Average packs/day: 1 pack/day for 44.4 years (44.4 ttl pk-yrs)     Types: Cigarettes     Start date: 1976     Quit date: 2020     Years since quittin.6    Smokeless tobacco: Former     Types: Chew     Quit date: 2014   Vaping Use    Vaping status: Never Used   Substance Use Topics    Alcohol use: No    Drug use: No      E-Cigarette/Vaping    E-Cigarette Use Never User       E-Cigarette/Vaping Substances    Nicotine No     THC No     CBD No       I have reviewed and agree with the history as documented.     HPI    Review of Systems        Objective       ED Triage Vitals [01/15/25 0954]   Temperature Pulse  Blood Pressure Respirations SpO2 Patient Position - Orthostatic VS   98.1 °F (36.7 °C) 101 167/81 18 97 % --      Temp Source Heart Rate Source BP Location FiO2 (%) Pain Score    Temporal Monitor Right arm -- --      Vitals      Date and Time Temp Pulse SpO2 Resp BP Pain Score FACES Pain Rating User   01/15/25 0954 98.1 °F (36.7 °C) 101 97 % 18 167/81 -- -- SS            Physical Exam    Results Reviewed       None            No orders to display       Procedures    ED Medication and Procedure Management   Prior to Admission Medications   Prescriptions Last Dose Informant Patient Reported? Taking?   Multiple Vitamin (multivitamin) tablet  Self No No   Sig: Take 1 tablet by mouth daily   busPIRone (BUSPAR) 10 mg tablet  Self Yes No   Sig: Take 10 mg by mouth 3 (three) times a day Once daily   celecoxib (CeleBREX) 100 mg capsule  Self No No   Sig: Take 1 capsule (100 mg total) by mouth 2 (two) times a day   citalopram (CeleXA) 40 mg tablet  Self No No   Sig: take 1 tablet by mouth once daily   ergocalciferol (VITAMIN D2) 50,000 units  Self No No   Sig: Take 1 capsule (50,000 Units total) by mouth every 28 days   lisinopril (ZESTRIL) 10 mg tablet   No No   Sig: take 1 tablet by mouth once daily   methylPREDNISolone 4 MG tablet therapy pack   No No   Sig: Use as directed on package   morphine (MSIR) 15 mg tablet   No No   Sig: Take 0.5-1 tablets (7.5-15 mg total) by mouth every 8 (eight) hours as needed for severe pain for up to 4 doses Max Daily Amount: 45 mg   pantoprazole (PROTONIX) 40 mg tablet   No No   Sig: take 1 tablet by mouth twice a day   resmetirom (Rezdiffra)   No No   Sig: Take 1 tablet (100 mg total) by mouth daily   vitamin B-12 (VITAMIN B-12) 1,000 mcg tablet  Self No No   Sig: Take 1 tablet (1,000 mcg total) by mouth daily      Facility-Administered Medications: None     Patient's Medications   Discharge Prescriptions    No medications on file     No discharge procedures on file.  ED SEPSIS  DOCUMENTATION            Erick Ornelas MD  01/15/25 5840

## 2025-01-15 NOTE — ED PROVIDER NOTES
"Time reflects when diagnosis was documented in both MDM as applicable and the Disposition within this note       Time User Action Codes Description Comment    1/15/2025 10:34 AM Kranthi Boudreaux Add [S62.631B] Open displaced fracture of distal phalanx of left index finger, initial encounter     1/15/2025 10:34 AM Kranthi Boudreaux Add [S61.213A] Laceration of left middle finger without foreign body without damage to nail, initial encounter           ED Disposition       ED Disposition   Discharge    Condition   Stable    Date/Time   Wed Familia 15, 2025 10:34 AM    Comment   Uriel Marrerojohnnuvia MCCALL discharge to home/self care.                   Assessment & Plan       Medical Decision Making  Patient is a 69-year-old who presents with complaint of left hand injury prior to arrival.  The patient states that while using his bow his left index finger was injured, laceration to the left tip.  Patient's last tetanus was in 2018.  Patient also has a laceration to the left long finger.  The finger of the index is \"hanging off\".    Pulse ox was attached to the tip of the left index finger which did show 98% on room air, good pulse ox.  Patient also had good cap refill.  Patient's finger was barely attached, exposed bone, was approximated.  Sutured, and using the 2 tongue depressors provided a splint, static     Total sutures 14  4 to middle digit will need out in 7-10 days    X-ray demonstrates open fracture, the patient's case was discussed with Dr. Montano, updated tetanus, did give IV Ancef.  Patient was started on Augmentin and will follow-up with Dr. Montano tomorrow at 10 AM in the orthopedic office.  Patient in agreement treatment plan.    Amount and/or Complexity of Data Reviewed  Labs: ordered. Decision-making details documented in ED Course.  Radiology: ordered and independent interpretation performed. Decision-making details documented in ED Course.    Risk  Prescription drug management.                          ED Course as of " 01/15/25 1642   Wed Familia 15, 2025   1034 Dr. 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 Pang     FOOT FRACTURE SURGERY Bilateral     MULTIPLE TOOTH EXTRACTIONS      POLYPECTOMY      TIBIA FRACTURE SURGERY Left       Family History   Problem Relation Age of Onset    Cancer Mother         lung    Heart disease Mother     Cancer Father         prostate    Heart murmur Brother     Diabetes Daughter     Diabetes Son     Diabetes Other       Social History     Tobacco Use    Smoking status: Former     Current packs/day: 0.00     Average packs/day: 1 pack/day for 44.4 years (44.4 ttl pk-yrs)     Types: Cigarettes     Start date: 1976     Quit date: 2020     Years since quittin.6    Smokeless tobacco: Former     Types: Chew     Quit date: 2014   Vaping Use    Vaping status: Never Used   Substance Use Topics    Alcohol use: No    Drug use: No      E-Cigarette/Vaping    E-Cigarette Use Never User       E-Cigarette/Vaping Substances    Nicotine  "No     THC No     CBD No       I have reviewed and agree with the history as documented.     Patient is a 69-year-old who presents with complaint of left hand injury prior to arrival.  The patient states that while using his bow his left index finger was injured, laceration to the left tip.  Patient's last tetanus was in 2018.  Patient also has a laceration to the left long finger.  The finger of the index is \"hanging off\".          Review of Systems   All other systems reviewed and are negative.          Objective       ED Triage Vitals   Temperature Pulse Blood Pressure Respirations SpO2 Patient Position - Orthostatic VS   01/15/25 0954 01/15/25 0954 01/15/25 0954 01/15/25 0954 01/15/25 0954 01/15/25 1217   98.1 °F (36.7 °C) 101 167/81 18 97 % Sitting      Temp Source Heart Rate Source BP Location FiO2 (%) Pain Score    01/15/25 0954 01/15/25 0954 01/15/25 0954 -- --    Temporal Monitor Right arm        Vitals      Date and Time Temp Pulse SpO2 Resp BP Pain Score FACES Pain Rating User   01/15/25 1217 -- 88 95 % 16 126/80 -- -- MB   01/15/25 1030 -- 70 94 % -- 148/91 -- -- MB   01/15/25 1015 -- 94 95 % -- 159/79 -- -- MB   01/15/25 1000 -- 94 94 % -- 152/82 -- -- MB   01/15/25 0954 98.1 °F (36.7 °C) 101 97 % 18 167/81 -- -- SS            Physical Exam  Vitals and nursing note reviewed.   Constitutional:       General: He is in acute distress.      Appearance: He is well-developed.   HENT:      Head: Normocephalic and atraumatic.   Eyes:      Extraocular Movements: Extraocular movements intact.      Pupils: Pupils are equal, round, and reactive to light.   Cardiovascular:      Rate and Rhythm: Normal rate and regular rhythm.   Pulmonary:      Effort: Pulmonary effort is normal.      Breath sounds: Normal breath sounds.   Musculoskeletal:      Left hand: Deformity, laceration and tenderness present.        Hands:       Cervical back: Normal range of motion.   Skin:     General: Skin is warm and dry.      Capillary " Refill: Capillary refill takes less than 2 seconds.   Neurological:      Mental Status: He is alert and oriented to person, place, and time.   Psychiatric:         Behavior: Behavior normal.         Results Reviewed       Procedure Component Value Units Date/Time    Comprehensive metabolic panel [[id number]]  (Abnormal) Collected: 01/15/25 1040    Lab Status: Final result Specimen: Blood from Hand, Right Updated: 01/15/25 1106     Sodium 135 mmol/L      Potassium 4.7 mmol/L      Chloride 102 mmol/L      CO2 28 mmol/L      ANION GAP 5 mmol/L      BUN 25 mg/dL      Creatinine 1.02 mg/dL      Glucose 101 mg/dL      Calcium 9.7 mg/dL      AST 17 U/L      ALT 18 U/L      Alkaline Phosphatase 29 U/L      Total Protein 6.7 g/dL      Albumin 4.2 g/dL      Total Bilirubin 0.54 mg/dL      eGFR 73 ml/min/1.73sq m     Narrative:      [employer] guidelines for Chronic Kidney Disease (CKD):     Stage 1 with normal or high GFR (GFR > 90 mL/min/1.73 square meters)    Stage 2 Mild CKD (GFR = 60-89 mL/min/1.73 square meters)    Stage 3A Moderate CKD (GFR = 45-59 mL/min/1.73 square meters)    Stage 3B Moderate CKD (GFR = 30-44 mL/min/1.73 square meters)    Stage 4 Severe CKD (GFR = 15-29 mL/min/1.73 square meters)    Stage 5 End Stage CKD (GFR <15 mL/min/1.73 square meters)  Note: GFR calculation is accurate only with a steady state creatinine    CBC and differential [[id number]]  (Abnormal) Collected: 01/15/25 1040    Lab Status: Final result Specimen: Blood from Hand, Right Updated: 01/15/25 1052     WBC 5.03 Thousand/uL      RBC 4.36 Million/uL      Hemoglobin 13.8 g/dL      Hematocrit 39.4 %      MCV 90 fL      MCH 31.7 pg      MCHC 35.0 g/dL      RDW 11.9 %      MPV 8.8 fL      Platelets 151 Thousands/uL      nRBC 0 /100 WBCs      Segmented % 70 %      Immature Grans % 0 %      Lymphocytes % 19 %      Monocytes % 9 %      Eosinophils Relative 1 %      Basophils Relative 0 %      Absolute Neutrophils 3.52  Thousands/µL      Absolute Immature Grans 0.02 Thousand/uL      Absolute Lymphocytes 0.96 Thousands/µL      Absolute Monocytes 0.47 Thousand/µL      Eosinophils Absolute 0.05 Thousand/µL      Basophils Absolute 0.01 Thousands/µL             XR finger second digit-index LEFT   Final Interpretation by Erick Alonzo DO (01/15 1217)      Redemonstration of comminuted fracture of the second middle phalanx with laceration repair and improved alignment as detailed above.         Computerized Assisted Algorithm (CAA) may have been used to analyze all applicable images.               Resident: Tiffanie Enamorado I, the attending radiologist, have reviewed the images and agree with the final report above.      Workstation performed: [id number]         XR hand 3+ views LEFT   Final Interpretation by Erick Alonzo DO (01/15 1128)      Comminuted fracture of the second digit middle phalanx with intra-articular extension as described above with associated soft tissue laceration in keeping with provided history of distal index finger amputation. There is palmar dislocation of the second    distal phalanx as aligned on this radiograph.      Flexion of the fifth DIP which may represent sequelae of degenerative changes or prior trauma (mallet finger).         Computerized Assisted Algorithm (CAA) may have been used to analyze all applicable images.         Resident: Tiffanie Enamorado I, the attending radiologist, have reviewed the images and agree with the final report above.      Workstation performed: [id number]             Universal Protocol:  Consent: Verbal consent obtained.  Risks and benefits: risks, benefits and alternatives were discussed  Consent given by: patient  Patient identity confirmed: verbally with patient  Laceration repair    Date/Time: 1/15/2025 4:36 PM    Performed by: Kranthi Boudreaux PA-C  Authorized by: Kranthi Boudreaux PA-C  Body area: upper extremity  Location details: left long  finger  Laceration length: 2 cm  Foreign bodies: no foreign bodies  Tendon involvement: none  Nerve involvement: none  Anesthesia: digital block    Anesthesia:  Local Anesthetic: lidocaine 1% without epinephrine    Sedation:  Patient sedated: no        Procedure Details:  Preparation: Patient was prepped and draped in the usual sterile fashion.  Irrigation solution: saline  Amount of cleaning: extensive  Debridement: none  Degree of undermining: none  Skin closure: Ethilon  Number of sutures: 4  Technique: simple  Approximation: close  Approximation difficulty: simple  Dressing: splint and tube gauze  Patient tolerance: patient tolerated the procedure well with no immediate complications      Universal Protocol:  Procedure performed by:  Consent: Verbal consent obtained.  Risks and benefits: risks, benefits and alternatives were discussed  Consent given by: patient  Patient identity confirmed: verbally with patient  Laceration repair    Date/Time: 1/15/2025 4:37 PM    Performed by: Kranthi Boudreaux PA-C  Authorized by: Kranthi Boudreaux PA-C  Body area: upper extremity  Location details: left index finger  Laceration length: 4 cm  Foreign bodies: no foreign bodies  Tendon involvement: none  Nerve involvement: none  Anesthesia: digital block    Anesthesia:  Local Anesthetic: lidocaine 1% without epinephrine      Procedure Details:  Preparation: Patient was prepped and draped in the usual sterile fashion.  Amount of cleaning: extensive  Debridement: minimal  Skin closure: 4-0 Prolene  Number of sutures: 10  Technique: simple  Approximation: close  Approximation difficulty: complex  Dressing: splint and tube gauze  Patient tolerance: patient tolerated the procedure well with no immediate complications      Splint application    Date/Time: 1/15/2025 4:38 PM    Performed by: Kranthi Boudreaux PA-C  Authorized by: Kranthi Boudreaux PA-C  Universal Protocol:  Consent: Verbal consent obtained.  Risks and  benefits: risks, benefits and alternatives were discussed  Consent given by: patient  Patient identity confirmed: verbally with patient    Pre-procedure details:     Sensation:  Normal  Procedure details:     Laterality:  Left    Location:  Finger    Finger:  L index finger    Splint type:  Finger splint, static    Supplies used: two tongue depressors and gauze wrap.  Post-procedure details:     Pain:  Improved    Sensation:  Normal    Patient tolerance of procedure:  Tolerated well, no immediate complications  Comments:      Pulse ox was attached to the tip of the left index finger which did show 98% on room air, good pulse ox.  Patient also had good cap refill.  Patient's finger was barely attached, exposed bone, was approximated.  Sutured, and using the 2 tongue depressors provided a splint, static       ED Medication and Procedure Management   Prior to Admission Medications   Prescriptions Last Dose Informant Patient Reported? Taking?   Multiple Vitamin (multivitamin) tablet  Self No No   Sig: Take 1 tablet by mouth daily   busPIRone (BUSPAR) 10 mg tablet  Self Yes No   Sig: Take 10 mg by mouth 3 (three) times a day Once daily   celecoxib (CeleBREX) 100 mg capsule  Self No No   Sig: Take 1 capsule (100 mg total) by mouth 2 (two) times a day   citalopram (CeleXA) 40 mg tablet  Self No No   Sig: take 1 tablet by mouth once daily   ergocalciferol (VITAMIN D2) 50,000 units  Self No No   Sig: Take 1 capsule (50,000 Units total) by mouth every 28 days   lisinopril (ZESTRIL) 10 mg tablet   No No   Sig: take 1 tablet by mouth once daily   methylPREDNISolone 4 MG tablet therapy pack   No No   Sig: Use as directed on package   morphine (MSIR) 15 mg tablet   No No   Sig: Take 0.5-1 tablets (7.5-15 mg total) by mouth every 8 (eight) hours as needed for severe pain for up to 4 doses Max Daily Amount: 45 mg   pantoprazole (PROTONIX) 40 mg tablet   No No   Sig: take 1 tablet by mouth twice a day   resmetirom (Rezdiffra)   No  No   Sig: Take 1 tablet (100 mg total) by mouth daily   vitamin B-12 (VITAMIN B-12) 1,000 mcg tablet  Self No No   Sig: Take 1 tablet (1,000 mcg total) by mouth daily      Facility-Administered Medications: None     Discharge Medication List as of 1/15/2025 12:09 PM        START taking these medications    Details   amoxicillin-clavulanate (AUGMENTIN) 875-125 mg per tablet Take 1 tablet by mouth every 12 (twelve) hours for 7 days, Starting Wed 1/15/2025, Until Wed 1/22/2025, Normal           CONTINUE these medications which have NOT CHANGED    Details   busPIRone (BUSPAR) 10 mg tablet Take 10 mg by mouth 3 (three) times a day Once daily, Historical Med      celecoxib (CeleBREX) 100 mg capsule Take 1 capsule (100 mg total) by mouth 2 (two) times a day, Starting Mon 2/5/2024, Normal      citalopram (CeleXA) 40 mg tablet take 1 tablet by mouth once daily, Starting Tue 7/30/2024, Normal      ergocalciferol (VITAMIN D2) 50,000 units Take 1 capsule (50,000 Units total) by mouth every 28 days, Starting Mon 2/5/2024, Normal      lisinopril (ZESTRIL) 10 mg tablet take 1 tablet by mouth once daily, Starting Tue 9/24/2024, Normal      methylPREDNISolone 4 MG tablet therapy pack Use as directed on package, Normal      morphine (MSIR) 15 mg tablet Take 0.5-1 tablets (7.5-15 mg total) by mouth every 8 (eight) hours as needed for severe pain for up to 4 doses Max Daily Amount: 45 mg, Starting Wed 10/2/2024, Normal      Multiple Vitamin (multivitamin) tablet Take 1 tablet by mouth daily, Starting Mon 2/5/2024, Normal      pantoprazole (PROTONIX) 40 mg tablet take 1 tablet by mouth twice a day, Starting Mon 10/14/2024, Normal      resmetirom (Rezdiffra) Take 1 tablet (100 mg total) by mouth daily, Starting Tue 12/17/2024, No Print      vitamin B-12 (VITAMIN B-12) 1,000 mcg tablet Take 1 tablet (1,000 mcg total) by mouth daily, Starting Mon 2/5/2024, Normal           No discharge procedures on file.  ED SEPSIS DOCUMENTATION   Time  reflects when diagnosis was documented in both MDM as applicable and the Disposition within this note       Time User Action Codes Description Comment    1/15/2025 10:34 AM Kranthi Boudreaux [S62.599Q] Open displaced fracture of distal phalanx of left index finger, initial encounter     1/15/2025 10:34 AM Kranthi Boudreaux Add [S61.213A] Laceration of left middle finger without foreign body without damage to nail, initial encounter                  Kranthi Boudreaux PA-C  01/15/25 8097     no

## 2025-01-16 ENCOUNTER — OFFICE VISIT (OUTPATIENT)
Dept: OBGYN CLINIC | Facility: CLINIC | Age: 70
End: 2025-01-16
Payer: MEDICARE

## 2025-01-16 ENCOUNTER — PREP FOR PROCEDURE (OUTPATIENT)
Dept: PAIN MEDICINE | Facility: CLINIC | Age: 70
End: 2025-01-16

## 2025-01-16 ENCOUNTER — ANESTHESIA EVENT (OUTPATIENT)
Dept: PERIOP | Facility: HOSPITAL | Age: 70
End: 2025-01-16
Payer: MEDICARE

## 2025-01-16 VITALS — HEIGHT: 69 IN | BODY MASS INDEX: 37.36 KG/M2

## 2025-01-16 DIAGNOSIS — Z01.818 PREOP TESTING: Primary | ICD-10-CM

## 2025-01-16 DIAGNOSIS — E66.812 CLASS 2 SEVERE OBESITY DUE TO EXCESS CALORIES WITH SERIOUS COMORBIDITY AND BODY MASS INDEX (BMI) OF 38.0 TO 38.9 IN ADULT (HCC): ICD-10-CM

## 2025-01-16 DIAGNOSIS — E66.01 CLASS 2 SEVERE OBESITY DUE TO EXCESS CALORIES WITH SERIOUS COMORBIDITY AND BODY MASS INDEX (BMI) OF 38.0 TO 38.9 IN ADULT (HCC): ICD-10-CM

## 2025-01-16 DIAGNOSIS — S62.609B OPEN FRACTURE OF PHALANX OF DIGIT OF HAND, INITIAL ENCOUNTER: Primary | ICD-10-CM

## 2025-01-16 PROCEDURE — 99214 OFFICE O/P EST MOD 30 MIN: CPT | Performed by: STUDENT IN AN ORGANIZED HEALTH CARE EDUCATION/TRAINING PROGRAM

## 2025-01-16 RX ORDER — CHLORHEXIDINE GLUCONATE ORAL RINSE 1.2 MG/ML
15 SOLUTION DENTAL ONCE
Status: CANCELLED | OUTPATIENT
Start: 2025-01-20 | End: 2025-01-16

## 2025-01-16 RX ORDER — CEFAZOLIN SODIUM 2 G/50ML
2000 SOLUTION INTRAVENOUS ONCE
Status: CANCELLED | OUTPATIENT
Start: 2025-01-20 | End: 2025-01-16

## 2025-01-16 NOTE — PROGRESS NOTES
The patient presents with an open fracture dislocation to the left index finger as well as a laceration over the palmar base of the middle finger.  The laceration over the base of the middle finger does not appear to have affected any of the vital structures as he is neurovascularly intact in the finger and FDS and FDP are intact.  The injury over the index finger is more severe.  The tip of the finger is perfused today on examination so I am not concerned for complete dysvascular injury.  He actually has intact sensation to the tip of the finger although the 2 point discrimination is diminished.  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  This is true for majority the area although there is a very small area distal to a section of the laceration that has no sensation.  I discussed with the patient that for this area the nerve fibers are so small that a repair would be unlikely and as the other nerve arborizes it is likely to improve the sensation here anyways.  The EDC tendon is definitely lacerated here leading to at least in the minimal and open mallet injury with the fracture.  The fracture itself is primarily of the distal aspect of the middle phalanx and is a comminuted intra-articular fracture.  A significant portion of the articular block is dislocated out of the joint and bayoneted and displaced proximally.  This had led to a dislocation of the DIP joint.  There is also some comminution of the remaining bone.  On examination testing although it was difficult for the patient I was actually able to appreciate that the FDP is intact as well as the FDS is intact from the index finger.  I had a discussion with the patient then what the treatment options for this could be.  The patient already went a irrigation and debridement in the ED with a closure of the wound and has been on antibiotics.  This would be acceptable  treatment alone if the fracture was aligned.  The patient could consider conservative management with the understanding that the finger would heal in a very malunited position and he would lose significant function.  The patient did not want to proceed with this.  We also discussed surgical options.  I think the 2 main options are to attempt a reduction of the articular surface and then consider percutaneous pinning versus plate fixation of the fracture.  If this were chosen and we placed a pin I would likely leave the pin in for 6 to 8 weeks as would be needed for a typical  mallet injury.  The patient understands that due to the severe involvement of the articular block he is at great risk for arthritis.  He already has arthritis in this joint but it would likely make it worse.  He understands that he would likely lose range of motion of this joint as well.  He also understands that the extensor tendon may not heal and he could develop a significant extensor lag.  He was not bothered by any of these findings.  I did discuss that another option besides percutaneous pinning is a direct fusion of the joint.  We could reduce the joint and fuse across it.  This would maintain the alignment and obviate any concern for the tendons in the area.  This treatment would also be beneficial if we found that we were unable to maintain the fracture reduction appropriately with percutaneous pinning.  After expressing these options to the patient the patient said that truthfully he was leaning towards the fusion just to be done with this scenario however he said I could try to reduce and pin the joint.  If I felt that there was any instability and he would like the fusion to just be performed.  I think this is an acceptable treatment pathway for it.  As such I have indicated the patient for an irrigation debridement of the area with an attempted open reduction and percutaneous pinning and possible fusion.  The patient understands  that tempted fixation could lead to nonunion and malunion as well as arthritis and loss of function.  The patient understands that fusion would lead to a loss of motion across the DIP joint and could additionally grade 2 nonunion at the fusion site.  Additional risks of surgery were discussed.  The patient elected to proceed with surgery.    The patient does not smoke cigarettes and does not have diabetes.  His only medical comorbidity is fatty liver disease and his recent labs demonstrate his levels are well-controlled.

## 2025-01-16 NOTE — PROGRESS NOTES
ASSESSMENT/PLAN:    Assessment:   69-year-old male with a left index finger DIP open fracture dislocation    The patient presents with an open fracture dislocation to the left index finger as well as a laceration over the palmar base of the middle finger.  The laceration over the base of the middle finger does not appear to have affected any of the vital structures as he is neurovascularly intact in the finger and FDS and FDP are intact.  The injury over the index finger is more severe.  The tip of the finger is perfused today on examination so I am not concerned for complete dysvascular injury.  He actually has intact sensation to the tip of the finger although the 2 point discrimination is diminished.  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  This is true for majority the area although there is a very small area distal to a section of the laceration that has no sensation.  I discussed with the patient that for this area the nerve fibers are so small that a repair would be unlikely and as the other nerve arborizes it is likely to improve the sensation here anyways.  The EDC tendon is definitely lacerated here leading to at least in the minimal and open mallet injury with the fracture.  The fracture itself is primarily of the distal aspect of the middle phalanx and is a comminuted intra-articular fracture.  A significant portion of the articular block is dislocated out of the joint and bayoneted and displaced proximally.  This had led to a dislocation of the DIP joint.  There is also some comminution of the remaining bone.  On examination testing although it was difficult for the patient I was actually able to appreciate that the FDP is intact as well as the FDS is intact from the index finger.  I had a discussion with the patient then what the treatment options for this could be.  The patient already went a irrigation  and debridement in the ED with a closure of the wound and has been on antibiotics.  This would be acceptable treatment alone if the fracture was aligned.  The patient could consider conservative management with the understanding that the finger would heal in a very malunited position and he would lose significant function.  The patient did not want to proceed with this.  We also discussed surgical options.  I think the 2 main options are to attempt a reduction of the articular surface and then consider percutaneous pinning versus plate fixation of the fracture.  If this were chosen and we placed a pin I would likely leave the pin in for 6 to 8 weeks as would be needed for a typical  mallet injury.  The patient understands that due to the severe involvement of the articular block he is at great risk for arthritis.  He already has arthritis in this joint but it would likely make it worse.  He understands that he would likely lose range of motion of this joint as well.  He also understands that the extensor tendon may not heal and he could develop a significant extensor lag.  He was not bothered by any of these findings.  I did discuss that another option besides percutaneous pinning is a direct fusion of the joint.  We could reduce the joint and fuse across it.  This would maintain the alignment and obviate any concern for the tendons in the area.  This treatment would also be beneficial if we found that we were unable to maintain the fracture reduction appropriately with percutaneous pinning.  After expressing these options to the patient the patient said that truthfully he was leaning towards the fusion just to be done with this scenario however he said I could try to reduce and pin the joint.  If I felt that there was any instability and he would like the fusion to just be performed.  I think this is an acceptable treatment pathway for it.  As such I have indicated the patient for an irrigation debridement of the  area with an attempted open reduction and percutaneous pinning and possible fusion.  The patient understands that tempted fixation could lead to nonunion and malunion as well as arthritis and loss of function.  The patient understands that fusion would lead to a loss of motion across the DIP joint and could additionally grade 2 nonunion at the fusion site.  A    The nature of the condition in general and specifically that involving the patient's condition has been reviewed in detail with the patient and family today. The indications for surgical versus nonsurgical management of the condition has been advised, including the inherent risks, benefits, prognosis of the condition. Surgical risks including infection, scar, wound healing issues, nerve or blood vessel injury, stiffness, chronic regional pain syndrome, incomplete recovery, arthritis, malunion, nonunion incomplete relief or worsening of symptoms, recurrence, need for further surgery or revision surgery have been advised. These risks, along with alternative conservative treatment options, and postoperative protocols were voiced back and understood by the patient.      The patient does not smoke cigarettes and does not have diabetes.  His only medical comorbidity is fatty liver disease and his recent labs demonstrate his levels are well-controlled.    All questions were answered to the patient's satisfaction.  The patient agrees to comply with a standard postoperative protocol, and is willing to proceed.  There were no barriers to learning. Prior to surgery, the patient may be requested to stop all anti-inflammatory medications.  Prophylactic aspirin, Plavix, and Coumadin may be allowed to be continued.  Medications including vitamin E., ginkgo, and fish oil are requested to be stopped approximately one week prior to surgery.     The patient verbalized understanding of exam findings and treatment plan. We engaged in the shared decision-making process and  treatment options were discussed at length with the patient. Surgical and conservative management discussed today along with risks and benefits.      Plan:   I had a discussion with the patient regarding my clinical findings, diagnosis, and treatment plan.  All questions answered.  Reviewed previous A1C.  New bandage applied including finger splint  Remain non-weight bearing LUE  Plan for operative repair on 1/20/25  OTC NSAIDs/tylenol for pain management  Next Visit:  Follow up 12-14 days after surgery      _____________________________________________________  CHIEF COMPLAINT:  Laceration to index and middle finger      SUBJECTIVE:  Uriel Howell III is a 69 y.o. right hand dominant male presenting for evaluation of his left index finger. The patient states the injury occurred while he was using his crossbow.  His finger was in the front of the crossbow when he pulled the trigger and the string of the crossbow struck and lacerated portion of his finger.  After injury he was initially evaluated by [facility] ED. the wound was irrigated and closed superficially with sutures.  The patient was given antibiotics and his tetanus was updated.  He was referred for follow-up since that time their pain has been moderately well-controlled.  He does not appreciate that at this time any specific numbness or tingling in the fingers.  There is no pain in the elbow or shoulder.  Here to establish care.     He does not smoke cigarettes or use alcohol on a very regular basis.    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 Pang     FOOT FRACTURE SURGERY Bilateral     MULTIPLE TOOTH EXTRACTIONS      POLYPECTOMY      TIBIA FRACTURE SURGERY Left        FAMILY HISTORY:  Family History   Problem Relation Age of Onset    Cancer Mother         lung    Heart disease Mother     Cancer Father         prostate    Heart murmur Brother     Diabetes Daughter     Diabetes Son     Diabetes Other        SOCIAL HISTORY:  Social History     Tobacco Use    Smoking status: Former     Current packs/day: 0.00     Average packs/day: 1 pack/day for 44.4 years (44.4 ttl pk-yrs)     Types: Cigarettes     Start date: 1976     Quit date: 2020     Years since quittin.6    Smokeless tobacco: Former     Types: Chew     Quit date: 2014   Vaping Use    Vaping status: Never Used   Substance Use Topics    Alcohol use: No    Drug use: No       MEDICATIONS:    Current Outpatient Medications:     amoxicillin-clavulanate (AUGMENTIN) 875-125 mg per tablet, Take 1 tablet by mouth every 12 (twelve) hours for 7 days, Disp: 14 tablet, Rfl: 0    busPIRone (BUSPAR) 10 mg tablet, Take 10 mg by mouth 3 (three) times a day Once daily, Disp: , Rfl:     celecoxib (CeleBREX) 100 mg capsule, Take 1 capsule (100 mg total) by mouth 2 (two) times a day, Disp: 180 capsule, Rfl: 1    citalopram (CeleXA) 40 mg tablet, take 1 tablet by mouth once daily, Disp: 100 tablet, Rfl: 1    ergocalciferol (VITAMIN D2) 50,000 units, Take 1 capsule (50,000 Units total) by mouth every 28 days, Disp: 3 capsule, Rfl: 1    lisinopril (ZESTRIL) 10 mg tablet, take 1 tablet by mouth once daily, Disp: 90 tablet, Rfl: 0    Multiple Vitamin (multivitamin) tablet, Take 1 tablet by mouth daily, Disp: 90 tablet, Rfl: 1    pantoprazole (PROTONIX) 40 mg tablet, take 1 tablet by mouth twice a day,  "Disp: 180 tablet, Rfl: 1    vitamin B-12 (VITAMIN B-12) 1,000 mcg tablet, Take 1 tablet (1,000 mcg total) by mouth daily, Disp: 90 tablet, Rfl: 2    methylPREDNISolone 4 MG tablet therapy pack, Use as directed on package (Patient not taking: Reported on 1/16/2025), Disp: 21 tablet, Rfl: 0    morphine (MSIR) 15 mg tablet, Take 0.5-1 tablets (7.5-15 mg total) by mouth every 8 (eight) hours as needed for severe pain for up to 4 doses Max Daily Amount: 45 mg (Patient not taking: Reported on 1/16/2025), Disp: 4 tablet, Rfl: 0    resmetirom (Rezdiffra), Take 1 tablet (100 mg total) by mouth daily (Patient not taking: Reported on 1/16/2025), Disp: , Rfl:   No current facility-administered medications for this visit.    ALLERGIES:  No Known Allergies    REVIEW OF SYSTEMS:  Pertinent items are noted in HPI.  A comprehensive review of systems was negative.    LABS:  HgA1c:   Lab Results   Component Value Date    HGBA1C 6.0 (H) 01/29/2024     BMP:   Lab Results   Component Value Date    CALCIUM 9.7 01/15/2025    K 4.7 01/15/2025    CO2 28 01/15/2025     01/15/2025    BUN 25 01/15/2025    CREATININE 1.02 01/15/2025         _____________________________________________________  PHYSICAL EXAMINATION:  Vital signs: Ht 5' 8.5\" (1.74 m)   BMI 37.36 kg/m²   General: well developed and well nourished, alert, oriented times 3, and appears comfortable  Psychiatric: Normal  HEENT: Trachea Midline, No torticollis  Cardiovascular: No discernable arrhythmia  Pulmonary: No wheezing or stridor  Abdomen: No rebound or guarding  Extremities: No peripheral edema  Skin: No masses, erythema, lacerations, fluctation, ulcerations  Neurovascular: Sensation Intact to the Median, Ulnar, Radial Nerve, Motor Intact to the Median, Ulnar, Radial Nerve, and Pulses Intact    MUSCULOSKELETAL EXAMINATION:  Left index finger    Index Finger:Sanguineous drainage. No concern for infection. Fingeritp is warm and well perfused. That is a laceration over the " dorsal half of the DIP.  The tip of the DIP is warm and has appropriate capillary refill.  Middle finger:  FDS intact. FDP intact. Laceration at digital palmar crease  Two-Point Discrimination Testing: Middle Finger-Ulnar Nerve distribution: 7mm. Radial Nerve Distribution: 8mm   Two-Point Discrimination Testing: Index Finger- Ulnar Nerve Distribution 10mm Radial Nerve Distribution: 10mm in majority with the exception of just distal to laceration over 5mm-5mm region there is no senseation  TTP:  Radial styloid: no   Scaphoid tubercle: no   Anatomic snuff box: no  SL interval: no   Ulnocarpal joint: no   Pisotriquetral compression: no   ECU: no   Fovea sign: negative   DRUJ stable in pronation, neutral, and pronation.   Range of Motion:  Elbow: extension/flexion 0/140  Forearm: pronation/supination 80/80  Wrist: extension/flexion 70/70  Digit: full AROM in DIP, PIP, MP joints  Motor Exam: firing AIN/PIN/U  Sensory Exam: Sensation intact to light touch in FDWS (radial), volar IF (median), volar SF (ulnar)  Vascular Exam: < 2 sec capillary refill     _____________________________________________________  STUDIES REVIEWED:  I reviewed imaging in PACS from 1/15/2025 of the left second finger and hand which demonstrates a comminuted fracture at the distal aspect of the middle phalanx of the index finger with displacement of a portion of the articular surface and subluxation of the DIP joint      PROCEDURES PERFORMED:  Procedures None performed at today's visit      Scribe Attestation      I,:  Hillary Najera am acting as a scribe while in the presence of the attending physician.:       I,:  Allen Montano MD personally performed the services described in this documentation    as scribed in my presence.:

## 2025-01-16 NOTE — H&P (VIEW-ONLY)
ASSESSMENT/PLAN:    Assessment:   69-year-old male with a left index finger DIP open fracture dislocation    The patient presents with an open fracture dislocation to the left index finger as well as a laceration over the palmar base of the middle finger.  The laceration over the base of the middle finger does not appear to have affected any of the vital structures as he is neurovascularly intact in the finger and FDS and FDP are intact.  The injury over the index finger is more severe.  The tip of the finger is perfused today on examination so I am not concerned for complete dysvascular injury.  He actually has intact sensation to the tip of the finger although the 2 point discrimination is diminished.  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  This is true for majority the area although there is a very small area distal to a section of the laceration that has no sensation.  I discussed with the patient that for this area the nerve fibers are so small that a repair would be unlikely and as the other nerve arborizes it is likely to improve the sensation here anyways.  The EDC tendon is definitely lacerated here leading to at least in the minimal and open mallet injury with the fracture.  The fracture itself is primarily of the distal aspect of the middle phalanx and is a comminuted intra-articular fracture.  A significant portion of the articular block is dislocated out of the joint and bayoneted and displaced proximally.  This had led to a dislocation of the DIP joint.  There is also some comminution of the remaining bone.  On examination testing although it was difficult for the patient I was actually able to appreciate that the FDP is intact as well as the FDS is intact from the index finger.  I had a discussion with the patient then what the treatment options for this could be.  The patient already went a irrigation  and debridement in the ED with a closure of the wound and has been on antibiotics.  This would be acceptable treatment alone if the fracture was aligned.  The patient could consider conservative management with the understanding that the finger would heal in a very malunited position and he would lose significant function.  The patient did not want to proceed with this.  We also discussed surgical options.  I think the 2 main options are to attempt a reduction of the articular surface and then consider percutaneous pinning versus plate fixation of the fracture.  If this were chosen and we placed a pin I would likely leave the pin in for 6 to 8 weeks as would be needed for a typical  mallet injury.  The patient understands that due to the severe involvement of the articular block he is at great risk for arthritis.  He already has arthritis in this joint but it would likely make it worse.  He understands that he would likely lose range of motion of this joint as well.  He also understands that the extensor tendon may not heal and he could develop a significant extensor lag.  He was not bothered by any of these findings.  I did discuss that another option besides percutaneous pinning is a direct fusion of the joint.  We could reduce the joint and fuse across it.  This would maintain the alignment and obviate any concern for the tendons in the area.  This treatment would also be beneficial if we found that we were unable to maintain the fracture reduction appropriately with percutaneous pinning.  After expressing these options to the patient the patient said that truthfully he was leaning towards the fusion just to be done with this scenario however he said I could try to reduce and pin the joint.  If I felt that there was any instability and he would like the fusion to just be performed.  I think this is an acceptable treatment pathway for it.  As such I have indicated the patient for an irrigation debridement of the  area with an attempted open reduction and percutaneous pinning and possible fusion.  The patient understands that tempted fixation could lead to nonunion and malunion as well as arthritis and loss of function.  The patient understands that fusion would lead to a loss of motion across the DIP joint and could additionally grade 2 nonunion at the fusion site.  A    The nature of the condition in general and specifically that involving the patient's condition has been reviewed in detail with the patient and family today. The indications for surgical versus nonsurgical management of the condition has been advised, including the inherent risks, benefits, prognosis of the condition. Surgical risks including infection, scar, wound healing issues, nerve or blood vessel injury, stiffness, chronic regional pain syndrome, incomplete recovery, arthritis, malunion, nonunion incomplete relief or worsening of symptoms, recurrence, need for further surgery or revision surgery have been advised. These risks, along with alternative conservative treatment options, and postoperative protocols were voiced back and understood by the patient.      The patient does not smoke cigarettes and does not have diabetes.  His only medical comorbidity is fatty liver disease and his recent labs demonstrate his levels are well-controlled.    All questions were answered to the patient's satisfaction.  The patient agrees to comply with a standard postoperative protocol, and is willing to proceed.  There were no barriers to learning. Prior to surgery, the patient may be requested to stop all anti-inflammatory medications.  Prophylactic aspirin, Plavix, and Coumadin may be allowed to be continued.  Medications including vitamin E., ginkgo, and fish oil are requested to be stopped approximately one week prior to surgery.     The patient verbalized understanding of exam findings and treatment plan. We engaged in the shared decision-making process and  treatment options were discussed at length with the patient. Surgical and conservative management discussed today along with risks and benefits.      Plan:   I had a discussion with the patient regarding my clinical findings, diagnosis, and treatment plan.  All questions answered.  Reviewed previous A1C.  New bandage applied including finger splint  Remain non-weight bearing LUE  Plan for operative repair on 1/20/25  OTC NSAIDs/tylenol for pain management  Next Visit:  Follow up 12-14 days after surgery      _____________________________________________________  CHIEF COMPLAINT:  Laceration to index and middle finger      SUBJECTIVE:  Uriel Howell III is a 69 y.o. right hand dominant male presenting for evaluation of his left index finger. The patient states the injury occurred while he was using his crossbow.  His finger was in the front of the crossbow when he pulled the trigger and the string of the crossbow struck and lacerated portion of his finger.  After injury he was initially evaluated by [facility] ED. the wound was irrigated and closed superficially with sutures.  The patient was given antibiotics and his tetanus was updated.  He was referred for follow-up since that time their pain has been moderately well-controlled.  He does not appreciate that at this time any specific numbness or tingling in the fingers.  There is no pain in the elbow or shoulder.  Here to establish care.     He does not smoke cigarettes or use alcohol on a very regular basis.    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 Pang     FOOT FRACTURE SURGERY Bilateral     MULTIPLE TOOTH EXTRACTIONS      POLYPECTOMY      TIBIA FRACTURE SURGERY Left        FAMILY HISTORY:  Family History   Problem Relation Age of Onset    Cancer Mother         lung    Heart disease Mother     Cancer Father         prostate    Heart murmur Brother     Diabetes Daughter     Diabetes Son     Diabetes Other        SOCIAL HISTORY:  Social History     Tobacco Use    Smoking status: Former     Current packs/day: 0.00     Average packs/day: 1 pack/day for 44.4 years (44.4 ttl pk-yrs)     Types: Cigarettes     Start date: 1976     Quit date: 2020     Years since quittin.6    Smokeless tobacco: Former     Types: Chew     Quit date: 2014   Vaping Use    Vaping status: Never Used   Substance Use Topics    Alcohol use: No    Drug use: No       MEDICATIONS:    Current Outpatient Medications:     amoxicillin-clavulanate (AUGMENTIN) 875-125 mg per tablet, Take 1 tablet by mouth every 12 (twelve) hours for 7 days, Disp: 14 tablet, Rfl: 0    busPIRone (BUSPAR) 10 mg tablet, Take 10 mg by mouth 3 (three) times a day Once daily, Disp: , Rfl:     celecoxib (CeleBREX) 100 mg capsule, Take 1 capsule (100 mg total) by mouth 2 (two) times a day, Disp: 180 capsule, Rfl: 1    citalopram (CeleXA) 40 mg tablet, take 1 tablet by mouth once daily, Disp: 100 tablet, Rfl: 1    ergocalciferol (VITAMIN D2) 50,000 units, Take 1 capsule (50,000 Units total) by mouth every 28 days, Disp: 3 capsule, Rfl: 1    lisinopril (ZESTRIL) 10 mg tablet, take 1 tablet by mouth once daily, Disp: 90 tablet, Rfl: 0    Multiple Vitamin (multivitamin) tablet, Take 1 tablet by mouth daily, Disp: 90 tablet, Rfl: 1    pantoprazole (PROTONIX) 40 mg tablet, take 1 tablet by mouth twice a day,  "Disp: 180 tablet, Rfl: 1    vitamin B-12 (VITAMIN B-12) 1,000 mcg tablet, Take 1 tablet (1,000 mcg total) by mouth daily, Disp: 90 tablet, Rfl: 2    methylPREDNISolone 4 MG tablet therapy pack, Use as directed on package (Patient not taking: Reported on 1/16/2025), Disp: 21 tablet, Rfl: 0    morphine (MSIR) 15 mg tablet, Take 0.5-1 tablets (7.5-15 mg total) by mouth every 8 (eight) hours as needed for severe pain for up to 4 doses Max Daily Amount: 45 mg (Patient not taking: Reported on 1/16/2025), Disp: 4 tablet, Rfl: 0    resmetirom (Rezdiffra), Take 1 tablet (100 mg total) by mouth daily (Patient not taking: Reported on 1/16/2025), Disp: , Rfl:   No current facility-administered medications for this visit.    ALLERGIES:  No Known Allergies    REVIEW OF SYSTEMS:  Pertinent items are noted in HPI.  A comprehensive review of systems was negative.    LABS:  HgA1c:   Lab Results   Component Value Date    HGBA1C 6.0 (H) 01/29/2024     BMP:   Lab Results   Component Value Date    CALCIUM 9.7 01/15/2025    K 4.7 01/15/2025    CO2 28 01/15/2025     01/15/2025    BUN 25 01/15/2025    CREATININE 1.02 01/15/2025         _____________________________________________________  PHYSICAL EXAMINATION:  Vital signs: Ht 5' 8.5\" (1.74 m)   BMI 37.36 kg/m²   General: well developed and well nourished, alert, oriented times 3, and appears comfortable  Psychiatric: Normal  HEENT: Trachea Midline, No torticollis  Cardiovascular: No discernable arrhythmia  Pulmonary: No wheezing or stridor  Abdomen: No rebound or guarding  Extremities: No peripheral edema  Skin: No masses, erythema, lacerations, fluctation, ulcerations  Neurovascular: Sensation Intact to the Median, Ulnar, Radial Nerve, Motor Intact to the Median, Ulnar, Radial Nerve, and Pulses Intact    MUSCULOSKELETAL EXAMINATION:  Left index finger    Index Finger:Sanguineous drainage. No concern for infection. Fingeritp is warm and well perfused. That is a laceration over the " dorsal half of the DIP.  The tip of the DIP is warm and has appropriate capillary refill.  Middle finger:  FDS intact. FDP intact. Laceration at digital palmar crease  Two-Point Discrimination Testing: Middle Finger-Ulnar Nerve distribution: 7mm. Radial Nerve Distribution: 8mm   Two-Point Discrimination Testing: Index Finger- Ulnar Nerve Distribution 10mm Radial Nerve Distribution: 10mm in majority with the exception of just distal to laceration over 5mm-5mm region there is no senseation  TTP:  Radial styloid: no   Scaphoid tubercle: no   Anatomic snuff box: no  SL interval: no   Ulnocarpal joint: no   Pisotriquetral compression: no   ECU: no   Fovea sign: negative   DRUJ stable in pronation, neutral, and pronation.   Range of Motion:  Elbow: extension/flexion 0/140  Forearm: pronation/supination 80/80  Wrist: extension/flexion 70/70  Digit: full AROM in DIP, PIP, MP joints  Motor Exam: firing AIN/PIN/U  Sensory Exam: Sensation intact to light touch in FDWS (radial), volar IF (median), volar SF (ulnar)  Vascular Exam: < 2 sec capillary refill     _____________________________________________________  STUDIES REVIEWED:  I reviewed imaging in PACS from 1/15/2025 of the left second finger and hand which demonstrates a comminuted fracture at the distal aspect of the middle phalanx of the index finger with displacement of a portion of the articular surface and subluxation of the DIP joint      PROCEDURES PERFORMED:  Procedures None performed at today's visit      Scribe Attestation      I,:  Hillary Najera am acting as a scribe while in the presence of the attending physician.:       I,:  Allen Montano MD personally performed the services described in this documentation    as scribed in my presence.:

## 2025-01-17 ENCOUNTER — APPOINTMENT (OUTPATIENT)
Dept: LAB | Facility: MEDICAL CENTER | Age: 70
End: 2025-01-17
Payer: MEDICARE

## 2025-01-17 ENCOUNTER — OFFICE VISIT (OUTPATIENT)
Dept: LAB | Facility: HOSPITAL | Age: 70
End: 2025-01-17
Payer: MEDICARE

## 2025-01-17 DIAGNOSIS — Z01.818 PREOP TESTING: ICD-10-CM

## 2025-01-17 LAB
ATRIAL RATE: 99 BPM
P AXIS: 54 DEGREES
PR INTERVAL: 138 MS
QRS AXIS: 11 DEGREES
QRSD INTERVAL: 84 MS
QT INTERVAL: 356 MS
QTC INTERVAL: 457 MS
T WAVE AXIS: 25 DEGREES
VENTRICULAR RATE: 99 BPM

## 2025-01-17 PROCEDURE — 93010 ELECTROCARDIOGRAM REPORT: CPT | Performed by: INTERNAL MEDICINE

## 2025-01-17 PROCEDURE — 93005 ELECTROCARDIOGRAM TRACING: CPT

## 2025-01-20 ENCOUNTER — APPOINTMENT (OUTPATIENT)
Dept: RADIOLOGY | Facility: HOSPITAL | Age: 70
End: 2025-01-20
Payer: MEDICARE

## 2025-01-20 ENCOUNTER — ANESTHESIA (OUTPATIENT)
Dept: PERIOP | Facility: HOSPITAL | Age: 70
End: 2025-01-20
Payer: MEDICARE

## 2025-01-20 ENCOUNTER — HOSPITAL ENCOUNTER (OUTPATIENT)
Facility: HOSPITAL | Age: 70
Setting detail: OUTPATIENT SURGERY
Discharge: HOME | End: 2025-01-21
Attending: STUDENT IN AN ORGANIZED HEALTH CARE EDUCATION/TRAINING PROGRAM | Admitting: STUDENT IN AN ORGANIZED HEALTH CARE EDUCATION/TRAINING PROGRAM
Payer: MEDICARE

## 2025-01-20 DIAGNOSIS — S62.609D OPEN FRACTURE OF PHALANX OF DIGIT OF HAND WITH ROUTINE HEALING, SUBSEQUENT ENCOUNTER: Primary | ICD-10-CM

## 2025-01-20 PROBLEM — S62.609B OPEN FRACTURE OF ONE OR MORE PHALANGES OF HAND: Status: ACTIVE | Noted: 2025-01-20

## 2025-01-20 PROCEDURE — 26746 TREAT FINGER FRACTURE EACH: CPT | Performed by: STUDENT IN AN ORGANIZED HEALTH CARE EDUCATION/TRAINING PROGRAM

## 2025-01-20 PROCEDURE — 12001 RPR S/N/AX/GEN/TRNK 2.5CM/<: CPT | Performed by: STUDENT IN AN ORGANIZED HEALTH CARE EDUCATION/TRAINING PROGRAM

## 2025-01-20 PROCEDURE — 12001 RPR S/N/AX/GEN/TRNK 2.5CM/<: CPT

## 2025-01-20 PROCEDURE — C1713 ANCHOR/SCREW BN/BN,TIS/BN: HCPCS | Performed by: STUDENT IN AN ORGANIZED HEALTH CARE EDUCATION/TRAINING PROGRAM

## 2025-01-20 PROCEDURE — 26746 TREAT FINGER FRACTURE EACH: CPT

## 2025-01-20 PROCEDURE — 73140 X-RAY EXAM OF FINGER(S): CPT

## 2025-01-20 PROCEDURE — 99024 POSTOP FOLLOW-UP VISIT: CPT | Performed by: STUDENT IN AN ORGANIZED HEALTH CARE EDUCATION/TRAINING PROGRAM

## 2025-01-20 RX ORDER — FENTANYL CITRATE/PF 50 MCG/ML
50 SYRINGE (ML) INJECTION
Status: DISCONTINUED | OUTPATIENT
Start: 2025-01-20 | End: 2025-01-20 | Stop reason: HOSPADM

## 2025-01-20 RX ORDER — PROMETHAZINE HYDROCHLORIDE 25 MG/ML
12.5 INJECTION, SOLUTION INTRAMUSCULAR; INTRAVENOUS ONCE AS NEEDED
Status: DISCONTINUED | OUTPATIENT
Start: 2025-01-20 | End: 2025-01-20 | Stop reason: HOSPADM

## 2025-01-20 RX ORDER — HYDROMORPHONE HCL/PF 1 MG/ML
0.5 SYRINGE (ML) INJECTION
Status: DISCONTINUED | OUTPATIENT
Start: 2025-01-20 | End: 2025-01-20 | Stop reason: HOSPADM

## 2025-01-20 RX ORDER — OXYCODONE HYDROCHLORIDE 5 MG/1
5 TABLET ORAL EVERY 6 HOURS PRN
Refills: 0 | Status: DISCONTINUED | OUTPATIENT
Start: 2025-01-20 | End: 2025-01-21 | Stop reason: HOSPADM

## 2025-01-20 RX ORDER — LIDOCAINE HYDROCHLORIDE 10 MG/ML
INJECTION, SOLUTION EPIDURAL; INFILTRATION; INTRACAUDAL; PERINEURAL AS NEEDED
Status: DISCONTINUED | OUTPATIENT
Start: 2025-01-20 | End: 2025-01-20

## 2025-01-20 RX ORDER — CHLORHEXIDINE GLUCONATE ORAL RINSE 1.2 MG/ML
15 SOLUTION DENTAL ONCE
Status: COMPLETED | OUTPATIENT
Start: 2025-01-20 | End: 2025-01-20

## 2025-01-20 RX ORDER — FENTANYL CITRATE 50 UG/ML
INJECTION, SOLUTION INTRAMUSCULAR; INTRAVENOUS AS NEEDED
Status: DISCONTINUED | OUTPATIENT
Start: 2025-01-20 | End: 2025-01-20

## 2025-01-20 RX ORDER — LIDOCAINE HYDROCHLORIDE AND EPINEPHRINE 10; 10 MG/ML; UG/ML
INJECTION, SOLUTION INFILTRATION; PERINEURAL AS NEEDED
Status: DISCONTINUED | OUTPATIENT
Start: 2025-01-20 | End: 2025-01-20 | Stop reason: HOSPADM

## 2025-01-20 RX ORDER — PROPOFOL 10 MG/ML
INJECTION, EMULSION INTRAVENOUS AS NEEDED
Status: DISCONTINUED | OUTPATIENT
Start: 2025-01-20 | End: 2025-01-20

## 2025-01-20 RX ORDER — LISINOPRIL 10 MG/1
10 TABLET ORAL DAILY
Status: DISCONTINUED | OUTPATIENT
Start: 2025-01-21 | End: 2025-01-21 | Stop reason: HOSPADM

## 2025-01-20 RX ORDER — ONDANSETRON 2 MG/ML
4 INJECTION INTRAMUSCULAR; INTRAVENOUS EVERY 6 HOURS PRN
Status: DISCONTINUED | OUTPATIENT
Start: 2025-01-20 | End: 2025-01-21 | Stop reason: HOSPADM

## 2025-01-20 RX ORDER — SODIUM CHLORIDE, SODIUM LACTATE, POTASSIUM CHLORIDE, CALCIUM CHLORIDE 600; 310; 30; 20 MG/100ML; MG/100ML; MG/100ML; MG/100ML
INJECTION, SOLUTION INTRAVENOUS CONTINUOUS PRN
Status: DISCONTINUED | OUTPATIENT
Start: 2025-01-20 | End: 2025-01-20

## 2025-01-20 RX ORDER — OXYCODONE HYDROCHLORIDE 5 MG/1
5 TABLET ORAL EVERY 6 HOURS PRN
Qty: 20 TABLET | Refills: 0 | Status: SHIPPED | OUTPATIENT
Start: 2025-01-20 | End: 2025-01-25

## 2025-01-20 RX ORDER — ONDANSETRON 2 MG/ML
INJECTION INTRAMUSCULAR; INTRAVENOUS AS NEEDED
Status: DISCONTINUED | OUTPATIENT
Start: 2025-01-20 | End: 2025-01-20

## 2025-01-20 RX ORDER — CEFAZOLIN SODIUM 2 G/50ML
2000 SOLUTION INTRAVENOUS ONCE
Status: COMPLETED | OUTPATIENT
Start: 2025-01-20 | End: 2025-01-20

## 2025-01-20 RX ORDER — ONDANSETRON 2 MG/ML
4 INJECTION INTRAMUSCULAR; INTRAVENOUS ONCE AS NEEDED
Status: DISCONTINUED | OUTPATIENT
Start: 2025-01-20 | End: 2025-01-20 | Stop reason: HOSPADM

## 2025-01-20 RX ORDER — PROPOFOL 10 MG/ML
INJECTION, EMULSION INTRAVENOUS CONTINUOUS PRN
Status: DISCONTINUED | OUTPATIENT
Start: 2025-01-20 | End: 2025-01-20

## 2025-01-20 RX ORDER — MIDAZOLAM HYDROCHLORIDE 2 MG/2ML
INJECTION, SOLUTION INTRAMUSCULAR; INTRAVENOUS AS NEEDED
Status: DISCONTINUED | OUTPATIENT
Start: 2025-01-20 | End: 2025-01-20

## 2025-01-20 RX ORDER — ACETAMINOPHEN 325 MG/1
650 TABLET ORAL EVERY 6 HOURS PRN
Status: DISCONTINUED | OUTPATIENT
Start: 2025-01-20 | End: 2025-01-21 | Stop reason: HOSPADM

## 2025-01-20 RX ORDER — CHLORHEXIDINE GLUCONATE ORAL RINSE 1.2 MG/ML
15 SOLUTION DENTAL ONCE
OUTPATIENT
Start: 2025-01-20 | End: 2025-01-20

## 2025-01-20 RX ORDER — ACETAMINOPHEN 500 MG
1000 TABLET ORAL EVERY 8 HOURS
Qty: 60 TABLET | Refills: 0 | Status: SHIPPED | OUTPATIENT
Start: 2025-01-20 | End: 2025-01-30

## 2025-01-20 RX ORDER — ERGOCALCIFEROL 1.25 MG/1
50000 CAPSULE, LIQUID FILLED ORAL
Status: DISCONTINUED | OUTPATIENT
Start: 2025-02-16 | End: 2025-01-21 | Stop reason: HOSPADM

## 2025-01-20 RX ORDER — CELECOXIB 100 MG/1
100 CAPSULE ORAL 2 TIMES DAILY
Status: DISCONTINUED | OUTPATIENT
Start: 2025-01-20 | End: 2025-01-21 | Stop reason: HOSPADM

## 2025-01-20 RX ORDER — BUSPIRONE HYDROCHLORIDE 10 MG/1
10 TABLET ORAL 3 TIMES DAILY
Status: DISCONTINUED | OUTPATIENT
Start: 2025-01-20 | End: 2025-01-21 | Stop reason: HOSPADM

## 2025-01-20 RX ORDER — IBUPROFEN 600 MG/1
600 TABLET, FILM COATED ORAL EVERY 6 HOURS PRN
Qty: 40 TABLET | Refills: 0 | Status: SHIPPED | OUTPATIENT
Start: 2025-01-20 | End: 2025-01-30

## 2025-01-20 RX ORDER — PANTOPRAZOLE SODIUM 40 MG/1
40 TABLET, DELAYED RELEASE ORAL
Status: DISCONTINUED | OUTPATIENT
Start: 2025-01-20 | End: 2025-01-21 | Stop reason: HOSPADM

## 2025-01-20 RX ORDER — TRAMADOL HYDROCHLORIDE 50 MG/1
50 TABLET ORAL EVERY 6 HOURS SCHEDULED
Status: DISCONTINUED | OUTPATIENT
Start: 2025-01-20 | End: 2025-01-21 | Stop reason: HOSPADM

## 2025-01-20 RX ADMIN — PROPOFOL 20 MG: 10 INJECTION, EMULSION INTRAVENOUS at 12:36

## 2025-01-20 RX ADMIN — MIDAZOLAM 1 MG: 1 INJECTION INTRAMUSCULAR; INTRAVENOUS at 12:27

## 2025-01-20 RX ADMIN — FENTANYL CITRATE 50 MCG: 50 INJECTION INTRAMUSCULAR; INTRAVENOUS at 12:34

## 2025-01-20 RX ADMIN — SODIUM CHLORIDE, SODIUM LACTATE, POTASSIUM CHLORIDE, AND CALCIUM CHLORIDE: .6; .31; .03; .02 INJECTION, SOLUTION INTRAVENOUS at 12:09

## 2025-01-20 RX ADMIN — AMOXICILLIN AND CLAVULANATE POTASSIUM 1 TABLET: 875; 125 TABLET, COATED ORAL at 20:25

## 2025-01-20 RX ADMIN — CEFAZOLIN SODIUM 2000 MG: 2 SOLUTION INTRAVENOUS at 12:30

## 2025-01-20 RX ADMIN — BUSPIRONE HYDROCHLORIDE 10 MG: 10 TABLET ORAL at 16:50

## 2025-01-20 RX ADMIN — NITROGLYCERIN 1 INCH: 20 OINTMENT TOPICAL at 15:04

## 2025-01-20 RX ADMIN — LIDOCAINE HYDROCHLORIDE 20 MG: 10 INJECTION, SOLUTION EPIDURAL; INFILTRATION; INTRACAUDAL at 12:36

## 2025-01-20 RX ADMIN — MIDAZOLAM 1 MG: 1 INJECTION INTRAMUSCULAR; INTRAVENOUS at 12:34

## 2025-01-20 RX ADMIN — PROPOFOL 20 MG: 10 INJECTION, EMULSION INTRAVENOUS at 12:39

## 2025-01-20 RX ADMIN — DEXMEDETOMIDINE 8 MCG: 200 INJECTION, SOLUTION INTRAVENOUS at 12:42

## 2025-01-20 RX ADMIN — PANTOPRAZOLE SODIUM 40 MG: 40 TABLET, DELAYED RELEASE ORAL at 16:50

## 2025-01-20 RX ADMIN — CELECOXIB 100 MG: 100 CAPSULE ORAL at 16:49

## 2025-01-20 RX ADMIN — ONDANSETRON 4 MG: 2 INJECTION INTRAMUSCULAR; INTRAVENOUS at 12:38

## 2025-01-20 RX ADMIN — PROPOFOL 20 MG: 10 INJECTION, EMULSION INTRAVENOUS at 12:42

## 2025-01-20 RX ADMIN — PROPOFOL 100 MCG/KG/MIN: 10 INJECTION, EMULSION INTRAVENOUS at 12:36

## 2025-01-20 RX ADMIN — CHLORHEXIDINE GLUCONATE 15 ML: 1.2 SOLUTION ORAL at 11:03

## 2025-01-20 RX ADMIN — FENTANYL CITRATE 50 MCG: 50 INJECTION INTRAMUSCULAR; INTRAVENOUS at 12:43

## 2025-01-20 RX ADMIN — TRAMADOL HYDROCHLORIDE 50 MG: 50 TABLET, COATED ORAL at 16:50

## 2025-01-20 RX ADMIN — BUSPIRONE HYDROCHLORIDE 10 MG: 10 TABLET ORAL at 20:25

## 2025-01-20 NOTE — OP NOTE
OPERATIVE REPORT  PATIENT NAME: Uriel Howell III    :  1955  MRN: [id number]  Pt Location: [facility] OR ROOM 02    SURGERY DATE: 2025    Surgeons and Role:     * Allen Montano MD - Primary     * Thanh Ash PA-C - Assisting    Preop Diagnosis:  Open fracture of phalanx of digit of hand, initial encounter [S62.609B]    Post-Op Diagnosis Codes:     * Open fracture of phalanx of digit of hand, initial encounter [S62.609B]    Procedure(s):  Left - Open Reduction. Left index finger I&D with percutaneous pinning  Left middle finger I&D    Specimen(s):  * No specimens in log *    Estimated Blood Loss:   2 mL    Drains:  * No LDAs found *    Anesthesia Type:   IV Sedation with Anesthesia    Operative Indications:  Open fracture of phalanx of digit of hand, initial encounter [S62.609G]  The patient presents with an open fracture dislocation to the left index finger as well as a laceration over the palmar base of the middle finger. The laceration over the base of the middle finger does not appear to have affected any of the vital structures as he is neurovascularly intact in the finger and FDS and FDP are intact. The injury over the index finger is more severe with an open fracture dislocation of the DIP joint and a sensory neuropraxia distally.  The EDC tendon is additionally lacerated at this area.    Operative Findings:  Index finger: There was a laceration over the dorsal aspect of the index finger there is about 3 cm in length and was near circumferential around the finger with only a 1 cm skin bridge volarly approximately at the level of the DIP joint.  There is a severe comminuted fracture of the distal aspect of the middle phalanx there was intra-articular.  There was a distal fragment of the middle phalanx that carried up majority of the articular bone block although this was sent on 1 side and there was a significant amount of articular bone that was lost around this area.  Evaluating the open wound  this articular bone could not be located and was likely lost during the open injury.  There was also some bone loss and some fragmentation of the articular base of the distal phalanx which could not be appreciated on fluoroscopy.  The neck of the middle phalanx was also significantly comminuted with fragments too small to accept a plate and screws and additionally likely unable to accept a fusion screw. The extensor tendon at this level was completely lacerated although there was relatively minimal retraction.  Looking at the structures volarly, the FPL did appear to be intact although it was partially torn on the ulnar aspect.  The digital neurovascular bundles appeared to be arborizing around this level making the radial neurovascular bundle difficult to appreciate however the ulnar neurovascular bundle was appreciated to be intact.  Limited dissection was taken volarly as the patient did have known intact 2-point discrimination although it was diminished and he did have intact perfusion.  The volar tissue seemed very thin and precarious and I did not want to further disrupt this area.    Long finger: There was a 2 cm laceration over the volar aspect of the long finger approximately at the level of the proximal digital crease.  Both the ulnar and radial neurovascular bundles were found to be intact and unaffected.  The volar flexor sheath was intact and there was no flexor tendon laceration.    Complications:   None    Perioperative Antibiotics:    Ancef 2 g       Procedure and Technique:  The patient was greeted in the preoperative area. The risks, benefits, and alternatives of the procedure were again discussed in detail with the patient. Risks include pain, stiffness, swelling, injury to neurovascular structure, infection, need for reoperation, and anesthetic complications. The patient was amenable to proceed. The operative extremity was marked. Patient was brought to the operating room and placed under  anesthesia. A tourniquet was applied to the operative extremity. The operative extremity was prepped and draped in the usual sterile fashion. Ancef were administered for pre-operative antibiotic prophylaxis. A timeout was performed identifying the name and laterality of the procedure.  A digital block was performed to each the index and the long fingers each receiving a 10 mL injection of 1:1 1% lidocaine and 0.25% Marcaine with 1:100,000 epinephrine.     The sutures previously placed in the emergency department were removed.  With this the extent of the wound over the index finger DIP became evident.  The finger was noted to gap and hang loosely.  Irrigation with sterile saline was used to remove interposing hematoma.  The finger was carefully held to prevent hyperflexion or over distraction of the digit given the precarious nature.  After the hematoma was irrigated out the wound was evaluated with a [place] elevator.  Notably we did not have to use any sharp dissection in the evaluation.  We were able to see that the EDC tendon was transversely lacerated completely.  The distal articular block of the middle phalanx was highly comminuted and irregular.  The articular base of the distal phalanx was also fractured.  The FDP appeared to be intact however there was a partial laceration of what appeared to be 30%.  The ulnar neurovascular bundle was appreciated and did appear to be intact.  The radial vessels appear to be arborizing at this level but also did appear to be intact.  X-ray fluoroscopy was used to evaluate the index finger finger fracture.  The distal articular block of the middle phalanx was carefully manipulated back into place with use of a [place] to reduce the DIP joint.  The distal diaphysis and neck of the middle phalanx was highly comminuted and did not appear amenable to screw or plate fixation.  A 2 point reduction forceps was then used to provisionally hold the fracture reduction in place. K wires  were then percutaneously placed to stabilize the fracture site.  A 0.045 K wire was inserted retrograde through the distal phalanx, through the articular block of the middle phalanx, and then into the base of the middle phalanx.  Fluoroscopy was used to demonstrate acceptable articular alignment and finger alignment.  An 0.038 K wire was then used to obliquely pin the middle phalanx distal articular block.  Fluoroscopy was then used to reevaluate the finger fracture.  The fracture was found to be stable with the K wire construct and reduced with acceptable alignment.  Tenodesis was then used to evaluate for any evidence of scissoring, of which there was none.  The fingernails were then evaluated and there was no evidence of malrotation.  The first K wire was cut at the skin at the tip of the finger and the second K wire was cut and bent for future removal.  Betadine scrub sponges were then cut and placed over the exposed K wire.  The wounds were then irrigated and the index finger laceration, which was approximately 3 cm in length, was closed with interrupted simple 4-0 chromic suture.  This repair dorsally was performed in a dermatotenodesis fashion repairing both the skin and deep tendon tissues.    Attention was then turned to the long finger.  The sutures from the emergency department were removed.  The wound was copiously irrigated.  Blunt dissection was used to evaluate the wound.  The radial and ulnar neurovascular bundles were found to be intact.  The flexor sheath and flexor tendons were found to be intact without damage.  The wound was then debrided of any necrotic tissue.  The wound was then again irrigated and the laceration, which was approximately 2 cm in length, was closed with interrupted simple 4-0 chromic suture.    Sterile dressings and a splint were applied. The patient was awoken and transported to the recovery room in stable condition.    I was present for the entire procedure. A qualified  resident physician was not available and a physician assistant was required during the procedure for retraction, tissue handling, dissection and suturing.       Patient Disposition:  PACU     Plan:  Keep dressing on, clean and dry until follow-up  Dressing change to be performed at 2 weeks in office  Please remain nonweightbearing of the affected extremity  Plan for pin removal at 4 week and then 8 weeks  Follow up in office in 2 weeks              SIGNATURE: Allen Montano MD  DATE: January 20, 2025  TIME: 4:49 PM

## 2025-01-20 NOTE — ANESTHESIA PREPROCEDURE EVALUATION
Procedure:  Left index finger I&D with percutaneous pinning vs fusion of the DIP. Left middle finger I&D (Left: Hand)    Relevant Problems   ANESTHESIA (within normal limits)      CARDIO   (+) Hypertension   (-) Angina at rest (HCC)   (-) Angina of effort (HCC)   (-) Chest pain   (-) MARVIN (dyspnea on exertion)      ENDO   (-) Diabetes mellitus type 1 (HCC)   (-) Diabetes mellitus, type 2 (HCC)      GI/HEPATIC   (+) Gastric ulcer   (+) Gastroesophageal reflux disease with esophagitis   (+) Gastroesophageal reflux disease without esophagitis   (-) Chronic liver disease      /RENAL   (-) Chronic kidney disease      MUSCULOSKELETAL   (+) Cervical spondylosis   (+) Chronic bilateral low back pain without sciatica   (+) Lumbar spondylosis   (+) Osteoarthritis of thoracolumbar spine   (+) Primary osteoarthritis of both knees      NEURO/PSYCH   (+) Anxiety   (+) Chronic bilateral low back pain without sciatica   (+) Chronic pain syndrome   (+) Depression   (-) CVA (cerebral vascular accident) (HCC)   (-) Seizures (HCC)      PULMONARY   (-) Asthma   (-) Chronic obstructive pulmonary disease (HCC)   (-) Sleep apnea   (-) Smoking        Physical Exam    Airway    Mallampati score: III  TM Distance: >3 FB  Neck ROM: full     Dental   No notable dental hx     Cardiovascular      Pulmonary      Other Findings        Anesthesia Plan  ASA Score- 2     Anesthesia Type- IV sedation with anesthesia with ASA Monitors.         Additional Monitors:     Airway Plan:            Plan Factors-Exercise tolerance (METS): >4 METS.    Chart reviewed.    Patient summary reviewed.                  Induction- intravenous.    Postoperative Plan- Plan for postoperative opioid use.         Informed Consent- Anesthetic plan and risks discussed with patient.  I personally reviewed this patient with the CRNA. Discussed and agreed on the Anesthesia Plan with the CRNA..      NPO Status:  Vitals Value Taken Time   Date of last liquid 01/20/25 01/20/25 1059    Time of last liquid 0700 01/20/25 1059   Date of last solid 01/19/25 01/20/25 1059   Time of last solid 1900 01/20/25 1059

## 2025-01-20 NOTE — ANESTHESIA POSTPROCEDURE EVALUATION
Post-Op Assessment Note    CV Status:  Stable    Pain management: adequate       Hydration Status:  Stable   Airway Patency:  Patent     Post Op Vitals Reviewed: Yes    No anethesia notable event occurred.    Staff: Anesthesiologist           Last Filed PACU Vitals:  Vitals Value Taken Time   Temp 97.5    Pulse 73    /66    Resp 12    SpO2 95%  RA

## 2025-01-20 NOTE — INTERVAL H&P NOTE
H&P reviewed. After examining the patient I find no changes in the patients condition since the H&P had been written.    Vitals:    01/20/25 1102   BP: 149/72   Pulse: 82   Resp: 18   Temp: 97.5 °F (36.4 °C)   SpO2: 97%

## 2025-01-20 NOTE — DISCHARGE INSTR - AVS FIRST PAGE
POST-OPERATIVE INSTRUCTIONS  FINGER FRACTURE OPERATIVE REPAIR        You have just undergone surgery by Dr Montano for the diagnosis of a finger fracture.  It is our wish that your postoperative recovery be as quick and comfortable as possible.  Please carefully review the following items that are important in your recovery.    Pain Control:  After any operation a certain degree of pain is to be expected. A prescription for pain medicine has been electronically sent to your pharmacy. This medication will relieve most of your pain but may not relieve all of the pain.  Pain medicine may make you drowsy so please curtail your activities appropriately.  Do not drive while taking pain medicine.     A prescription for a narcotic pain medicine (oxycodone) and anti-inflammatory (ibuprofen) were called in to your pharmacy. Please take the anti-inflammatory medication AND acetaminophen (Tylenol) regularly.   In general tylenol can be taken as follows: 500 mg every 4-6 hours, not to exceed 3000 mg in a 24-hour period  The instructions will be listed on the bottles. The narcotic pain medicine should be used for pain that is not controlled by these other medications and only for the first few days after surgery. The narcotic is HIGHLY ADDICTIVE and has many side effects such as causing constipation, dizziness, confusion, decreased breathing and more. It is safe to take for a short period of time after surgery. It is almost never prescribed for longer than a few weeks and never after one month for elective surgeries.  Do not take narcotic or anti-inflammatories on an empty stomach.  It is illegal to drive while taking narcotic pain medication  Your pain should decrease over the first few days after surgery which will allow you to take less pain medicine, increase the time between doses of medication, or stop taking all pain medicine.     When you go home, please keep your arm elevated at all times (above the level of your heart).  If you do this your swelling will be diminished and your pain will be diminished as well. You have been given a yellow Rory pillow to assist in this. If the Rory pillow is uncomfortable, please use your own pillows at home to elevate your hand.    Specific Instructions- discussed postop:  No caffeine  Keep the hand warm (warm blankets/heating pad)  Keep the hand elevated    Dressing Care:    Keep you operative dressing on, clean and dry until you first follow up with  Dr. Montano       Weight Bearing:  You should NOT bear weight through your operative extremity. Do not push off using your operative extremity.       Follow Up:    Your first post operative visit with Dr. Montano's office will be 10 days after surgery. At this visit you will be seen by either Dr. Montano, his PA;  or one of their associates. Typically at visit, your sutures will be removed if the surgical incision has healed enough to do remove them. If not they may need to remain in place for an additional week.        When to Call:  It is normal to see minor staining on the hand surgery dressing after surgery. If there is significant bleeding, you are advised to call the office during regular office hours to have this checked.     If you feel that you have a surgical emergency postoperatively that requires immediate attention, please call 9-1-1. In addition, any emergency can also be handled by the emergency room.      If you have questions regarding your surgery postop that you feel requires attention, please call the office.   If this occurs after our regular office hours, there is a message with instructions to talk to the physician on call.

## 2025-01-20 NOTE — NURSING NOTE
Pt new admit from PACU. Arm and hand elevated with heat per order. Finger still dusky with no feeling (due to medications in OR). Will continue to monitor.

## 2025-01-21 ENCOUNTER — TELEPHONE (OUTPATIENT)
Age: 70
End: 2025-01-21

## 2025-01-21 VITALS
WEIGHT: 245.59 LBS | RESPIRATION RATE: 16 BRPM | SYSTOLIC BLOOD PRESSURE: 127 MMHG | DIASTOLIC BLOOD PRESSURE: 82 MMHG | TEMPERATURE: 98.1 F | HEART RATE: 76 BPM | BODY MASS INDEX: 37.22 KG/M2 | HEIGHT: 68 IN | OXYGEN SATURATION: 91 %

## 2025-01-21 PROCEDURE — 90662 IIV NO PRSV INCREASED AG IM: CPT | Performed by: STUDENT IN AN ORGANIZED HEALTH CARE EDUCATION/TRAINING PROGRAM

## 2025-01-21 PROCEDURE — 99024 POSTOP FOLLOW-UP VISIT: CPT

## 2025-01-21 PROCEDURE — NC001 PR NO CHARGE: Performed by: STUDENT IN AN ORGANIZED HEALTH CARE EDUCATION/TRAINING PROGRAM

## 2025-01-21 PROCEDURE — G0008 ADMIN INFLUENZA VIRUS VAC: HCPCS | Performed by: STUDENT IN AN ORGANIZED HEALTH CARE EDUCATION/TRAINING PROGRAM

## 2025-01-21 RX ADMIN — TRAMADOL HYDROCHLORIDE 50 MG: 50 TABLET, COATED ORAL at 01:00

## 2025-01-21 RX ADMIN — AMOXICILLIN AND CLAVULANATE POTASSIUM 1 TABLET: 875; 125 TABLET, COATED ORAL at 09:08

## 2025-01-21 RX ADMIN — CELECOXIB 100 MG: 100 CAPSULE ORAL at 09:08

## 2025-01-21 RX ADMIN — INFLUENZA A VIRUS A/VICTORIA/4897/2022 IVR-238 (H1N1) ANTIGEN (FORMALDEHYDE INACTIVATED), INFLUENZA A VIRUS A/CALIFORNIA/122/2022 SAN-022 (H3N2) ANTIGEN (FORMALDEHYDE INACTIVATED), AND INFLUENZA B VIRUS B/MICHIGAN/01/2021 ANTIGEN (FORMALDEHYDE INACTIVATED) 0.5 ML: 60; 60; 60 INJECTION, SUSPENSION INTRAMUSCULAR at 09:09

## 2025-01-21 RX ADMIN — LISINOPRIL 10 MG: 10 TABLET ORAL at 09:08

## 2025-01-21 RX ADMIN — BUSPIRONE HYDROCHLORIDE 10 MG: 10 TABLET ORAL at 09:08

## 2025-01-21 RX ADMIN — TRAMADOL HYDROCHLORIDE 50 MG: 50 TABLET, COATED ORAL at 05:52

## 2025-01-21 RX ADMIN — PANTOPRAZOLE SODIUM 40 MG: 40 TABLET, DELAYED RELEASE ORAL at 05:52

## 2025-01-21 NOTE — ASSESSMENT & PLAN NOTE
The patient is postop from a left index finger debridement and percutaneous pinning.  He has done well postoperatively.  He was admitted for pain control as well as monitoring of the finger.  On the postoperative exam he has been stable and his pain has been well-controlled. This has continued to POD#1. His fingers warm well-perfused.  He reports improved sensation of the tip of the finger, with some surgical pain  -Nonweightbearing left upper extremity  - OK for discharge following observation  -Keep arm elevated/at level of heart, warm  -Tylenol, Motrin, and oxycodone for pain  -Continue home medications  -Patient to be monitored overnight and discharged on 1/21/2025.

## 2025-01-21 NOTE — TELEPHONE ENCOUNTER
Faxed received from [facility]. Request form for Prior authorization for rezdiffra.Medicare Pt's need to be re authorized for the new year form was filled out and faxed backed to [phone]

## 2025-01-21 NOTE — CASE MANAGEMENT
Case Management Progress Note    Patient name Uriel Howell III  Location /403-01 MRN [id number]  : 1955 Date 2025       LOS (days): 0  Geometric Mean LOS (GMLOS) (days):   Days to GMLOS:        OBJECTIVE:        Current admission status: Outpatient Surgery  Preferred Pharmacy:   [id number] - [address] [address]  Phone: [phone] Fax: [phone]    Primary Care Provider: Deric Sahu DO    Primary Insurance: MEDICARE  Secondary Insurance: BLUE CROSS    PROGRESS NOTE:    Chart review completed.  POD#1 Left - Open Reduction. Left index finger I&D with percutaneous pinning  Left middle finger I&D. Due to trauma with Crossbow.    No Cm needs at this time.  Baseline patient active and independent.  CM to follow for any discharge needs.

## 2025-01-21 NOTE — CASE MANAGEMENT
Case Management Discharge Planning Note    Patient name Uriel Howell III  Location /403-01 MRN [id number]  : 1955 Date 2025       Current Admission Date: 2025  Current Admission Diagnosis:Open fracture of one or more phalanges of hand   Patient Active Problem List    Diagnosis Date Noted Date Diagnosed    Open fracture of one or more phalanges of hand 2025     Chronic pain of both knees 2022     Primary osteoarthritis of both knees 2022     Class 2 severe obesity with serious comorbidity and body mass index (BMI) of 38.0 to 38.9 in adult (HCC) 2021     Chronic bilateral low back pain without sciatica 2020     Neck pain 2020     Chronic pain syndrome 2020     Cervical disc disorder with radiculopathy of mid-cervical region 2020     Cervical radiculopathy 2020     Bilateral carpal tunnel syndrome 2020     Lumbar spondylosis 2020     Lumbar radiculopathy 2020     Cervical spondylosis 2019     Osteoarthritis of thoracolumbar spine 2019     Chronic gastritis without bleeding 2018     Gastroesophageal reflux disease with esophagitis 2018     Depression 2018     Vitamin B12 deficiency 2018     Vitamin D deficiency 2018     Hypertension 2018     Anxiety 2018     Insomnia 2018     Gastroesophageal reflux disease without esophagitis 2018     Gastric ulcer 2018       LOS (days): 0  Geometric Mean LOS (GMLOS) (days):   Days to GMLOS:     OBJECTIVE:            Current admission status: Outpatient Surgery   Preferred Pharmacy:   [id number] [address] [address]  Phone: [phone] Fax: [phone]    Primary Care Provider: Deric Sahu DO    Primary Insurance: MEDICARE  Secondary Insurance: BLUE CROSS    DISCHARGE DETAILS:    Discharge planning discussed with::  patient  Freedom of Choice: Yes     CM contacted family/caregiver?: No- see comments  Were Treatment Team discharge recommendations reviewed with patient/caregiver?: Yes  Did patient/caregiver verbalize understanding of patient care needs?: Yes  Were patient/caregiver advised of the risks associated with not following Treatment Team discharge recommendations?: Yes       Patient stable for discharge home today with outpatient medical providers.  Follow up providers listed on AVS.  Patient family to transport patient home.                 Would you like to participate in our [facility] service program?  : No - Declined    Treatment Team Recommendation: Home  Discharge Destination Plan:: Home

## 2025-01-21 NOTE — TELEMEDICINE
The patient is a 69-year-old male now postop day 1 from a left index finger debridement with percutaneous pinning for an open fracture dislocation of the PIP joint as well as a debridement for the laceration at the base of the left long finger.  He has done well postoperatively.  He was admitted overnight for monitoring for pain control and for monitoring of the finger.  Immediately postoperatively the finger did appear to have sluggish blood flow but after monitoring and on follow-up examination the finger was warm and well-perfused with a 98% oxygenation on pulse oximetry.  As such it was determined that he likely had a vasospasm or response to the epinephrine that was in the analgesic injection the patient received for digital block.  He reports that overnight he had no issues.  His pain was well-controlled.  He reports that his hand feels great.  He does report that the tip of the finger seems to have improved sensation now compared to where it was preoperatively.  Overall he is doing well and is stable for discharge.  A PA from the orthopedic team will stop by to evaluate the patient in person and facilitate the discharge.

## 2025-01-21 NOTE — PROGRESS NOTES
Progress Note - Orthopedics   Name: Uriel Howell III 69 y.o. male I MRN: [id number]  Unit/Bed#: 403-01 I Date of Admission: 1/20/2025   Date of Service: 1/21/2025 I Hospital Day: 0     Assessment & Plan  Open fracture of one or more phalanges of hand  The patient is postop from a left index finger debridement and percutaneous pinning.  He has done well postoperatively.  He was admitted for pain control as well as monitoring of the finger.  On the postoperative exam he has been stable and his pain has been well-controlled.  His fingers warm well-perfused.  He reports improved sensation of the tip of the finger relative to preoperatively actually.   -Nonweightbearing left upper extremity  -Keep arm elevated, warm  -Tylenol, Motrin, and oxycodone for pain  -Continue home medications  -Patient to be monitored overnight and discharged on 1/21/2025.      Subjective   69 y.o.male postop from a left index finger debridement and percutaneous pinning.  No acute events, no new complaints. Pain well controlled. Denies fevers, chills, CP, SOB, N/V, numbness or tingling. Patient reports no issues with urination or bowel movements. Patient states that he feels the sensation in the tip of his finger has improved and overall he is very happy.    Objective :  Temp:  [97.5 °F (36.4 °C)-98.3 °F (36.8 °C)] 98.1 °F (36.7 °C)  HR:  [59-82] 76  BP: (120-149)/(57-82) 127/82  Resp:  [16-21] 16  SpO2:  [90 %-99 %] 91 %  O2 Device: None (Room air)    Physical Exam  Musculoskeletal: leftupper  The left hand is in a dressing.  The tip of the finger is exposed.  The tip of the finger is warm and well-perfused.  Pulse ox on the tip of the finger demonstrated a 98% oxygenation.  The dressing remains clean and dry with no bleeding or saturation.  TTP minimal and as expected postoperatively  Patient has sensation to light touch at the tip of the finger both of the index and the long fingers.  All fingers are warm and well-perfused      Lab  "Results: I have reviewed the following results:  No results for input(s): \"WBC\", \"HGB\", \"HCT\", \"PLT\", \"BANDSPCT\", \"BUN\", \"CREATININE\", \"PTT\", \"INR\", \"ESR\", \"CRP\" in the last 72 hours.  Blood Culture:  No results found for: \"BLOODCX\"  Wound Culture: No results found for: \"WOUNDCULT\"  "

## 2025-01-21 NOTE — NURSING NOTE
AVS reviewed virtually with patient and spouse. Both verbalized understanding of same, including new prescribed medications and side effects, recommended follow-up appointments, and reasons to seek medical assistance. All questions answered.

## 2025-01-21 NOTE — ASSESSMENT & PLAN NOTE
The patient is postop from a left index finger debridement and percutaneous pinning.  He has done well postoperatively.  He was admitted for pain control as well as monitoring of the finger.  On the postoperative exam he has been stable and his pain has been well-controlled.  His fingers warm well-perfused.  He reports improved sensation of the tip of the finger relative to preoperatively actually.   -Nonweightbearing left upper extremity  -Keep arm elevated, warm  -Tylenol, Motrin, and oxycodone for pain  -Continue home medications  -Patient to be monitored overnight and discharged on 1/21/2025.

## 2025-01-21 NOTE — PLAN OF CARE
Problem: Prexisting or High Potential for Compromised Skin Integrity  Goal: Skin integrity is maintained or improved  Description: INTERVENTIONS:  - Identify patients at risk for skin breakdown  - Assess and monitor skin integrity  - Assess and monitor nutrition and hydration status  - Monitor labs   - Assess for incontinence   - Turn and reposition patient  - Assist with mobility/ambulation  - Relieve pressure over bony prominences  - Avoid friction and shearing  - Provide appropriate hygiene as needed including keeping skin clean and dry  - Evaluate need for skin moisturizer/barrier cream  - Collaborate with interdisciplinary team   - Patient/family teaching  - Consider wound care consult   Outcome: Progressing     Problem: PAIN - ADULT  Goal: Verbalizes/displays adequate comfort level or baseline comfort level  Description: Interventions:  - Encourage patient to monitor pain and request assistance  - Assess pain using appropriate pain scale  - Administer analgesics based on type and severity of pain and evaluate response  - Implement non-pharmacological measures as appropriate and evaluate response  - Consider cultural and social influences on pain and pain management  - Notify physician/advanced practitioner if interventions unsuccessful or patient reports new pain  Outcome: Progressing     Problem: INFECTION - ADULT  Goal: Absence or prevention of progression during hospitalization  Description: INTERVENTIONS:  - Assess and monitor for signs and symptoms of infection  - Monitor lab/diagnostic results  - Monitor all insertion sites, i.e. indwelling lines, tubes, and drains  - Monitor endotracheal if appropriate and nasal secretions for changes in amount and color  - [place] appropriate cooling/warming therapies per order  - Administer medications as ordered  - Instruct and encourage patient and family to use good hand hygiene technique  - Identify and instruct in appropriate isolation precautions for  identified infection/condition  Outcome: Progressing  Goal: Absence of fever/infection during neutropenic period  Description: INTERVENTIONS:  - Monitor WBC    Outcome: Progressing     Problem: SAFETY ADULT  Goal: Patient will remain free of falls  Description: INTERVENTIONS:  - Educate patient/family on patient safety including physical limitations  - Instruct patient to call for assistance with activity   - Consult OT/PT to assist with strengthening/mobility   - Keep Call bell within reach  - Keep bed low and locked with side rails adjusted as appropriate  - Keep care items and personal belongings within reach  - Initiate and maintain comfort rounds  - Make Fall Risk Sign visible to staff  - Offer Toileting every 2 Hours, in advance of need  - Initiate/Maintain bed/chair alarm  - Obtain necessary fall risk management equipment: nonskid footwear  - Apply yellow socks and bracelet for high fall risk patients  - Consider moving patient to room near nurses station  Outcome: Progressing  Goal: Maintain or return to baseline ADL function  Description: INTERVENTIONS:  -  Assess patient's ability to carry out ADLs; assess patient's baseline for ADL function and identify physical deficits which impact ability to perform ADLs (bathing, care of mouth/teeth, toileting, grooming, dressing, etc.)  - Assess/evaluate cause of self-care deficits   - Assess range of motion  - Assess patient's mobility; develop plan if impaired  - Assess patient's need for assistive devices and provide as appropriate  - Encourage maximum independence but intervene and supervise when necessary  - Involve family in performance of ADLs  - Assess for home care needs following discharge   - Consider OT consult to assist with ADL evaluation and planning for discharge  - Provide patient education as appropriate  Outcome: Progressing  Goal: Maintains/Returns to pre admission functional level  Description: INTERVENTIONS:  - Perform AM-PAC 6 Click Basic  Mobility/ Daily Activity assessment daily.  - Set and communicate daily mobility goal to care team and patient/family/caregiver.   - Collaborate with rehabilitation services on mobility goals if consulted  - Perform Range of Motion 4 times a day.  - Reposition patient every 2 hours.  - Dangle patient 3 times a day  - Stand patient 3 times a day  - Ambulate patient 3 times a day  - Out of bed to chair 3 times a day   - Out of bed for meals 3 times a day  - Out of bed for toileting  - Record patient progress and toleration of activity level   Outcome: Progressing     Problem: DISCHARGE PLANNING  Goal: Discharge to home or other facility with appropriate resources  Description: INTERVENTIONS:  - Identify barriers to discharge w/patient and caregiver  - Arrange for needed discharge resources and transportation as appropriate  - Identify discharge learning needs (meds, wound care, etc.)  - Arrange for interpretive services to assist at discharge as needed  - Refer to Case Management Department for coordinating discharge planning if the patient needs post-hospital services based on physician/advanced practitioner order or complex needs related to functional status, cognitive ability, or social support system  Outcome: Progressing     Problem: Knowledge Deficit  Goal: Patient/family/caregiver demonstrates understanding of disease process, treatment plan, medications, and discharge instructions  Description: Complete learning assessment and assess knowledge base.  Interventions:  - Provide teaching at level of understanding  - Provide teaching via preferred learning methods  Outcome: Progressing     Problem: MUSCULOSKELETAL - ADULT  Goal: Maintain or return mobility to safest level of function  Description: INTERVENTIONS:  - Assess patient's ability to carry out ADLs; assess patient's baseline for ADL function and identify physical deficits which impact ability to perform ADLs (bathing, care of mouth/teeth, toileting,  grooming, dressing, etc.)  - Assess/evaluate cause of self-care deficits   - Assess range of motion  - Assess patient's mobility  - Assess patient's need for assistive devices and provide as appropriate  - Encourage maximum independence but intervene and supervise when necessary  - Involve family in performance of ADLs  - Assess for home care needs following discharge   - Consider OT consult to assist with ADL evaluation and planning for discharge  - Provide patient education as appropriate  Outcome: Progressing  Goal: Maintain proper alignment of affected body part  Description: INTERVENTIONS:  - Support, maintain and protect limb and body alignment  - Provide patient/ family with appropriate education  Outcome: Progressing

## 2025-01-21 NOTE — PROGRESS NOTES
Progress Note - Orthopedics   Name: Uriel Howell III 69 y.o. male I MRN: [id number]  Unit/Bed#: 403-01 I Date of Admission: 1/20/2025   Date of Service: 1/21/2025 I Hospital Day: 0     Assessment & Plan  Open fracture of one or more phalanges of hand  The patient is postop from a left index finger debridement and percutaneous pinning.  He has done well postoperatively.  He was admitted for pain control as well as monitoring of the finger.  On the postoperative exam he has been stable and his pain has been well-controlled. This has continued to POD#1. His fingers warm well-perfused.  He reports improved sensation of the tip of the finger, with some surgical pain  -Nonweightbearing left upper extremity  - OK for discharge following observation  -Keep arm elevated/at level of heart, warm  -Tylenol, Motrin, and oxycodone for pain  -Continue home medications  -Patient to be monitored overnight and discharged on 1/21/2025.    Please contact the SecureChat role for the Orthopedics service with any questions/concerns.    History of Present Illness   69 y.o. male. No acute events, no acute distress. No noted fever/chills, SOB. Pain well controlled. Tolerating PO diet.   Seen sitting in recliner with wife in the room, prepared to review discharge summary. Reviewed precautions following surgery and plan for follow up with Dr. Montano.   Pt and wife report that Dr. Montano spoke with them earlier and agreed with his assessment and feeling after a day of recovery.    Objective      Temp:  [97.5 °F (36.4 °C)-98.3 °F (36.8 °C)] 98.1 °F (36.7 °C)  HR:  [59-79] 76  BP: (120-143)/(57-82) 127/82  Resp:  [16-21] 16  SpO2:  [90 %-99 %] 91 %  O2 Device: None (Room air)  O2 Device: None (Room air)          I/O         01/19 0701 01/20 0700 01/20 0701 01/21 0700 01/21 0701 01/22 0700    P.O.   480    I.V. (mL/kg)  700 (6.3)     IV Piggyback  50     Total Intake(mL/kg)  750 (6.8) 480 (4.3)    Blood  2     Total Output  2     Net  +748  "+480           Unmeasured Urine Occurrence  2 x             Physical Exam   Musculoskeletal: left index finger  With some dried blood on the kerlix   Splint intact with coban  TTP at distal finger, with sensation intact  Motor limited due to splint, able to actively move all fingers and elbow  Cap refill equal between all distal fingertips on left hand        Lab Results: I have reviewed the following results:  No results for input(s): \"WBC\", \"HGB\", \"HCT\", \"PLT\", \"BANDSPCT\", \"BUN\", \"CREATININE\", \"PTT\", \"INR\", \"ESR\", \"CRP\" in the last 72 hours.  Blood Culture:  No results found for: \"BLOODCX\"  Wound Culture: No results found for: \"WOUNDCULT\"    Thanh Ash PA-C    "

## 2025-01-22 ENCOUNTER — TELEPHONE (OUTPATIENT)
Age: 70
End: 2025-01-22

## 2025-01-22 ENCOUNTER — TRANSCRIBE ORDERS (OUTPATIENT)
Age: 70
End: 2025-01-22

## 2025-01-22 NOTE — TELEPHONE ENCOUNTER
Message left for Pt that his Rezdiffra is approved for the new year which is until 12/31/2025. His shipments should now continue. I also left the # to office if he had any f/u questions

## 2025-01-30 ENCOUNTER — OFFICE VISIT (OUTPATIENT)
Dept: OBGYN CLINIC | Facility: CLINIC | Age: 70
End: 2025-01-30

## 2025-01-30 VITALS — BODY MASS INDEX: 36.34 KG/M2 | WEIGHT: 239.8 LBS | HEIGHT: 68 IN

## 2025-01-30 DIAGNOSIS — S62.609D OPEN FRACTURE OF PHALANX OF DIGIT OF HAND WITH ROUTINE HEALING, SUBSEQUENT ENCOUNTER: Primary | ICD-10-CM

## 2025-01-30 PROCEDURE — 99024 POSTOP FOLLOW-UP VISIT: CPT | Performed by: STUDENT IN AN ORGANIZED HEALTH CARE EDUCATION/TRAINING PROGRAM

## 2025-01-30 NOTE — PROGRESS NOTES
Assessment and Plan:  1. Open fracture of phalanx of digit of hand with routine healing, subsequent encounter            69 y.o. male presents 10 days status post left index finger I&D with open reduction and percutaneous pinning and left middle finger I&D.  There is no signs of infection, the incisions are healing well, the finger is well-perfused, he has good sensation of the tip of the finger, and overall gross alignment is appropriate.  I again discussed with the patient that as this is an intra-articular fracture and there was significant comminution of the chondral surface that I think there is the potential for stiffness of the DIP joint and likely loss of range of motion with an early potential for arthritis.  The patient expressed understanding with all this.  Additionally explained that as the extensor tendon was disrupted I will be treating this partially like a mallet finger.  Specifically for that I will remove the cross pin at 4 weeks after surgery but will leave the transarticular pin in for a total of 8 weeks and remove it in office.    Surgical dressings were removed today.   Incision and pin sites were inspected today and show no signs of acute infection.   New dressing and finger splint were applied to the left index finger.   Nonweightbearing left upper extremity  Elevate left upper extremity regularly  OTC oral analgesics as needed  Follow-up in 2 weeks for pin removal followed by xrays of the second digit.     he expressed understanding of the plan and agreed. We encouraged them to contact our office with any questions or concerns.       Chief Complaint:     Post op follow-up    Surgery:  Left index finger I&D with open reduction and percutaneous pinning and left middle finger I&D - 1/202025    History of Present Illness:   Patient presents now 10 days status post the above surgery. Patient has maintained dressing as directed. Patient states pain is currently well controlled. Patient states he  has not taken the oxycodone since around two days postop.     Past Medical History:  Past Medical History:   Diagnosis Date    Anxiety     Carpal tunnel syndrome     Colon polyps     DJD (degenerative joint disease)     Duodenal ulcer     Fatty liver     Gastric reflux     Gastric ulcer     Heel fracture     bilateral    Hypertension     Vitamin B12 deficiency     Vitamin D deficiency      Past Surgical History:   Procedure Laterality Date    ANKLE SURGERY      repair    COLONOSCOPY      polyp removed; benign    COLONOSCOPY  2019    COLONOSCOPY W/ POLYPECTOMY      Followed by Dr. Pang     FOOT FRACTURE SURGERY Bilateral     MULTIPLE TOOTH EXTRACTIONS      POLYPECTOMY      TIBIA FRACTURE SURGERY Left      Family History   Problem Relation Age of Onset    Cancer Mother         lung    Heart disease Mother     Cancer Father         prostate    Heart murmur Brother     Diabetes Daughter     Diabetes Son     Diabetes Other      Social History     Socioeconomic History    Marital status: /Civil Union     Spouse name: Not on file    Number of children: Not on file    Years of education: Not on file    Highest education level: Not on file   Occupational History    Not on file   Tobacco Use    Smoking status: Former     Current packs/day: 0.00     Average packs/day: 1 pack/day for 44.4 years (44.4 ttl pk-yrs)     Types: Cigarettes     Start date: 1976     Quit date: 2020     Years since quittin.6    Smokeless tobacco: Former     Types: Chew     Quit date: 2014   Vaping Use    Vaping status: Never Used   Substance and Sexual Activity    Alcohol use: Not Currently    Drug use: No    Sexual activity: Not Currently     Partners: Female   Other Topics Concern    Not on file   Social History Narrative    Not on file     Social Drivers of Health     Financial Resource Strain: Not on file   Food Insecurity: No Food Insecurity (2025)    Nursing - Inadequate Food Risk Classification     Worried  "About Running Out of Food in the Last Year: Not on file     Ran Out of Food in the Last Year: Not on file     Ran Out of Food in the Last Year: Never true   Transportation Needs: No Transportation Needs (2025)    Nursing - Transportation Risk Classification     Lack of Transportation: Not on file     Lack of Transportation: No   Physical Activity: Not on file   Stress: Not on file   Social Connections: Not on file   Intimate Partner Violence: Patient Unable To Answer (2025)    Nursing IPS     Feels Physically and Emotionally Safe: Not on file     Physically Hurt by Someone: Not on file     Humiliated or Emotionally Abused by Someone: Not on file     Physically Hurt by Someone: Patient unable to answer     Hurt or Threatened by Someone: Patient unable to answer   Housing Stability: Unknown (2025)    Nursing: Inadequate Housing Risk Classification     Has Housing: Not on file     Worried About Losing Housing: Not on file     Unable to Get Utilities: Not on file     Unable to Pay for Housing in the Last Year: No     Has Housin     Scheduled Meds:  Continuous Infusions:No current facility-administered medications for this visit.    PRN Meds:.  No Known Allergies      PHYSICAL EXAMINATION:    Ht 5' 8\" (1.727 m)   Wt 109 kg (239 lb 12.8 oz)   BMI 36.46 kg/m²     Gen: A&Ox3, NAD      Left Upper Extremity:  Dressing clean and dry, removed  Underlying incision healing well without signs of infection   Pins intact  Sensation intact to light touch in the axillary median, ulnar, and radial nerve distributions  The fingertip is warm and well-perfused  NV intact    STUDIES REVIEWED:  No Studies to review        Allen Montano MD  Hand and Upper Extremity Surgery          *This note was dictated using Dragon voice recognition software. Please excuse any word substitutions or errors.*      Scribe Attestation      I,:  Keshia Beauchamp PA-C am acting as a scribe while in the presence of the attending " physician.:       I,:  Allen Montano MD personally performed the services described in this documentation    as scribed in my presence.:

## 2025-01-31 DIAGNOSIS — F41.9 ANXIETY: ICD-10-CM

## 2025-01-31 RX ORDER — CITALOPRAM HYDROBROMIDE 40 MG/1
40 TABLET ORAL DAILY
Qty: 100 TABLET | Refills: 1 | Status: SHIPPED | OUTPATIENT
Start: 2025-01-31

## 2025-02-07 ENCOUNTER — TELEPHONE (OUTPATIENT)
Dept: GASTROENTEROLOGY | Facility: CLINIC | Age: 70
End: 2025-02-07

## 2025-02-07 NOTE — TELEPHONE ENCOUNTER
Pharmacy called in stating they need sig with directions that on the hub snap it has none. Please advise.

## 2025-02-11 NOTE — TELEPHONE ENCOUNTER
[facility] Needed a verbal on how the medication needed to be taken. And the ICD 10 code along with if Pt had any allergies.

## 2025-02-13 ENCOUNTER — TELEPHONE (OUTPATIENT)
Age: 70
End: 2025-02-13

## 2025-02-13 ENCOUNTER — OFFICE VISIT (OUTPATIENT)
Dept: OBGYN CLINIC | Facility: CLINIC | Age: 70
End: 2025-02-13

## 2025-02-13 VITALS — WEIGHT: 239.6 LBS | HEIGHT: 68 IN | BODY MASS INDEX: 36.31 KG/M2

## 2025-02-13 DIAGNOSIS — S62.609D OPEN FRACTURE OF PHALANX OF DIGIT OF HAND WITH ROUTINE HEALING, SUBSEQUENT ENCOUNTER: Primary | ICD-10-CM

## 2025-02-13 PROCEDURE — 99024 POSTOP FOLLOW-UP VISIT: CPT | Performed by: STUDENT IN AN ORGANIZED HEALTH CARE EDUCATION/TRAINING PROGRAM

## 2025-02-13 NOTE — PROGRESS NOTES
Assessment and Plan:  1. Open fracture of phalanx of digit of hand with routine healing, subsequent encounter            69 y.o. male presents 3 weeks status post left index finger I&D with open reduction and percutaneous pinning and left middle finger I&D.  There are a few areas of scab around the distal finger as well as some areas of skin necrosis.  I think this is undoubtably due to the poor perfusion of the tip of the finger.  However on examination the fingertip is well-perfused at this point and the patient does have sensation intact to the tip of the finger although it is diminished.  I discussed with the patient today that we will have to see how the areas of full-thickness skin necrosis heal and we will likely have them heal by secondary intention after the eschar delaminates from the rest of the tissue.  Given that there is poor perfusion I think this could indicate likely delayed healing of the fracture itself as this is also dependent on good blood supply therefore I will wait 1 more week to pull the pin.  I discussed again with the patient that I think he will likely have diminished range of motion at the DIP joint.  I am optimistic however about his healing overall.  I think the plan will be for him to come back next week for cross pin removal.  We will leave the retrograde intramedullary pin in for 8 weeks total as this is in place for the open extensor injury.    Dressing and splint was removed in the office today.   No signs of infection today.   Left index finger cleaned with betadine followed by gauze and coband dressing application.   Keep left index finger clean and dry.   Follow-up in 1 week for pin removal and xrays of the left second digit.     he expressed understanding of the plan and agreed. We encouraged them to contact our office with any questions or concerns.       Chief Complaint:     Post op follow-up    Surgery:  Left index finger I&D with open reduction and percutaneous pinning  and left middle finger I&D - 1/202025     History of Present Illness:   Patient presents now 3 weeks status post the above surgery. Patient has maintained splint as directed. Patient mention today that prior to the injury he had carpal tunnel syndrome and that the tips of his fingers have always had to diminished sensation.  He was inspecting his fingers the other day and felt that his current sensation is actually relatively on par for the foot sensation he had prior to the injury.  Overall he has had no fever or chills and the dressings have been clean and dry.  He has had no pain      Past Medical History:  Past Medical History:   Diagnosis Date    Anxiety     Carpal tunnel syndrome     Colon polyps     DJD (degenerative joint disease)     Duodenal ulcer     Fatty liver     Gastric reflux     Gastric ulcer     Heel fracture     bilateral    Hypertension     Vitamin B12 deficiency     Vitamin D deficiency      Past Surgical History:   Procedure Laterality Date    ANKLE SURGERY      repair    COLONOSCOPY      polyp removed; benign    COLONOSCOPY  02/07/2019    COLONOSCOPY W/ POLYPECTOMY      Followed by Dr. Pang     FOOT FRACTURE SURGERY Bilateral     MULTIPLE TOOTH EXTRACTIONS      POLYPECTOMY      TIBIA FRACTURE SURGERY Left      Family History   Problem Relation Age of Onset    Cancer Mother         lung    Heart disease Mother     Cancer Father         prostate    Heart murmur Brother     Diabetes Daughter     Diabetes Son     Diabetes Other      Social History     Socioeconomic History    Marital status: /Civil Union     Spouse name: Not on file    Number of children: Not on file    Years of education: Not on file    Highest education level: Not on file   Occupational History    Not on file   Tobacco Use    Smoking status: Former     Current packs/day: 0.00     Average packs/day: 1 pack/day for 44.4 years (44.4 ttl pk-yrs)     Types: Cigarettes     Start date: 1/1/1976     Quit date: 6/1/2020      "Years since quittin.7    Smokeless tobacco: Former     Types: Chew     Quit date: 2014   Vaping Use    Vaping status: Never Used   Substance and Sexual Activity    Alcohol use: Not Currently    Drug use: No    Sexual activity: Not Currently     Partners: Female   Other Topics Concern    Not on file   Social History Narrative    Not on file     Social Drivers of Health     Financial Resource Strain: Not on file   Food Insecurity: No Food Insecurity (2025)    Nursing - Inadequate Food Risk Classification     Worried About Running Out of Food in the Last Year: Not on file     Ran Out of Food in the Last Year: Not on file     Ran Out of Food in the Last Year: Never true   Transportation Needs: No Transportation Needs (2025)    Nursing - Transportation Risk Classification     Lack of Transportation: Not on file     Lack of Transportation: No   Physical Activity: Not on file   Stress: Not on file   Social Connections: Not on file   Intimate Partner Violence: Patient Unable To Answer (2025)    Nursing IPS     Feels Physically and Emotionally Safe: Not on file     Physically Hurt by Someone: Not on file     Humiliated or Emotionally Abused by Someone: Not on file     Physically Hurt by Someone: Patient unable to answer     Hurt or Threatened by Someone: Patient unable to answer   Housing Stability: Unknown (2025)    Nursing: Inadequate Housing Risk Classification     Has Housing: Not on file     Worried About Losing Housing: Not on file     Unable to Get Utilities: Not on file     Unable to Pay for Housing in the Last Year: No     Has Housin     Scheduled Meds:  Continuous Infusions:No current facility-administered medications for this visit.    PRN Meds:.  No Known Allergies      PHYSICAL EXAMINATION:    Ht 5' 8\" (1.727 m)   Wt 109 kg (239 lb 9.6 oz)   BMI 36.43 kg/m²     Gen: A&Ox3, NAD    Left Upper Extremity:  No erythema, drainage, or signs of acute infection. See clinical image. "   Sensation intact to light touch in the axillary median, ulnar, and radial nerve distributions  NV intact  Two point discrimination: 7mm in radial and ulnar tip of index finger  The fingertip is warm and well-perfused with good capillary refill  The incision itself appears well-healed but just distal to the incision there are areas of dark eschar indicating a likely full-thickness skin necrosis.  No exudate or drainage.                STUDIES REVIEWED:  No Studies to review        Allen Montano MD  Hand and Upper Extremity Surgery          *This note was dictated using Dragon voice recognition software. Please excuse any word substitutions or errors.*      Scribe Attestation      I,:  Keshia Beauchamp PA-C am acting as a scribe while in the presence of the attending physician.:       I,:  Allen Montano MD personally performed the services described in this documentation    as scribed in my presence.:

## 2025-02-20 ENCOUNTER — OFFICE VISIT (OUTPATIENT)
Dept: OBGYN CLINIC | Facility: CLINIC | Age: 70
End: 2025-02-20

## 2025-02-20 ENCOUNTER — HOSPITAL ENCOUNTER (OUTPATIENT)
Dept: RADIOLOGY | Facility: CLINIC | Age: 70
End: 2025-02-20
Payer: MEDICARE

## 2025-02-20 VITALS — BODY MASS INDEX: 36.65 KG/M2 | HEIGHT: 68 IN | WEIGHT: 241.8 LBS

## 2025-02-20 DIAGNOSIS — S62.609D OPEN FRACTURE OF PHALANX OF DIGIT OF HAND WITH ROUTINE HEALING, SUBSEQUENT ENCOUNTER: Primary | ICD-10-CM

## 2025-02-20 DIAGNOSIS — S62.609D OPEN FRACTURE OF PHALANX OF DIGIT OF HAND WITH ROUTINE HEALING, SUBSEQUENT ENCOUNTER: ICD-10-CM

## 2025-02-20 PROCEDURE — 73140 X-RAY EXAM OF FINGER(S): CPT

## 2025-02-20 PROCEDURE — 99024 POSTOP FOLLOW-UP VISIT: CPT | Performed by: STUDENT IN AN ORGANIZED HEALTH CARE EDUCATION/TRAINING PROGRAM

## 2025-02-20 RX ORDER — SODIUM PHOSPHATE,MONO-DIBASIC 19G-7G/118
1 ENEMA (ML) RECTAL DAILY
COMMUNITY

## 2025-02-20 NOTE — PROGRESS NOTES
Assessment and Plan:  1. Open fracture of phalanx of digit of hand with routine healing, subsequent encounter  XR finger left second digit-index          69 y.o. male presents 4 weeks status post left index finger I&D with open reduction and percutaneous pinning and left middle finger I&D.  I remove the cross pin today and obtained x-rays.  The fracture alignment has been maintained.  I think I can actually potentially appreciate some interval fracture healing.  The tip of the finger itself is well-perfused and the areas of scabbing or starting to peel away revealing nicely epithelialized skin below.  The patient has no pain.  There are no signs of infection.  He does have some stiffness of the PIP joints I expressed to him the need to focus on range of motion of the PIP joint at this time.  He does not need to use any splint anymore.  He can shower normally but should not submerge the finger.  He will come back in 4 weeks for the removal of the trans DIP pin.    Dressing and pin was removed in the office today.   No signs of infection today.   Follow-up in 4 weeks    he expressed understanding of the plan and agreed. We encouraged them to contact our office with any questions or concerns.       Chief Complaint:     Post op follow-up    Surgery:  Left index finger I&D with open reduction and percutaneous pinning and left middle finger I&D - 1/202025     History of Present Illness:   Patient presents now 4 weeks status post the above surgery. Patient has maintained dressing as directed.  He has no pain.  He has kept the dressing clean and dry since we saw him.  Overall he has no complaints.      Past Medical History:  Past Medical History:   Diagnosis Date    Anxiety     Carpal tunnel syndrome     Colon polyps     DJD (degenerative joint disease)     Duodenal ulcer     Fatty liver     Gastric reflux     Gastric ulcer     Heel fracture     bilateral    Hypertension     Vitamin B12 deficiency     Vitamin D deficiency       Past Surgical History:   Procedure Laterality Date    ANKLE SURGERY      repair    COLONOSCOPY      polyp removed; benign    COLONOSCOPY  2019    COLONOSCOPY W/ POLYPECTOMY      Followed by Dr. Pang     FOOT FRACTURE SURGERY Bilateral     MULTIPLE TOOTH EXTRACTIONS      POLYPECTOMY      TIBIA FRACTURE SURGERY Left      Family History   Problem Relation Age of Onset    Cancer Mother         lung    Heart disease Mother     Cancer Father         prostate    Heart murmur Brother     Diabetes Daughter     Diabetes Son     Diabetes Other      Social History     Socioeconomic History    Marital status: /Civil Union     Spouse name: Not on file    Number of children: Not on file    Years of education: Not on file    Highest education level: Not on file   Occupational History    Not on file   Tobacco Use    Smoking status: Former     Current packs/day: 0.00     Average packs/day: 1 pack/day for 44.4 years (44.4 ttl pk-yrs)     Types: Cigarettes     Start date: 1976     Quit date: 2020     Years since quittin.7    Smokeless tobacco: Former     Types: Chew     Quit date: 2014   Vaping Use    Vaping status: Never Used   Substance and Sexual Activity    Alcohol use: Not Currently    Drug use: No    Sexual activity: Not Currently     Partners: Female   Other Topics Concern    Not on file   Social History Narrative    Not on file     Social Drivers of Health     Financial Resource Strain: Not on file   Food Insecurity: No Food Insecurity (2025)    Nursing - Inadequate Food Risk Classification     Worried About Running Out of Food in the Last Year: Not on file     Ran Out of Food in the Last Year: Not on file     Ran Out of Food in the Last Year: Never true   Transportation Needs: No Transportation Needs (2025)    Nursing - Transportation Risk Classification     Lack of Transportation: Not on file     Lack of Transportation: No   Physical Activity: Not on file   Stress: Not on file  "  Social Connections: Not on file   Intimate Partner Violence: Patient Unable To Answer (2025)    Nursing IPS     Feels Physically and Emotionally Safe: Not on file     Physically Hurt by Someone: Not on file     Humiliated or Emotionally Abused by Someone: Not on file     Physically Hurt by Someone: Patient unable to answer     Hurt or Threatened by Someone: Patient unable to answer   Housing Stability: Unknown (2025)    Nursing: Inadequate Housing Risk Classification     Has Housing: Not on file     Worried About Losing Housing: Not on file     Unable to Get Utilities: Not on file     Unable to Pay for Housing in the Last Year: No     Has Housin     Scheduled Meds:  Continuous Infusions:No current facility-administered medications for this visit.    PRN Meds:.  No Known Allergies      PHYSICAL EXAMINATION:    Ht 5' 8\" (1.727 m)   Wt 110 kg (241 lb 12.8 oz)   BMI 36.77 kg/m²     Gen: A&Ox3, NAD    Left Upper Extremity:  No erythema, drainage, or signs of acute infection.   Cross pin removed.  Sensation intact to light touch in the axillary median, ulnar, and radial nerve distributions  NV intact  Two point discrimination: 7mm in radial and ulnar tip of index finger  The fingertip is warm and well-perfused with good capillary refill  The incision well-healed        STUDIES REVIEWED:  X-Ray of left finger obtained on 2025 were reviewed and demonstrate maintained fracture reduction with what appears to be some interval evidence of fracture healing with sclerotic changes around the fracture site        Allen Montano MD  Hand and Upper Extremity Surgery        *This note was dictated using Dragon voice recognition software. Please excuse any word substitutions or errors.*      Scribe Attestation      I,:  Hillary Najera am acting as a scribe while in the presence of the attending physician.:       I,:  Allen Montano MD personally performed the services described in this documentation    as " scribed in my presence.:

## 2025-03-06 ENCOUNTER — TELEPHONE (OUTPATIENT)
Dept: GASTROENTEROLOGY | Facility: CLINIC | Age: 70
End: 2025-03-06

## 2025-03-20 ENCOUNTER — OFFICE VISIT (OUTPATIENT)
Dept: OBGYN CLINIC | Facility: CLINIC | Age: 70
End: 2025-03-20

## 2025-03-20 ENCOUNTER — HOSPITAL ENCOUNTER (OUTPATIENT)
Dept: RADIOLOGY | Facility: CLINIC | Age: 70
Discharge: HOME | End: 2025-03-20
Payer: MEDICARE

## 2025-03-20 VITALS — BODY MASS INDEX: 36.74 KG/M2 | WEIGHT: 242.4 LBS | HEIGHT: 68 IN

## 2025-03-20 DIAGNOSIS — S62.609D OPEN FRACTURE OF PHALANX OF DIGIT OF HAND WITH ROUTINE HEALING, SUBSEQUENT ENCOUNTER: ICD-10-CM

## 2025-03-20 DIAGNOSIS — S62.609D OPEN FRACTURE OF PHALANX OF DIGIT OF HAND WITH ROUTINE HEALING, SUBSEQUENT ENCOUNTER: Primary | ICD-10-CM

## 2025-03-20 PROCEDURE — 99024 POSTOP FOLLOW-UP VISIT: CPT | Performed by: STUDENT IN AN ORGANIZED HEALTH CARE EDUCATION/TRAINING PROGRAM

## 2025-03-20 PROCEDURE — 73140 X-RAY EXAM OF FINGER(S): CPT

## 2025-03-20 RX ORDER — CEPHALEXIN 500 MG/1
500 CAPSULE ORAL EVERY 8 HOURS SCHEDULED
Qty: 21 CAPSULE | Refills: 0 | Status: SHIPPED | OUTPATIENT
Start: 2025-03-20 | End: 2025-03-27

## 2025-03-20 NOTE — PROGRESS NOTES
Assessment and Plan:  1. Open fracture of phalanx of digit of hand with routine healing, subsequent encounter  XR finger left second digit-index          69 y.o. male presents 8 weeks status post left index finger I&D with open reduction and percutaneous pinning and left middle finger I&D.  The patient has done well and actually reports that he is returned to most activities including work around the house without issue.  He says his finger feels good and he is happy with the use of it at this point.    I did remove the pin from the finger today.  This was done with a local block and a small incision of the tip of the finger.  Notably there was some erythema at the tip of the finger overlying the pin and a small amount of what could have been purulence just at the tip.  The pin otherwise was removed without issue there is no other signs of purulence or erythema.  I have started the patient on a prescription of Keflex for a presumed pin tract infection.  I discussed with the patient to monitor this area for signs of infection.  I placed a Band-Aid over the tip of the finger.  The patient can remove this tomorrow and wash the hand and monitor the area.  He can place a Band-Aid over it after this area has been cleaned.      Dressing and pin was removed in the office today.   Keflex 250 mg four times a day for 1 week for possible pin tract infection  At this point he can have a weightbearing status of 10 pounds and can increase his 5 pounds per week  Range of motion as tolerated and encouraged.    Follow-up in 4 weeks    he expressed understanding of the plan and agreed. We encouraged them to contact our office with any questions or concerns.       Chief Complaint:     Post op follow-up    Surgery:  Left index finger I&D with open reduction and percutaneous pinning and left middle finger I&D - 1/202025     History of Present Illness:   Patient presents now 8 weeks status post the above surgery. Patient is doing well  since his last visit. He denies pain at time of visit. He states that he has been using his left hand for work around the house and with his truck. He denies numbness or tingling.       Past Medical History:  Past Medical History:   Diagnosis Date    Anxiety     Carpal tunnel syndrome     Colon polyps     DJD (degenerative joint disease)     Duodenal ulcer     Fatty liver     Gastric reflux     Gastric ulcer     Heel fracture     bilateral    Hypertension     Vitamin B12 deficiency     Vitamin D deficiency      Past Surgical History:   Procedure Laterality Date    ANKLE SURGERY      repair    COLONOSCOPY      polyp removed; benign    COLONOSCOPY  2019    COLONOSCOPY W/ POLYPECTOMY      Followed by Dr. Pang     FOOT FRACTURE SURGERY Bilateral     MULTIPLE TOOTH EXTRACTIONS      POLYPECTOMY      TIBIA FRACTURE SURGERY Left      Family History   Problem Relation Age of Onset    Cancer Mother         lung    Heart disease Mother     Cancer Father         prostate    Heart murmur Brother     Diabetes Daughter     Diabetes Son     Diabetes Other      Social History     Socioeconomic History    Marital status: /Civil Union     Spouse name: Not on file    Number of children: Not on file    Years of education: Not on file    Highest education level: Not on file   Occupational History    Not on file   Tobacco Use    Smoking status: Former     Current packs/day: 0.00     Average packs/day: 1 pack/day for 44.4 years (44.4 ttl pk-yrs)     Types: Cigarettes     Start date: 1976     Quit date: 2020     Years since quittin.8    Smokeless tobacco: Former     Types: Chew     Quit date: 2014   Vaping Use    Vaping status: Never Used   Substance and Sexual Activity    Alcohol use: Not Currently    Drug use: No    Sexual activity: Not Currently     Partners: Female   Other Topics Concern    Not on file   Social History Narrative    Not on file     Social Drivers of Health     Financial Resource Strain:  "Not on file   Food Insecurity: No Food Insecurity (2025)    Nursing - Inadequate Food Risk Classification     Worried About Running Out of Food in the Last Year: Not on file     Ran Out of Food in the Last Year: Not on file     Ran Out of Food in the Last Year: Never true   Transportation Needs: No Transportation Needs (2025)    Nursing - Transportation Risk Classification     Lack of Transportation: Not on file     Lack of Transportation: No   Physical Activity: Not on file   Stress: Not on file   Social Connections: Not on file   Intimate Partner Violence: Patient Unable To Answer (2025)    Nursing IPS     Feels Physically and Emotionally Safe: Not on file     Physically Hurt by Someone: Not on file     Humiliated or Emotionally Abused by Someone: Not on file     Physically Hurt by Someone: Patient unable to answer     Hurt or Threatened by Someone: Patient unable to answer   Housing Stability: Unknown (2025)    Nursing: Inadequate Housing Risk Classification     Has Housing: Not on file     Worried About Losing Housing: Not on file     Unable to Get Utilities: Not on file     Unable to Pay for Housing in the Last Year: No     Has Housin     Scheduled Meds:  Continuous Infusions:No current facility-administered medications for this visit.    PRN Meds:.  No Known Allergies      PHYSICAL EXAMINATION:    Ht 5' 8\" (1.727 m)   Wt 110 kg (242 lb 6.4 oz)   BMI 36.86 kg/m²     Gen: A&Ox3, NAD    Left Upper Extremity:  Skin intact, dorsal incision well-healed  Small rim of erythema around the tip of the finger where the pin is just subcutaneous  Retrograde intramedullary pin removed.   Sensation intact to light touch in the axillary median, ulnar, and radial nerve distributions  NV intact  Two point discrimination: 7mm in radial and ulnar tip of index finger  The fingertip is warm and well-perfused with good capillary refill  The incision well-healed        STUDIES REVIEWED:  X-Ray of left finger " obtained on 3/20/2025 were reviewed and demonstrate the fracture is relatively well aligned.  Although the lateral view is not the same as prior films so it is difficult to assess there is the possibility there has been some shortening at the fracture site, however overall alignment has been maintained      Allen Montano MD  Hand and Upper Extremity Surgery        *This note was dictated using Dragon voice recognition software. Please excuse any word substitutions or errors.*      Scribe Attestation      I,:  Michel Parham am acting as a scribe while in the presence of the attending physician.:       I,:  Allen Montano MD personally performed the services described in this documentation    as scribed in my presence.:

## 2025-03-20 NOTE — PROGRESS NOTES
Assessment and Plan:  No diagnosis found.      69 y.o. male presents 4 weeks status post left index finger I&D with open reduction and percutaneous pinning and left middle finger I&D.  I remove the cross pin today and obtained x-rays.  The fracture alignment has been maintained.  I think I can actually potentially appreciate some interval fracture healing.  The tip of the finger itself is well-perfused and the areas of scabbing or starting to peel away revealing nicely epithelialized skin below.  The patient has no pain.  There are no signs of infection.  He does have some stiffness of the PIP joints I expressed to him the need to focus on range of motion of the PIP joint at this time.  He does not need to use any splint anymore.  He can shower normally but should not submerge the finger.  He will come back in 4 weeks for the removal of the trans DIP pin.    Dressing and pin was removed in the office today.   No signs of infection today.   Follow-up in 4 weeks    he expressed understanding of the plan and agreed. We encouraged them to contact our office with any questions or concerns.       Chief Complaint:     Post op follow-up    Surgery:  Left index finger I&D with open reduction and percutaneous pinning and left middle finger I&D - 1/202025     History of Present Illness:   Patient presents now 4 weeks status post the above surgery. Patient has maintained dressing as directed.  He has no pain.  He has kept the dressing clean and dry since we saw him.  Overall he has no complaints.      Past Medical History:  Past Medical History:   Diagnosis Date   • Anxiety    • Carpal tunnel syndrome    • Colon polyps    • DJD (degenerative joint disease)    • Duodenal ulcer    • Fatty liver    • Gastric reflux    • Gastric ulcer    • Heel fracture     bilateral   • Hypertension    • Vitamin B12 deficiency    • Vitamin D deficiency      Past Surgical History:   Procedure Laterality Date   • ANKLE SURGERY      repair   •  COLONOSCOPY      polyp removed; benign   • COLONOSCOPY  2019   • COLONOSCOPY W/ POLYPECTOMY      Followed by Dr. Pang    • FOOT FRACTURE SURGERY Bilateral    • MULTIPLE TOOTH EXTRACTIONS     • POLYPECTOMY     • TIBIA FRACTURE SURGERY Left      Family History   Problem Relation Age of Onset   • Cancer Mother         lung   • Heart disease Mother    • Cancer Father         prostate   • Heart murmur Brother    • Diabetes Daughter    • Diabetes Son    • Diabetes Other      Social History     Socioeconomic History   • Marital status: /Civil Union     Spouse name: Not on file   • Number of children: Not on file   • Years of education: Not on file   • Highest education level: Not on file   Occupational History   • Not on file   Tobacco Use   • Smoking status: Former     Current packs/day: 0.00     Average packs/day: 1 pack/day for 44.4 years (44.4 ttl pk-yrs)     Types: Cigarettes     Start date: 1976     Quit date: 2020     Years since quittin.8   • Smokeless tobacco: Former     Types: Chew     Quit date: 2014   Vaping Use   • Vaping status: Never Used   Substance and Sexual Activity   • Alcohol use: Not Currently   • Drug use: No   • Sexual activity: Not Currently     Partners: Female   Other Topics Concern   • Not on file   Social History Narrative   • Not on file     Social Drivers of Health     Financial Resource Strain: Not on file   Food Insecurity: No Food Insecurity (2025)    Nursing - Inadequate Food Risk Classification    • Worried About Running Out of Food in the Last Year: Not on file    • Ran Out of Food in the Last Year: Not on file    • Ran Out of Food in the Last Year: Never true   Transportation Needs: No Transportation Needs (2025)    Nursing - Transportation Risk Classification    • Lack of Transportation: Not on file    • Lack of Transportation: No   Physical Activity: Not on file   Stress: Not on file   Social Connections: Not on file   Intimate Partner  "Violence: Patient Unable To Answer (2025)    Nursing IPS    • Feels Physically and Emotionally Safe: Not on file    • Physically Hurt by Someone: Not on file    • Humiliated or Emotionally Abused by Someone: Not on file    • Physically Hurt by Someone: Patient unable to answer    • Hurt or Threatened by Someone: Patient unable to answer   Housing Stability: Unknown (2025)    Nursing: Inadequate Housing Risk Classification    • Has Housing: Not on file    • Worried About Losing Housing: Not on file    • Unable to Get Utilities: Not on file    • Unable to Pay for Housing in the Last Year: No    • Has Housin     Scheduled Meds:  Continuous Infusions:No current facility-administered medications for this visit.    PRN Meds:.  No Known Allergies      PHYSICAL EXAMINATION:    Ht 5' 8\" (1.727 m)   Wt 110 kg (242 lb 6.4 oz)   BMI 36.86 kg/m²     Gen: A&Ox3, NAD    Left Upper Extremity:  No erythema, drainage, or signs of acute infection.   Cross pin removed.  Sensation intact to light touch in the axillary median, ulnar, and radial nerve distributions  NV intact  Two point discrimination: 7mm in radial and ulnar tip of index finger  The fingertip is warm and well-perfused with good capillary refill  The incision well-healed        STUDIES REVIEWED:  X-Ray of left finger obtained on 2025 were reviewed and demonstrate maintained fracture reduction with what appears to be some interval evidence of fracture healing with sclerotic changes around the fracture site        Allen Montano MD  Hand and Upper Extremity Surgery        *This note was dictated using Dragon voice recognition software. Please excuse any word substitutions or errors.*      Scribe Attestation    I,:   am acting as a scribe while in the presence of the attending physician.:       I,:   personally performed the services described in this documentation    as scribed in my presence.:           "

## 2025-03-27 ENCOUNTER — OFFICE VISIT (OUTPATIENT)
Dept: BARIATRICS | Facility: CLINIC | Age: 70
End: 2025-03-27
Payer: MEDICARE

## 2025-03-27 VITALS
BODY MASS INDEX: 36.59 KG/M2 | SYSTOLIC BLOOD PRESSURE: 142 MMHG | RESPIRATION RATE: 96 BRPM | WEIGHT: 241.4 LBS | DIASTOLIC BLOOD PRESSURE: 82 MMHG | HEART RATE: 71 BPM | HEIGHT: 68 IN | TEMPERATURE: 98.1 F

## 2025-03-27 DIAGNOSIS — K75.81 METABOLIC DYSFUNCTION-ASSOCIATED STEATOHEPATITIS (MASH): ICD-10-CM

## 2025-03-27 DIAGNOSIS — E66.01 SEVERE OBESITY (BMI 35.0-39.9) WITH COMORBIDITY (HCC): Primary | ICD-10-CM

## 2025-03-27 DIAGNOSIS — R73.03 PREDIABETES: ICD-10-CM

## 2025-03-27 DIAGNOSIS — I10 HYPERTENSION, UNSPECIFIED TYPE: ICD-10-CM

## 2025-03-27 DIAGNOSIS — K21.9 GASTROESOPHAGEAL REFLUX DISEASE WITHOUT ESOPHAGITIS: ICD-10-CM

## 2025-03-27 PROCEDURE — 99204 OFFICE O/P NEW MOD 45 MIN: CPT | Performed by: PHYSICIAN ASSISTANT

## 2025-03-27 NOTE — ASSESSMENT & PLAN NOTE
-Discussed options of [facility]-Intensive Lifestyle Intervention Program, Very Low Calorie Diet-VLCD, Conservative Program, Melba-En-Y Gastric Bypass, and Vertical Sleeve Gastrectomy and the role of weight loss medications.  -Initial weight loss goal of 5-10% weight loss for improved health  -Screening labs: reviewed CMP. Reports he will be having labs update in April after he sees his PCP  -Patient is interested in pursuing conservative program  -Calorie goals, sample menu, portion size guidelines, and food logging reviewed with the patient.    -Declines meeting with RD  -patient is also interested in AOMs. Discussed the importance of consistent dietary and lifestyle changes for long term success. Patient to check coverage. Denies personal hx pancreatitis or family hx MEN2 or MTC  -medication agreement signed.

## 2025-03-27 NOTE — PROGRESS NOTES
Assessment/Plan:    Severe obesity (BMI 35.0-39.9) with comorbidity (HCC)  -Discussed options of [facility]-Intensive Lifestyle Intervention Program, Very Low Calorie Diet-VLCD, Conservative Program, Melba-En-Y Gastric Bypass, and Vertical Sleeve Gastrectomy and the role of weight loss medications.  -Initial weight loss goal of 5-10% weight loss for improved health  -Screening labs: reviewed CMP. Reports he will be having labs update in April after he sees his PCP  -Patient is interested in pursuing conservative program  -Calorie goals, sample menu, portion size guidelines, and food logging reviewed with the patient.    -Declines meeting with RD  -patient is also interested in AOMs. Discussed the importance of consistent dietary and lifestyle changes for long term success. Patient to check coverage. Denies personal hx pancreatitis or family hx MEN2 or MTC  -medication agreement signed.     Hypertension  -On Lisinopril  -can improve with weight loss    Gastroesophageal reflux disease without esophagitis  -On Protonix  -avoid food triggers, large portion sizes  -can improve with weight loss    Prediabetes  -HgbA1c 6.0. Update lab  -avoid/limit refined carbohydrates  -can improve with weight loss    Goals:    Food log (ie.) www.[employer].com,sparkpeople.com,loseit.com,calorieking.com,etc.   No sugary beverages. At least 64oz of water daily.  Increase physical activity by 10 minutes daily. Gradually increase physical activity to a goal of 5 days per week for 30 minutes of MODERATE intensity PLUS 2 days per week of FULL BODY resistance training  1500 Calories per day  5-10 servings of fruits and vegetables per day  25-35 grams of dietary fiber per day    Follow up in approximately  4 months  with Non-Surgical Physician/Advanced Practitioner.    Diagnoses and all orders for this visit:    Severe obesity (BMI 35.0-39.9) with comorbidity (HCC)    Metabolic dysfunction-associated steatohepatitis (MASH)  -     Ambulatory  Referral to Weight Management    Hypertension, unspecified type    Gastroesophageal reflux disease without esophagitis    Prediabetes          Subjective:   Chief Complaint   Patient presents with    Consult       Patient ID: Uriel Howell III  is a 69 y.o. male with excess weight/obesity here to pursue weight managment.    Past Medical History:   Diagnosis Date    Anxiety     Carpal tunnel syndrome     Colon polyps     DJD (degenerative joint disease)     Duodenal ulcer     Fatty liver     Gastric reflux     Gastric ulcer     Heel fracture     bilateral    Hypertension     Vitamin B12 deficiency     Vitamin D deficiency          HPI:  Obesity/Excess Weight:  Severity: Severe  Onset:  Adult life, since quit smoking  Modifiers:  no attempts  Contributing factors:  quitting smoking, not as active, large portions  Associated symptoms: comorbid conditions    Goals: 220 lbs  Highest: 249    Hydration: water 40oz, diet coke, 2 cups coffee + sugar + half and half, occ tomato juice, rare sweetened iced tea  ETOH: denies  Exercise: denies  Occupation: [employer]  Smoking: former  Sleep: 5-6 hours    B: 2 slices of white bread + butter + jelly +PB  S: skips  L: Ham or bologna or turkey + cheese sandwich + fruit  S: chips or fruit  D: chicken or pork chops + veggie + potato  S: chips      Colonoscopy: completed 2024      The following portions of the patient's history were reviewed and updated as appropriate: allergies, current medications, past family history, past medical history, past social history, past surgical history, and problem list.    Review of Systems   Constitutional:  Negative for chills and fever.   HENT:  Negative for sore throat.    Respiratory:  Positive for cough (dry). Negative for shortness of breath.    Cardiovascular:  Negative for chest pain and palpitations.   Gastrointestinal:  Negative for abdominal pain, nausea and vomiting.   Genitourinary:  Negative for dysuria.   Musculoskeletal:  Positive  "for arthralgias.   Skin:  Negative for rash.   Neurological:  Negative for dizziness and headaches.   Psychiatric/Behavioral:  Negative for dysphoric mood.        Objective:    /82 (BP Location: Right arm, Patient Position: Sitting, Cuff Size: Large)   Pulse 71   Temp 98.1 °F (36.7 °C) (Temporal)   Resp (!) 96   Ht 5' 8\" (1.727 m)   Wt 109 kg (241 lb 6.4 oz)   BMI 36.70 kg/m²     Physical Exam  Vitals and nursing note reviewed.      Constitutional   General appearance: Abnormal.  well developed and obese.   Eyes No conjunctival pallor.   Ears, Nose, Mouth, and Throat Oral mucosa moist.   Pulmonary   Respiratory effort: No increased work of breathing or signs of respiratory distress.    Auscultation of lungs: Clear to auscultation, equal breath sounds bilaterally, no wheezes, no rales, no rhonci.    Cardiovascular   Auscultation of heart: Normal rate and rhythm, normal S1 and S2, without murmurs.    Examination of extremities for edema and/or varicosities: Normal.  no edema.   Abdomen   Abdomen: Abnormal.  The abdomen was obese. Bowel sounds were normal. The abdomen was soft and nontender.   Musculoskeletal   Gait and station: Normal.    Psychiatric   Orientation to person, place and time: Normal.    Affect: appropriate   "

## 2025-04-07 DIAGNOSIS — K21.9 GASTROESOPHAGEAL REFLUX DISEASE WITHOUT ESOPHAGITIS: ICD-10-CM

## 2025-04-07 RX ORDER — PANTOPRAZOLE SODIUM 40 MG/1
40 TABLET, DELAYED RELEASE ORAL 2 TIMES DAILY
Qty: 180 TABLET | Refills: 1 | Status: SHIPPED | OUTPATIENT
Start: 2025-04-07

## 2025-04-17 ENCOUNTER — HOSPITAL ENCOUNTER (OUTPATIENT)
Dept: RADIOLOGY | Facility: CLINIC | Age: 70
End: 2025-04-17
Attending: STUDENT IN AN ORGANIZED HEALTH CARE EDUCATION/TRAINING PROGRAM
Payer: MEDICARE

## 2025-04-17 ENCOUNTER — OFFICE VISIT (OUTPATIENT)
Dept: OBGYN CLINIC | Facility: CLINIC | Age: 70
End: 2025-04-17

## 2025-04-17 DIAGNOSIS — S62.609D OPEN FRACTURE OF PHALANX OF DIGIT OF HAND WITH ROUTINE HEALING, SUBSEQUENT ENCOUNTER: Primary | ICD-10-CM

## 2025-04-17 DIAGNOSIS — S62.609D OPEN FRACTURE OF PHALANX OF DIGIT OF HAND WITH ROUTINE HEALING, SUBSEQUENT ENCOUNTER: ICD-10-CM

## 2025-04-17 PROCEDURE — 99024 POSTOP FOLLOW-UP VISIT: CPT | Performed by: STUDENT IN AN ORGANIZED HEALTH CARE EDUCATION/TRAINING PROGRAM

## 2025-04-17 PROCEDURE — 73140 X-RAY EXAM OF FINGER(S): CPT

## 2025-04-17 NOTE — PROGRESS NOTES
Assessment and Plan:  1. Open fracture of phalanx of digit of hand with routine healing, subsequent encounter  XR finger left second digit-index          69 y.o. male presents 12 weeks status post left index finger I&D with open reduction and percutaneous pinning and left middle finger I&D. The fracture has healed with some collapse of the ulnar column of the middle phalanx leading to slight angular deformity. This was dicussed with the patient. The patient states that the angulation does not bother him in the least. The patient has returned to all preinjury activities, including work around the house without issue.  He says his finger feels good. He reports that he is happy with the use of it at this point.    Weightbearing as tolerated  Range of motion as tolerated and encouraged.    Follow-up on an as-needed basis     he expressed understanding of the plan and agreed. We encouraged them to contact our office with any questions or concerns.       Chief Complaint:     Post op follow-up    Surgery:  Left index finger I&D with open reduction and percutaneous pinning and left middle finger I&D - 1/202025     History of Present Illness:   Patient presents now 12 weeks status post the above surgery. Patient is doing well since his last visit. He denies pain at this time. He states that he has been using his left hand for regular activity without restriction. He has been able to do construction around the house and has been able to fire his bow without issues. He states that to him the finger functions as if there had been no injury. He denies numbness or tingling.       Past Medical History:  Past Medical History:   Diagnosis Date    Anxiety     Carpal tunnel syndrome     Colon polyps     DJD (degenerative joint disease)     Duodenal ulcer     Fatty liver     Gastric reflux     Gastric ulcer     Heel fracture     bilateral    Hypertension     Vitamin B12 deficiency     Vitamin D deficiency      Past Surgical History:    Procedure Laterality Date    ANKLE SURGERY      repair    COLONOSCOPY      polyp removed; benign    COLONOSCOPY  2019    COLONOSCOPY W/ POLYPECTOMY      Followed by Dr. Pang     FOOT FRACTURE SURGERY Bilateral     MULTIPLE TOOTH EXTRACTIONS      POLYPECTOMY      TIBIA FRACTURE SURGERY Left      Family History   Problem Relation Age of Onset    Cancer Mother         lung    Heart disease Mother     Cancer Father         prostate    Heart murmur Brother     Diabetes Daughter     Diabetes Son     Diabetes Other      Social History     Socioeconomic History    Marital status: /Civil Union     Spouse name: Not on file    Number of children: Not on file    Years of education: Not on file    Highest education level: Not on file   Occupational History    Not on file   Tobacco Use    Smoking status: Former     Current packs/day: 0.00     Average packs/day: 1 pack/day for 44.4 years (44.4 ttl pk-yrs)     Types: Cigarettes     Start date: 1976     Quit date: 2020     Years since quittin.8    Smokeless tobacco: Former     Types: Chew     Quit date: 2014   Vaping Use    Vaping status: Never Used   Substance and Sexual Activity    Alcohol use: Not Currently    Drug use: No    Sexual activity: Not Currently     Partners: Female   Other Topics Concern    Not on file   Social History Narrative    Not on file     Social Drivers of Health     Financial Resource Strain: Not on file   Food Insecurity: No Food Insecurity (2025)    Nursing - Inadequate Food Risk Classification     Worried About Running Out of Food in the Last Year: Not on file     Ran Out of Food in the Last Year: Not on file     Ran Out of Food in the Last Year: Never true   Transportation Needs: No Transportation Needs (2025)    Nursing - Transportation Risk Classification     Lack of Transportation: Not on file     Lack of Transportation: No   Physical Activity: Not on file   Stress: Not on file   Social Connections: Not on  file   Intimate Partner Violence: Patient Unable To Answer (2025)    Nursing IPS     Feels Physically and Emotionally Safe: Not on file     Physically Hurt by Someone: Not on file     Humiliated or Emotionally Abused by Someone: Not on file     Physically Hurt by Someone: Patient unable to answer     Hurt or Threatened by Someone: Patient unable to answer   Housing Stability: Unknown (2025)    Nursing: Inadequate Housing Risk Classification     Has Housing: Not on file     Worried About Losing Housing: Not on file     Unable to Get Utilities: Not on file     Unable to Pay for Housing in the Last Year: No     Has Housin     Scheduled Meds:  Continuous Infusions:No current facility-administered medications for this visit.    PRN Meds:.  No Known Allergies      PHYSICAL EXAMINATION:  There were no vitals taken for this visit.    Gen: A&Ox3, NAD    Left Upper Extremity:   The fingertip is warm and well-perfused with good capillary refill  The incision well-healed  Slight ulnar angulation of the distal phalanx of the finger  Index DIP ROM 0-30 of active flexion  FDS, FDP and EDC intact in the index finger  Sensation intact to light touch in the axillary median, ulnar, and radial nerve distributions  NV intact  2-pt Discrimination testing: The sensation in the affected digit is better than the surrounding: Thumb 6mm, index 5mm radial and ulnar aspects, remaining digits 6mm      STUDIES REVIEWED:  X-Ray of left finger obtained on 3/20/2025 were reviewed and demonstrate the fracture is relatively well aligned.  Although the lateral view is not the same as prior films so it is difficult to assess there is the possibility there has been some shortening at the fracture site, however overall alignment has been maintained      PROCEDURES:   Procedures  None performed today    Scribe Attestation      I,:  Dinora Raya PA-C am acting as a scribe while in the presence of the attending physician.:       I,:   Allen Montano MD personally performed the services described in this documentation    as scribed in my presence.:

## 2025-04-17 NOTE — PROGRESS NOTES
States no functional deficit    Limited ROM at PIP  Redness about the Distal Phalanx  2-pt Discrimination testing: The sensation in the affected digit is better than the surrounding: Thumb 6mm, index 5mm, remaining 6mm

## 2025-06-12 ENCOUNTER — OFFICE VISIT (OUTPATIENT)
Dept: OBGYN CLINIC | Facility: CLINIC | Age: 70
End: 2025-06-12
Payer: MEDICARE

## 2025-06-12 VITALS — HEIGHT: 68 IN | BODY MASS INDEX: 35.64 KG/M2 | WEIGHT: 235.2 LBS

## 2025-06-12 DIAGNOSIS — R20.0 BILATERAL HAND NUMBNESS: Primary | ICD-10-CM

## 2025-06-12 PROCEDURE — 99213 OFFICE O/P EST LOW 20 MIN: CPT | Performed by: STUDENT IN AN ORGANIZED HEALTH CARE EDUCATION/TRAINING PROGRAM

## 2025-06-12 NOTE — PROGRESS NOTES
Orthopedic Surgery Management Note  Uriel Howell III (69 y.o. male)  : 1955 Encounter Date: 2025      Assessment and Plan:    Assessment & Plan  Bilateral hand numbness  The patient presents today with longstanding bilateral hand pain and numbness.  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  Notably he does have cervical spine disease so there could be an element of cervical radiculopathy involved.  Recommend bilateral upper extremity EMG to evaluate the ulnar and median nerve function.   In regards to treatments the patient has tried bracing of the upper extremities intermittently in the past.  These have not provided any relief.  We did discuss the pathology and treatment options for compressive neuropathies.  We discussed conservative and operative treatment options.  At this point the patient is no longer interested in conservative management as this is failed him in the past.  He is more interested in definitive surgical intervention.  I did discuss with him I would like to get an EMG to fully understand the extent of his pathology before proceeding.  For today's evaluation the patient wanted his wrists evaluated for the numbness and had no concerns for this index finger. For his left index finger, he reports he has been using it normally without issue. There is no pain on exam. He will continue to follow up as need for the finger.   Follow-up after EMG results.         Orders:    EMG; Future        Chief Complaint:     Right hand pain    Previous History:   Patient is 5 months s/p left index finger I&D with open reduction and percutaneous pinning and left middle finger I&D.     Interval History:  Patient reports he is having pain in the left index finger. Patient also reports he is having electrical feeling with shooting pain going into all fingers of both hands. Patient states left  hand is worse than right. Patient states numbness in the tips of fingers is constant but worsens with certain positions and at night. Pain and numbness worsens with activity and driving.  He does have nighttime symptoms that wake him up.  Patient denies any numbness in his feet. Patient reports he has history of neck issues. Patient has tried wrist bracing in the past without relief.     Past Medical History:  Past Medical History[1]  Past Surgical History[2]  Family History[3]  Social History     Socioeconomic History    Marital status: /Civil Union     Spouse name: Not on file    Number of children: Not on file    Years of education: Not on file    Highest education level: Not on file   Occupational History    Not on file   Tobacco Use    Smoking status: Former     Current packs/day: 0.00     Average packs/day: 1 pack/day for 44.4 years (44.4 ttl pk-yrs)     Types: Cigarettes     Start date: 1976     Quit date: 2020     Years since quittin.0    Smokeless tobacco: Former     Types: Chew     Quit date: 2014   Vaping Use    Vaping status: Never Used   Substance and Sexual Activity    Alcohol use: Not Currently    Drug use: No    Sexual activity: Not Currently     Partners: Female   Other Topics Concern    Not on file   Social History Narrative    Not on file     Social Drivers of Health     Financial Resource Strain: Not on file   Food Insecurity: No Food Insecurity (2025)    Nursing - Inadequate Food Risk Classification     Worried About Running Out of Food in the Last Year: Not on file     Ran Out of Food in the Last Year: Not on file     Ran Out of Food in the Last Year: Never true   Transportation Needs: No Transportation Needs (2025)    Nursing - Transportation Risk Classification     Lack of Transportation: Not on file     Lack of Transportation: No   Physical Activity: Not on file   Stress: Not on file   Social Connections: Not on file   Intimate Partner Violence: Patient  "Unable To Answer (2025)    Nursing IPS     Feels Physically and Emotionally Safe: Not on file     Physically Hurt by Someone: Not on file     Humiliated or Emotionally Abused by Someone: Not on file     Physically Hurt by Someone: Patient unable to answer     Hurt or Threatened by Someone: Patient unable to answer   Housing Stability: Unknown (2025)    Nursing: Inadequate Housing Risk Classification     Has Housing: Not on file     Worried About Losing Housing: Not on file     Unable to Get Utilities: Not on file     Unable to Pay for Housing in the Last Year: No     Has Housin     Scheduled Meds:  Continuous Infusions:No current facility-administered medications for this visit.    PRN Meds:.  Allergies[4]    Physical Examination:    Ht 5' 8\" (1.727 m)   Wt 107 kg (235 lb 3.2 oz)   BMI 35.76 kg/m²     Gen: A&Ox3, NAD  Cardiac: regular rate  Chest: non labored breathing  Abdomen: Non-distended      Left Upper Extremity:  Skin CDI  No obvious deformity of the shoulder, arm, elbow, forearm, wrist, hand. There is the persistent slight angulation of the index finger at the DIP joint but he has no pain and about a 30 degree active motion arc  Durkans positive  Positive tinel at carpal tunnel  Positive tinel at elbow  Sensation intact to light touch in the axillary median, ulnar, and radial nerve distributions  NV intact  Two point discrimination: 7mm in thumb, 5mm in index finger, 7mm in long finger, 8mm in radial ring finger, 7mm in ulnar ring finger, 8mm in small finger.   5-/5 APB  5/5 first dorsal IO, EPL, FPL    Right Upper Extremity:  Skin CDI  No obvious deformity of the shoulder, arm, elbow, forearm, wrist, hand  Positive tinel at elbow  Sensation intact to light touch in the axillary median, ulnar, and radial nerve distributions  NV intact  Two point discrimination: 7mm in thumb, 7mm in index finger, 5mm in long finger, 6mm in radial ring finger, 8mm in ulnar ring finger, 7mm in small finger. "   5-/5 APB  5/5 first dorsal IO, EPL, FPL      Studies:  Radiographs: I personally reviewed and independently interpreted the available radiographs.   No new imaging.         Allen Montano MD  Hand and Upper Extremity Surgery      *This note was dictated using Dragon voice recognition software. Please excuse any word substitutions or errors.*    Scribe Attestation      I,:  Keshia Beauchamp PA-C am acting as a scribe while in the presence of the attending physician.:       I,:  Allen Montano MD personally performed the services described in this documentation    as scribed in my presence.:                    [1]   Past Medical History:  Diagnosis Date    Anxiety     Carpal tunnel syndrome     Colon polyps     DJD (degenerative joint disease)     Duodenal ulcer     Fatty liver     Gastric reflux     Gastric ulcer     Heel fracture     bilateral    Hypertension     Vitamin B12 deficiency     Vitamin D deficiency    [2]   Past Surgical History:  Procedure Laterality Date    ANKLE SURGERY      repair    COLONOSCOPY      polyp removed; benign    COLONOSCOPY  02/07/2019    COLONOSCOPY W/ POLYPECTOMY      Followed by Dr. Pang     FOOT FRACTURE SURGERY Bilateral     MULTIPLE TOOTH EXTRACTIONS      POLYPECTOMY      TIBIA FRACTURE SURGERY Left    [3]   Family History  Problem Relation Name Age of Onset    Cancer Mother Eve         lung    Heart disease Mother Eve     Cancer Father Uriel.         prostate    Heart murmur Brother      Diabetes Daughter      Diabetes Son      Diabetes Other grandmother    [4] No Known Allergies

## 2025-06-12 NOTE — PROGRESS NOTES
Orthopedic Surgery Management Note  Uriel Howell III (69 y.o. male)  : 1955 Encounter Date: 2025      Assessment and Plan:    Assessment & Plan      69 y.o. male presents in follow up for the above diagnosis. ***    he expressed understanding of the plan and agreed. We encouraged them to contact our office with any questions or concerns.     No follow-ups on file.  for evaluation {WITH/WITHOUT:62877} x-ray      Chief Complaint:     ***    Previous History:   ***    Interval History:  ***    Past Medical History:  Past Medical History[1]  Past Surgical History[2]  Family History[3]  Social History     Socioeconomic History    Marital status: /Civil Union     Spouse name: Not on file    Number of children: Not on file    Years of education: Not on file    Highest education level: Not on file   Occupational History    Not on file   Tobacco Use    Smoking status: Former     Current packs/day: 0.00     Average packs/day: 1 pack/day for 44.4 years (44.4 ttl pk-yrs)     Types: Cigarettes     Start date: 1976     Quit date: 2020     Years since quittin.0    Smokeless tobacco: Former     Types: Chew     Quit date: 2014   Vaping Use    Vaping status: Never Used   Substance and Sexual Activity    Alcohol use: Not Currently    Drug use: No    Sexual activity: Not Currently     Partners: Female   Other Topics Concern    Not on file   Social History Narrative    Not on file     Social Drivers of Health     Financial Resource Strain: Not on file   Food Insecurity: No Food Insecurity (2025)    Nursing - Inadequate Food Risk Classification     Worried About Running Out of Food in the Last Year: Not on file     Ran Out of Food in the Last Year: Not on file     Ran Out of Food in the Last Year: Never true   Transportation Needs: No Transportation Needs (2025)    Nursing - Transportation Risk Classification     Lack of Transportation: Not on file     Lack of Transportation: No  "  Physical Activity: Not on file   Stress: Not on file   Social Connections: Not on file   Intimate Partner Violence: Patient Unable To Answer (2025)    Nursing IPS     Feels Physically and Emotionally Safe: Not on file     Physically Hurt by Someone: Not on file     Humiliated or Emotionally Abused by Someone: Not on file     Physically Hurt by Someone: Patient unable to answer     Hurt or Threatened by Someone: Patient unable to answer   Housing Stability: Unknown (2025)    Nursing: Inadequate Housing Risk Classification     Has Housing: Not on file     Worried About Losing Housing: Not on file     Unable to Get Utilities: Not on file     Unable to Pay for Housing in the Last Year: No     Has Housin     Scheduled Meds:  Continuous Infusions:No current facility-administered medications for this visit.    PRN Meds:.  Allergies[4]    Physical Examination:    Ht 5' 8\" (1.727 m)   Wt 107 kg (235 lb 3.2 oz)   BMI 35.76 kg/m²     Gen: A&Ox3, NAD  Cardiac: regular rate  Chest: non labored breathing  Abdomen: Non-distended    Cervcial Spine: ***Negative Spurling's    Right Upper Extremity:  Skin CDI  No obvious deformity of the shoulder, arm, elbow, forearm, wrist, hand  ***  Sensation intact to light touch in the axillary median, ulnar, and radial nerve distributions  5/5 motor to FPL (AIN), EPL (PIN) and FDIO (ulnar)  2+RP      Left Upper Extremity:  Skin CDI  No obvious deformity of the shoulder, arm, elbow, forearm, wrist, hand  ***  Sensation intact to light touch in the axillary median, ulnar, and radial nerve distributions  5/5 motor to FPL (AIN), EPL (PIN) and FDIO (ulnar)  2+RP    Studies:  Radiographs: I personally reviewed and independently interpreted the available radiographs.   No new imaging.         Allen Montano MD  Hand and Upper Extremity Surgery      *This note was dictated using Dragon voice recognition software. Please excuse any word substitutions or errors.*      Scribe Attestation "      I,:  Keshia Beauchamp PA-C am acting as a scribe while in the presence of the attending physician.:       I,:  Allen Montano MD personally performed the services described in this documentation    as scribed in my presence.:                  [1]   Past Medical History:  Diagnosis Date    Anxiety     Carpal tunnel syndrome     Colon polyps     DJD (degenerative joint disease)     Duodenal ulcer     Fatty liver     Gastric reflux     Gastric ulcer     Heel fracture     bilateral    Hypertension     Vitamin B12 deficiency     Vitamin D deficiency    [2]   Past Surgical History:  Procedure Laterality Date    ANKLE SURGERY      repair    COLONOSCOPY      polyp removed; benign    COLONOSCOPY  02/07/2019    COLONOSCOPY W/ POLYPECTOMY      Followed by Dr. Pang     FOOT FRACTURE SURGERY Bilateral     MULTIPLE TOOTH EXTRACTIONS      POLYPECTOMY      TIBIA FRACTURE SURGERY Left    [3]   Family History  Problem Relation Name Age of Onset    Cancer Mother Eve         lung    Heart disease Mother Eve     Cancer Father Uriel.         prostate    Heart murmur Brother      Diabetes Daughter      Diabetes Son      Diabetes Other grandmother    [4] No Known Allergies

## 2025-06-17 ENCOUNTER — TELEPHONE (OUTPATIENT)
Dept: GASTROENTEROLOGY | Facility: CLINIC | Age: 70
End: 2025-06-17

## 2025-06-17 ENCOUNTER — OFFICE VISIT (OUTPATIENT)
Dept: GASTROENTEROLOGY | Facility: CLINIC | Age: 70
End: 2025-06-17
Payer: MEDICARE

## 2025-06-17 VITALS
HEART RATE: 61 BPM | SYSTOLIC BLOOD PRESSURE: 126 MMHG | DIASTOLIC BLOOD PRESSURE: 82 MMHG | BODY MASS INDEX: 35.77 KG/M2 | OXYGEN SATURATION: 95 % | RESPIRATION RATE: 16 BRPM | WEIGHT: 236 LBS | HEIGHT: 68 IN | TEMPERATURE: 97.5 F

## 2025-06-17 DIAGNOSIS — K75.81 METABOLIC DYSFUNCTION-ASSOCIATED STEATOHEPATITIS (MASH): Primary | ICD-10-CM

## 2025-06-17 DIAGNOSIS — Z86.0100 HISTORY OF COLONIC POLYPS: ICD-10-CM

## 2025-06-17 DIAGNOSIS — K74.02 ADVANCED HEPATIC FIBROSIS: ICD-10-CM

## 2025-06-17 DIAGNOSIS — K21.9 GASTROESOPHAGEAL REFLUX DISEASE, UNSPECIFIED WHETHER ESOPHAGITIS PRESENT: ICD-10-CM

## 2025-06-17 DIAGNOSIS — A04.8 H. PYLORI INFECTION: ICD-10-CM

## 2025-06-17 PROCEDURE — 99213 OFFICE O/P EST LOW 20 MIN: CPT | Performed by: FAMILY MEDICINE

## 2025-06-17 NOTE — Clinical Note
Please call patient later today to determine if he is taking Rezdiffra. If not, please determine why. If this is due to cost then would recommend pursuing patient assistance. Thank you!

## 2025-06-17 NOTE — PROGRESS NOTES
Name: Uriel Howell III      : 1955      MRN: [id number]  Encounter Provider: Sita Lombardo PA-C  Encounter Date: 2025   Encounter department: [facility]  :  Assessment & Plan  Metabolic dysfunction-associated steatohepatitis (MASH)  Advanced hepatic fibrosis  Patient with mild and intermittently elevated serum transaminases with ALT predominance since 2022 prompting an abdominal ultrasound notable for hepatomegaly and hepatic steatosis. He had a complete serologic evaluation which was negative for competing causes of liver disease including celiac disease. He also had a repeat abdominal ultrasound again demonstrating hepatomegaly and hepatic steatosis as well as an ultrasound elastography showing F3 fibrosis.     Again, he did not complete his labs (CBC, CMP, INR, A1AT phenotype) as requested. These labs were reordered and printed so he may take them to his primary care provider's office ([facility]) and have them drawn with his PCPs labs in early July. He was also prescribed Rezdiffra - which is documented as approved through 2025 - but is unsure if he is taking this. Asked our clinical team to call him later today to clarify whether or not he is taking this medication. If he is not taking this due to cost would recommend pursuing patient assistance.    He's gained 5 lbs since his last appointment. Again, stressed the importance of an aggressive approach towards weight loss. Unfortunately, he was seen by weight management but did not pursue GLP therapy due to cost. Encouraged him to discuss alternative weight loss medications that may be more cost effective. Also counseled on dietary lifestyle modifications to help promote weight loss.    He is fully aware of the basic pathophysiology of fatty liver disease and potential to progress to cirrhosis if left untreated. Strongly encouraged that he make his health a priority. Aside from  weight loss, he will also continue working on optimization of his metabolic risk factors and complete EtOH avoidance. He will be due for an updated US elastography in August 2025, ordered today.    Orders:  •  CBC and differential; Future  •  Comprehensive metabolic panel; Future  •  Protime-INR; Future  •  Alpha 1 Antitrypsin Phenotype; Future  •  US elastography/UGAP; Future    Gastroesophageal reflux disease, unspecified whether esophagitis present  H. pylori infection  Patient with a longstanding history of GERD and PUD with associated breakthrough reflux and left upper quadrant abdominal pain. His acid suppression regimen was adjusted to include pantoprazole 40 mg twice daily and Pepcid as needed. He had a cross-sectional imaging which was unremarkable, aside from hepatic steatosis, as well as a CBC, CMP and lipase which were normal.      He ultimately had an EGD (6/11/2024) with gastric biopsies positive for H. pylori. He has since been treated with quadruple therapy and has had complete resolution of his prior symptoms. Since his last appointment, he completed a subsequent H. pylori stool Ag with confirmation of eradication.    Continue pantoprazole 40 mg once daily.  Discussed optimization of PPI and dietary/lifestyle modifications to help prevent reflux.       History of colonic polyps  Patient is up-to-date with CRC screening.   Colonoscopy (2/20/2024) notable for 3 subcentimeter polyps (SSA x 1 and TA x 1) and diverticulosis.  Recommended he repeat a colonoscopy x3 years due to personal hx of colonic polyps; Next due 2/2027.        Follow-up in 6 months or sooner if necessary.      History of Present Illness   HPI    Uriel Howell III is a 69 y.o. male with PMH significant for obesity, pre-diabetes, HTN, GERD with a history of gastric ulcer, vitamin B12 deficiency, vitamin D deficiency, anxiety with depression and chronic pain syndrome who presents today for follow-up regarding fatty liver disease.       Interval history  - Last seen on 12/17/2024.   - He did not have his labs drawn as previously ordered (CBC, CMP, INR, A1 AT phenotype).  - His most recent labs (1/15) show normal liver chemistries aside from a deficient alkaline phosphatase and a preserved platelet count.  - H. pylori stool Ag negative (1/14/2025).   - He was prescribed Rezdiffra but unsure if he is taking this.   - He has gained 6 lbs since his last appointment. He was seen by weight management for reports that GLP therapy was too expensive.     Extended liver history  Uriel is familiar to [facility] gastroenterology last seen by Hannah Dave PA-C (12/12/2023). He has been noted to have mild and intermittently elevated transaminases with ALT predominance since June 2022. Also noted to have a deficient alkaline phosphatase level. This prompted an abdominal ultrasound notable for an enlarged (23.7 cm) and fatty appearing liver (severe hepatic steatosis). Subsequent US elastography was notable for F2 fibrosis and S3 steatosis He has also had a complete serologic evaluation negative for viral hepatitis as well as autoimmune and other genetic causes of liver disease. Also negative for celiac disease. Serologies also showed normal liver synthetic function and are without thrombocytopenia or chronic hypoalbuminemia.     In regards to metabolic risk factors, patient's BMI is 36.80 with prediabetes and hypertension. Denies family hsitory of liver disease or liver related cancers. 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 Reports that he is physically active but that he struggles with eating a healthful diet and portion control.      History obtained from: patient    Review of Systems   All other systems reviewed and are negative.    Pertinent Medical History     Medical History Reviewed by provider this encounter:  Tobacco  Allergies  Meds  Problems  Med Hx  Surg Hx  Fam Hx     .  Past Medical History  "  Past Medical History[1]  Past Surgical History[2]  Family History[3]   reports that he quit smoking about 5 years ago. His smoking use included cigarettes. He started smoking about 49 years ago. He has a 44.4 pack-year smoking history. He quit smokeless tobacco use about 11 years ago.  His smokeless tobacco use included chew. He reports that he does not currently use alcohol. He reports that he does not use drugs.  Current Outpatient Medications   Medication Instructions   • busPIRone (BUSPAR) 10 mg, 3 times daily   • celecoxib (CELEBREX) 100 mg, Oral, 2 times daily   • citalopram (CELEXA) 40 mg, Oral, Daily   • ergocalciferol (VITAMIN D2) 50,000 Units, Oral, Every 28 days   • lisinopril (ZESTRIL) 10 mg, Oral, Daily   • Multiple Vitamin (multivitamin) tablet 1 tablet, Oral, Daily   • pantoprazole (PROTONIX) 40 mg, Oral, 2 times daily   • vitamin B-12 (VITAMIN B-12) 1,000 mcg, Oral, Daily   Allergies[4]   Medications Ordered Prior to Encounter[5]   Social History[6]     Objective   /82 (BP Location: Left arm, Patient Position: Sitting, Cuff Size: Large)   Pulse 61   Temp 97.5 °F (36.4 °C) (Temporal)   Resp 16   Ht 5' 8\" (1.727 m)   Wt 107 kg (236 lb)   SpO2 95%   BMI 35.88 kg/m²      Physical Exam  Vitals and nursing note reviewed.   Constitutional:       General: He is not in acute distress.     Appearance: Normal appearance. He is well-developed. He is not ill-appearing or toxic-appearing.   HENT:      Head: Normocephalic and atraumatic.     Eyes:      General: No scleral icterus.     Conjunctiva/sclera: Conjunctivae normal.     Pulmonary:      Effort: Pulmonary effort is normal. No respiratory distress.   Abdominal:      General: There is no distension.      Palpations: Abdomen is soft. There is no mass.      Tenderness: There is no abdominal tenderness.     Musculoskeletal:      Right lower leg: No edema.      Left lower leg: No edema.     Skin:     General: Skin is warm and dry.      Coloration: " Skin is not jaundiced.      Findings: No bruising or rash.     Neurological:      Mental Status: He is alert and oriented to person, place, and time. Mental status is at baseline.     Psychiatric:         Mood and Affect: Mood normal.         Behavior: Behavior normal.                  [1]  Past Medical History:  Diagnosis Date   • Anxiety    • Carpal tunnel syndrome    • Colon polyps    • DJD (degenerative joint disease)    • Duodenal ulcer    • Fatty liver    • Gastric reflux    • Gastric ulcer    • Heel fracture     bilateral   • Hypertension    • Vitamin B12 deficiency    • Vitamin D deficiency    [2]  Past Surgical History:  Procedure Laterality Date   • ANKLE SURGERY      repair   • COLONOSCOPY      polyp removed; benign   • COLONOSCOPY  02/20/2024   • COLONOSCOPY W/ POLYPECTOMY      Followed by Dr. Pang    • FOOT FRACTURE SURGERY Bilateral    • MULTIPLE TOOTH EXTRACTIONS     • POLYPECTOMY     • TIBIA FRACTURE SURGERY Left    [3]  Family History  Problem Relation Name Age of Onset   • Cancer Mother Eve         lung   • Heart disease Mother Eve    • Cancer Father Uriel.         prostate   • Heart murmur Brother     • Diabetes Daughter Jillian    • Diabetes Son Diana    • Diabetes Other grandmother    [4]  No Known Allergies[5]  Current Outpatient Medications on File Prior to Visit   Medication Sig Dispense Refill   • busPIRone (BUSPAR) 10 mg tablet Take 10 mg by mouth in the morning and 10 mg in the evening and 10 mg before bedtime. Once daily.     • celecoxib (CeleBREX) 100 mg capsule Take 1 capsule (100 mg total) by mouth 2 (two) times a day 180 capsule 1   • citalopram (CeleXA) 40 mg tablet take 1 tablet by mouth once daily 100 tablet 1   • ergocalciferol (VITAMIN D2) 50,000 units Take 1 capsule (50,000 Units total) by mouth every 28 days 3 capsule 1   • lisinopril (ZESTRIL) 10 mg tablet take 1 tablet by mouth once daily 90 tablet 0   • Multiple Vitamin (multivitamin) tablet Take 1 tablet by mouth  daily 90 tablet 1   • pantoprazole (PROTONIX) 40 mg tablet take 1 tablet by mouth twice a day 180 tablet 1   • vitamin B-12 (VITAMIN B-12) 1,000 mcg tablet Take 1 tablet (1,000 mcg total) by mouth daily 90 tablet 2   • [DISCONTINUED] Glucosamine-Chondroitin 500-400 MG CAPS Take 1 tablet by mouth in the morning (Patient not taking: Reported on 2025)       No current facility-administered medications on file prior to visit.   [6]  Social History  Tobacco Use   • Smoking status: Former     Current packs/day: 0.00     Average packs/day: 1 pack/day for 44.4 years (44.4 ttl pk-yrs)     Types: Cigarettes     Start date: 1976     Quit date: 2020     Years since quittin.0   • Smokeless tobacco: Former     Types: Chew     Quit date: 2014   Vaping Use   • Vaping status: Never Used   Substance and Sexual Activity   • Alcohol use: Not Currently   • Drug use: No   • Sexual activity: Not Currently     Partners: Female

## 2025-06-17 NOTE — TELEPHONE ENCOUNTER
----- Message from Mariella PONCE sent at 6/17/2025 11:19 AM EDT -----    ----- Message -----  From: Sita Lombardo PA-C  Sent: 6/17/2025  11:06 AM EDT  To: Gastroenterology [address] Clinical    Please call patient later today to determine if he is taking Rezdiffra. If not, please determine why. If this is due to cost then would recommend pursuing patient assistance. Thank you!

## 2025-06-19 ENCOUNTER — APPOINTMENT (OUTPATIENT)
Dept: LAB | Facility: CLINIC | Age: 70
End: 2025-06-19
Payer: MEDICARE

## 2025-06-19 DIAGNOSIS — K74.02 ADVANCED HEPATIC FIBROSIS: ICD-10-CM

## 2025-06-19 DIAGNOSIS — K75.81 METABOLIC DYSFUNCTION-ASSOCIATED STEATOHEPATITIS (MASH): ICD-10-CM

## 2025-06-19 LAB
ALBUMIN SERPL BCG-MCNC: 4.4 G/DL (ref 3.5–5)
ALP SERPL-CCNC: 38 U/L (ref 34–104)
ALT SERPL W P-5'-P-CCNC: 20 U/L (ref 7–52)
ANION GAP SERPL CALCULATED.3IONS-SCNC: 5 MMOL/L (ref 4–13)
AST SERPL W P-5'-P-CCNC: 19 U/L (ref 13–39)
BASOPHILS # BLD AUTO: 0.01 THOUSANDS/ÂΜL (ref 0–0.1)
BASOPHILS NFR BLD AUTO: 0 % (ref 0–1)
BILIRUB SERPL-MCNC: 0.51 MG/DL (ref 0.2–1)
BUN SERPL-MCNC: 20 MG/DL (ref 5–25)
CALCIUM SERPL-MCNC: 9.4 MG/DL (ref 8.4–10.2)
CHLORIDE SERPL-SCNC: 101 MMOL/L (ref 96–108)
CO2 SERPL-SCNC: 32 MMOL/L (ref 21–32)
CREAT SERPL-MCNC: 1.18 MG/DL (ref 0.6–1.3)
EOSINOPHIL # BLD AUTO: 0.08 THOUSAND/ÂΜL (ref 0–0.61)
EOSINOPHIL NFR BLD AUTO: 2 % (ref 0–6)
ERYTHROCYTE [DISTWIDTH] IN BLOOD BY AUTOMATED COUNT: 12 % (ref 11.6–15.1)
GFR SERPL CREATININE-BSD FRML MDRD: 61 ML/MIN/1.73SQ M
GLUCOSE P FAST SERPL-MCNC: 102 MG/DL (ref 65–99)
HCT VFR BLD AUTO: 41.9 % (ref 36.5–49.3)
HGB BLD-MCNC: 14.1 G/DL (ref 12–17)
IMM GRANULOCYTES # BLD AUTO: 0.03 THOUSAND/UL (ref 0–0.2)
IMM GRANULOCYTES NFR BLD AUTO: 1 % (ref 0–2)
INR PPP: 0.95 (ref 0.85–1.19)
LYMPHOCYTES # BLD AUTO: 1.55 THOUSANDS/ÂΜL (ref 0.6–4.47)
LYMPHOCYTES NFR BLD AUTO: 32 % (ref 14–44)
MCH RBC QN AUTO: 31.5 PG (ref 26.8–34.3)
MCHC RBC AUTO-ENTMCNC: 33.7 G/DL (ref 31.4–37.4)
MCV RBC AUTO: 94 FL (ref 82–98)
MONOCYTES # BLD AUTO: 0.43 THOUSAND/ÂΜL (ref 0.17–1.22)
MONOCYTES NFR BLD AUTO: 9 % (ref 4–12)
NEUTROPHILS # BLD AUTO: 2.69 THOUSANDS/ÂΜL (ref 1.85–7.62)
NEUTS SEG NFR BLD AUTO: 56 % (ref 43–75)
NRBC BLD AUTO-RTO: 0 /100 WBCS
PLATELET # BLD AUTO: 173 THOUSANDS/UL (ref 149–390)
PMV BLD AUTO: 9.7 FL (ref 8.9–12.7)
POTASSIUM SERPL-SCNC: 4.6 MMOL/L (ref 3.5–5.3)
PROT SERPL-MCNC: 6.9 G/DL (ref 6.4–8.4)
PROTHROMBIN TIME: 13 SECONDS (ref 12.3–15)
RBC # BLD AUTO: 4.47 MILLION/UL (ref 3.88–5.62)
SODIUM SERPL-SCNC: 138 MMOL/L (ref 135–147)
WBC # BLD AUTO: 4.79 THOUSAND/UL (ref 4.31–10.16)

## 2025-06-19 PROCEDURE — 80053 COMPREHEN METABOLIC PANEL: CPT

## 2025-06-19 PROCEDURE — 82103 ALPHA-1-ANTITRYPSIN TOTAL: CPT

## 2025-06-19 PROCEDURE — 85610 PROTHROMBIN TIME: CPT

## 2025-06-19 PROCEDURE — 85025 COMPLETE CBC W/AUTO DIFF WBC: CPT

## 2025-06-19 PROCEDURE — 82104 ALPHA-1-ANTITRYPSIN PHENO: CPT

## 2025-06-19 PROCEDURE — 36415 COLL VENOUS BLD VENIPUNCTURE: CPT

## 2025-06-23 LAB
A1AT PHENOTYP SERPL IFE: NORMAL
A1AT SERPL-MCNC: 117 MG/DL (ref 101–187)

## 2025-06-23 NOTE — TELEPHONE ENCOUNTER
Patients GI provider:  Dr. Sita Lombardo    Number to return call: [phone]    Reason for call: Pt returning call. Patient states he stop taking Rezdiffra due to the cost. Patient states he can not pay $1000.00 for the medication and is interested in patient assistance program. Please reach out to patient, thank you.    Scheduled procedure/appointment date if applicable: Apt/procedure n/a

## 2025-06-25 NOTE — TELEPHONE ENCOUNTER
Left message on patient voicemail to call back regarding getting a signature for the Rezdiffra assistance application that I have filled out.     Form filled out was the [employer] Patient Support Application.     Will have then scan incomplete form to this telephone encounter.

## 2025-06-26 NOTE — TELEPHONE ENCOUNTER
Patient has received form, signed and sent back to the office. Sita is in [place] 7/01/2025 and will sign her part and we will have the form completed at that time to fax to [facility].

## 2025-06-30 ENCOUNTER — NURSE TRIAGE (OUTPATIENT)
Age: 70
End: 2025-06-30

## 2025-06-30 DIAGNOSIS — R19.7 DIARRHEA, UNSPECIFIED TYPE: ICD-10-CM

## 2025-06-30 DIAGNOSIS — Z86.19 HISTORY OF HELICOBACTER PYLORI INFECTION: ICD-10-CM

## 2025-06-30 DIAGNOSIS — R10.9 LEFT SIDED ABDOMINAL PAIN: Primary | ICD-10-CM

## 2025-06-30 NOTE — TELEPHONE ENCOUNTER
"REASON FOR CONVERSATION: Abdominal Pain and Nausea    SYMPTOMS: L side abdominal pain x couple weeks, unable to rate; nausea; sour taste; dry cough; \"mushy\" BM; difficulty urinating at times    OTHER HEALTH INFORMATION: Pt reports that symptoms feel similar to when he had H. Pylori before; continues to take Protonix 40mg BID    PROTOCOL DISPOSITION: 24 Hour Provider Response (overriding See Today or Tomorrow in Office)    CARE ADVICE PROVIDED: Clear liquids to bland diet, call back if symptoms worsen, ER precautions reviewed and understanding verbalized.  Next OV: Elastography 8/1/25    PRACTICE FOLLOW-UP: please review    Reason for Disposition   MILD pain (e.g., does not interfere with normal activities) and pain comes and goes (cramps) lasts > 48 hours  (Exception: This same abdominal pain is a chronic symptom recurrent or ongoing AND present > 4 weeks.)    Answer Assessment - Initial Assessment Questions  1. LOCATION: \"Where does it hurt?\"       L side  2. RADIATION: \"Does the pain shoot anywhere else?\" (e.g., chest, back)      back  3. ONSET: \"When did the pain begin?\" (Minutes, hours or days ago)       Couple weeks  4. SUDDEN: \"Gradual or sudden onset?\"      gradually  5. PATTERN \"Does the pain come and go, or is it constant?\"      constant  6. SEVERITY: \"How bad is the pain?\"  (e.g., Scale 1-10; mild, moderate, or severe)      Unable to rate  7. RECURRENT SYMPTOM: \"Have you ever had this type of stomach pain before?\" If Yes, ask: \"When was the last time?\" and \"What happened that time?\"       Yes, when he had h. pylori  8. CAUSE: \"What do you think is causing the stomach pain?\"      unsure  9. RELIEVING/AGGRAVATING FACTORS: \"What makes it better or worse?\" (e.g., antacids, bending or twisting motion, bowel movement)      denies  10. OTHER SYMPTOMS: \"Do you have any other symptoms?\" (e.g., back pain, diarrhea, fever, urination pain, vomiting)        Mushy BM, nausea, sour taste    Protocols used: Abdominal Pain " - Male-Adult-[place]

## 2025-07-01 NOTE — TELEPHONE ENCOUNTER
Patient calling for recommendations.  Willing to do CT and stool studies.  Please order.  Will stop PPI today.  Wants to let you know that he does have PCP OV tomorrow to be seen.    Wants to let you know that did fax information to patient support for Kenneth.

## 2025-07-01 NOTE — TELEPHONE ENCOUNTER
Called patient for further clarification.    Patient reports primarily left upper quadrant abdominal pain which wraps around to the back which is been intermittent for the past 4 weeks but more consistent within the last week. It occasionally radiates into his left lower quadrant and into his groin. He also describes an increase in reflux, sour taste in his mouth, nausea and mushy or soft stools. He reports similar symptoms prior to being diagnosed with H. pylori. Pain is a 5/10 at its worst. No fever/chills, vomiting, other abdominal pain, overt GI bleeding, constipation or unintentional weight loss.    Plan for a CT A/P with IV and p.o. contrast to rule out diverticulitis.  Patient has stopped taking his PPI and will complete infectious/inflammatory stool studies, including an H. pylori stool Ag, x2 weeks.  He may take OTC Pepcid and Tums as needed for reflux while off of his PPI.  He was given the number to [facility]'s Central scheduling to call and schedule his CT scan.  He is also aware that he will need to  oral contrast for this study.  Also reviewed his most recent labs (6/19) with overall normal LFTs and A1AT phenotype.  He is scheduled with his PCP tomorrow.    Patient gave verbal understanding of recommendations. All questions were answered to his satisfaction.

## 2025-07-02 ENCOUNTER — HOSPITAL ENCOUNTER (OUTPATIENT)
Dept: CT IMAGING | Facility: HOSPITAL | Age: 70
Discharge: HOME | End: 2025-07-02
Attending: FAMILY MEDICINE
Payer: MEDICARE

## 2025-07-02 ENCOUNTER — RESULTS FOLLOW-UP (OUTPATIENT)
Age: 70
End: 2025-07-02

## 2025-07-02 DIAGNOSIS — R19.7 DIARRHEA, UNSPECIFIED TYPE: ICD-10-CM

## 2025-07-02 DIAGNOSIS — Z86.19 HISTORY OF HELICOBACTER PYLORI INFECTION: ICD-10-CM

## 2025-07-02 DIAGNOSIS — R10.9 LEFT SIDED ABDOMINAL PAIN: ICD-10-CM

## 2025-07-02 PROCEDURE — 74177 CT ABD & PELVIS W/CONTRAST: CPT

## 2025-07-02 RX ADMIN — IOHEXOL 50 ML: 240 INJECTION, SOLUTION INTRATHECAL; INTRAVASCULAR; INTRAVENOUS; ORAL at 11:52

## 2025-07-02 RX ADMIN — IOHEXOL 100 ML: 350 INJECTION, SOLUTION INTRAVENOUS at 11:52

## 2025-07-07 ENCOUNTER — APPOINTMENT (OUTPATIENT)
Dept: LAB | Facility: CLINIC | Age: 70
End: 2025-07-07

## 2025-07-07 DIAGNOSIS — Z86.19 HISTORY OF HELICOBACTER PYLORI INFECTION: ICD-10-CM

## 2025-07-07 DIAGNOSIS — R19.7 DIARRHEA, UNSPECIFIED TYPE: ICD-10-CM

## 2025-07-07 DIAGNOSIS — R10.9 LEFT SIDED ABDOMINAL PAIN: ICD-10-CM

## 2025-07-11 ENCOUNTER — TRANSCRIBE ORDERS (OUTPATIENT)
Age: 70
End: 2025-07-11

## 2025-07-16 NOTE — TELEPHONE ENCOUNTER
Shira from [facility]'s lab calling in regards to stool samples that were received. States that the samples were improperly collected. States that there were feces all over the bag patient brought in so it needed to be discarded. Shira did try to reach patient multiple times in the last two days by both numbers in the chart but has been unsuccessful. Calling to let provider know.

## 2025-07-17 ENCOUNTER — PATIENT MESSAGE (OUTPATIENT)
Dept: GASTROENTEROLOGY | Facility: CLINIC | Age: 70
End: 2025-07-17

## 2025-07-23 ENCOUNTER — APPOINTMENT (OUTPATIENT)
Dept: LAB | Facility: CLINIC | Age: 70
End: 2025-07-23

## 2025-07-24 ENCOUNTER — APPOINTMENT (OUTPATIENT)
Dept: LAB | Facility: CLINIC | Age: 70
End: 2025-07-24
Payer: MEDICARE

## 2025-07-24 DIAGNOSIS — K74.02 STAGE 3 HEPATIC FIBROSIS: ICD-10-CM

## 2025-07-24 DIAGNOSIS — K75.81 METABOLIC DYSFUNCTION-ASSOCIATED STEATOHEPATITIS (MASH): ICD-10-CM

## 2025-07-24 PROCEDURE — 87338 HPYLORI STOOL AG IA: CPT

## 2025-07-24 PROCEDURE — 87329 GIARDIA AG IA: CPT

## 2025-07-24 PROCEDURE — 87209 SMEAR COMPLEX STAIN: CPT

## 2025-07-24 PROCEDURE — 87506 IADNA-DNA/RNA PROBE TQ 6-11: CPT

## 2025-07-24 PROCEDURE — 83993 ASSAY FOR CALPROTECTIN FECAL: CPT

## 2025-07-24 PROCEDURE — 87177 OVA AND PARASITES SMEARS: CPT

## 2025-07-25 LAB
C COLI+JEJUNI TUF STL QL NAA+PROBE: NEGATIVE
C DIFF TOX B TCDB STL QL NAA+PROBE: NEGATIVE
CALPROTECTIN STL-MCNC: 48.3 ÂΜG/G
EC STX1+STX2 GENES STL QL NAA+PROBE: NEGATIVE
H PYLORI AG STL QL IA: NEGATIVE
SALMONELLA SP SPAO STL QL NAA+PROBE: NEGATIVE
SHIGELLA SP+EIEC IPAH STL QL NAA+PROBE: NEGATIVE

## 2025-07-28 LAB — G LAMBLIA AG STL QL IA: NEGATIVE

## 2025-08-01 ENCOUNTER — HOSPITAL ENCOUNTER (OUTPATIENT)
Dept: ULTRASOUND IMAGING | Facility: HOSPITAL | Age: 70
Discharge: HOME | End: 2025-08-01
Attending: FAMILY MEDICINE
Payer: MEDICARE

## 2025-08-01 ENCOUNTER — APPOINTMENT (OUTPATIENT)
Dept: RADIOLOGY | Facility: HOSPITAL | Age: 70
End: 2025-08-01
Payer: MEDICARE

## 2025-08-01 DIAGNOSIS — K74.02 ADVANCED HEPATIC FIBROSIS: ICD-10-CM

## 2025-08-01 DIAGNOSIS — K75.81 METABOLIC DYSFUNCTION-ASSOCIATED STEATOHEPATITIS (MASH): ICD-10-CM

## 2025-08-01 PROCEDURE — 76981 USE PARENCHYMA: CPT

## (undated) DEVICE — NEEDLE 25G X 1 1/2

## (undated) DEVICE — CHLORAPREP HI-LITE 26ML ORANGE

## (undated) DEVICE — K-WIRE THREADED DIAMOND POINT                                    ROUND END .045 X 9
Type: IMPLANTABLE DEVICE | Site: HAND | Status: NON-FUNCTIONAL
Removed: 2025-01-20

## (undated) DEVICE — GLOVE PI ULTRA TOUCH SZ.6.5

## (undated) DEVICE — C-WIRE PAK DOUBLE ENDED ORTHOPAEDIC WIRE, SPADE, .035" (0.89 MM)
Type: IMPLANTABLE DEVICE | Status: NON-FUNCTIONAL
Brand: C-WIRE
Removed: 2025-01-20

## (undated) DEVICE — PAD GROUNDING DUAL ADULT

## (undated) DEVICE — SUT CHROMIC 4-0 PS-4 18 IN 1643G

## (undated) DEVICE — GLOVE SRG BIOGEL ECLIPSE 7.5

## (undated) DEVICE — CUFF TOURNIQUET 18 X 4 IN QUICK CONNECT DISP 1 BLADDER

## (undated) DEVICE — DRAPE SHEET THREE QUARTER

## (undated) DEVICE — WEBRIL 6 IN UNSTERILE

## (undated) DEVICE — CABLE BIPOLAR DISP MEGADYNE

## (undated) DEVICE — SPONGE SCRUB 4 PCT CHLORHEXIDINE

## (undated) DEVICE — OCCLUSIVE GAUZE STRIP,3% BISMUTH TRIBROMOPHENATE IN PETROLATUM BLEND: Brand: XEROFORM

## (undated) DEVICE — GLOVE INDICATOR PI UNDERGLOVE SZ 7 BLUE

## (undated) DEVICE — BETHLEHEM UNIVERSAL  MIONR EXT: Brand: CARDINAL HEALTH

## (undated) DEVICE — GAUZE SPONGES,USP TYPE VII GAUZE, 12 PLY: Brand: CURITY

## (undated) DEVICE — IMPERVIOUS STOCKINETTE: Brand: DEROYAL

## (undated) DEVICE — C-ARM: Brand: UNBRANDED

## (undated) DEVICE — COBAN 4 IN STERILE

## (undated) DEVICE — ADHESIVE SKIN CLOSURE SYS EXOFIN FUSION 22CM

## (undated) DEVICE — INTENDED FOR TISSUE SEPARATION, AND OTHER PROCEDURES THAT REQUIRE A SHARP SURGICAL BLADE TO PUNCTURE OR CUT.: Brand: BARD-PARKER ® CARBON RIB-BACK BLADES

## (undated) DEVICE — GLOVE SRG BIOGEL ECLIPSE 6.5

## (undated) DEVICE — WET SKIN PREP TRAY: Brand: MEDLINE INDUSTRIES, INC.

## (undated) DEVICE — EXOFIN PRECISION PEN HIGH VISCOSITY TOPICAL SKIN ADHESIVE: Brand: EXOFIN PRECISION PEN, 1G

## (undated) DEVICE — DRAPE STERI 1010 18IN X 12IN